# Patient Record
Sex: FEMALE | Race: WHITE | NOT HISPANIC OR LATINO | Employment: OTHER | URBAN - METROPOLITAN AREA
[De-identification: names, ages, dates, MRNs, and addresses within clinical notes are randomized per-mention and may not be internally consistent; named-entity substitution may affect disease eponyms.]

---

## 2019-05-07 ENCOUNTER — OFFICE VISIT (OUTPATIENT)
Dept: FAMILY MEDICINE CLINIC | Facility: CLINIC | Age: 44
End: 2019-05-07
Payer: COMMERCIAL

## 2019-05-07 VITALS
SYSTOLIC BLOOD PRESSURE: 116 MMHG | WEIGHT: 126 LBS | TEMPERATURE: 99.5 F | BODY MASS INDEX: 19.1 KG/M2 | HEIGHT: 68 IN | OXYGEN SATURATION: 97 % | HEART RATE: 83 BPM | DIASTOLIC BLOOD PRESSURE: 78 MMHG

## 2019-05-07 DIAGNOSIS — Z12.31 VISIT FOR SCREENING MAMMOGRAM: ICD-10-CM

## 2019-05-07 DIAGNOSIS — K44.9 HIATAL HERNIA: ICD-10-CM

## 2019-05-07 DIAGNOSIS — Z12.4 CERVICAL CANCER SCREENING: ICD-10-CM

## 2019-05-07 DIAGNOSIS — Z12.39 BREAST CANCER SCREENING: ICD-10-CM

## 2019-05-07 DIAGNOSIS — K29.70 GASTRITIS WITHOUT BLEEDING, UNSPECIFIED CHRONICITY, UNSPECIFIED GASTRITIS TYPE: Primary | ICD-10-CM

## 2019-05-07 DIAGNOSIS — B07.0 PLANTAR WART, LEFT FOOT: ICD-10-CM

## 2019-05-07 PROCEDURE — 99203 OFFICE O/P NEW LOW 30 MIN: CPT | Performed by: FAMILY MEDICINE

## 2019-06-07 ENCOUNTER — OFFICE VISIT (OUTPATIENT)
Dept: GASTROENTEROLOGY | Facility: CLINIC | Age: 44
End: 2019-06-07
Payer: COMMERCIAL

## 2019-06-07 VITALS
SYSTOLIC BLOOD PRESSURE: 100 MMHG | RESPIRATION RATE: 16 BRPM | DIASTOLIC BLOOD PRESSURE: 80 MMHG | BODY MASS INDEX: 18.79 KG/M2 | WEIGHT: 124 LBS | HEIGHT: 68 IN | HEART RATE: 70 BPM

## 2019-06-07 DIAGNOSIS — K64.9 HEMORRHOIDS, UNSPECIFIED HEMORRHOID TYPE: ICD-10-CM

## 2019-06-07 DIAGNOSIS — K44.9 HIATAL HERNIA: ICD-10-CM

## 2019-06-07 DIAGNOSIS — Z86.010 HISTORY OF COLON POLYPS: ICD-10-CM

## 2019-06-07 DIAGNOSIS — R11.0 NAUSEA: ICD-10-CM

## 2019-06-07 DIAGNOSIS — R10.13 EPIGASTRIC PAIN: Primary | ICD-10-CM

## 2019-06-07 DIAGNOSIS — K29.70 GASTRITIS WITHOUT BLEEDING, UNSPECIFIED CHRONICITY, UNSPECIFIED GASTRITIS TYPE: ICD-10-CM

## 2019-06-07 PROCEDURE — 99244 OFF/OP CNSLTJ NEW/EST MOD 40: CPT | Performed by: INTERNAL MEDICINE

## 2019-06-16 ENCOUNTER — HOSPITAL ENCOUNTER (OUTPATIENT)
Dept: ULTRASOUND IMAGING | Facility: HOSPITAL | Age: 44
Discharge: HOME | End: 2019-06-16
Attending: INTERNAL MEDICINE
Payer: COMMERCIAL

## 2019-06-16 DIAGNOSIS — R11.0 NAUSEA: ICD-10-CM

## 2019-06-16 DIAGNOSIS — R10.13 EPIGASTRIC PAIN: ICD-10-CM

## 2019-06-16 PROCEDURE — 76705 ECHO EXAM OF ABDOMEN: CPT

## 2019-06-21 ENCOUNTER — TELEPHONE (OUTPATIENT)
Dept: GASTROENTEROLOGY | Facility: CLINIC | Age: 44
End: 2019-06-21

## 2019-06-21 DIAGNOSIS — N28.9 RENAL LESION: Primary | ICD-10-CM

## 2019-06-24 ENCOUNTER — OFFICE VISIT (OUTPATIENT)
Dept: UROLOGY | Facility: AMBULATORY SURGERY CENTER | Age: 44
End: 2019-06-24
Payer: COMMERCIAL

## 2019-06-24 ENCOUNTER — TELEPHONE (OUTPATIENT)
Dept: GASTROENTEROLOGY | Facility: CLINIC | Age: 44
End: 2019-06-24

## 2019-06-24 VITALS
BODY MASS INDEX: 20 KG/M2 | DIASTOLIC BLOOD PRESSURE: 62 MMHG | HEIGHT: 67 IN | SYSTOLIC BLOOD PRESSURE: 100 MMHG | WEIGHT: 127.4 LBS | HEART RATE: 62 BPM

## 2019-06-24 DIAGNOSIS — N28.9 RENAL LESION: Primary | ICD-10-CM

## 2019-06-24 PROCEDURE — 99244 OFF/OP CNSLTJ NEW/EST MOD 40: CPT | Performed by: UROLOGY

## 2019-06-25 ENCOUNTER — APPOINTMENT (OUTPATIENT)
Dept: LAB | Facility: CLINIC | Age: 44
End: 2019-06-25
Payer: COMMERCIAL

## 2019-06-25 DIAGNOSIS — N28.9 RENAL LESION: ICD-10-CM

## 2019-06-25 LAB
ANION GAP SERPL CALCULATED.3IONS-SCNC: 5 MMOL/L (ref 4–13)
BUN SERPL-MCNC: 14 MG/DL (ref 5–25)
CALCIUM SERPL-MCNC: 8.8 MG/DL (ref 8.3–10.1)
CHLORIDE SERPL-SCNC: 105 MMOL/L (ref 100–108)
CO2 SERPL-SCNC: 26 MMOL/L (ref 21–32)
CREAT SERPL-MCNC: 0.79 MG/DL (ref 0.6–1.3)
GFR SERPL CREATININE-BSD FRML MDRD: 91 ML/MIN/1.73SQ M
GLUCOSE P FAST SERPL-MCNC: 76 MG/DL (ref 65–99)
POTASSIUM SERPL-SCNC: 4.3 MMOL/L (ref 3.5–5.3)
SODIUM SERPL-SCNC: 136 MMOL/L (ref 136–145)

## 2019-06-25 PROCEDURE — 36415 COLL VENOUS BLD VENIPUNCTURE: CPT

## 2019-06-25 PROCEDURE — 80048 BASIC METABOLIC PNL TOTAL CA: CPT

## 2019-06-28 ENCOUNTER — HOSPITAL ENCOUNTER (OUTPATIENT)
Dept: CT IMAGING | Facility: HOSPITAL | Age: 44
Discharge: HOME | End: 2019-06-28
Attending: UROLOGY
Payer: COMMERCIAL

## 2019-06-28 DIAGNOSIS — N28.9 RENAL LESION: ICD-10-CM

## 2019-06-28 PROCEDURE — 74170 CT ABD WO CNTRST FLWD CNTRST: CPT

## 2019-06-28 RX ADMIN — IOHEXOL 100 ML: 350 INJECTION, SOLUTION INTRAVENOUS at 06:54

## 2019-07-08 ENCOUNTER — TELEPHONE (OUTPATIENT)
Dept: GASTROENTEROLOGY | Facility: CLINIC | Age: 44
End: 2019-07-08

## 2019-07-10 ENCOUNTER — OFFICE VISIT (OUTPATIENT)
Dept: UROLOGY | Facility: AMBULATORY SURGERY CENTER | Age: 44
End: 2019-07-10
Payer: COMMERCIAL

## 2019-07-10 VITALS
WEIGHT: 127.2 LBS | DIASTOLIC BLOOD PRESSURE: 60 MMHG | SYSTOLIC BLOOD PRESSURE: 100 MMHG | BODY MASS INDEX: 19.97 KG/M2 | HEIGHT: 67 IN | HEART RATE: 80 BPM

## 2019-07-10 DIAGNOSIS — N28.89 RENAL MASS, RIGHT: ICD-10-CM

## 2019-07-10 DIAGNOSIS — F41.9 ANXIETY: Primary | ICD-10-CM

## 2019-07-10 PROCEDURE — 99215 OFFICE O/P EST HI 40 MIN: CPT | Performed by: UROLOGY

## 2019-07-10 RX ORDER — ALPRAZOLAM 0.25 MG/1
0.25 TABLET ORAL 2 TIMES DAILY PRN
Qty: 20 TABLET | Refills: 0 | Status: SHIPPED | OUTPATIENT
Start: 2019-07-10 | End: 2020-08-07 | Stop reason: SDUPTHER

## 2019-07-10 RX ORDER — CEFAZOLIN SODIUM 1 G/50ML
1000 SOLUTION INTRAVENOUS ONCE
Status: CANCELLED | OUTPATIENT
Start: 2019-07-10 | End: 2019-07-10

## 2019-07-10 NOTE — PROGRESS NOTES
Assessment/Plan:    Renal lesion  Had a very lengthy discussion with the patient and her   We reviewed her imaging in detail  We discussed that the lesion is concerning for malignancy and does not look like a angiomyolipoma on CT scan  We discussed there is a possible 15% chance that the lesion is benign  We discussed options including observation, biopsy, cryoablation, and surgical extirpation  The risks and benefits of all these options were discussed  The patient wishes to proceed with right robotic partial nephrectomy  We discussed all of the risks of the surgery and consented her for the surgery  All of her questions were answered  We will schedule this in the near future  The patient also asked for a refill on her xanax for help with her anxiety regarding this new diagnosis and to help her quit smoking  I did give her a small prescription and recommended that she discuss this further with her primary doctor  Diagnoses and all orders for this visit:    Anxiety  -     ALPRAZolam (XANAX) 0 25 mg tablet; Take 1 tablet (0 25 mg total) by mouth 2 (two) times a day as needed for anxiety    Renal mass, right  -     Case request operating room: [facility]; Standing  -     Case request operating room: NEPHRECTOMY PARTIAL LAPAROSCOPIC W ROBOTICS    Other orders  -     Diet NPO; Sips with meds; Standing  -     Place sequential compression device; Standing  -     ceFAZolin (ANCEF) IVPB (premix) 1,000 mg          Total visit time was 40 minutes of which over 50% was spent on counseling  Subjective:     Patient ID: Kelley Connolly is a 38 y o  female    79-year-old female presents for follow-up of recently diagnosed right renal mass  The patient underwent CT scan for further characterization and is here to discuss the results  She denies any changes in her health since her last appointment and has no new complaints          The following portions of the patient's history were reviewed and updated as appropriate: allergies, current medications, past family history, past medical history, past social history, past surgical history and problem list     Review of Systems   Constitutional: Negative  HENT: Negative  Eyes: Negative  Respiratory: Negative  Cardiovascular: Negative  Gastrointestinal: Negative  Endocrine: Negative  Genitourinary:        As noted per HPI   Musculoskeletal: Negative  Skin: Negative  Allergic/Immunologic: Negative  Neurological: Negative  Hematological: Negative  Psychiatric/Behavioral: Negative  Objective:    Physical Exam   Constitutional: She is oriented to person, place, and time  She appears well-developed and well-nourished  HENT:   Head: Normocephalic and atraumatic  Neck: Normal range of motion  Neck supple  Cardiovascular: Intact distal pulses  Pulmonary/Chest: Effort normal    Abdominal: Soft  Bowel sounds are normal  She exhibits no distension and no mass  There is no tenderness  There is no rebound and no guarding  Musculoskeletal: Normal range of motion  Neurological: She is alert and oriented to person, place, and time  Skin: Skin is warm and dry  Psychiatric: She has a normal mood and affect  Vitals reviewed          Results  No results found for: PSA  Lab Results   Component Value Date    CALCIUM 8 8 06/25/2019    K 4 3 06/25/2019    CO2 26 06/25/2019     06/25/2019    BUN 14 06/25/2019    CREATININE 0 79 06/25/2019     No results found for: WBC, HGB, HCT, MCV, PLT    No results found for this or any previous visit (from the past 1 hour(s)) ]

## 2019-07-11 ENCOUNTER — TELEPHONE (OUTPATIENT)
Dept: UROLOGY | Facility: AMBULATORY SURGERY CENTER | Age: 44
End: 2019-07-11

## 2019-07-11 NOTE — TELEPHONE ENCOUNTER
Called patient back -we discussed her having her period during  Surgery, pain after surgery which she doesn't want any pain medication  Explained that she needs to write down her questions prior to surgery if she has more questions

## 2019-07-14 ENCOUNTER — ANESTHESIA EVENT (OUTPATIENT)
Dept: GASTROENTEROLOGY | Facility: HOSPITAL | Age: 44
End: 2019-07-14

## 2019-07-14 RX ORDER — SODIUM CHLORIDE, SODIUM LACTATE, POTASSIUM CHLORIDE, CALCIUM CHLORIDE 600; 310; 30; 20 MG/100ML; MG/100ML; MG/100ML; MG/100ML
125 INJECTION, SOLUTION INTRAVENOUS CONTINUOUS
Status: CANCELLED | OUTPATIENT
Start: 2019-07-14

## 2019-07-15 ENCOUNTER — HOSPITAL ENCOUNTER (OUTPATIENT)
Dept: GASTROENTEROLOGY | Facility: HOSPITAL | Age: 44
Setting detail: OUTPATIENT SURGERY
Discharge: HOME | End: 2019-07-15
Attending: INTERNAL MEDICINE | Admitting: INTERNAL MEDICINE
Payer: COMMERCIAL

## 2019-07-15 ENCOUNTER — ANESTHESIA (OUTPATIENT)
Dept: GASTROENTEROLOGY | Facility: HOSPITAL | Age: 44
End: 2019-07-15

## 2019-07-15 ENCOUNTER — TELEPHONE (OUTPATIENT)
Dept: UROLOGY | Facility: AMBULATORY SURGERY CENTER | Age: 44
End: 2019-07-15

## 2019-07-15 VITALS
RESPIRATION RATE: 16 BRPM | OXYGEN SATURATION: 100 % | SYSTOLIC BLOOD PRESSURE: 146 MMHG | TEMPERATURE: 97.9 F | WEIGHT: 124.34 LBS | BODY MASS INDEX: 19.52 KG/M2 | HEART RATE: 72 BPM | HEIGHT: 67 IN | DIASTOLIC BLOOD PRESSURE: 77 MMHG

## 2019-07-15 DIAGNOSIS — K29.70 GASTRITIS WITHOUT BLEEDING, UNSPECIFIED CHRONICITY, UNSPECIFIED GASTRITIS TYPE: ICD-10-CM

## 2019-07-15 DIAGNOSIS — R10.13 EPIGASTRIC PAIN: ICD-10-CM

## 2019-07-15 DIAGNOSIS — K44.9 HIATAL HERNIA: ICD-10-CM

## 2019-07-15 DIAGNOSIS — R11.0 NAUSEA: ICD-10-CM

## 2019-07-15 DIAGNOSIS — Z86.010 HISTORY OF COLON POLYPS: ICD-10-CM

## 2019-07-15 DIAGNOSIS — K64.9 HEMORRHOIDS, UNSPECIFIED HEMORRHOID TYPE: ICD-10-CM

## 2019-07-15 PROBLEM — N28.89 RENAL MASS, RIGHT: Status: ACTIVE | Noted: 2019-07-15

## 2019-07-15 LAB
EXT PREGNANCY TEST URINE: NEGATIVE
EXT. CONTROL: NORMAL

## 2019-07-15 PROCEDURE — 43239 EGD BIOPSY SINGLE/MULTIPLE: CPT | Performed by: INTERNAL MEDICINE

## 2019-07-15 PROCEDURE — 88305 TISSUE EXAM BY PATHOLOGIST: CPT | Performed by: PATHOLOGY

## 2019-07-15 PROCEDURE — 81025 URINE PREGNANCY TEST: CPT | Performed by: INTERNAL MEDICINE

## 2019-07-15 PROCEDURE — 45384 COLONOSCOPY W/LESION REMOVAL: CPT | Performed by: INTERNAL MEDICINE

## 2019-07-15 RX ORDER — PROPOFOL 10 MG/ML
INJECTION, EMULSION INTRAVENOUS AS NEEDED
Status: DISCONTINUED | OUTPATIENT
Start: 2019-07-15 | End: 2019-07-15 | Stop reason: SURG

## 2019-07-15 RX ORDER — SODIUM CHLORIDE, SODIUM LACTATE, POTASSIUM CHLORIDE, CALCIUM CHLORIDE 600; 310; 30; 20 MG/100ML; MG/100ML; MG/100ML; MG/100ML
INJECTION, SOLUTION INTRAVENOUS CONTINUOUS PRN
Status: DISCONTINUED | OUTPATIENT
Start: 2019-07-15 | End: 2019-07-15 | Stop reason: SURG

## 2019-07-15 RX ORDER — LIDOCAINE HYDROCHLORIDE 10 MG/ML
INJECTION, SOLUTION INFILTRATION; PERINEURAL AS NEEDED
Status: DISCONTINUED | OUTPATIENT
Start: 2019-07-15 | End: 2019-07-15 | Stop reason: SURG

## 2019-07-15 RX ORDER — ACETAMINOPHEN 325 MG/1
650 TABLET ORAL EVERY 6 HOURS PRN
COMMUNITY
End: 2021-04-16 | Stop reason: HOSPADM

## 2019-07-15 RX ADMIN — PROPOFOL 50 MG: 10 INJECTION, EMULSION INTRAVENOUS at 07:35

## 2019-07-15 RX ADMIN — PROPOFOL 50 MG: 10 INJECTION, EMULSION INTRAVENOUS at 07:47

## 2019-07-15 RX ADMIN — LIDOCAINE HYDROCHLORIDE 50 MG: 10 INJECTION, SOLUTION INFILTRATION; PERINEURAL at 07:32

## 2019-07-15 RX ADMIN — PROPOFOL 50 MG: 10 INJECTION, EMULSION INTRAVENOUS at 07:44

## 2019-07-15 RX ADMIN — PROPOFOL 100 MG: 10 INJECTION, EMULSION INTRAVENOUS at 07:32

## 2019-07-15 RX ADMIN — PROPOFOL 50 MG: 10 INJECTION, EMULSION INTRAVENOUS at 07:38

## 2019-07-15 RX ADMIN — SODIUM CHLORIDE, SODIUM LACTATE, POTASSIUM CHLORIDE, AND CALCIUM CHLORIDE: .6; .31; .03; .02 INJECTION, SOLUTION INTRAVENOUS at 07:30

## 2019-07-15 RX ADMIN — PROPOFOL 50 MG: 10 INJECTION, EMULSION INTRAVENOUS at 07:40

## 2019-07-15 RX ADMIN — PROPOFOL 50 MG: 10 INJECTION, EMULSION INTRAVENOUS at 07:42

## 2019-07-15 NOTE — ANESTHESIA POSTPROCEDURE EVALUATION
Post-Op Assessment Note    CV Status:  Stable    Pain management: adequate     Mental Status:  Alert and awake   Hydration Status:  Euvolemic   PONV Controlled:  Controlled   Airway Patency:  Patent   Post Op Vitals Reviewed: Yes      Staff: CRNA           BP   92/52   Temp      Pulse  80   Resp 18   SpO2   94

## 2019-07-15 NOTE — TELEPHONE ENCOUNTER
Spoke to Patient to confirm surgery with Dr Torrence Primrose 8/26/19 @ [facility]  Instructions given and testing needed discussed  Paperwork to be emailed today

## 2019-07-15 NOTE — H&P
History and Physical -  [facility]  April Cathleen 44 y o  female MRN: [id number]                  HPI: April Alan Madsen is a 38y o  year old female who presents for EGD and colonoscopy for epigastric pain, nausea, hiatal hernia, history of gastritis, history of colon polyps  Last colonoscopy 5 years ago  REVIEW OF SYSTEMS: Per the HPI, and otherwise unremarkable  Historical Information   Past Medical History:   Diagnosis Date    Chronic kidney disease     tumor and cyst    Colon polyp     Gastritis      History reviewed  No pertinent surgical history  Social History   Social History     Substance and Sexual Activity   Alcohol Use Not Currently     Social History     Substance and Sexual Activity   Drug Use Never     Social History     Tobacco Use   Smoking Status Current Every Day Smoker    Packs/day: 1 00    Years: 25 00    Pack years: 25 00   Smokeless Tobacco Never Used     Family History   Problem Relation Age of Onset    Heart disease Father     Cancer Paternal Grandmother     Diabetes Paternal Grandmother     Other Maternal Aunt         Renal Cell Carcinoma       Meds/Allergies       (Not in a hospital admission)    Allergies   Allergen Reactions    Strawberry (Diagnostic) Hives    Tetracycline      breast swelling        Objective     Blood pressure 118/74, pulse 81, temperature 98 3 °F (36 8 °C), temperature source Oral, resp  rate 14, height 5' 7" (1 702 m), weight 56 4 kg (124 lb 5 4 oz), SpO2 97 %        PHYSICAL EXAM    Gen: NAD  CV: RRR  CHEST: Clear  ABD: soft, NT/ND  EXT: no edema  Neuro: AAO      ASSESSMENT/PLAN:  This is a 38y o  year old female here for epigastric pain, nausea, history of polyps    PLAN:   Procedure:  EGD and colonoscopy

## 2019-07-15 NOTE — DISCHARGE INSTRUCTIONS
Upper Endoscopy   WHAT YOU NEED TO KNOW:   An upper endoscopy is also called an upper gastrointestinal (GI) endoscopy, or an esophagogastroduodenoscopy (EGD)  You may feel bloated, gassy, or have some abdominal discomfort after your procedure  Your throat may be sore for 24 to 36 hours  You may burp or pass gas from air that is still inside your body  DISCHARGE INSTRUCTIONS:   Call 911 for any of the following:   · You have sudden chest pain or trouble breathing  Seek care immediately if:   · You feel dizzy or faint  · You have trouble swallowing  · Your bowel movements are very dark or black  · Your abdomen is hard and firm and you have severe pain  · You vomit blood  Contact your healthcare provider if:   · You feel full or bloated and cannot burp or pass gas  · You have not had a bowel movement for 3 days after your procedure  · You have neck pain  · You have a fever or chills  · You have nausea or are vomiting  · You have a rash or hives  · You have questions or concerns about your endoscopy  Relieve a sore throat:  Suck on throat lozenges or crushed ice  Gargle with a small amount of warm salt water  Mix 1 teaspoon of salt and 1 cup of warm water to make salt water  Relieve gas and discomfort from bloating:  Lie on your right side with a heating pad on your abdomen  Take short walks to help pass gas  Eat small meals until bloating is relieved  Rest after your procedure: You have been given medicine to relax you  Do not  drive or make important decisions until the day after your procedure  Return to your normal activity as directed  You can usually return to work the day after your procedure  Follow up with your healthcare provider as directed:  Write down your questions so you remember to ask them during your visits     © 2017 [address] is for End User's use only and may not be sold, redistributed or otherwise used for commercial purposes  All illustrations and images included in CareNotes® are the copyrighted property of [employer]  or Anthony Ramachandran  The above information is an  only  It is not intended as medical advice for individual conditions or treatments  Talk to your doctor, nurse or pharmacist before following any medical regimen to see if it is safe and effective for you  Colonoscopy   WHAT YOU NEED TO KNOW:   A colonoscopy is a procedure to examine the inside of your colon (intestine) with a scope  Polyps or tissue growths may have been removed during your colonoscopy  It is normal to feel bloated and to have some abdominal discomfort  You should be passing gas  If you have hemorrhoids or you had polyps removed, you may have a small amount of bleeding  DISCHARGE INSTRUCTIONS:   Seek care immediately if:   · You have a large amount of bright red blood in your bowel movements  · Your abdomen is hard and firm and you have severe pain  · You have sudden trouble breathing  Contact your healthcare provider if:   · You develop a rash or hives  · You have a fever within 24 hours of your procedure       · You have not had a bowel movement for 3 days after your procedure  · You have questions or concerns about your condition or care  Activity:   · Do not lift, strain, or run  for 3 days after your procedure  · Rest after your procedure  You have been given medicine to relax you  Do not  drive or make important decisions until the day after your procedure  Return to your normal activity as directed  · Relieve gas and discomfort from bloating  by lying on your right side with a heating pad on your abdomen  You may need to take short walks to help the gas move out  Eat small meals until bloating is relieved  If you had polyps removed: For 7 days after your procedure:  · Do not  take aspirin  · Do not  go on long car rides    Follow up with your healthcare provider as directed: Write down your questions so you remember to ask them during your visits  © 2017 [address] is for End User's use only and may not be sold, redistributed or otherwise used for commercial purposes  All illustrations and images included in CareNotes® are the copyrighted property of [employer]  or Anthony Ramachandran  The above information is an  only  It is not intended as medical advice for individual conditions or treatments  Talk to your doctor, nurse or pharmacist before following any medical regimen to see if it is safe and effective for you

## 2019-07-15 NOTE — ANESTHESIA PREPROCEDURE EVALUATION
Review of Systems/Medical History  Patient summary reviewed  Chart reviewed  No history of anesthetic complications     Cardiovascular  Exercise tolerance (METS): >4,     Pulmonary  Smoker cigarette smoker  , Tobacco cessation counseling given ,        GI/Hepatic     Hiatal hernia,             Endo/Other     GYN       Hematology   Musculoskeletal       Neurology   Psychology           Physical Exam    Airway    Mallampati score: II  TM Distance: >3 FB  Neck ROM: full     Dental   No notable dental hx     Cardiovascular      Pulmonary      Other Findings        Anesthesia Plan  ASA Score- 2     Anesthesia Type- IV sedation with anesthesia with ASA Monitors  Additional Monitors:   Airway Plan:         Plan Factors- Patient instructed to abstain from smoking on day of procedure  Patient did not smoke on day of surgery  Induction- intravenous  Postoperative Plan-     Informed Consent- Anesthetic plan and risks discussed with patient  I personally reviewed this patient with the CRNA  Discussed and agreed on the Anesthesia Plan with the CRNA  Mendy Garcia

## 2019-07-15 NOTE — TELEPHONE ENCOUNTER
Patient requesting to discuss bowel prep with Dr Kamla Rosa due to issue today @ colonoscopy  Please call [phone]  Thank you!

## 2019-08-12 ENCOUNTER — APPOINTMENT (OUTPATIENT)
Dept: LAB | Facility: HOSPITAL | Age: 44
End: 2019-08-12
Attending: UROLOGY
Payer: COMMERCIAL

## 2019-08-12 DIAGNOSIS — N28.89 RENAL MASS, RIGHT: ICD-10-CM

## 2019-08-12 LAB
ABO GROUP BLD: NORMAL
ANION GAP SERPL CALCULATED.3IONS-SCNC: 4 MMOL/L (ref 4–13)
APTT PPP: 29 SECONDS (ref 23–37)
BASOPHILS # BLD AUTO: 0.05 THOUSANDS/ΜL (ref 0–0.1)
BASOPHILS NFR BLD AUTO: 1 % (ref 0–1)
BLD GP AB SCN SERPL QL: NEGATIVE
BUN SERPL-MCNC: 9 MG/DL (ref 5–25)
CALCIUM SERPL-MCNC: 8.9 MG/DL (ref 8.3–10.1)
CHLORIDE SERPL-SCNC: 105 MMOL/L (ref 100–108)
CO2 SERPL-SCNC: 28 MMOL/L (ref 21–32)
CREAT SERPL-MCNC: 0.74 MG/DL (ref 0.6–1.3)
EOSINOPHIL # BLD AUTO: 0.09 THOUSAND/ΜL (ref 0–0.61)
EOSINOPHIL NFR BLD AUTO: 2 % (ref 0–6)
ERYTHROCYTE [DISTWIDTH] IN BLOOD BY AUTOMATED COUNT: 12.9 % (ref 11.6–15.1)
GFR SERPL CREATININE-BSD FRML MDRD: 98 ML/MIN/1.73SQ M
GLUCOSE P FAST SERPL-MCNC: 82 MG/DL (ref 65–99)
HCT VFR BLD AUTO: 44.1 % (ref 34.8–46.1)
HGB BLD-MCNC: 14.7 G/DL (ref 11.5–15.4)
IMM GRANULOCYTES # BLD AUTO: 0.02 THOUSAND/UL (ref 0–0.2)
IMM GRANULOCYTES NFR BLD AUTO: 0 % (ref 0–2)
INR PPP: 1.08 (ref 0.84–1.19)
LYMPHOCYTES # BLD AUTO: 2.13 THOUSANDS/ΜL (ref 0.6–4.47)
LYMPHOCYTES NFR BLD AUTO: 36 % (ref 14–44)
MCH RBC QN AUTO: 30.6 PG (ref 26.8–34.3)
MCHC RBC AUTO-ENTMCNC: 33.3 G/DL (ref 31.4–37.4)
MCV RBC AUTO: 92 FL (ref 82–98)
MONOCYTES # BLD AUTO: 0.4 THOUSAND/ΜL (ref 0.17–1.22)
MONOCYTES NFR BLD AUTO: 7 % (ref 4–12)
NEUTROPHILS # BLD AUTO: 3.25 THOUSANDS/ΜL (ref 1.85–7.62)
NEUTS SEG NFR BLD AUTO: 54 % (ref 43–75)
NRBC BLD AUTO-RTO: 0 /100 WBCS
PLATELET # BLD AUTO: 215 THOUSANDS/UL (ref 149–390)
PMV BLD AUTO: 9.2 FL (ref 8.9–12.7)
POTASSIUM SERPL-SCNC: 4.1 MMOL/L (ref 3.5–5.3)
PROTHROMBIN TIME: 13.6 SECONDS (ref 11.6–14.5)
RBC # BLD AUTO: 4.81 MILLION/UL (ref 3.81–5.12)
RH BLD: POSITIVE
SODIUM SERPL-SCNC: 137 MMOL/L (ref 136–145)
SPECIMEN EXPIRATION DATE: NORMAL
WBC # BLD AUTO: 5.94 THOUSAND/UL (ref 4.31–10.16)

## 2019-08-12 PROCEDURE — 86900 BLOOD TYPING SEROLOGIC ABO: CPT

## 2019-08-12 PROCEDURE — 86850 RBC ANTIBODY SCREEN: CPT

## 2019-08-12 PROCEDURE — 85610 PROTHROMBIN TIME: CPT

## 2019-08-12 PROCEDURE — 36415 COLL VENOUS BLD VENIPUNCTURE: CPT

## 2019-08-12 PROCEDURE — 85730 THROMBOPLASTIN TIME PARTIAL: CPT

## 2019-08-12 PROCEDURE — 86901 BLOOD TYPING SEROLOGIC RH(D): CPT

## 2019-08-12 PROCEDURE — 80048 BASIC METABOLIC PNL TOTAL CA: CPT

## 2019-08-12 PROCEDURE — 85025 COMPLETE CBC W/AUTO DIFF WBC: CPT

## 2019-08-20 ENCOUNTER — TELEPHONE (OUTPATIENT)
Dept: UROLOGY | Facility: AMBULATORY SURGERY CENTER | Age: 44
End: 2019-08-20

## 2019-08-20 NOTE — TELEPHONE ENCOUNTER
April is scheduled for Robotic assisted partial right nephrectomy on 8/26/19 with Dr Baylee Melgoza  Called her  Reviewed bowel prep  P/O restrictions were reviewed including no lifting greater than 10 lbs for 6 weeks after surgery and no driving until cleared at her P/O visit  She currently does not have P/O visit scheduled  Clinical team made aware  She does not have any transportation issues and her  will help her during her recovery  All of her questions about surgery were answered  She was instructed to avoid ibuprofen until after evaluated by Dr Baylee Melgoza at her P/O visit  She was advised to take the pain medication prescribed after surgery or tylenol  She was given my direct extension to call with questions

## 2019-08-23 NOTE — PRE-PROCEDURE INSTRUCTIONS
Pre-Surgery Instructions:   Medication Instructions    acetaminophen (TYLENOL) 325 mg tablet Instructed patient per Anesthesia Guidelines   ALPRAZolam (XANAX) 0 25 mg tablet Instructed patient per Anesthesia Guidelines

## 2019-08-23 NOTE — PRE-PROCEDURE INSTRUCTIONS
Pre-Surgery Instructions:   Medication Instructions    acetaminophen (TYLENOL) 325 mg tablet Instructed patient per Anesthesia Guidelines   ALPRAZolam (XANAX) 0 25 mg tablet Instructed patient per Anesthesia Guidelines  Continue to take this medication on your normal schedule  If this is an oral medication and you take it in the morning, then you may take this medicine with a sip of water  Benzodiazepine antagonist Med Class     If this medication is needed please continue to take on your normal schedule  If you take it in the morning, then you may take this medicine with a sip of water

## 2019-08-25 ENCOUNTER — ANESTHESIA EVENT (OUTPATIENT)
Dept: PERIOP | Facility: HOSPITAL | Age: 44
End: 2019-08-25
Payer: COMMERCIAL

## 2019-08-25 NOTE — ANESTHESIA PREPROCEDURE EVALUATION
Review of Systems/Medical History  Patient summary reviewed  Chart reviewed  No history of anesthetic complications     Cardiovascular  Negative cardio ROS Exercise tolerance (METS): >4,     Pulmonary  Smoker cigarette smoker  ,        GI/Hepatic    GERD ,  Hiatal hernia,        Genitourinary malignancy renal cancer,        Endo/Other  Negative endo/other ROS      GYN  Negative gynecology ROS          Hematology  Negative hematology ROS      Musculoskeletal    Arthritis     Neurology  Negative neurology ROS      Psychology   Anxiety,          CT abd 6/2019:   1  Predominantly solid enhancing mass with small cystic component arising from the lower pole of the right kidney corresponding to findings on recent ultrasound and concerning for renal cell carcinoma  There is no retroperitoneal lymphadenopathy  The   renal vein is patent  2  No clinically significant abnormality identified in the visualized lower chest     U/S abd 6/2019:  Right lower pole renal 1 6 x 1 7 cm hyperechoic slightly vascular lesion could represent angiomyolipoma although lipid-containing renal cell carcinoma cannot be excluded  Please follow-up with contrast-enhanced abdominal MRI renal protocol      Recent Results (from the past 672 hour(s))   Protime-INR    Collection Time: 08/12/19  9:39 AM   Result Value Ref Range    Protime 13 6 11 6 - 14 5 seconds    INR 1 08 0 84 - 1 19   APTT    Collection Time: 08/12/19  9:39 AM   Result Value Ref Range    PTT 29 23 - 37 seconds   CBC and differential    Collection Time: 08/12/19  9:39 AM   Result Value Ref Range    WBC 5 94 4 31 - 10 16 Thousand/uL    RBC 4 81 3 81 - 5 12 Million/uL    Hemoglobin 14 7 11 5 - 15 4 g/dL    Hematocrit 44 1 34 8 - 46 1 %    MCV 92 82 - 98 fL    MCH 30 6 26 8 - 34 3 pg    MCHC 33 3 31 4 - 37 4 g/dL    RDW 12 9 11 6 - 15 1 %    MPV 9 2 8 9 - 12 7 fL    Platelets 631 810 - 489 Thousands/uL    nRBC 0 /100 WBCs    Neutrophils Relative 54 43 - 75 %    Immat GRANS % 0 0 - 2 %    Lymphocytes Relative 36 14 - 44 %    Monocytes Relative 7 4 - 12 %    Eosinophils Relative 2 0 - 6 %    Basophils Relative 1 0 - 1 %    Neutrophils Absolute 3 25 1 85 - 7 62 Thousands/µL    Immature Grans Absolute 0 02 0 00 - 0 20 Thousand/uL    Lymphocytes Absolute 2 13 0 60 - 4 47 Thousands/µL    Monocytes Absolute 0 40 0 17 - 1 22 Thousand/µL    Eosinophils Absolute 0 09 0 00 - 0 61 Thousand/µL    Basophils Absolute 0 05 0 00 - 0 10 Thousands/µL   Basic metabolic panel    Collection Time: 08/12/19  9:39 AM   Result Value Ref Range    Sodium 137 136 - 145 mmol/L    Potassium 4 1 3 5 - 5 3 mmol/L    Chloride 105 100 - 108 mmol/L    CO2 28 21 - 32 mmol/L    ANION GAP 4 4 - 13 mmol/L    BUN 9 5 - 25 mg/dL    Creatinine 0 74 0 60 - 1 30 mg/dL    Glucose, Fasting 82 65 - 99 mg/dL    Calcium 8 9 8 3 - 10 1 mg/dL    eGFR 98 ml/min/1 73sq m   Type and screen    Collection Time: 08/12/19  9:39 AM   Result Value Ref Range    ABO Grouping B     Rh Factor Positive     Antibody Screen Negative     Specimen Expiration Date [id number]        Physical Exam    Airway    Mallampati score: II  TM Distance: >3 FB  Neck ROM: full     Dental       Cardiovascular  Comment: Negative ROS, Rhythm: regular, Rate: normal, No murmur,     Pulmonary  Breath sounds clear to auscultation,     Other Findings        Anesthesia Plan  ASA Score- 3     Anesthesia Type- general with ASA Monitors  Additional Monitors: arterial line  Airway Plan: ETT  Comment: Scop patch; multimodal analgesia;      Plan Factors-    Induction- intravenous  Postoperative Plan- Plan for postoperative opioid use  Planned trial extubation    Informed Consent- Anesthetic plan and risks discussed with patient  I personally reviewed this patient with the CRNA  Discussed and agreed on the Anesthesia Plan with the CRNA  Yohannes Plasencia

## 2019-08-26 ENCOUNTER — HOSPITAL ENCOUNTER (OUTPATIENT)
Facility: HOSPITAL | Age: 44
Setting detail: OUTPATIENT SURGERY
Discharge: HOME | End: 2019-08-28
Attending: UROLOGY | Admitting: UROLOGY
Payer: COMMERCIAL

## 2019-08-26 ENCOUNTER — ANESTHESIA (OUTPATIENT)
Dept: PERIOP | Facility: HOSPITAL | Age: 44
End: 2019-08-26
Payer: COMMERCIAL

## 2019-08-26 DIAGNOSIS — N28.89 RENAL MASS, RIGHT: ICD-10-CM

## 2019-08-26 LAB
ABO GROUP BLD: NORMAL
ANION GAP SERPL CALCULATED.3IONS-SCNC: 8 MMOL/L (ref 4–13)
BLD GP AB SCN SERPL QL: NEGATIVE
BUN SERPL-MCNC: 7 MG/DL (ref 5–25)
CALCIUM SERPL-MCNC: 7.7 MG/DL (ref 8.3–10.1)
CHLORIDE SERPL-SCNC: 108 MMOL/L (ref 100–108)
CO2 SERPL-SCNC: 23 MMOL/L (ref 21–32)
CREAT SERPL-MCNC: 0.7 MG/DL (ref 0.6–1.3)
ERYTHROCYTE [DISTWIDTH] IN BLOOD BY AUTOMATED COUNT: 12.8 % (ref 11.6–15.1)
EXT PREGNANCY TEST URINE: NEGATIVE
EXT. CONTROL: NORMAL
GFR SERPL CREATININE-BSD FRML MDRD: 105 ML/MIN/1.73SQ M
GLUCOSE P FAST SERPL-MCNC: 109 MG/DL (ref 65–99)
GLUCOSE SERPL-MCNC: 109 MG/DL (ref 65–140)
HCT VFR BLD AUTO: 39 % (ref 34.8–46.1)
HGB BLD-MCNC: 13 G/DL (ref 11.5–15.4)
MCH RBC QN AUTO: 31 PG (ref 26.8–34.3)
MCHC RBC AUTO-ENTMCNC: 33.3 G/DL (ref 31.4–37.4)
MCV RBC AUTO: 93 FL (ref 82–98)
PLATELET # BLD AUTO: 177 THOUSANDS/UL (ref 149–390)
PMV BLD AUTO: 9.3 FL (ref 8.9–12.7)
POTASSIUM SERPL-SCNC: 3.9 MMOL/L (ref 3.5–5.3)
RBC # BLD AUTO: 4.19 MILLION/UL (ref 3.81–5.12)
RH BLD: POSITIVE
SODIUM SERPL-SCNC: 139 MMOL/L (ref 136–145)
SPECIMEN EXPIRATION DATE: NORMAL
WBC # BLD AUTO: 8.89 THOUSAND/UL (ref 4.31–10.16)

## 2019-08-26 PROCEDURE — 50543 LAPARO PARTIAL NEPHRECTOMY: CPT | Performed by: UROLOGY

## 2019-08-26 PROCEDURE — 86850 RBC ANTIBODY SCREEN: CPT | Performed by: UROLOGY

## 2019-08-26 PROCEDURE — 88341 IMHCHEM/IMCYTCHM EA ADD ANTB: CPT | Performed by: PATHOLOGY

## 2019-08-26 PROCEDURE — 50543 LAPARO PARTIAL NEPHRECTOMY: CPT | Performed by: PHYSICIAN ASSISTANT

## 2019-08-26 PROCEDURE — 88342 IMHCHEM/IMCYTCHM 1ST ANTB: CPT | Performed by: PATHOLOGY

## 2019-08-26 PROCEDURE — 80048 BASIC METABOLIC PNL TOTAL CA: CPT | Performed by: UROLOGY

## 2019-08-26 PROCEDURE — 81025 URINE PREGNANCY TEST: CPT | Performed by: UROLOGY

## 2019-08-26 PROCEDURE — NC001 PR NO CHARGE: Performed by: UROLOGY

## 2019-08-26 PROCEDURE — 88307 TISSUE EXAM BY PATHOLOGIST: CPT | Performed by: PATHOLOGY

## 2019-08-26 PROCEDURE — 85027 COMPLETE CBC AUTOMATED: CPT | Performed by: UROLOGY

## 2019-08-26 PROCEDURE — 86923 COMPATIBILITY TEST ELECTRIC: CPT

## 2019-08-26 PROCEDURE — 86901 BLOOD TYPING SEROLOGIC RH(D): CPT | Performed by: UROLOGY

## 2019-08-26 PROCEDURE — 86900 BLOOD TYPING SEROLOGIC ABO: CPT | Performed by: UROLOGY

## 2019-08-26 PROCEDURE — NC001 PR NO CHARGE: Performed by: PHYSICIAN ASSISTANT

## 2019-08-26 RX ORDER — ONDANSETRON 2 MG/ML
INJECTION INTRAMUSCULAR; INTRAVENOUS AS NEEDED
Status: DISCONTINUED | OUTPATIENT
Start: 2019-08-26 | End: 2019-08-26 | Stop reason: SURG

## 2019-08-26 RX ORDER — GABAPENTIN 300 MG/1
300 CAPSULE ORAL ONCE
Status: COMPLETED | OUTPATIENT
Start: 2019-08-26 | End: 2019-08-26

## 2019-08-26 RX ORDER — SODIUM CHLORIDE, SODIUM LACTATE, POTASSIUM CHLORIDE, CALCIUM CHLORIDE 600; 310; 30; 20 MG/100ML; MG/100ML; MG/100ML; MG/100ML
75 INJECTION, SOLUTION INTRAVENOUS CONTINUOUS
Status: DISCONTINUED | OUTPATIENT
Start: 2019-08-26 | End: 2019-08-26

## 2019-08-26 RX ORDER — SCOLOPAMINE TRANSDERMAL SYSTEM 1 MG/1
1 PATCH, EXTENDED RELEASE TRANSDERMAL ONCE
Status: DISCONTINUED | OUTPATIENT
Start: 2019-08-26 | End: 2019-08-26

## 2019-08-26 RX ORDER — PROPOFOL 10 MG/ML
INJECTION, EMULSION INTRAVENOUS AS NEEDED
Status: DISCONTINUED | OUTPATIENT
Start: 2019-08-26 | End: 2019-08-26 | Stop reason: SURG

## 2019-08-26 RX ORDER — HYDROCODONE BITARTRATE AND ACETAMINOPHEN 5; 325 MG/1; MG/1
2 TABLET ORAL EVERY 4 HOURS PRN
Status: DISCONTINUED | OUTPATIENT
Start: 2019-08-26 | End: 2019-08-28 | Stop reason: HOSPADM

## 2019-08-26 RX ORDER — ONDANSETRON 2 MG/ML
4 INJECTION INTRAMUSCULAR; INTRAVENOUS ONCE AS NEEDED
Status: DISCONTINUED | OUTPATIENT
Start: 2019-08-26 | End: 2019-08-26 | Stop reason: HOSPADM

## 2019-08-26 RX ORDER — CEFAZOLIN SODIUM 1 G/50ML
1000 SOLUTION INTRAVENOUS ONCE
Status: COMPLETED | OUTPATIENT
Start: 2019-08-26 | End: 2019-08-26

## 2019-08-26 RX ORDER — PROPOFOL 10 MG/ML
INJECTION, EMULSION INTRAVENOUS CONTINUOUS PRN
Status: DISCONTINUED | OUTPATIENT
Start: 2019-08-26 | End: 2019-08-26 | Stop reason: SURG

## 2019-08-26 RX ORDER — EPHEDRINE SULFATE 50 MG/ML
INJECTION INTRAVENOUS AS NEEDED
Status: DISCONTINUED | OUTPATIENT
Start: 2019-08-26 | End: 2019-08-26 | Stop reason: SURG

## 2019-08-26 RX ORDER — ACETAMINOPHEN 325 MG/1
650 TABLET ORAL EVERY 6 HOURS PRN
Status: DISCONTINUED | OUTPATIENT
Start: 2019-08-26 | End: 2019-08-28 | Stop reason: HOSPADM

## 2019-08-26 RX ORDER — METOCLOPRAMIDE HYDROCHLORIDE 5 MG/ML
10 INJECTION INTRAMUSCULAR; INTRAVENOUS ONCE AS NEEDED
Status: DISCONTINUED | OUTPATIENT
Start: 2019-08-26 | End: 2019-08-26 | Stop reason: HOSPADM

## 2019-08-26 RX ORDER — DOCUSATE SODIUM 100 MG/1
100 CAPSULE, LIQUID FILLED ORAL 2 TIMES DAILY
Status: DISCONTINUED | OUTPATIENT
Start: 2019-08-26 | End: 2019-08-28 | Stop reason: HOSPADM

## 2019-08-26 RX ORDER — HYDROMORPHONE HCL/PF 1 MG/ML
0.2 SYRINGE (ML) INJECTION
Status: DISCONTINUED | OUTPATIENT
Start: 2019-08-26 | End: 2019-08-26 | Stop reason: HOSPADM

## 2019-08-26 RX ORDER — ACETAMINOPHEN 325 MG/1
975 TABLET ORAL ONCE
Status: COMPLETED | OUTPATIENT
Start: 2019-08-26 | End: 2019-08-26

## 2019-08-26 RX ORDER — SODIUM CHLORIDE 9 MG/ML
INJECTION, SOLUTION INTRAVENOUS CONTINUOUS PRN
Status: DISCONTINUED | OUTPATIENT
Start: 2019-08-26 | End: 2019-08-26 | Stop reason: SURG

## 2019-08-26 RX ORDER — FENTANYL CITRATE 50 UG/ML
INJECTION, SOLUTION INTRAMUSCULAR; INTRAVENOUS AS NEEDED
Status: DISCONTINUED | OUTPATIENT
Start: 2019-08-26 | End: 2019-08-26 | Stop reason: SURG

## 2019-08-26 RX ORDER — FENTANYL CITRATE/PF 50 MCG/ML
50 SYRINGE (ML) INJECTION
Status: COMPLETED | OUTPATIENT
Start: 2019-08-26 | End: 2019-08-26

## 2019-08-26 RX ORDER — MIDAZOLAM HYDROCHLORIDE 1 MG/ML
INJECTION INTRAMUSCULAR; INTRAVENOUS AS NEEDED
Status: DISCONTINUED | OUTPATIENT
Start: 2019-08-26 | End: 2019-08-26 | Stop reason: SURG

## 2019-08-26 RX ORDER — LIDOCAINE HYDROCHLORIDE 10 MG/ML
0.5 INJECTION, SOLUTION EPIDURAL; INFILTRATION; INTRACAUDAL; PERINEURAL ONCE AS NEEDED
Status: DISCONTINUED | OUTPATIENT
Start: 2019-08-26 | End: 2019-08-26 | Stop reason: HOSPADM

## 2019-08-26 RX ORDER — ROCURONIUM BROMIDE 10 MG/ML
INJECTION, SOLUTION INTRAVENOUS AS NEEDED
Status: DISCONTINUED | OUTPATIENT
Start: 2019-08-26 | End: 2019-08-26 | Stop reason: SURG

## 2019-08-26 RX ORDER — MAGNESIUM HYDROXIDE 1200 MG/15ML
LIQUID ORAL AS NEEDED
Status: DISCONTINUED | OUTPATIENT
Start: 2019-08-26 | End: 2019-08-26 | Stop reason: HOSPADM

## 2019-08-26 RX ORDER — NICOTINE 21 MG/24HR
1 PATCH, TRANSDERMAL 24 HOURS TRANSDERMAL DAILY
Status: DISCONTINUED | OUTPATIENT
Start: 2019-08-27 | End: 2019-08-28 | Stop reason: HOSPADM

## 2019-08-26 RX ORDER — HYDROMORPHONE HYDROCHLORIDE 2 MG/ML
INJECTION, SOLUTION INTRAMUSCULAR; INTRAVENOUS; SUBCUTANEOUS AS NEEDED
Status: DISCONTINUED | OUTPATIENT
Start: 2019-08-26 | End: 2019-08-26 | Stop reason: SURG

## 2019-08-26 RX ORDER — GLYCOPYRROLATE 0.2 MG/ML
INJECTION INTRAMUSCULAR; INTRAVENOUS AS NEEDED
Status: DISCONTINUED | OUTPATIENT
Start: 2019-08-26 | End: 2019-08-26 | Stop reason: SURG

## 2019-08-26 RX ORDER — CEFAZOLIN SODIUM 1 G/50ML
1000 SOLUTION INTRAVENOUS EVERY 8 HOURS
Status: COMPLETED | OUTPATIENT
Start: 2019-08-26 | End: 2019-08-27

## 2019-08-26 RX ORDER — LIDOCAINE HYDROCHLORIDE 10 MG/ML
INJECTION, SOLUTION INFILTRATION; PERINEURAL AS NEEDED
Status: DISCONTINUED | OUTPATIENT
Start: 2019-08-26 | End: 2019-08-26 | Stop reason: SURG

## 2019-08-26 RX ORDER — DEXTROSE, SODIUM CHLORIDE, SODIUM LACTATE, POTASSIUM CHLORIDE, AND CALCIUM CHLORIDE 5; .6; .31; .03; .02 G/100ML; G/100ML; G/100ML; G/100ML; G/100ML
125 INJECTION, SOLUTION INTRAVENOUS CONTINUOUS
Status: DISCONTINUED | OUTPATIENT
Start: 2019-08-26 | End: 2019-08-28 | Stop reason: HOSPADM

## 2019-08-26 RX ORDER — ONDANSETRON 2 MG/ML
4 INJECTION INTRAMUSCULAR; INTRAVENOUS EVERY 6 HOURS PRN
Status: DISCONTINUED | OUTPATIENT
Start: 2019-08-26 | End: 2019-08-28 | Stop reason: HOSPADM

## 2019-08-26 RX ORDER — DEXAMETHASONE SODIUM PHOSPHATE 10 MG/ML
INJECTION, SOLUTION INTRAMUSCULAR; INTRAVENOUS AS NEEDED
Status: DISCONTINUED | OUTPATIENT
Start: 2019-08-26 | End: 2019-08-26 | Stop reason: SURG

## 2019-08-26 RX ORDER — KETAMINE HCL IN NACL, ISO-OSM 100MG/10ML
SYRINGE (ML) INJECTION AS NEEDED
Status: DISCONTINUED | OUTPATIENT
Start: 2019-08-26 | End: 2019-08-26 | Stop reason: SURG

## 2019-08-26 RX ORDER — HYDROCODONE BITARTRATE AND ACETAMINOPHEN 5; 325 MG/1; MG/1
1 TABLET ORAL EVERY 4 HOURS PRN
Status: DISCONTINUED | OUTPATIENT
Start: 2019-08-26 | End: 2019-08-28 | Stop reason: HOSPADM

## 2019-08-26 RX ADMIN — HYDROCODONE BITARTRATE AND ACETAMINOPHEN 1 TABLET: 5; 325 TABLET ORAL at 19:56

## 2019-08-26 RX ADMIN — PROPOFOL 100 MCG/KG/MIN: 10 INJECTION, EMULSION INTRAVENOUS at 14:08

## 2019-08-26 RX ADMIN — FENTANYL CITRATE 100 MCG: 50 INJECTION, SOLUTION INTRAMUSCULAR; INTRAVENOUS at 13:13

## 2019-08-26 RX ADMIN — DEXTROSE, SODIUM CHLORIDE, SODIUM LACTATE, POTASSIUM CHLORIDE, AND CALCIUM CHLORIDE 125 ML/HR: 5; .6; .31; .03; .02 INJECTION, SOLUTION INTRAVENOUS at 18:00

## 2019-08-26 RX ADMIN — ROCURONIUM BROMIDE 50 MG: 10 INJECTION INTRAVENOUS at 13:13

## 2019-08-26 RX ADMIN — FENTANYL CITRATE 50 MCG: 50 INJECTION, SOLUTION INTRAMUSCULAR; INTRAVENOUS at 17:25

## 2019-08-26 RX ADMIN — DEXAMETHASONE SODIUM PHOSPHATE 10 MG: 10 INJECTION, SOLUTION INTRAMUSCULAR; INTRAVENOUS at 15:07

## 2019-08-26 RX ADMIN — PHENYLEPHRINE HYDROCHLORIDE 100 MCG: 10 INJECTION INTRAVENOUS at 13:19

## 2019-08-26 RX ADMIN — ROCURONIUM BROMIDE 30 MG: 10 INJECTION INTRAVENOUS at 14:35

## 2019-08-26 RX ADMIN — HYDROMORPHONE HYDROCHLORIDE 0.2 MG: 1 INJECTION, SOLUTION INTRAMUSCULAR; INTRAVENOUS; SUBCUTANEOUS at 17:55

## 2019-08-26 RX ADMIN — EPHEDRINE SULFATE 15 MG: 50 INJECTION, SOLUTION INTRAVENOUS at 14:11

## 2019-08-26 RX ADMIN — ONDANSETRON 4 MG: 2 INJECTION INTRAMUSCULAR; INTRAVENOUS at 16:10

## 2019-08-26 RX ADMIN — FENTANYL CITRATE 50 MCG: 50 INJECTION, SOLUTION INTRAMUSCULAR; INTRAVENOUS at 17:12

## 2019-08-26 RX ADMIN — PROPOFOL 150 MG: 10 INJECTION, EMULSION INTRAVENOUS at 13:13

## 2019-08-26 RX ADMIN — HYDROMORPHONE HYDROCHLORIDE 0.5 MG: 2 INJECTION, SOLUTION INTRAMUSCULAR; INTRAVENOUS; SUBCUTANEOUS at 14:22

## 2019-08-26 RX ADMIN — LIDOCAINE HYDROCHLORIDE 50 MG: 10 INJECTION, SOLUTION INFILTRATION; PERINEURAL at 13:13

## 2019-08-26 RX ADMIN — SODIUM CHLORIDE, SODIUM LACTATE, POTASSIUM CHLORIDE, AND CALCIUM CHLORIDE: .6; .31; .03; .02 INJECTION, SOLUTION INTRAVENOUS at 16:13

## 2019-08-26 RX ADMIN — Medication 30 MG: at 13:13

## 2019-08-26 RX ADMIN — ACETAMINOPHEN 975 MG: 325 TABLET ORAL at 11:09

## 2019-08-26 RX ADMIN — GABAPENTIN 300 MG: 300 CAPSULE ORAL at 11:09

## 2019-08-26 RX ADMIN — CEFAZOLIN SODIUM 1000 MG: 1 SOLUTION INTRAVENOUS at 14:02

## 2019-08-26 RX ADMIN — CEFAZOLIN SODIUM 1000 MG: 1 SOLUTION INTRAVENOUS at 21:19

## 2019-08-26 RX ADMIN — GLYCOPYRROLATE 0.4 MG: 0.2 INJECTION, SOLUTION INTRAMUSCULAR; INTRAVENOUS at 14:39

## 2019-08-26 RX ADMIN — SCOPALAMINE 1 PATCH: 1 PATCH, EXTENDED RELEASE TRANSDERMAL at 11:48

## 2019-08-26 RX ADMIN — MIDAZOLAM 2 MG: 1 INJECTION INTRAMUSCULAR; INTRAVENOUS at 13:03

## 2019-08-26 RX ADMIN — SODIUM CHLORIDE, SODIUM LACTATE, POTASSIUM CHLORIDE, AND CALCIUM CHLORIDE 75 ML/HR: .6; .31; .03; .02 INJECTION, SOLUTION INTRAVENOUS at 11:48

## 2019-08-26 RX ADMIN — HYDROMORPHONE HYDROCHLORIDE 0.2 MG: 1 INJECTION, SOLUTION INTRAMUSCULAR; INTRAVENOUS; SUBCUTANEOUS at 17:42

## 2019-08-26 RX ADMIN — SODIUM CHLORIDE: 0.9 INJECTION, SOLUTION INTRAVENOUS at 13:17

## 2019-08-26 RX ADMIN — PROPOFOL 50 MG: 10 INJECTION, EMULSION INTRAVENOUS at 14:25

## 2019-08-26 NOTE — H&P
UROLOGY H&P NOTE     Patient Identifiers: Kelley Connolly (MRN [id number])    Date of Service: 8/26/2019    History of Present Illness:     Kelley Connolly is a 38 y o  old female who presents for elective right laparoscopic robot assisted partial nephrectomy for a right-sided lower pole renal mass  The patient denies any changes in her health since being seen in the office  She denies any gross hematuria, pain, fevers, chills, or dysuria  She has no other complaints  Past Medical, Past Surgical History:     Past Medical History:   Diagnosis Date    Chronic kidney disease     tumor and cyst    Colon polyp     Gastritis    :    Past Surgical History:   Procedure Laterality Date    COLONOSCOPY      WISDOM TOOTH EXTRACTION     :    Medications, Allergies:     Current Facility-Administered Medications   Medication Dose Route Frequency    ceFAZolin (ANCEF) IVPB (premix) 1,000 mg  1,000 mg Intravenous Once    lactated ringers infusion  75 mL/hr Intravenous Continuous    lidocaine (PF) (XYLOCAINE-MPF) 1 % injection 0 5 mL  0 5 mL Infiltration Once PRN    scopolamine (TRANSDERM-SCOP) 1 5 mg/3 days TD 72 hr patch 1 patch  1 patch Transdermal Once       Allergies: Allergies   Allergen Reactions    Strawberry (Diagnostic) Hives    Tetracycline      breast swelling    :    Social and Family History:   Social History:   Social History     Tobacco Use    Smoking status: Current Every Day Smoker     Packs/day: 1 00     Years: 25 00     Pack years: 25 00    Smokeless tobacco: Never Used   Substance Use Topics    Alcohol use: Not Currently    Drug use: Never         Social History     Tobacco Use   Smoking Status Current Every Day Smoker    Packs/day: 1 00    Years: 25 00    Pack years: 25 00   Smokeless Tobacco Never Used       Family History:  Family History   Problem Relation Age of Onset    Heart disease Father     Cancer Paternal Grandmother     Diabetes Paternal Grandmother     Other Maternal Aunt Renal Cell Carcinoma   :     Review of Systems:     General: Fever, chills, or night sweats: negative  Cardiac: Negative for chest pain  Pulmonary: Negative for shortness of breath  Gastrointestinal: Abdominal pain negative  Nausea, vomiting, or diarrhea negative,  Genitourinary: See HPI above  Patient does not have hematuria  All other systems queried were negative  Physical Exam:   General: Patient is pleasant and in NAD  Awake and alert  /76   Pulse 71   Temp 98 8 °F (37 1 °C) (Tympanic)   Resp 16   Ht 5' 7" (1 702 m)   Wt 56 7 kg (125 lb)   SpO2 97%   BMI 19 58 kg/m² Temp (24hrs), Av 8 °F (37 1 °C), Min:98 8 °F (37 1 °C), Max:98 8 °F (37 1 °C)  current; Temperature: 98 8 °F (37 1 °C)  No intake/output data recorded  Cardiac: Peripheral edema: negative  Pulmonary: Non-labored breathing  Abdomen: Soft, non-tender, non-distended  No surgical scars  No masses, tenderness, hernias noted  Genitourinary: Negative CVA tenderness, negative suprapubic tenderness  Labs:     Lab Results   Component Value Date    HGB 14 7 2019    HCT 44 1 2019    WBC 5 94 2019     2019   ]    Lab Results   Component Value Date    K 4 1 2019     2019    CO2 28 2019    BUN 9 2019    CREATININE 0 74 2019    CALCIUM 8 9 2019   ]    Imaging:   I personally reviewed the images and report of the following studies, and reviewed them with the patient:    CT Abdomen/Pelvis: 2 cm anterior right lower pole renal mass      ASSESSMENT:     38 y o  old female with  right renal mass here for robotic partial nephrectomy  PLAN:     We again discussed the risks and benefits of robotic partial nephrectomy  All of her questions were answered  We will proceed with surgery as planned  She was marked  She has been previously consented          Ami Fothergill, MD

## 2019-08-26 NOTE — OP NOTE
OPERATIVE REPORT  PATIENT NAME: Kelley Connolly    :  1975  MRN: [id number]  Pt Location: [facility] OR ROOM 14    SURGERY DATE: 2019    Surgeon(s) and Role:     * Sumaya Ochoa MD - Primary     * Vilma Olivo PA-C - Assisting     * Hannah Aggarwal PA-C - Assisting     * Jewel Orosco PA-C - Rhian[place]    Preop Diagnosis:  Renal mass, right [N28 89]    Post-Op Diagnosis Codes:     * Renal mass, right [N28 89]    Procedure(s) (LRB):  NEPHRECTOMY PARTIAL LAPAROSCOPIC W ROBOTICS (Right)    Specimen(s):  ID Type Source Tests Collected by Time Destination   1 : Right Renal Mass Tissue Kidney, Right TISSUE EXAM Sumaya Ochoa MD 2019 1606        Estimated Blood Loss:   50 mL    Drains:  Closed/Suction Drain Right Abdomen Bulb 10 Fr  (Active)   Number of days: 0       Urethral Catheter Double-lumen 18 Fr  (Active)   Number of days: 0       Anesthesia Type:   General    Operative Indications:  Renal mass, right [N28 89]      Operative Findings:  Renal mass removed with no evidence grossly of positive margins  Complications:   None    Procedure and Technique:    After informed consent was obtained the patient was brought to the operating room  Preoperative antibiotics were given for infection prophylaxis and bilateral sequential compressive devices placed on lower extremities for DVT prophylaxis  A time-out was performed to identify the patient, surgery to be performed, and correct laterality  The patient was then placed under general anesthesia and a 18 [place] Martinez catheter was placed with return of clear yellow urine  The patient was then positioned in the left lateral decubitus position and all pressure points were padded  She was then prepped and draped in standard sterile fashion  A Veress needle was then inserted into the right lower quadrant and the abdomen was insufflated to 15 mm Hg  There was good high flow and low opening pressures indicating where the correct space    A 4 robotic trocars were then placed starting a finger breath below the costal margin in the midclavicular line and marching our way caudally every 8 cm along the midclavicular lied down into the lower quadrant  The 1st trocar that was placed was done so blindly and the others were placed under vision  The most cephalad trocar placement which was done under vision did cause a small laceration in the liver which was quickly cauterized with a pair of monopolar laparoscopic scissors  Good hemostasis was quickly achieved  Once all 4 robotic trocars were placed a 12 mm assistant trocar was placed medial to and between the most cephalad trocar and the 2nd trocar  The robot was then docked and the robotic portion of the surgery was started  The right colon was reflected medially along the white line of [place]  The kidney was quickly identified and the hilum was dissected  The artery was found to be just cephalad to the vein and this was dissected on both sides so that we would be able to place a bulldog clamp  We then defatted the kidney and identified the tumor  We then used intraoperative ultrasound to identify the margins of resection of the tumor  The renal artery was then clamped and the tumor was excised sharply  The base of our resection bed was then reapproximated with a running 2 0 V lock suture  A sliding clip Renorrhaphy was then performed with a running strata fix suture  The artery was then unclamped  Total clamp time was 22 minutes  Pressures in the abdomen were lowered and the resection bed, hilum, and liver were inspected to make sure we had good hemostasis  FloSeal was then applied to all 3 areas  Specimen was placed in the Endo-Catch bag that was brought out through the assistant trocar site  The robot was undocked and the assistant trocar site was made slightly larger and the Endo-Catch bag was removed    Flat LEANA drain was placed through the right lower quadrant trocar and placed in the right pericolic gutter  The fascia at the assistant trocar site where the specimen was removed was reapproximated with an 0 Vicryl suture  All the incisions were irrigated and reapproximated with 4 0  Monocryl subcuticular sutures  Skin glue was placed over top  Patient was then woken and taken to the postanesthesia care unit stable condition  Dr Quynh Linton assisted me with dissecting out the tumor and identifying the margins of our resection     I was present for the entire procedure, A qualified resident physician was not available and A physician assistant was required during the procedure for retraction tissue handling,dissection and suturing    Patient Disposition:  PACU     SIGNATURE: Betty Jacobo MD  DATE: August 26, 2019  TIME: 4:45 PM

## 2019-08-26 NOTE — ANESTHESIA POSTPROCEDURE EVALUATION
Post-Op Assessment Note    CV Status:  Stable  Pain Score: 0    Pain management: satisfactory to patient     Mental Status:  Lethargic and awake   PONV Controlled:  None   Airway Patency:  Patent   Post Op Vitals Reviewed: Yes      Staff: Anesthesiologist, CRNA           BP   117/70   Temp   98   Pulse  76   Resp   18   SpO2   100

## 2019-08-27 LAB
ANION GAP SERPL CALCULATED.3IONS-SCNC: 5 MMOL/L (ref 4–13)
BUN SERPL-MCNC: 7 MG/DL (ref 5–25)
CALCIUM SERPL-MCNC: 7.9 MG/DL (ref 8.3–10.1)
CHLORIDE SERPL-SCNC: 110 MMOL/L (ref 100–108)
CO2 SERPL-SCNC: 26 MMOL/L (ref 21–32)
CREAT SERPL-MCNC: 0.69 MG/DL (ref 0.6–1.3)
ERYTHROCYTE [DISTWIDTH] IN BLOOD BY AUTOMATED COUNT: 12.7 % (ref 11.6–15.1)
GFR SERPL CREATININE-BSD FRML MDRD: 105 ML/MIN/1.73SQ M
GLUCOSE SERPL-MCNC: 125 MG/DL (ref 65–140)
GLUCOSE SERPL-MCNC: 138 MG/DL (ref 65–140)
HCT VFR BLD AUTO: 36 % (ref 34.8–46.1)
HGB BLD-MCNC: 12.2 G/DL (ref 11.5–15.4)
MCH RBC QN AUTO: 31.3 PG (ref 26.8–34.3)
MCHC RBC AUTO-ENTMCNC: 33.9 G/DL (ref 31.4–37.4)
MCV RBC AUTO: 92 FL (ref 82–98)
PLATELET # BLD AUTO: 179 THOUSANDS/UL (ref 149–390)
PMV BLD AUTO: 9.8 FL (ref 8.9–12.7)
POTASSIUM SERPL-SCNC: 4.1 MMOL/L (ref 3.5–5.3)
RBC # BLD AUTO: 3.9 MILLION/UL (ref 3.81–5.12)
SODIUM SERPL-SCNC: 141 MMOL/L (ref 136–145)
WBC # BLD AUTO: 8.64 THOUSAND/UL (ref 4.31–10.16)

## 2019-08-27 PROCEDURE — 85027 COMPLETE CBC AUTOMATED: CPT | Performed by: UROLOGY

## 2019-08-27 PROCEDURE — 82948 REAGENT STRIP/BLOOD GLUCOSE: CPT

## 2019-08-27 PROCEDURE — 99024 POSTOP FOLLOW-UP VISIT: CPT | Performed by: PHYSICIAN ASSISTANT

## 2019-08-27 PROCEDURE — 80048 BASIC METABOLIC PNL TOTAL CA: CPT | Performed by: UROLOGY

## 2019-08-27 RX ADMIN — CEFAZOLIN SODIUM 1000 MG: 1 SOLUTION INTRAVENOUS at 06:15

## 2019-08-27 RX ADMIN — HYDROCODONE BITARTRATE AND ACETAMINOPHEN 2 TABLET: 5; 325 TABLET ORAL at 21:07

## 2019-08-27 RX ADMIN — DEXTROSE, SODIUM CHLORIDE, SODIUM LACTATE, POTASSIUM CHLORIDE, AND CALCIUM CHLORIDE 125 ML/HR: 5; .6; .31; .03; .02 INJECTION, SOLUTION INTRAVENOUS at 21:02

## 2019-08-27 RX ADMIN — ACETAMINOPHEN 650 MG: 325 TABLET ORAL at 06:15

## 2019-08-27 RX ADMIN — DEXTROSE, SODIUM CHLORIDE, SODIUM LACTATE, POTASSIUM CHLORIDE, AND CALCIUM CHLORIDE 125 ML/HR: 5; .6; .31; .03; .02 INJECTION, SOLUTION INTRAVENOUS at 04:19

## 2019-08-27 RX ADMIN — MORPHINE SULFATE 2 MG: 2 INJECTION, SOLUTION INTRAMUSCULAR; INTRAVENOUS at 11:28

## 2019-08-27 RX ADMIN — ENOXAPARIN SODIUM 40 MG: 40 INJECTION SUBCUTANEOUS at 08:43

## 2019-08-27 RX ADMIN — HYDROCODONE BITARTRATE AND ACETAMINOPHEN 2 TABLET: 5; 325 TABLET ORAL at 09:55

## 2019-08-27 RX ADMIN — DEXTROSE, SODIUM CHLORIDE, SODIUM LACTATE, POTASSIUM CHLORIDE, AND CALCIUM CHLORIDE 125 ML/HR: 5; .6; .31; .03; .02 INJECTION, SOLUTION INTRAVENOUS at 12:54

## 2019-08-27 RX ADMIN — HYDROCODONE BITARTRATE AND ACETAMINOPHEN 2 TABLET: 5; 325 TABLET ORAL at 16:34

## 2019-08-27 RX ADMIN — ONDANSETRON 4 MG: 2 INJECTION INTRAMUSCULAR; INTRAVENOUS at 09:51

## 2019-08-27 NOTE — SOCIAL WORK
Cm attempted to meet with patient x 2 today but unable to meet with patient in room due to medical team discussing care with patient  Cm to re-attempt

## 2019-08-27 NOTE — RESTORATIVE TECHNICIAN NOTE
Restorative Specialist Mobility Note       Activity: Ambulate in reina(Back to bed  )     Assistive Device: (Pushed IV pole)        Repositioned: Supine              Anti-Embolism Device On:  Bilateral, Sequential compression devices, below knee

## 2019-08-27 NOTE — RESTORATIVE TECHNICIAN NOTE
Restorative Specialist Mobility Note       Activity: Ambulate in room, Chair     Assistive Device: None        Repositioned: Up in chair, Sitting              Anti-Embolism Device On:  Bilateral, Sequential compression devices, below knee

## 2019-08-27 NOTE — PLAN OF CARE
Problem: Nutrition/Hydration-ADULT  Goal: Nutrient/Hydration intake appropriate for improving, restoring or maintaining nutritional needs  Description  Monitor and assess patient's nutrition/hydration status for malnutrition  Collaborate with interdisciplinary team and initiate plan and interventions as ordered  Monitor patient's weight and dietary intake as ordered or per policy  Utilize nutrition screening tool and intervene as necessary  Determine patient's food preferences and provide high-protein, high-caloric foods as appropriate       INTERVENTIONS:  - Monitor oral intake, urinary output, labs, and treatment plans  - Assess nutrition and hydration status and recommend course of action  - Evaluate amount of meals eaten  - Assist patient with eating if necessary   - Allow adequate time for meals  - Recommend/ encourage appropriate diets, oral nutritional supplements, and vitamin/mineral supplements  - Order, calculate, and assess calorie counts as needed  - Recommend, monitor, and adjust tube feedings and TPN/PPN based on assessed needs  - Assess need for intravenous fluids  - Provide specific nutrition/hydration education as appropriate  - Include patient/family/caregiver in decisions related to nutrition  Outcome: Progressing     Problem: PAIN - ADULT  Goal: Verbalizes/displays adequate comfort level or baseline comfort level  Description  Interventions:  - Encourage patient to monitor pain and request assistance  - Assess pain using appropriate pain scale  - Administer analgesics based on type and severity of pain and evaluate response  - Implement non-pharmacological measures as appropriate and evaluate response  - Consider cultural and social influences on pain and pain management  - Notify physician/advanced practitioner if interventions unsuccessful or patient reports new pain  Outcome: Progressing     Problem: INFECTION - ADULT  Goal: Absence or prevention of progression during hospitalization  Description  INTERVENTIONS:  - Assess and monitor for signs and symptoms of infection  - Monitor lab/diagnostic results  - Monitor all insertion sites, i e  indwelling lines, tubes, and drains  - Monitor endotracheal if appropriate and nasal secretions for changes in amount and color  - [place] appropriate cooling/warming therapies per order  - Administer medications as ordered  - Instruct and encourage patient and family to use good hand hygiene technique  - Identify and instruct in appropriate isolation precautions for identified infection/condition  Outcome: Progressing  Goal: Absence of fever/infection during neutropenic period  Description  INTERVENTIONS:  - Monitor WBC    Outcome: Progressing     Problem: SAFETY ADULT  Goal: Patient will remain free of falls  Description  INTERVENTIONS:  - Assess patient frequently for physical needs  -  Identify cognitive and physical deficits and behaviors that affect risk of falls    -  [place] fall precautions as indicated by assessment   - Educate patient/family on patient safety including physical limitations  - Instruct patient to call for assistance with activity based on assessment  - Modify environment to reduce risk of injury  - Consider OT/PT consult to assist with strengthening/mobility  Outcome: Progressing  Goal: Maintain or return to baseline ADL function  Description  INTERVENTIONS:  -  Assess patient's ability to carry out ADLs; assess patient's baseline for ADL function and identify physical deficits which impact ability to perform ADLs (bathing, care of mouth/teeth, toileting, grooming, dressing, etc )  - Assess/evaluate cause of self-care deficits   - Assess range of motion  - Assess patient's mobility; develop plan if impaired  - Assess patient's need for assistive devices and provide as appropriate  - Encourage maximum independence but intervene and supervise when necessary  - Involve family in performance of ADLs  - Assess for home care needs following discharge   - Consider OT consult to assist with ADL evaluation and planning for discharge  - Provide patient education as appropriate  Outcome: Progressing  Goal: Maintain or return mobility status to optimal level  Description  INTERVENTIONS:  - Assess patient's baseline mobility status (ambulation, transfers, stairs, etc )    - Identify cognitive and physical deficits and behaviors that affect mobility  - Identify mobility aids required to assist with transfers and/or ambulation (gait belt, sit-to-stand, lift, walker, cane, etc )  - [place] fall precautions as indicated by assessment  - Record patient progress and toleration of activity level on Mobility SBAR; progress patient to next Phase/Stage  - Instruct patient to call for assistance with activity based on assessment  - Consider rehabilitation consult to assist with strengthening/weightbearing, etc   Outcome: Progressing     Problem: DISCHARGE PLANNING  Goal: Discharge to home or other facility with appropriate resources  Description  INTERVENTIONS:  - Identify barriers to discharge w/patient and caregiver  - Arrange for needed discharge resources and transportation as appropriate  - Identify discharge learning needs (meds, wound care, etc )  - Arrange for interpretive services to assist at discharge as needed  - Refer to Case Management Department for coordinating discharge planning if the patient needs post-hospital services based on physician/advanced practitioner order or complex needs related to functional status, cognitive ability, or social support system  Outcome: Progressing     Problem: Knowledge Deficit  Goal: Patient/family/caregiver demonstrates understanding of disease process, treatment plan, medications, and discharge instructions  Description  Complete learning assessment and assess knowledge base    Interventions:  - Provide teaching at level of understanding  - Provide teaching via preferred learning methods  Outcome: Progressing     Problem: GENITOURINARY - ADULT  Goal: Maintains or returns to baseline urinary function  Description  INTERVENTIONS:  - Assess urinary function  - Encourage oral fluids to ensure adequate hydration if ordered  - Administer IV fluids as ordered to ensure adequate hydration  - Administer ordered medications as needed  - Offer frequent toileting  - Follow urinary retention protocol if ordered  Outcome: Progressing  Goal: Absence of urinary retention  Description  INTERVENTIONS:  - Assess patients ability to void and empty bladder  - Monitor I/O  - Bladder scan as needed  - Discuss with physician/AP medications to alleviate retention as needed  - Discuss catheterization for long term situations as appropriate  Outcome: Progressing  Goal: Urinary catheter remains patent  Description  INTERVENTIONS:  - Assess patency of urinary catheter  - If patient has a chronic bellamy, consider changing catheter if non-functioning  - Follow guidelines for intermittent irrigation of non-functioning urinary catheter  Outcome: Progressing     Problem: Potential for Falls  Goal: Patient will remain free of falls  Description  INTERVENTIONS:  - Assess patient frequently for physical needs  -  Identify cognitive and physical deficits and behaviors that affect risk of falls    -  [place] fall precautions as indicated by assessment   - Educate patient/family on patient safety including physical limitations  - Instruct patient to call for assistance with activity based on assessment  - Modify environment to reduce risk of injury  - Consider OT/PT consult to assist with strengthening/mobility  Outcome: Progressing

## 2019-08-27 NOTE — UTILIZATION REVIEW
Initial Clinical Review    Elective OPNCB surgical procedure    Age/Sex: 38 y o  female     Surgery Date: 8/26/19    Procedure:   Preop Diagnosis:  Renal mass, right [N28 89]     Post-Op Diagnosis Codes:     * Renal mass, right [N28 89]     Procedure(s) (LRB):  NEPHRECTOMY PARTIAL LAPAROSCOPIC W ROBOTICS (Right)    8/27 AFTERNOON UPDATE - AMBULATING, USED MORPHINE X 1 AND ZOFRAN X 1 ALONG WITH PO ANALGESIA  Anesthesia: GENERAL     Operative Findings: Renal mass removed with no evidence grossly of positive margins  Admission Orders: Date/Time/Statement: [facility] 8/26/19 @ 02 73 91 27 04    08/26/19 1637  Outpatient No Charge Bed Once     Transfer Service: Urology       Question: Admitting Physician Answer: Yenny Davis    08/26/19 1645     Vital Signs: BP 95/59   Pulse 78   Temp 98 7 °F (37 1 °C)   Resp 16   Ht 5' 7" (1 702 m)   Wt 56 7 kg (125 lb)   SpO2 97%   BMI 19 58 kg/m²      Diet: DIET CL LIQ    Mobility: AMBULATE DAILY     DVT Prophylaxis: SCDs, ENOXAPARIN SQ DAILY     Medications/Pain Control:   Current Facility-Administered Medications:  acetaminophen 650 mg Oral Q6H PRN X1 8/27   dextrose 5% lactated ringer's 125 mL/hr Intravenous Continuous Last Rate: 125 mL/hr (08/27/19 0419)   docusate sodium 100 mg Oral BID    enoxaparin 40 mg Subcutaneous Daily    HYDROcodone-acetaminophen 1 tablet Oral Q4H PRN X1 8/26   HYDROcodone-acetaminophen 2 tablet Oral Q4H PRN X1 8/27   lactated ringers 1,000 mL Intravenous Once PRN    And       lactated ringers 1,000 mL Intravenous Once PRN    morphine injection 2 mg Intravenous Q2H PRN X1 8/27   nicotine 1 patch Transdermal Daily    ondansetron 4 mg Intravenous Q6H PRN X1 8/27   sodium chloride 1,000 mL Intravenous Once PRN    And       sodium chloride 1,000 mL Intravenous Once PRN      Network Utilization Review Department  Phone: [phone]; Fax [phone]  [email or web address]  ATTENTION: Please call with any questions or concerns to [phone]  and carefully listen to the prompts so that you are directed to the right person  Send all requests for admission clinical reviews, approved or denied determinations and any other requests to fax [phone]   All voicemails are confidential

## 2019-08-27 NOTE — PLAN OF CARE
Problem: Nutrition/Hydration-ADULT  Goal: Nutrient/Hydration intake appropriate for improving, restoring or maintaining nutritional needs  Description  Monitor and assess patient's nutrition/hydration status for malnutrition  Collaborate with interdisciplinary team and initiate plan and interventions as ordered  Monitor patient's weight and dietary intake as ordered or per policy  Utilize nutrition screening tool and intervene as necessary  Determine patient's food preferences and provide high-protein, high-caloric foods as appropriate       INTERVENTIONS:  - Monitor oral intake, urinary output, labs, and treatment plans  - Assess nutrition and hydration status and recommend course of action  - Evaluate amount of meals eaten  - Assist patient with eating if necessary   - Allow adequate time for meals  - Recommend/ encourage appropriate diets, oral nutritional supplements, and vitamin/mineral supplements  - Order, calculate, and assess calorie counts as needed  - Recommend, monitor, and adjust tube feedings and TPN/PPN based on assessed needs  - Assess need for intravenous fluids  - Provide specific nutrition/hydration education as appropriate  - Include patient/family/caregiver in decisions related to nutrition  Outcome: Progressing     Problem: PAIN - ADULT  Goal: Verbalizes/displays adequate comfort level or baseline comfort level  Description  Interventions:  - Encourage patient to monitor pain and request assistance  - Assess pain using appropriate pain scale  - Administer analgesics based on type and severity of pain and evaluate response  - Implement non-pharmacological measures as appropriate and evaluate response  - Consider cultural and social influences on pain and pain management  - Notify physician/advanced practitioner if interventions unsuccessful or patient reports new pain  Outcome: Progressing     Problem: INFECTION - ADULT  Goal: Absence or prevention of progression during hospitalization  Description  INTERVENTIONS:  - Assess and monitor for signs and symptoms of infection  - Monitor lab/diagnostic results  - Monitor all insertion sites, i e  indwelling lines, tubes, and drains  - Monitor endotracheal if appropriate and nasal secretions for changes in amount and color  - [place] appropriate cooling/warming therapies per order  - Administer medications as ordered  - Instruct and encourage patient and family to use good hand hygiene technique  - Identify and instruct in appropriate isolation precautions for identified infection/condition  Outcome: Progressing  Goal: Absence of fever/infection during neutropenic period  Description  INTERVENTIONS:  - Monitor WBC    Outcome: Progressing     Problem: SAFETY ADULT  Goal: Patient will remain free of falls  Description  INTERVENTIONS:  - Assess patient frequently for physical needs  -  Identify cognitive and physical deficits and behaviors that affect risk of falls    -  [place] fall precautions as indicated by assessment   - Educate patient/family on patient safety including physical limitations  - Instruct patient to call for assistance with activity based on assessment  - Modify environment to reduce risk of injury  - Consider OT/PT consult to assist with strengthening/mobility  Outcome: Progressing  Goal: Maintain or return to baseline ADL function  Description  INTERVENTIONS:  -  Assess patient's ability to carry out ADLs; assess patient's baseline for ADL function and identify physical deficits which impact ability to perform ADLs (bathing, care of mouth/teeth, toileting, grooming, dressing, etc )  - Assess/evaluate cause of self-care deficits   - Assess range of motion  - Assess patient's mobility; develop plan if impaired  - Assess patient's need for assistive devices and provide as appropriate  - Encourage maximum independence but intervene and supervise when necessary  - Involve family in performance of ADLs  - Assess for home care needs following discharge   - Consider OT consult to assist with ADL evaluation and planning for discharge  - Provide patient education as appropriate  Outcome: Progressing  Goal: Maintain or return mobility status to optimal level  Description  INTERVENTIONS:  - Assess patient's baseline mobility status (ambulation, transfers, stairs, etc )    - Identify cognitive and physical deficits and behaviors that affect mobility  - Identify mobility aids required to assist with transfers and/or ambulation (gait belt, sit-to-stand, lift, walker, cane, etc )  - [place] fall precautions as indicated by assessment  - Record patient progress and toleration of activity level on Mobility SBAR; progress patient to next Phase/Stage  - Instruct patient to call for assistance with activity based on assessment  - Consider rehabilitation consult to assist with strengthening/weightbearing, etc   Outcome: Progressing     Problem: DISCHARGE PLANNING  Goal: Discharge to home or other facility with appropriate resources  Description  INTERVENTIONS:  - Identify barriers to discharge w/patient and caregiver  - Arrange for needed discharge resources and transportation as appropriate  - Identify discharge learning needs (meds, wound care, etc )  - Arrange for interpretive services to assist at discharge as needed  - Refer to Case Management Department for coordinating discharge planning if the patient needs post-hospital services based on physician/advanced practitioner order or complex needs related to functional status, cognitive ability, or social support system  Outcome: Progressing     Problem: Knowledge Deficit  Goal: Patient/family/caregiver demonstrates understanding of disease process, treatment plan, medications, and discharge instructions  Description  Complete learning assessment and assess knowledge base    Interventions:  - Provide teaching at level of understanding  - Provide teaching via preferred learning methods  Outcome: Progressing     Problem: GENITOURINARY - ADULT  Goal: Maintains or returns to baseline urinary function  Description  INTERVENTIONS:  - Assess urinary function  - Encourage oral fluids to ensure adequate hydration if ordered  - Administer IV fluids as ordered to ensure adequate hydration  - Administer ordered medications as needed  - Offer frequent toileting  - Follow urinary retention protocol if ordered  Outcome: Progressing  Goal: Absence of urinary retention  Description  INTERVENTIONS:  - Assess patients ability to void and empty bladder  - Monitor I/O  - Bladder scan as needed  - Discuss with physician/AP medications to alleviate retention as needed  - Discuss catheterization for long term situations as appropriate  Outcome: Progressing  Goal: Urinary catheter remains patent  Description  INTERVENTIONS:  - Assess patency of urinary catheter  - If patient has a chronic bellamy, consider changing catheter if non-functioning  - Follow guidelines for intermittent irrigation of non-functioning urinary catheter  Outcome: Progressing

## 2019-08-27 NOTE — PROGRESS NOTES
UROLOGY PROGRESS NOTE   Patient Identifiers: Kelley Connolly (MRN [id number])  Date of Service: 8/27/2019    Subjective:    Awake and alert  Complaining of some pain  Drain 40ml  Hbg 12 2    Patient has  complaints of post op pain         Objective:     VITALS:    Vitals:    08/27/19 0753   BP: 95/59   Pulse: 78   Resp: 16   Temp: 98 7 °F (37 1 °C)   SpO2: 97%           LABS:  Lab Results   Component Value Date    HGB 12 2 08/27/2019    HCT 36 0 08/27/2019    WBC 8 64 08/27/2019     08/27/2019   ]    Lab Results   Component Value Date    K 4 1 08/27/2019     (H) 08/27/2019    CO2 26 08/27/2019    BUN 7 08/27/2019    CREATININE 0 69 08/27/2019    CALCIUM 7 9 (L) 08/27/2019   ]        INPATIENT MEDS:    Current Facility-Administered Medications:     acetaminophen (TYLENOL) tablet 650 mg, 650 mg, Oral, Q6H PRN, Danay Ro MD, 650 mg at 08/27/19 0615    dextrose 5 % in lactated Ringer's infusion, 125 mL/hr, Intravenous, Continuous, Danay Ro MD, Last Rate: 125 mL/hr at 08/27/19 0419, 125 mL/hr at 08/27/19 0419    docusate sodium (COLACE) capsule 100 mg, 100 mg, Oral, BID, Danay Ro MD    enoxaparin (LOVENOX) subcutaneous injection 40 mg, 40 mg, Subcutaneous, Daily, Danay Ro MD    HYDROcodone-acetaminophen [facility]) 5-325 mg per tablet 1 tablet, 1 tablet, Oral, Q4H PRN, Danay Ro MD, 1 tablet at 08/26/19 1956    HYDROcodone-acetaminophen ([address] [address]) 5-325 mg per tablet 2 tablet, 2 tablet, Oral, Q4H PRN, Danay oR MD    lactated ringers bolus 1,000 mL, 1,000 mL, Intravenous, Once PRN **AND** lactated ringers bolus 1,000 mL, 1,000 mL, Intravenous, Once PRN, Danay Ro MD    morphine injection 2 mg, 2 mg, Intravenous, Q2H PRN, Danay Ro MD    nicotine (NICODERM CQ) 14 mg/24hr TD 24 hr patch 1 patch, 1 patch, Transdermal, Daily, Danay Ro MD    ondansetron (ZOFRAN) injection 4 mg, 4 mg, Intravenous, Q6H PRN, Danay Ro MD    sodium chloride 0 9 % bolus 1,000 mL, 1,000 mL, Intravenous, Once PRN **AND** sodium chloride 0 9 % bolus 1,000 mL, 1,000 mL, Intravenous, Once PRN, Mila Kraus MD      Physical Exam:   BP 95/59   Pulse 78   Temp 98 7 °F (37 1 °C)   Resp 16   Ht 5' 7" (1 702 m)   Wt 56 7 kg (125 lb)   SpO2 97%   BMI 19 58 kg/m²   GEN: no acute distress    RESP: breathing comfortably with no accessory muscle use    ABD: soft, appropriately tender to palpation, non-distended   INCISION: clean, dry, intact   EXT: no significant peripheral edema     RADIOLOGY:   none     Assessment:   1   POD #1  Robotic partial right nephrectomy    Plan:   -OOB and ambulate  -clear liquid diet  -pain control  -Lovenox for DVT prophylaxis

## 2019-08-28 VITALS
DIASTOLIC BLOOD PRESSURE: 79 MMHG | OXYGEN SATURATION: 93 % | HEART RATE: 83 BPM | BODY MASS INDEX: 19.62 KG/M2 | RESPIRATION RATE: 18 BRPM | SYSTOLIC BLOOD PRESSURE: 107 MMHG | HEIGHT: 67 IN | TEMPERATURE: 99 F | WEIGHT: 125 LBS

## 2019-08-28 DIAGNOSIS — C64.2 MALIGNANT NEOPLASM OF LEFT KIDNEY (HCC): ICD-10-CM

## 2019-08-28 DIAGNOSIS — C64.1 MALIGNANT NEOPLASM OF RIGHT KIDNEY (HCC): Primary | ICD-10-CM

## 2019-08-28 DIAGNOSIS — N28.89 RENAL MASS, RIGHT: ICD-10-CM

## 2019-08-28 LAB
ABO GROUP BLD BPU: NORMAL
ABO GROUP BLD BPU: NORMAL
BPU ID: NORMAL
BPU ID: NORMAL
CROSSMATCH: NORMAL
CROSSMATCH: NORMAL
UNIT DISPENSE STATUS: NORMAL
UNIT DISPENSE STATUS: NORMAL
UNIT PRODUCT CODE: NORMAL
UNIT PRODUCT CODE: NORMAL
UNIT RH: NORMAL
UNIT RH: NORMAL

## 2019-08-28 PROCEDURE — 99024 POSTOP FOLLOW-UP VISIT: CPT | Performed by: PHYSICIAN ASSISTANT

## 2019-08-28 PROCEDURE — NC001 PR NO CHARGE: Performed by: PHYSICIAN ASSISTANT

## 2019-08-28 RX ORDER — OXYCODONE HYDROCHLORIDE AND ACETAMINOPHEN 5; 325 MG/1; MG/1
1 TABLET ORAL EVERY 4 HOURS PRN
Qty: 30 TABLET | Refills: 0
Start: 2019-08-28 | End: 2019-08-28 | Stop reason: HOSPADM

## 2019-08-28 RX ORDER — OXYCODONE HYDROCHLORIDE AND ACETAMINOPHEN 5; 325 MG/1; MG/1
1 TABLET ORAL EVERY 4 HOURS PRN
Qty: 30 TABLET | Refills: 0 | Status: SHIPPED | OUTPATIENT
Start: 2019-08-28 | End: 2019-08-28 | Stop reason: SDUPTHER

## 2019-08-28 RX ORDER — OXYCODONE HYDROCHLORIDE AND ACETAMINOPHEN 5; 325 MG/1; MG/1
1 TABLET ORAL EVERY 4 HOURS PRN
Qty: 30 TABLET | Refills: 0 | Status: SHIPPED | OUTPATIENT
Start: 2019-08-28 | End: 2019-09-07

## 2019-08-28 RX ADMIN — HYDROCODONE BITARTRATE AND ACETAMINOPHEN 2 TABLET: 5; 325 TABLET ORAL at 07:47

## 2019-08-28 RX ADMIN — ONDANSETRON 4 MG: 2 INJECTION INTRAMUSCULAR; INTRAVENOUS at 07:47

## 2019-08-28 RX ADMIN — ENOXAPARIN SODIUM 40 MG: 40 INJECTION SUBCUTANEOUS at 09:43

## 2019-08-28 RX ADMIN — HYDROCODONE BITARTRATE AND ACETAMINOPHEN 2 TABLET: 5; 325 TABLET ORAL at 12:11

## 2019-08-28 RX ADMIN — DEXTROSE, SODIUM CHLORIDE, SODIUM LACTATE, POTASSIUM CHLORIDE, AND CALCIUM CHLORIDE 125 ML/HR: 5; .6; .31; .03; .02 INJECTION, SOLUTION INTRAVENOUS at 04:21

## 2019-08-28 RX ADMIN — ACETAMINOPHEN 650 MG: 325 TABLET ORAL at 04:20

## 2019-08-28 RX ADMIN — DOCUSATE SODIUM 100 MG: 100 CAPSULE, LIQUID FILLED ORAL at 09:43

## 2019-08-28 NOTE — PROGRESS NOTES
UROLOGY PROGRESS NOTE   Patient Identifiers: Kelley Connolly (MRN [id number])  Date of Service: 8/28/2019    Subjective:     Awake and alert  Complaining of some pain  Urine clear yellow  LEANA drain 65 mL  Patient has  complaints of   Some gas an incisional pain         Objective:     VITALS:    Vitals:    08/28/19 0800   BP: 105/80   Pulse: 78   Resp: 18   Temp: 99 2 °F (37 3 °C)   SpO2: 94%           LABS:  Lab Results   Component Value Date    HGB 12 2 08/27/2019    HCT 36 0 08/27/2019    WBC 8 64 08/27/2019     08/27/2019   ]    Lab Results   Component Value Date    K 4 1 08/27/2019     (H) 08/27/2019    CO2 26 08/27/2019    BUN 7 08/27/2019    CREATININE 0 69 08/27/2019    CALCIUM 7 9 (L) 08/27/2019   ]        INPATIENT MEDS:    Current Facility-Administered Medications:     acetaminophen (TYLENOL) tablet 650 mg, 650 mg, Oral, Q6H PRN, Froylan Douglass MD, 650 mg at 08/28/19 0420    dextrose 5 % in lactated Ringer's infusion, 125 mL/hr, Intravenous, Continuous, Froylan Douglass MD, Last Rate: 125 mL/hr at 08/28/19 0421, 125 mL/hr at 08/28/19 0421    docusate sodium (COLACE) capsule 100 mg, 100 mg, Oral, BID, Froylan Douglass MD, 100 mg at 08/28/19 0943    enoxaparin (LOVENOX) subcutaneous injection 40 mg, 40 mg, Subcutaneous, Daily, Froylan Douglass MD, 40 mg at 08/28/19 0943    HYDROcodone-acetaminophen (NORCO) 5-325 mg per tablet 1 tablet, 1 tablet, Oral, Q4H PRN, Froylan Douglass MD, 1 tablet at 08/26/19 1956    HYDROcodone-acetaminophen (NORCO) 5-325 mg per tablet 2 tablet, 2 tablet, Oral, Q4H PRN, Froylan Douglass MD, 2 tablet at 08/28/19 0747    morphine injection 2 mg, 2 mg, Intravenous, Q2H PRN, Froylan Douglass MD, 2 mg at 08/27/19 1128    nicotine (NICODERM CQ) 14 mg/24hr TD 24 hr patch 1 patch, 1 patch, Transdermal, Daily, Froylan Douglass MD    ondansetron (ZOFRAN) injection 4 mg, 4 mg, Intravenous, Q6H PRN, Froylan Douglass MD, 4 mg at 08/28/19 0747      Physical Exam:   /80   Pulse 78   Temp 99 2 °F (37 3 °C)   Resp 18   Ht 5' 7" (1 702 m)   Wt 56 7 kg (125 lb)   SpO2 94%   BMI 19 58 kg/m²   GEN: no acute distress    RESP: breathing comfortably with no accessory muscle use    ABD: soft, non-tender, non-distended   INCISION:   Clean dry and intact  LEANA drain removed  EXT: no significant peripheral edema     FAJARDO:  removed    RADIOLOGY:    none     Assessment:    1   POD #2  Robotic partial right nephrectomy       Plan:   - Fajardo in LEANA drain had been removed  - plan for discharge home later today  - encourage ambulation as much as possible  -

## 2019-08-28 NOTE — SOCIAL WORK
Cm met with patient to explain Cm role and discuss discharge planning  Patient lives with  and children in 1 story home with 2STE  Patient's  [place] is her emergency contact, [phone]  Patient does not have POA/living will  Patient reported being independent with ADLs PTA, has no DME, drives, and is self-employed  Patient denied SNF, VNA, mental health or ETOH treatment in the past   Pharmacy: [address] in [place] but is requesting [facility] for meds upon discharge  PCP: Dr Bryant Ask  Patient's family to transport once stable  Cm following  CM reviewed d/c planning process including the following: identifying help at home, patient preference for d/c planning needs, Discharge Lounge, [facility] Meds to Bed program, availability of treatment team to discuss questions or concerns patient and/or family may have regarding understanding medications and recognizing signs and symptoms once discharged  CM also encouraged patient to follow up with all recommended appointments after discharge  Patient advised of importance for patient and family to participate in managing patients medical well being

## 2019-08-28 NOTE — RESTORATIVE TECHNICIAN NOTE
Restorative Specialist Mobility Note       Activity: Dangle     Assistive Device: Other (Comment)(HHA x1)        Repositioned: Supine

## 2019-08-30 ENCOUNTER — TELEPHONE (OUTPATIENT)
Dept: UROLOGY | Facility: CLINIC | Age: 44
End: 2019-08-30

## 2019-08-30 NOTE — TELEPHONE ENCOUNTER
Post Op Note    April Cathleen is a 38 y o  female s/p Right NEPHRECTOMY PARTIAL LAPAROSCOPIC W ROBOTICS WITH INTRA-OP LAPAROSCOPIC ULTRASOUND  performed 8/26/19 April Cathleen is a patient of Dr Demetrius Bob and is seen at the [place] office  How would you rate your pain on a scale from 1 to 10, 10 being the worst pain ever? 7 Patient reports she has been taking Tylenol for the pain, reports it "takes the edge off"  Patient comfortable with just taking Tylenol, reports she does not want to take anything stronger  Have you had a fever? No    Have your bowel movements been regular? No  Patient reports she has not had a bowel movement yet  Reports in the hospital she was only getting liquid diet  Reports since being home she has slowly started in increase diet  Reports taking stool softeners, is passing gas  Do you have any difficulty urinating? No  If the patient has an incision- do you have any redness around the incision or any drainage from the incision? No  Patient reports she still has the dressing over previous drain site  Reports this evening with be 48 hours since drain was removed and will take dressing off at that time  Do you have any other questions or concerns that I can address at this time? No    Patient knows to call office with any questions or concerns  Otherwise, will keep follow up as scheduled  Patient has pending follow up on 9/11/19 with Dr Demetrius Bob in the [place] office

## 2019-09-09 ENCOUNTER — TELEPHONE (OUTPATIENT)
Dept: UROLOGY | Facility: MEDICAL CENTER | Age: 44
End: 2019-09-09

## 2019-09-09 NOTE — TELEPHONE ENCOUNTER
Patient of Dr Kane Lopez seen in [place] office  Patient states she had surgery on 08/26/2019 and would like to know if she can take Motrin she has her period and would like to know if this is acceptable  Please advise

## 2019-09-11 ENCOUNTER — OFFICE VISIT (OUTPATIENT)
Dept: UROLOGY | Facility: AMBULATORY SURGERY CENTER | Age: 44
End: 2019-09-11

## 2019-09-11 VITALS
HEIGHT: 67 IN | HEART RATE: 60 BPM | SYSTOLIC BLOOD PRESSURE: 102 MMHG | BODY MASS INDEX: 19.46 KG/M2 | WEIGHT: 124 LBS | DIASTOLIC BLOOD PRESSURE: 60 MMHG

## 2019-09-11 DIAGNOSIS — C64.1 RENAL CELL CARCINOMA OF RIGHT KIDNEY (HCC): Primary | ICD-10-CM

## 2019-09-11 PROCEDURE — 99024 POSTOP FOLLOW-UP VISIT: CPT | Performed by: UROLOGY

## 2019-09-11 NOTE — PROGRESS NOTES
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 with negative margins  We discussed that she does not need any further treatment for the cancer  She will need surveillance with repeat imaging and BMP in 4 months  She is to continue with light activity for another 3-4 weeks  Diagnoses and all orders for this visit:    Renal cell carcinoma of right kidney ([address] )  -     Basic metabolic panel; Future  -     CT abdomen w wo contrast; Future              Subjective:     Patient ID: April Cathleen is a 38 y o  female    50-year-old female presents for follow-up 2 weeks after right robotic partial nephrectomy  The patient has been doing well at home  Her main complaint is of fatigue  She is eating and having bowel movements  She denies any gross hematuria  She has no other complaints  Review of Systems   Constitutional: Negative  HENT: Negative  Eyes: Negative  Respiratory: Negative  Cardiovascular: Negative  Gastrointestinal: Negative  Endocrine: Negative  Genitourinary:        As noted per HPI   Musculoskeletal: Negative  Skin: Negative  Allergic/Immunologic: Negative  Neurological: Negative  Hematological: Negative  Psychiatric/Behavioral: Negative  Objective:    Physical Exam   Constitutional: She is oriented to person, place, and time  She appears well-developed and well-nourished  HENT:   Head: Normocephalic and atraumatic  Neck: Normal range of motion  Neck supple  Cardiovascular: Intact distal pulses  Pulmonary/Chest: Effort normal    Abdominal: Soft  Bowel sounds are normal  She exhibits no distension and no mass  There is no tenderness  There is no rebound and no guarding  Musculoskeletal: Normal range of motion  Neurological: She is alert and oriented to person, place, and time  Skin: Skin is warm and dry  Psychiatric: She has a normal mood and affect  Vitals reviewed          Results  No results found for: PSA  Lab Results   Component Value Date    CALCIUM 7 9 (L) 08/27/2019    K 4 1 08/27/2019    CO2 26 08/27/2019     (H) 08/27/2019    BUN 7 08/27/2019    CREATININE 0 69 08/27/2019     Lab Results   Component Value Date    WBC 8 64 08/27/2019    HGB 12 2 08/27/2019    HCT 36 0 08/27/2019    MCV 92 08/27/2019     08/27/2019       No results found for this or any previous visit (from the past 1 hour(s)) ]

## 2019-09-11 NOTE — ASSESSMENT & PLAN NOTE
I reviewed the pathology results with the patient  Final pathology demonstrated Rossana grade 1, T1a clear cell carcinoma of the kidney with negative margins  We discussed that she does not need any further treatment for the cancer  She will need surveillance with repeat imaging and BMP in 4 months  She is to continue with light activity for another 3-4 weeks

## 2019-09-20 NOTE — DISCHARGE SUMMARY
Discharge Summary   April Cathleen 1975 38 y o  female   MRN: [id number]    Unit/Bed#: [facility] 812-01 Encounter: [id number]    Admission Date: 8/26/2019   Admitting Diagnosis: Renal mass, right [[id number]]      Procedures Performed: NEPHRECTOMY PARTIAL LAPAROSCOPIC W ROBOTICS (Right)       Surgical Pathology:Final Diagnosis   A  Kidney, right, nephrectomy:             - Clear cell renal cell carcinoma, 2 2 centimeters, confined to the kidney, ISUP nucleolar grade 1, see tumor synoptic report  Order Name Source Comment Collection Info Order Time   [address], Arterial  Collected By: Cheo Adkins RN 8/26/2019  4:45 PM   CBC AND PLATELET Line, Arterial  Collected By: Cheo Adkins RN 8/26/2019  4:45 PM   BASIC METABOLIC PANEL Arm, Right  Collected By: Leila Duke 8/27/2019  4:00 AM   CBC AND PLATELET Arm, Right  Collected By: Leila Duke 8/27/2019  4:00 AM   TYPE AND SCREEN Arm, Left  Collected By: Kathie Vega 8/26/2019 10:51 AM     Has the patient been transfused in the last 3 months? Unknown          Explanatory Comment:   unknown          Has the patient been pregnant within the last 3 months? Unknown          Medical or Pre-op   PreOp          Pre-op   Complete the following question(s)          Has the surgery been scheduled? Yes          Date of Surgery   8/26/2019          Where is Surgery Scheduled? [address] [facility] ADULT    8/26/2019 10:51 AM     Transfusion Indications   Hgb < 7 5g/dL or Hct < 22% with ongoing organ ischemia, or acute CAD, or low cardiopulmonary reserve, or MI, or high oxygen consumption, or cardiogenic shock          Has consent been obtained? Yes          Medical or Pre-op   Medical          Is the patient pregnant?    No        TISSUE EXAM Kidney, Right  Collected By: Elijah Rodriguez MD 8/26/2019  4:06 PM       Hospital Course: female who presents for elective right laparoscopic robot assisted partial nephrectomy for a right-sided lower pole renal mass  The patient denies any changes in her health since being seen in the office  She denies any gross hematuria, pain, fevers, chills, or dysuria  She has no other complaints  She underwent robotic partial right nephrectomy with Dr Alvaro Looney  On August 26th  She tolerated the procedure well  She had the usual postoperative pain  There were no complications during the hospitalization and she was able to be discharged home on postoperative day 2  She will follow up in the office with the attending surgeon for her postop visit and pathology review  Patient Active Problem List   Diagnosis    Gastritis    Hiatal hernia    Plantar wart, left foot    Renal lesion    Renal mass, right    Renal cell carcinoma of right kidney [facility])       Discharge Diagnosis:   POD #2  Robotic partial right nephrectomy        Condition at Discharge: good     Discharge instructions/Information to patient and family:   See after visit summary for information provided to patient and family  Provisions for Follow-Up Care:  See after visit summary for information related to follow-up care and any pertinent home health orders  Disposition: Home    Discharge Statement   I spent 30 minutes discharging the patient  This time was spent on the day of discharge  I had direct contact with the patient on the day of discharge  Additional documentation is required if more than 30 minutes were spent on discharge  Discharge Medications:  See after visit summary for reconciled discharge medications provided to patient and family

## 2019-11-05 ENCOUNTER — OFFICE VISIT (OUTPATIENT)
Dept: FAMILY MEDICINE CLINIC | Facility: CLINIC | Age: 44
End: 2019-11-05
Payer: COMMERCIAL

## 2019-11-05 VITALS
OXYGEN SATURATION: 98 % | SYSTOLIC BLOOD PRESSURE: 118 MMHG | WEIGHT: 125.4 LBS | HEART RATE: 75 BPM | BODY MASS INDEX: 19.68 KG/M2 | DIASTOLIC BLOOD PRESSURE: 68 MMHG | HEIGHT: 67 IN

## 2019-11-05 DIAGNOSIS — K44.9 HIATAL HERNIA: ICD-10-CM

## 2019-11-05 DIAGNOSIS — F17.210 CIGARETTE NICOTINE DEPENDENCE WITHOUT COMPLICATION: ICD-10-CM

## 2019-11-05 DIAGNOSIS — K29.70 GASTRITIS WITHOUT BLEEDING, UNSPECIFIED CHRONICITY, UNSPECIFIED GASTRITIS TYPE: ICD-10-CM

## 2019-11-05 DIAGNOSIS — E83.51 HYPOCALCEMIA: ICD-10-CM

## 2019-11-05 DIAGNOSIS — Z23 ENCOUNTER FOR VACCINATION: Primary | ICD-10-CM

## 2019-11-05 DIAGNOSIS — C64.1 RENAL CELL CARCINOMA OF RIGHT KIDNEY (HCC): ICD-10-CM

## 2019-11-05 PROCEDURE — 99214 OFFICE O/P EST MOD 30 MIN: CPT | Performed by: FAMILY MEDICINE

## 2019-11-05 PROCEDURE — 90686 IIV4 VACC NO PRSV 0.5 ML IM: CPT | Performed by: FAMILY MEDICINE

## 2019-11-05 PROCEDURE — 90471 IMMUNIZATION ADMIN: CPT | Performed by: FAMILY MEDICINE

## 2019-11-05 NOTE — PROGRESS NOTES
Assessment/Plan:       Problem List Items Addressed This Visit        Digestive    Gastritis     Gastritis patient follows up with Gastroenterology she additionally had endoscopy and colonoscopy while being worked up for her renal cell carcinoma  She will continue with Pepcid AC at this time as recommended through Gastroenterology and I will follow up with her at her next visit here            Genitourinary    Renal cell carcinoma of right kidney [facility])     Renal cell carcinoma right kidney post nephrectomy stable postoperative at this point and follow up with Nephrology as indicated I reviewed laboratory work with patient today will repeat blood work at next office visit         Relevant Orders    Comprehensive metabolic panel    Lipid panel    CBC and differential    TSH, 3rd generation with Free T4 reflex       Other    Hiatal hernia     Hiatal hernia by history patient has been off her proton pump inhibitors and will use Pepcid now especially during her menstrual cycle week once per month I recommend Pepcid AC 10 mg twice daily while she requires ibuprofen for her menstrual cramps         Cigarette nicotine dependence without complication     Nicotine dependence at this time I recommend that she reduce and work on quitting which she has been going through that process over the last 6 months she went to the kidney surgery and other issues and she is down to half a pack a day she does not want to use a patch and she is going to work on this on her own  She does take alprazolam on occasion to help herself when she is ready to quit especially in the wintertime she does not like to go outside and smoke in the cold  I will discuss this with her further in the future  Hypocalcemia     Hypocalcemia on last laboratory work will repeat CMP at next office visit    I believe this may have been attributed to her previous renal cancer and I will follow up at this point         Relevant Orders    Comprehensive metabolic panel      Other Visit Diagnoses     Encounter for vaccination    -  Primary    Relevant Orders    FLUZONE: influenza vaccine, quadrivalent, 0 5 mL (Completed)            Subjective:      Patient ID: April Cathleen is a 38 y o  female  Patient presents today she is post op from nephrectomy right side from renal cell carcinoma she has been stable at this point postoperatively she had abdominal pain and postsurgical pain and has tolerated this now she is taking on occasion ibuprofen but has to watch her gastritis upper stomach and we discussed Pepcid at this point since she is not taking Dexilant Nexium or Zantac since that cause an increased risk for cancer      The following portions of the patient's history were reviewed and updated as appropriate: allergies, current medications, past family history, past medical history, past social history, past surgical history and problem list     Review of Systems   Constitutional: Negative for chills, fatigue and fever  HENT: Negative for congestion, nosebleeds, rhinorrhea, sinus pressure and sore throat  Eyes: Negative for discharge and redness  Respiratory: Negative for cough and shortness of breath  Cardiovascular: Negative for chest pain, palpitations and leg swelling  Gastrointestinal: Negative for abdominal pain, blood in stool and nausea  Endocrine: Negative for cold intolerance, heat intolerance and polyuria  Genitourinary: Negative for dysuria and frequency  Musculoskeletal: Negative for arthralgias, back pain and myalgias  Skin: Negative for rash  Neurological: Negative for dizziness, weakness and headaches  Hematological: Negative for adenopathy  Psychiatric/Behavioral: Negative for behavioral problems and sleep disturbance  The patient is not nervous/anxious            Objective:      /68 (BP Location: Left arm, Patient Position: Sitting)   Pulse 75   Ht 5' 7" (1 702 m)   Wt 56 9 kg (125 lb 6 4 oz)   SpO2 98%   BMI 19 64 kg/m²        Physical Exam   Constitutional: She is oriented to person, place, and time  She appears well-developed and well-nourished  No distress  HENT:   Head: Normocephalic and atraumatic  Right Ear: External ear normal    Left Ear: External ear normal    Nose: Nose normal    Mouth/Throat: Oropharynx is clear and moist  No oropharyngeal exudate  Eyes: Pupils are equal, round, and reactive to light  Conjunctivae and EOM are normal  Right eye exhibits no discharge  Left eye exhibits no discharge  No scleral icterus  Neck: Normal range of motion  No JVD present  No thyromegaly present  Cardiovascular: Normal rate, regular rhythm and normal heart sounds  No murmur heard  Pulmonary/Chest: Effort normal  She has no wheezes  She has no rales  She exhibits no tenderness  Abdominal: Soft  Bowel sounds are normal  She exhibits no distension and no mass  There is no tenderness  Musculoskeletal: Normal range of motion  She exhibits no edema, tenderness or deformity  Lymphadenopathy:     She has no cervical adenopathy  Neurological: She is alert and oriented to person, place, and time  She has normal reflexes  She displays normal reflexes  No cranial nerve deficit  Coordination normal    Skin: Skin is warm and dry  No rash noted  Psychiatric: She has a normal mood and affect  Her behavior is normal  Judgment and thought content normal    Nursing note and vitals reviewed  Data:    Laboratory Results: I have personally reviewed the pertinent laboratory results/reports   Radiology/Other Diagnostic Testing Results: I have personally reviewed pertinent reports         Lab Results   Component Value Date    WBC 8 64 08/27/2019    HGB 12 2 08/27/2019    HCT 36 0 08/27/2019    MCV 92 08/27/2019     08/27/2019     Lab Results   Component Value Date    K 4 1 08/27/2019     (H) 08/27/2019    CO2 26 08/27/2019    BUN 7 08/27/2019    CREATININE 0 69 08/27/2019    GLUF 109 (H) 08/26/2019 CALCIUM 7 9 (L) 08/27/2019    EGFR 105 08/27/2019     No results found for: CHOLESTEROL  No results found for: HDL  No results found for: LDLCALC  No results found for: TRIG  No results found for: CHOLHDL  No results found for: [id number], TSH  No results found for: HGBA1C  No results found for: PSA    Land O'Taylor, DO

## 2019-11-05 NOTE — ASSESSMENT & PLAN NOTE
Hypocalcemia on last laboratory work will repeat CMP at next office visit    I believe this may have been attributed to her previous renal cancer and I will follow up at this point

## 2019-11-05 NOTE — PATIENT INSTRUCTIONS
Cigarette Smoking and Your Health, Ambulatory Care   GENERAL INFORMATION:   What are the risks to my health if I smoke? Chemicals in tobacco are addictive and damage every cell in your body  All tobacco products are dangerous to you and to nonsmokers who breathe your secondhand smoke  Even if you are a light smoker or a social smoker, you have an increased risk for cancer, heart disease, and lung disease  If you are pregnant or have diabetes, smoking increases your risk for complications  What are the benefits to my health if I stop smoking? · Lower risk of cancer, heart disease, blood clots, heart attack, and stroke    · Lower risk of diabetes complications, such as kidney, artery, or eye disease, and nerve damage that can result in amputations    · Lower risk of lung infections and diseases, such as pneumonia, asthma, chronic bronchitis, and emphysema           · Increased benefits of chemotherapy if you already have cancer, and decreased risk for cancer returning after treatment    · Improved circulation, allowing more oxygen to be delivered to your body    · Improved ability to heal and to fight infections  What are the benefits to the health of others if I stop smoking? · Lower risk of lung cancer and heart disease in nonsmokers    · Lower risk of miscarriage, early delivery, low birth weight, and stillbirth    · Lower risk of SIDS, obesity, developmental delay, ear infections, colds, pneumonia, bronchitis, asthma, and ADHD in your child  Where can I find more information and support to stop smoking? It is never too late to stop smoking  Ask your healthcare provider for more information if you need help  · [employer]  Phone: 7- [phone]  Web Address: "[employer]  Follow up with your healthcare provider as directed:  Write down your questions so you remember to ask them during your visits  CARE AGREEMENT:   You have the right to help plan your care   Learn about your health condition and how it may be treated  Discuss treatment options with your caregivers to decide what care you want to receive  You always have the right to refuse treatment  The above information is an  only  It is not intended as medical advice for individual conditions or treatments  Talk to your doctor, nurse or pharmacist before following any medical regimen to see if it is safe and effective for you  © 2014 [address] is for End User's use only and may not be sold, redistributed or otherwise used for commercial purposes  All illustrations and images included in CareNotes® are the copyrighted property of [employer]  or Anthony Ramachandran

## 2019-11-05 NOTE — ASSESSMENT & PLAN NOTE
Nicotine dependence at this time I recommend that she reduce and work on quitting which she has been going through that process over the last 6 months she went to the kidney surgery and other issues and she is down to half a pack a day she does not want to use a patch and she is going to work on this on her own  She does take alprazolam on occasion to help herself when she is ready to quit especially in the wintertime she does not like to go outside and smoke in the cold  I will discuss this with her further in the future

## 2019-11-05 NOTE — ASSESSMENT & PLAN NOTE
Renal cell carcinoma right kidney post nephrectomy stable postoperative at this point and follow up with Nephrology as indicated I reviewed laboratory work with patient today will repeat blood work at next office visit

## 2019-11-05 NOTE — ASSESSMENT & PLAN NOTE
Gastritis patient follows up with Gastroenterology she additionally had endoscopy and colonoscopy while being worked up for her renal cell carcinoma    She will continue with Pepcid AC at this time as recommended through Gastroenterology and I will follow up with her at her next visit here

## 2019-11-05 NOTE — ASSESSMENT & PLAN NOTE
Hiatal hernia by history patient has been off her proton pump inhibitors and will use Pepcid now especially during her menstrual cycle week once per month I recommend Pepcid AC 10 mg twice daily while she requires ibuprofen for her menstrual cramps

## 2020-01-08 ENCOUNTER — APPOINTMENT (OUTPATIENT)
Dept: LAB | Facility: CLINIC | Age: 45
End: 2020-01-08
Payer: COMMERCIAL

## 2020-01-08 DIAGNOSIS — C64.1 RENAL CELL CARCINOMA OF RIGHT KIDNEY (HCC): ICD-10-CM

## 2020-01-08 LAB
ANION GAP SERPL CALCULATED.3IONS-SCNC: 3 MMOL/L (ref 4–13)
BUN SERPL-MCNC: 14 MG/DL (ref 5–25)
CALCIUM SERPL-MCNC: 8.7 MG/DL (ref 8.3–10.1)
CHLORIDE SERPL-SCNC: 110 MMOL/L (ref 100–108)
CO2 SERPL-SCNC: 27 MMOL/L (ref 21–32)
CREAT SERPL-MCNC: 0.85 MG/DL (ref 0.6–1.3)
GFR SERPL CREATININE-BSD FRML MDRD: 83 ML/MIN/1.73SQ M
GLUCOSE P FAST SERPL-MCNC: 84 MG/DL (ref 65–99)
POTASSIUM SERPL-SCNC: 4.3 MMOL/L (ref 3.5–5.3)
SODIUM SERPL-SCNC: 140 MMOL/L (ref 136–145)

## 2020-01-08 PROCEDURE — 80048 BASIC METABOLIC PNL TOTAL CA: CPT

## 2020-01-08 PROCEDURE — 36415 COLL VENOUS BLD VENIPUNCTURE: CPT

## 2020-01-09 ENCOUNTER — HOSPITAL ENCOUNTER (OUTPATIENT)
Dept: MAMMOGRAPHY | Facility: CLINIC | Age: 45
Discharge: HOME | End: 2020-01-09
Payer: COMMERCIAL

## 2020-01-09 VITALS — WEIGHT: 125 LBS | BODY MASS INDEX: 19.62 KG/M2 | HEIGHT: 67 IN

## 2020-01-09 DIAGNOSIS — Z12.39 BREAST CANCER SCREENING: ICD-10-CM

## 2020-01-09 PROCEDURE — 77067 SCR MAMMO BI INCL CAD: CPT

## 2020-01-09 PROCEDURE — 77063 BREAST TOMOSYNTHESIS BI: CPT

## 2020-01-13 ENCOUNTER — HOSPITAL ENCOUNTER (OUTPATIENT)
Dept: ULTRASOUND IMAGING | Facility: CLINIC | Age: 45
Discharge: HOME | End: 2020-01-13
Payer: COMMERCIAL

## 2020-01-13 ENCOUNTER — HOSPITAL ENCOUNTER (OUTPATIENT)
Dept: MAMMOGRAPHY | Facility: CLINIC | Age: 45
Discharge: HOME | End: 2020-01-13
Payer: COMMERCIAL

## 2020-01-13 ENCOUNTER — HOSPITAL ENCOUNTER (OUTPATIENT)
Dept: CT IMAGING | Facility: HOSPITAL | Age: 45
Discharge: HOME | End: 2020-01-13
Attending: UROLOGY
Payer: COMMERCIAL

## 2020-01-13 VITALS — WEIGHT: 125 LBS | HEIGHT: 67 IN | BODY MASS INDEX: 19.62 KG/M2

## 2020-01-13 DIAGNOSIS — R92.8 ABNORMAL MAMMOGRAM: ICD-10-CM

## 2020-01-13 DIAGNOSIS — C64.1 RENAL CELL CARCINOMA OF RIGHT KIDNEY (HCC): ICD-10-CM

## 2020-01-13 PROCEDURE — 77066 DX MAMMO INCL CAD BI: CPT

## 2020-01-13 PROCEDURE — G0279 TOMOSYNTHESIS, MAMMO: HCPCS

## 2020-01-13 PROCEDURE — 74170 CT ABD WO CNTRST FLWD CNTRST: CPT

## 2020-01-13 PROCEDURE — 76642 ULTRASOUND BREAST LIMITED: CPT

## 2020-01-13 RX ADMIN — IODIXANOL 100 ML: 320 INJECTION, SOLUTION INTRAVASCULAR at 07:56

## 2020-01-13 NOTE — PROGRESS NOTES
Met with patient, pt  and Dr Maki Valverde regarding recommendation for;    _____ RIGHT ___X___LEFT      __X___Ultrasound guided  ______Stereotactic breast biopsy  __X___Verbalized understanding        Blood thinners:  _____yes __X___no    Date stopped: ___N/A____    Biopsy teaching sheet given:  __X___yes ____no    Pt given contact information and adv to call with any questions/needs

## 2020-01-14 ENCOUNTER — TELEPHONE (OUTPATIENT)
Dept: UROLOGY | Facility: AMBULATORY SURGERY CENTER | Age: 45
End: 2020-01-14

## 2020-01-14 NOTE — TELEPHONE ENCOUNTER
Patient of Ilana/Radha office  History renal cell carcinoma of right kidney  Last seen in office 9/11/19  Ordered for repeat imaging and BMP in 4 months  Patient scheduled for office visit 1/21/202  Will route to provider to review

## 2020-01-14 NOTE — TELEPHONE ENCOUNTER
Radiology calling with significant findings on imaging study for CT abdomen in Epic for Remy Redmond to review

## 2020-01-15 ENCOUNTER — HOSPITAL ENCOUNTER (OUTPATIENT)
Dept: ULTRASOUND IMAGING | Facility: CLINIC | Age: 45
Discharge: HOME | End: 2020-01-15
Payer: COMMERCIAL

## 2020-01-15 ENCOUNTER — HOSPITAL ENCOUNTER (OUTPATIENT)
Dept: MAMMOGRAPHY | Facility: CLINIC | Age: 45
Discharge: HOME | End: 2020-01-15

## 2020-01-15 VITALS — DIASTOLIC BLOOD PRESSURE: 76 MMHG | SYSTOLIC BLOOD PRESSURE: 102 MMHG | HEART RATE: 92 BPM

## 2020-01-15 DIAGNOSIS — R92.8 ABNORMAL MAMMOGRAM: ICD-10-CM

## 2020-01-15 PROCEDURE — 88305 TISSUE EXAM BY PATHOLOGIST: CPT | Performed by: PATHOLOGY

## 2020-01-15 PROCEDURE — 19083 BX BREAST 1ST LESION US IMAG: CPT

## 2020-01-15 RX ORDER — LIDOCAINE HYDROCHLORIDE 10 MG/ML
5 INJECTION, SOLUTION EPIDURAL; INFILTRATION; INTRACAUDAL; PERINEURAL ONCE
Status: COMPLETED | OUTPATIENT
Start: 2020-01-15 | End: 2020-01-15

## 2020-01-15 RX ORDER — LIDOCAINE HYDROCHLORIDE 10 MG/ML
5 INJECTION, SOLUTION INFILTRATION; PERINEURAL ONCE
Status: COMPLETED | OUTPATIENT
Start: 2020-01-15 | End: 2020-01-15

## 2020-01-15 RX ADMIN — LIDOCAINE HYDROCHLORIDE 5 ML: 10 INJECTION, SOLUTION EPIDURAL; INFILTRATION; INTRACAUDAL; PERINEURAL at 09:10

## 2020-01-15 RX ADMIN — LIDOCAINE HYDROCHLORIDE 5 ML: 10 INJECTION, SOLUTION INFILTRATION; PERINEURAL at 09:06

## 2020-01-15 NOTE — PROGRESS NOTES
Procedure type:    ___x__ultrasound guided _____stereotactic    Breast:    __x___Left _____Right    Location: 4 o'clock 3 CMFN    Needle: 12 gauge lang    # of passes: 3    Clip: Heart    Performed by: Dr Magdalena Celeste held for 5 minutes by: Hernan Morley Strips:    ___x__yes _____no    Band aid:    __x___yes_____no    Tape and guaze:    _____yes __x___no    Tolerated procedure:    ___x__yes _____no

## 2020-01-16 NOTE — PROGRESS NOTES
Post procedure call completed    Bleeding: _____yes __X___no    Pain: _____yes ___X___no (Pt c/o soreness, used ice and Motrin)    Redness/Swelling: ______yes ___X___no (Pt with slight bruising)    Band aid removed: _____yes __X___no (Pt did take a shower and did not remove band aid, given instructions on changing band aid after shower d/t increased risk of infection)    Steri-Strips intact: ___X___yes _____no    Pt with no questions at this time, adv will call when results available, adv to call with any questions or concerns, has name/# for contact

## 2020-01-17 ENCOUNTER — TELEPHONE (OUTPATIENT)
Dept: MAMMOGRAPHY | Facility: CLINIC | Age: 45
End: 2020-01-17

## 2020-01-17 NOTE — TELEPHONE ENCOUNTER
Epic email sent to Dr Alex Raya,    Hi Dr Jackalyn Homans,    I just wanted to let you know that the above patient was given her negative breast biopsy results  The patient had no questions and was advised to have a bilateral diagnostic US in 6 months  The patient was scheduled for this in July 2020  If you have any questions or need further assistance please let me know      Thank you,  Jeffrey Sharpe, MSN, RN  Breast Nurse Navigator

## 2020-01-21 ENCOUNTER — OFFICE VISIT (OUTPATIENT)
Dept: UROLOGY | Facility: AMBULATORY SURGERY CENTER | Age: 45
End: 2020-01-21
Payer: COMMERCIAL

## 2020-01-21 VITALS
HEIGHT: 67 IN | WEIGHT: 128 LBS | DIASTOLIC BLOOD PRESSURE: 70 MMHG | HEART RATE: 92 BPM | SYSTOLIC BLOOD PRESSURE: 102 MMHG | BODY MASS INDEX: 20.09 KG/M2

## 2020-01-21 DIAGNOSIS — C64.1 RENAL CELL CARCINOMA OF RIGHT KIDNEY (HCC): Primary | ICD-10-CM

## 2020-01-21 PROCEDURE — 99213 OFFICE O/P EST LOW 20 MIN: CPT | Performed by: NURSE PRACTITIONER

## 2020-01-21 NOTE — PROGRESS NOTES
1/21/2020      Chief Complaint   Patient presents with    Renal cell carcinoma     Assessment and Plan    38 y o  female managed by Dr Maria Esther Abdullahi    1  Renal Cell Carcinoma (T1a clear cell renal carcinoma)  · Status post right partial laparoscopic nephrectomy 08/26/2019  · CT abdomen pelvis performed 01/13/2020 reveals postop seroma with no with definitive residual tumor appreciated  Follow-up with repeat CT scan for 6 months  · Reviewed results of CT and BMP with patient  · CT of the pelvis with and without contrast ordered for 6 months  · BMP in 6 months    History of Present Illness  April Eric Copeland is a 38 y o  female here for follow up evaluation of  T1a  cell renal cell carcinoma status post right partial laparoscopic nephrectomy 08/26/2019  Patient has no complaints and has been doing well postoperatively  She has no complaints of lower urinary tract symptoms including dysuria, hematuria, urinary frequency and urgency  Postoperative CT abdomen pelvis performed 01/13/2010 reveals a postop seroma with no definitive residual tumor appreciated  Review of Systems   Constitutional: Negative for chills and fever  Respiratory: Negative for cough and shortness of breath  Cardiovascular: Negative for chest pain  Gastrointestinal: Negative for abdominal distention, abdominal pain, blood in stool, nausea and vomiting  Genitourinary: Negative for difficulty urinating, dysuria, enuresis, flank pain, frequency, hematuria and urgency  Musculoskeletal: Negative for back pain  Skin: Negative for rash  Neurological: Negative for dizziness       Past Medical History  Past Medical History:   Diagnosis Date    Chronic kidney disease 08/2019    tumor and cyst    Colon polyp     Gastritis        Past Social History  Past Surgical History:   Procedure Laterality Date    COLONOSCOPY      MD LAP,PARTIAL NEPHRECTOMY Right 8/26/2019    Procedure: NEPHRECTOMY PARTIAL LAPAROSCOPIC W ROBOTICS WITH INTRA-OP LAPAROSCOPIC ULTRASOUND;  Surgeon: Alicja Thomas MD;  [facility];  Service: Urology    US GUIDED BREAST BIOPSY LEFT COMPLETE Left 1/15/2020    WISDOM TOOTH EXTRACTION       Social History     Tobacco Use   Smoking Status Current Every Day Smoker    Packs/day: 1 00    Years: 25 00    Pack years: 25 00   Smokeless Tobacco Never Used       Past Family History  Family History   Problem Relation Age of Onset    Heart disease Father     Cancer Paternal Grandmother     Diabetes Paternal Grandmother     Breast cancer Paternal Grandmother 79    Other Maternal Aunt         Renal Cell Carcinoma    No Known Problems Mother     No Known Problems Sister     No Known Problems Maternal Grandmother     Breast cancer Paternal Grandfather 79    No Known Problems Maternal Aunt     No Known Problems Maternal Aunt        Past Social history  Social History     Socioeconomic History    Marital status: /Civil Union     Spouse name: Not on file    Number of children: Not on file    Years of education: Not on file    Highest education level: Not on file   Occupational History    Not on file   Social Needs    Financial resource strain: Not on file    Food insecurity:     Worry: Not on file     Inability: Not on file    Transportation needs:     Medical: Not on file     Non-medical: Not on file   Tobacco Use    Smoking status: Current Every Day Smoker     Packs/day: 1 00     Years: 25 00     Pack years: 25 00    Smokeless tobacco: Never Used   Substance and Sexual Activity    Alcohol use: Not Currently    Drug use: Never    Sexual activity: Yes     Partners: Male     Birth control/protection: Condom Male   Lifestyle    Physical activity:     Days per week: Not on file     Minutes per session: Not on file    Stress: Not on file   Relationships    Social connections:     Talks on phone: Not on file     Gets together: Not on file     Attends Taoism service: Not on file     Active member of club or organization: Not on file     Attends meetings of clubs or organizations: Not on file     Relationship status: Not on file    Intimate partner violence:     Fear of current or ex partner: Not on file     Emotionally abused: Not on file     Physically abused: Not on file     Forced sexual activity: Not on file   Other Topics Concern    Not on file   Social History Narrative    Not on file       Current Medications  Current Outpatient Medications   Medication Sig Dispense Refill    acetaminophen (TYLENOL) 325 mg tablet Take 650 mg by mouth every 6 (six) hours as needed for mild pain      ALPRAZolam (XANAX) 0 25 mg tablet Take 1 tablet (0 25 mg total) by mouth 2 (two) times a day as needed for anxiety 20 tablet 0     No current facility-administered medications for this visit  Allergies  Allergies   Allergen Reactions    Strawberry (Diagnostic) Hives    Tetracycline      breast swelling          The following portions of the patient's history were reviewed and updated as appropriate: allergies, current medications, past medical history, past social history, past surgical history and problem list       Vitals  Vitals:    01/21/20 1005   BP: 102/70   BP Location: Left arm   Patient Position: Sitting   Cuff Size: Adult   Pulse: 92   Weight: 58 1 kg (128 lb)   Height: 5' 7" (1 702 m)       Physical Exam  Physical Exam   Constitutional: She is oriented to person, place, and time  She appears well-developed and well-nourished  No distress  HENT:   Head: Atraumatic  Cardiovascular: Normal rate  Pulmonary/Chest: Effort normal and breath sounds normal  No respiratory distress  Abdominal: Soft  Musculoskeletal: Normal range of motion  Neurological: She is alert and oriented to person, place, and time  Skin: Skin is warm  Psychiatric: She has a normal mood and affect  Vitals reviewed  Results  No results found for this or any previous visit (from the past 1 hour(s))  ]  No results found for: PSA  Lab Results   Component Value Date    CALCIUM 8 7 01/08/2020    K 4 3 01/08/2020    CO2 27 01/08/2020     (H) 01/08/2020    BUN 14 01/08/2020    CREATININE 0 85 01/08/2020     Lab Results   Component Value Date    WBC 8 64 08/27/2019    HGB 12 2 08/27/2019    HCT 36 0 08/27/2019    MCV 92 08/27/2019     08/27/2019     Orders  Orders Placed This Encounter   Procedures    CT chest abdomen pelvis w wo contrast     Standing Status:   Future     Standing Expiration Date:   1/21/2024     Scheduling Instructions: For procedures with both oral (PO) and IV contrast:            The patient will need to drink barium for this test  Barium needs to be picked up in the registration area at least one day prior to your study  For AM Appointments: Drink one bottle of barium before bed time the evening before your scheduled test   Drink 1/2 of the second bottle one hour prior to your test  Please bring other 1/2 bottle with you to drink at the time of your study  For PM Appointments: Drink one bottle of barium before 9:00am on the day of your test  Drink 1/2 of the second bottle one hour prior to your test  Please bring other 1/2 bottle with you to drink at the time of your study  Nothing to eat 3 hours prior to your test  In addition to the barium, clear liquids are also permitted up until the time of the scan  Clear liquids includes water, black coffee or tea, apple juice or clear broth  If possible wear clothing without any metal in the abdomen area  Sweat suit, sports bra or bra without underwire may eliminate the need to change  Please bring your insurance       cards, a form of photo ID and a list of your medications with you  Arrive 15 minutes prior to your appointment time in order to register  On the day of your test, please bring any prior CT or MRI studies of this area with you that were not performed at a [facility]              For procedures with ONLY IV contrast:            Nothing to eat 3 hours prior to your test  Clear liquids are permitted up until the time of the scan  Clear liquids includes water, black coffee or tea, apple juice or clear broth  If possible wear clothing without any metal in the abdomen area  Sweat suit, sports bra or bra without underwire may eliminate the need to change  Please bring your insurance cards, a form of photo ID and a list of your medications with you  Arrive 15 minutes prior to your appointment time in order to register  On the day of your test, please bring any prior CT or MRI studies of this area with you that were not performed at a [facility]  To schedule this appointment, please contact Central Scheduling at [phone]  Order Specific Question:   Is the patient pregnant? Answer:   No     Order Specific Question:   What is the patient's sedation requirement? Answer:   No Sedation     Order Specific Question:   Contrast information:     Answer:   IV     Order Specific Question:   Did the patient ever have a reaction to x-ray dye? If yes, please verify the type of allergy and order the contrast allergy prep  Answer:   No    Basic metabolic panel     This is a patient instruction: Patient fasting for 8 hours or longer recommended       Standing Status:   Future     Standing Expiration Date:   1/21/2021       EDNA Barrett

## 2020-05-08 ENCOUNTER — TELEMEDICINE (OUTPATIENT)
Dept: FAMILY MEDICINE CLINIC | Facility: CLINIC | Age: 45
End: 2020-05-08
Payer: COMMERCIAL

## 2020-05-08 DIAGNOSIS — C64.1 RENAL CELL CARCINOMA OF RIGHT KIDNEY (HCC): ICD-10-CM

## 2020-05-08 DIAGNOSIS — K44.9 HIATAL HERNIA: ICD-10-CM

## 2020-05-08 DIAGNOSIS — K29.70 GASTRITIS WITHOUT BLEEDING, UNSPECIFIED CHRONICITY, UNSPECIFIED GASTRITIS TYPE: ICD-10-CM

## 2020-05-08 DIAGNOSIS — E83.51 HYPOCALCEMIA: Primary | ICD-10-CM

## 2020-05-08 PROCEDURE — 99213 OFFICE O/P EST LOW 20 MIN: CPT | Performed by: FAMILY MEDICINE

## 2020-07-10 ENCOUNTER — APPOINTMENT (OUTPATIENT)
Dept: LAB | Facility: CLINIC | Age: 45
End: 2020-07-10
Payer: COMMERCIAL

## 2020-07-10 DIAGNOSIS — E83.51 HYPOCALCEMIA: ICD-10-CM

## 2020-07-10 DIAGNOSIS — K44.9 HIATAL HERNIA: ICD-10-CM

## 2020-07-10 DIAGNOSIS — K29.70 GASTRITIS WITHOUT BLEEDING, UNSPECIFIED CHRONICITY, UNSPECIFIED GASTRITIS TYPE: ICD-10-CM

## 2020-07-10 DIAGNOSIS — C64.1 RENAL CELL CARCINOMA OF RIGHT KIDNEY (HCC): ICD-10-CM

## 2020-07-10 LAB
ALBUMIN SERPL BCP-MCNC: 4.3 G/DL (ref 3.5–5)
ALP SERPL-CCNC: 68 U/L (ref 46–116)
ALT SERPL W P-5'-P-CCNC: 16 U/L (ref 12–78)
ANION GAP SERPL CALCULATED.3IONS-SCNC: 5 MMOL/L (ref 4–13)
AST SERPL W P-5'-P-CCNC: 10 U/L (ref 5–45)
BASOPHILS # BLD AUTO: 0.06 THOUSANDS/ΜL (ref 0–0.1)
BASOPHILS NFR BLD AUTO: 1 % (ref 0–1)
BILIRUB SERPL-MCNC: 0.51 MG/DL (ref 0.2–1)
BUN SERPL-MCNC: 15 MG/DL (ref 5–25)
CALCIUM SERPL-MCNC: 8.7 MG/DL (ref 8.3–10.1)
CHLORIDE SERPL-SCNC: 107 MMOL/L (ref 100–108)
CHOLEST SERPL-MCNC: 237 MG/DL (ref 50–200)
CO2 SERPL-SCNC: 25 MMOL/L (ref 21–32)
CREAT SERPL-MCNC: 0.86 MG/DL (ref 0.6–1.3)
EOSINOPHIL # BLD AUTO: 0.16 THOUSAND/ΜL (ref 0–0.61)
EOSINOPHIL NFR BLD AUTO: 2 % (ref 0–6)
ERYTHROCYTE [DISTWIDTH] IN BLOOD BY AUTOMATED COUNT: 13.5 % (ref 11.6–15.1)
GFR SERPL CREATININE-BSD FRML MDRD: 81 ML/MIN/1.73SQ M
GLUCOSE P FAST SERPL-MCNC: 87 MG/DL (ref 65–99)
HCT VFR BLD AUTO: 46.2 % (ref 34.8–46.1)
HDLC SERPL-MCNC: 65 MG/DL
HGB BLD-MCNC: 15.4 G/DL (ref 11.5–15.4)
IMM GRANULOCYTES # BLD AUTO: 0.02 THOUSAND/UL (ref 0–0.2)
IMM GRANULOCYTES NFR BLD AUTO: 0 % (ref 0–2)
LDLC SERPL CALC-MCNC: 159 MG/DL (ref 0–100)
LYMPHOCYTES # BLD AUTO: 2.97 THOUSANDS/ΜL (ref 0.6–4.47)
LYMPHOCYTES NFR BLD AUTO: 40 % (ref 14–44)
MCH RBC QN AUTO: 31.1 PG (ref 26.8–34.3)
MCHC RBC AUTO-ENTMCNC: 33.3 G/DL (ref 31.4–37.4)
MCV RBC AUTO: 93 FL (ref 82–98)
MONOCYTES # BLD AUTO: 0.46 THOUSAND/ΜL (ref 0.17–1.22)
MONOCYTES NFR BLD AUTO: 6 % (ref 4–12)
NEUTROPHILS # BLD AUTO: 3.76 THOUSANDS/ΜL (ref 1.85–7.62)
NEUTS SEG NFR BLD AUTO: 51 % (ref 43–75)
NONHDLC SERPL-MCNC: 172 MG/DL
NRBC BLD AUTO-RTO: 0 /100 WBCS
PLATELET # BLD AUTO: 216 THOUSANDS/UL (ref 149–390)
PMV BLD AUTO: 9.8 FL (ref 8.9–12.7)
POTASSIUM SERPL-SCNC: 5 MMOL/L (ref 3.5–5.3)
PROT SERPL-MCNC: 7.5 G/DL (ref 6.4–8.2)
RBC # BLD AUTO: 4.95 MILLION/UL (ref 3.81–5.12)
SODIUM SERPL-SCNC: 137 MMOL/L (ref 136–145)
TRIGL SERPL-MCNC: 65 MG/DL
TSH SERPL DL<=0.05 MIU/L-ACNC: 1.69 UIU/ML (ref 0.36–3.74)
WBC # BLD AUTO: 7.43 THOUSAND/UL (ref 4.31–10.16)

## 2020-07-10 PROCEDURE — 85025 COMPLETE CBC W/AUTO DIFF WBC: CPT

## 2020-07-10 PROCEDURE — 83516 IMMUNOASSAY NONANTIBODY: CPT

## 2020-07-10 PROCEDURE — 80053 COMPREHEN METABOLIC PANEL: CPT

## 2020-07-10 PROCEDURE — 84443 ASSAY THYROID STIM HORMONE: CPT

## 2020-07-10 PROCEDURE — 87389 HIV-1 AG W/HIV-1&-2 AB AG IA: CPT

## 2020-07-10 PROCEDURE — 80061 LIPID PANEL: CPT

## 2020-07-10 PROCEDURE — 36415 COLL VENOUS BLD VENIPUNCTURE: CPT

## 2020-07-12 LAB — HIV 1+2 AB+HIV1 P24 AG SERPL QL IA: NORMAL

## 2020-07-20 LAB — MISCELLANEOUS LAB TEST RESULT: NORMAL

## 2020-07-21 ENCOUNTER — HOSPITAL ENCOUNTER (OUTPATIENT)
Dept: ULTRASOUND IMAGING | Facility: CLINIC | Age: 45
Discharge: HOME | End: 2020-07-21
Payer: COMMERCIAL

## 2020-07-21 ENCOUNTER — TRANSCRIBE ORDERS (OUTPATIENT)
Dept: MAMMOGRAPHY | Facility: CLINIC | Age: 45
End: 2020-07-21

## 2020-07-21 ENCOUNTER — HOSPITAL ENCOUNTER (OUTPATIENT)
Dept: CT IMAGING | Facility: HOSPITAL | Age: 45
Discharge: HOME | End: 2020-07-21
Payer: COMMERCIAL

## 2020-07-21 DIAGNOSIS — R92.8 ABNORMAL MAMMOGRAM: ICD-10-CM

## 2020-07-21 DIAGNOSIS — C64.1 RENAL CELL CARCINOMA OF RIGHT KIDNEY (HCC): ICD-10-CM

## 2020-07-21 DIAGNOSIS — R92.8 ABNORMAL MAMMOGRAM: Primary | ICD-10-CM

## 2020-07-21 PROCEDURE — 76642 ULTRASOUND BREAST LIMITED: CPT

## 2020-07-21 PROCEDURE — 74178 CT ABD&PLV WO CNTR FLWD CNTR: CPT

## 2020-07-21 PROCEDURE — 71260 CT THORAX DX C+: CPT

## 2020-07-21 RX ADMIN — IOHEXOL 100 ML: 350 INJECTION, SOLUTION INTRAVENOUS at 08:37

## 2020-07-23 ENCOUNTER — TELEPHONE (OUTPATIENT)
Dept: UROLOGY | Facility: AMBULATORY SURGERY CENTER | Age: 45
End: 2020-07-23

## 2020-07-23 DIAGNOSIS — R91.1 LUNG NODULE: Primary | ICD-10-CM

## 2020-07-23 NOTE — TELEPHONE ENCOUNTER
Attempted to call patient to notify of results of CT renal protocol as well as CT of the chest   CT renal study negative for recurrence or metastatic disease  There is resolution of the postoperative seroma that was present on previous evaluation  CT of the chest reveals a 2 7 cm nodule in the right upper lobe of the lung but will need to be re-evaluated in approximately 3 months with a repeat CT of the chest   Patient will also need to be followed up by pulmonology  Referral for pulmonology placed into epic    Will re-attempt contact patient regarding results    Anamaria Watts, pt seeing you 07/28/2020

## 2020-07-24 NOTE — PROGRESS NOTES
Assessment and plan:       1  Renal Cell Carcinoma (T1a clear cell renal carcinoma)  ? Status post right partial laparoscopic nephrectomy 08/26/2019  ? Reviewed results of CT and BMP with patient  ? US and BMP will be completed in 12 months    2  Pulmonary nodule  - chest CT scan showed a pulmonary nodule  - repeat CT scan has been ordered and patient has been referred to pulmonology    Debi Joseph PA-C      Chief Complaint     Chief Complaint   Patient presents with    Renal cell carcinoma of right kidney [facility])         History of Present Illness     April Alirio Hay is a 54 y o  female patient managed by Dr Cleveland Max for T1a renal cell carcinoma status post right partial laparoscopic nephrectomy 08/26/2019  Postoperative CT scan performed 01/13/2020 revealed a postoperative seroma but no definitive residual tumor  Updated CT of abdomen and pelvis performed 07/21/2020 shows resolution of previously noted seroma  No evidence of recurrent or metastatic disease  CT scan of the chest showed a 2 7 cm ground-glass nodule in the right upper lobe and follow-up with CT scan was recommended  This has been ordered and patient will follow up with pulmonology  Kidney function remains normal with a creatinine of 0 86 on 07/10/2020  Patient reports she has been having some slight back pain  She is having no issues with urination  She is continuing to try to quit smoking  This has been made more difficult due to stressors such as an abnormal mammogram requiring biopsy and now a pulmonary nodule  She does plan on quitting smoking as soon as possible and initiating exercise  Laboratory     Lab Results   Component Value Date    CREATININE 0 86 07/10/2020       No results found for: PSA    No results found for this or any previous visit (from the past 1 hour(s))  Review of Systems     Review of Systems   Constitutional: Negative for chills and fever  HENT: Negative  Eyes: Negative  Respiratory: Negative for cough and shortness of breath  Cardiovascular: Negative for chest pain  Gastrointestinal: Negative for constipation, diarrhea, nausea and vomiting  Endocrine: Negative  Genitourinary: Negative for difficulty urinating, dyspareunia, dysuria, enuresis, flank pain, frequency, hematuria and urgency  Musculoskeletal: Negative  Skin: Negative  Allergies     Allergies   Allergen Reactions    Strawberry (Diagnostic) Hives    Tetracycline      breast swelling        Physical Exam     Physical Exam   Constitutional: She is oriented to person, place, and time  She appears well-developed and well-nourished  No distress  HENT:   Head: Normocephalic and atraumatic  Right Ear: External ear normal    Left Ear: External ear normal    Nose: Nose normal    Eyes: Right eye exhibits no discharge  Left eye exhibits no discharge  No scleral icterus  Cardiovascular: Normal rate  Pulmonary/Chest: Effort normal    Abdominal:   Incisions are well healed and nontender   Genitourinary:   Genitourinary Comments: Negative for CVA tenderness bilaterally   Musculoskeletal:   Ambulates independently   Neurological: She is alert and oriented to person, place, and time  Skin: Skin is warm and dry  She is not diaphoretic  Psychiatric: She has a normal mood and affect  Her behavior is normal  Judgment and thought content normal    Nursing note and vitals reviewed          Vital Signs     Vitals:    07/28/20 0910   BP: 100/72   Pulse: 97   Weight: 59 9 kg (132 lb)   Height: 5' 7" (1 702 m)         Current Medications       Current Outpatient Medications:     acetaminophen (TYLENOL) 325 mg tablet, Take 650 mg by mouth every 6 (six) hours as needed for mild pain, Disp: , Rfl:     ALPRAZolam (XANAX) 0 25 mg tablet, Take 1 tablet (0 25 mg total) by mouth 2 (two) times a day as needed for anxiety, Disp: 20 tablet, Rfl: 0      Active Problems     Patient Active Problem List   Diagnosis  Gastritis    Hiatal hernia    Plantar wart, left foot    Renal lesion    Renal mass, right    Renal cell carcinoma of right kidney (HCC)    Cigarette nicotine dependence without complication    Hypocalcemia         Past Medical History     Past Medical History:   Diagnosis Date    Chronic kidney disease 08/2019    tumor and cyst    Colon polyp     Gastritis          Surgical History     Past Surgical History:   Procedure Laterality Date    COLONOSCOPY      CT LAP,PARTIAL NEPHRECTOMY Right 8/26/2019    Procedure: NEPHRECTOMY PARTIAL LAPAROSCOPIC W ROBOTICS WITH INTRA-OP LAPAROSCOPIC ULTRASOUND;  Surgeon: Lilliana Wan MD;  [facility];  Service: Urology    US GUIDED BREAST BIOPSY LEFT COMPLETE Left 1/15/2020    WISDOM TOOTH EXTRACTION           Family History     Family History   Problem Relation Age of Onset    Heart disease Father     Cancer Paternal Grandmother     Diabetes Paternal Grandmother     Breast cancer Paternal Grandmother 79    Other Maternal Aunt         Renal Cell Carcinoma    No Known Problems Mother     No Known Problems Sister     No Known Problems Maternal Grandmother     Breast cancer Paternal Grandfather 79    No Known Problems Maternal Aunt     No Known Problems Maternal Aunt          Social History     Social History       Radiology     CT ABDOMEN AND PELVIS WITH AND WITHOUT IV CONTRAST     INDICATION:   C64 1: Malignant neoplasm of right kidney, except renal pelvis  Right side renal cell carcinoma, status post partial nephrectomy on 8/20 6/20/2019   Follow-up      Pathology from 8/26/2019:  A  Kidney, right, nephrectomy:             - Clear cell renal cell carcinoma, 2 2 centimeters, confined to the kidney, ISUP nucleolar grade 1, see tumor synoptic report      COMPARISON:  CT abdomen and pelvis dated January 13, 2020 and baseline CT from June 28, 2019      TECHNIQUE: Initial CT of the abdomen and pelvis was performed without intravenous contrast  Subsequent dynamic CT evaluation of the abdomen and pelvis was performed after the administration of intravenous contrast in both nephrographic and delayed   phases after the administration of intravenous contrast   Axial, sagittal, and coronal 2D reformatted images were created from the source data and submitted for interpretation       Radiation dose length product (DLP) for this visit:  8658 mGy-cm   This examination, like all CT scans performed in the [facility], was performed utilizing techniques to minimize radiation dose exposure, including the use of iterative   reconstruction and automated exposure control      IV Contrast:  100 mL of iohexol (OMNIPAQUE)  Enteric Contrast:  Enteric contrast was not administered      FINDINGS:     ABDOMEN     RIGHT KIDNEY AND URETER:  Contour deformity in the lower pole of right kidney, in keeping with prior partial nephrectomy  No mass or enhancing soft tissue is seen to suggest locally recurrent tumor  There has been interval resolution of previously noted a small postoperative   seroma      No discrete upper tract urothelial lesion  No hydronephrosis or hydroureter  No urinary tract calculi  No perinephric collection      LEFT KIDNEY AND URETER:  No solid renal mass  No detectable urothelial mass  No hydronephrosis or hydroureter  No urinary tract calculi  No perinephric collection      URINARY BLADDER:  No bladder wall mass  No calculi         LOWER CHEST:  Please refer to the separately dictated CT chest report      LIVER/BILIARY TREE:  Normal liver size and configuration  Stable 3 mm hypoattenuating left hepatic lobe lesion (series 4 image 18), too small to characterize but most likely benign  No suspicious mass  No biliary ductal dilation  The portal and   hepatic veins are patent      GALLBLADDER:  No calcified gallstones   No pericholecystic inflammatory change      SPLEEN:  Unremarkable      PANCREAS:  Unremarkable      ADRENAL GLANDS: Unremarkable      STOMACH AND BOWEL:  Unremarkable      ABDOMINOPELVIC CAVITY:  Trace free fluid in the pelvis, likely physiological   No free intraperitoneal air  No lymphadenopathy      VESSELS:  No aneurysm  Circumaortic left renal vein, normal variant      PELVIS     REPRODUCTIVE ORGANS:  Unremarkable for patient's age      APPENDIX: No findings to suggest appendicitis      ABDOMINAL WALL/INGUINAL REGIONS:  Unremarkable      OSSEOUS STRUCTURES:  No acute fracture or destructive osseous lesion      IMPRESSION:     Status post right lower pole partial nephrectomy with resolution of previously noted small postoperative seroma    No recurrent or metastatic disease in the chest, abdomen or pelvis

## 2020-07-24 NOTE — TELEPHONE ENCOUNTER
Spoke with pt regarding the results of the CT renal protocol and CT Chest  She is awre of the need for repeat CT scan of chest in 3 months and need for follow up with Pulmonolgy    Clerical Team- Please assist in securing appointment with Pulm   Thank you

## 2020-07-24 NOTE — TELEPHONE ENCOUNTER
Patient calling again  VERY CONCERNED  Would like a call back ASAP   She can be reached at [phone]

## 2020-07-28 ENCOUNTER — OFFICE VISIT (OUTPATIENT)
Dept: UROLOGY | Facility: CLINIC | Age: 45
End: 2020-07-28
Payer: COMMERCIAL

## 2020-07-28 VITALS
HEART RATE: 97 BPM | WEIGHT: 132 LBS | DIASTOLIC BLOOD PRESSURE: 72 MMHG | BODY MASS INDEX: 20.72 KG/M2 | HEIGHT: 67 IN | SYSTOLIC BLOOD PRESSURE: 100 MMHG

## 2020-07-28 DIAGNOSIS — N28.89 RENAL MASS, RIGHT: Primary | ICD-10-CM

## 2020-07-28 PROCEDURE — 4004F PT TOBACCO SCREEN RCVD TLK: CPT | Performed by: PHYSICIAN ASSISTANT

## 2020-07-28 PROCEDURE — 3008F BODY MASS INDEX DOCD: CPT | Performed by: PHYSICIAN ASSISTANT

## 2020-07-28 PROCEDURE — 3008F BODY MASS INDEX DOCD: CPT | Performed by: FAMILY MEDICINE

## 2020-07-28 PROCEDURE — 99214 OFFICE O/P EST MOD 30 MIN: CPT | Performed by: PHYSICIAN ASSISTANT

## 2020-08-07 ENCOUNTER — TELEMEDICINE (OUTPATIENT)
Dept: FAMILY MEDICINE CLINIC | Facility: CLINIC | Age: 45
End: 2020-08-07
Payer: COMMERCIAL

## 2020-08-07 DIAGNOSIS — K29.70 GASTRITIS WITHOUT BLEEDING, UNSPECIFIED CHRONICITY, UNSPECIFIED GASTRITIS TYPE: ICD-10-CM

## 2020-08-07 DIAGNOSIS — C64.1 RENAL CELL CARCINOMA OF RIGHT KIDNEY (HCC): ICD-10-CM

## 2020-08-07 DIAGNOSIS — E78.2 MIXED HYPERLIPIDEMIA: ICD-10-CM

## 2020-08-07 DIAGNOSIS — F41.9 ANXIETY: ICD-10-CM

## 2020-08-07 DIAGNOSIS — Z12.4 SCREENING FOR CERVICAL CANCER: Primary | ICD-10-CM

## 2020-08-07 PROCEDURE — 99214 OFFICE O/P EST MOD 30 MIN: CPT | Performed by: FAMILY MEDICINE

## 2020-08-07 PROCEDURE — 4004F PT TOBACCO SCREEN RCVD TLK: CPT | Performed by: FAMILY MEDICINE

## 2020-08-07 PROCEDURE — 3725F SCREEN DEPRESSION PERFORMED: CPT | Performed by: FAMILY MEDICINE

## 2020-08-07 RX ORDER — ALPRAZOLAM 0.25 MG/1
0.25 TABLET ORAL 2 TIMES DAILY PRN
Qty: 20 TABLET | Refills: 0 | Status: SHIPPED | OUTPATIENT
Start: 2020-08-07 | End: 2021-11-30 | Stop reason: ALTCHOICE

## 2020-08-07 NOTE — ASSESSMENT & PLAN NOTE
Post nephrectomy CT scan reviewed with patient showing lung mass follow-up with Pulmonary Medicine repeat CT scan in 3 months

## 2020-08-07 NOTE — ASSESSMENT & PLAN NOTE
Mixed hyperlipidemia heart healthy diet low-fat diet information provided follow-up in 3 months repeat lipid profile at that time

## 2020-08-07 NOTE — PROGRESS NOTES
Virtual Brief Visit    Assessment/Plan:    Problem List Items Addressed This Visit        Digestive    Gastritis      Continue current medication as directed avoid foods rich in acid         Relevant Orders    Comprehensive metabolic panel    Lipid panel       Genitourinary    Renal cell carcinoma of right kidney ([address] )      Post nephrectomy CT scan reviewed with patient showing lung mass follow-up with Pulmonary Medicine repeat CT scan in 3 months         Relevant Orders    Comprehensive metabolic panel    Lipid panel       Other    Mixed hyperlipidemia      Mixed hyperlipidemia heart healthy diet low-fat diet information provided follow-up in 3 months repeat lipid profile at that time         Relevant Orders    Lipid panel    Comprehensive metabolic panel    Comprehensive metabolic panel    Lipid panel    Anxiety      Anxiety and stress reaction from multiple medical problems including renal cell carcinoma and patient is attempting to quit smoking now alprazolam will be used as needed         Relevant Medications    ALPRAZolam (XANAX) 0 25 mg tablet    Other Relevant Orders    Lipid panel    Comprehensive metabolic panel    Comprehensive metabolic panel    Lipid panel      Other Visit Diagnoses     Screening for cervical cancer    -  Primary    Relevant Orders    Ambulatory referral to Obstetrics / Gynecology    Lipid panel    Comprehensive metabolic panel    Comprehensive metabolic panel    Lipid panel                Reason for visit is   Chief Complaint   Patient presents with    Virtual Brief Visit     7/10 lab review , CT 7/21 results     Virtual Brief Visit        Encounter provider Xiomara Graf DO    Provider located at [facility]  [address] [address] [phone]    Recent Visits  No visits were found meeting these conditions     Showing recent visits within past 7 days and meeting all other requirements     Today's Visits  Date Type Provider Dept   08/07/20 Telemedicine Qamar Austin DO [address] [facility] today's visits and meeting all other requirements     Future Appointments  No visits were found meeting these conditions  Showing future appointments within next 150 days and meeting all other requirements        After connecting through  Telephone virtual visit, the patient was identified by name and date of birth  Kelley Connolly was informed that this is a telemedicine visit and that the visit is being conducted through  Telephone virtual visit  My office door was closed  No one else was in the room  She acknowledged consent and understanding of privacy and security of the platform  The patient has agreed to participate and understands she can discontinue the visit at any time  It was my intent to perform this visit via video technology but the patient was not able to do a video connection so the visit was completed via audio telephone only  Patient is aware this is a billable service  Subjective    Kelley Connolly is a 54 y o  female  Telephone virtual visit to discuss CT scan reports after renal cell carcinoma and lung mass along with review of laboratory work   Review of laboratory work showing  hyperlipidemia and diet instructions  Additionally review of CT scan in general health concerns regarding lung mass on right upper lobe    Patient is trying to quit smoking and needs alprazolam as needed for anxiety and stress over all of her problems medically now       Past Medical History:   Diagnosis Date    Chronic kidney disease 08/2019    tumor and cyst    Colon polyp     Gastritis        Past Surgical History:   Procedure Laterality Date    COLONOSCOPY      ME LAP,PARTIAL NEPHRECTOMY Right 8/26/2019    Procedure: NEPHRECTOMY PARTIAL LAPAROSCOPIC W ROBOTICS WITH INTRA-OP LAPAROSCOPIC ULTRASOUND;  Surgeon: Roxi Yrabrough MD;  Location: [facility];  Service: Urology    US GUIDED BREAST BIOPSY LEFT COMPLETE Left 1/15/2020    WISDOM TOOTH EXTRACTION         Current Outpatient Medications   Medication Sig Dispense Refill    acetaminophen (TYLENOL) 325 mg tablet Take 650 mg by mouth every 6 (six) hours as needed for mild pain      ALPRAZolam (XANAX) 0 25 mg tablet Take 1 tablet (0 25 mg total) by mouth 2 (two) times a day as needed for anxiety 20 tablet 0     No current facility-administered medications for this visit  Allergies   Allergen Reactions    Strawberry (Diagnostic) Hives    Tetracycline      breast swelling        Review of Systems   Constitutional: Negative for chills, fatigue and fever  HENT: Negative for congestion, nosebleeds, rhinorrhea, sinus pressure and sore throat  Eyes: Negative for discharge and redness  Respiratory: Negative for cough and shortness of breath  Cardiovascular: Negative for chest pain, palpitations and leg swelling  Gastrointestinal: Negative for abdominal pain, blood in stool and nausea  Endocrine: Negative for cold intolerance, heat intolerance and polyuria  Genitourinary: Negative for dysuria and frequency  Musculoskeletal: Negative for arthralgias, back pain and myalgias  Skin: Negative for rash  Neurological: Negative for dizziness, weakness and headaches  Hematological: Negative for adenopathy  Psychiatric/Behavioral: Negative for behavioral problems and sleep disturbance  The patient is nervous/anxious  There were no vitals filed for this visit  I spent 25 minutes with patient today in which greater than 50% of the time was spent in counseling/coordination of care regarding  discussion regarding the CT scan report the lung mass renal cell carcinoma generalized stress over quitting smoking and all of her medical problems now and her hyperlipidemia diet plan result of gluten testing    She will follow-up with me in 3 months repeat laboratory work at that time and discussed good diet plan on heart healthy diet and low-fat diet information    VIRTUAL VISIT DISCLAIMER    April Cathleen acknowledges that she has consented to an online visit or consultation  She understands that the online visit is based solely on information provided by her, and that, in the absence of a face-to-face physical evaluation by the physician, the diagnosis she receives is both limited and provisional in terms of accuracy and completeness  This is not intended to replace a full medical face-to-face evaluation by the physician  Kelley Connolly understands and accepts these terms

## 2020-08-07 NOTE — PATIENT INSTRUCTIONS
Low Fat Diet   WHAT YOU NEED TO KNOW:   A low-fat diet is an eating plan that is low in total fat, unhealthy fat, and cholesterol  You may need to follow a low-fat diet if you have trouble digesting or absorbing fat  You may also need to follow this diet if you have high cholesterol  You can also lower your cholesterol by increasing the amount of fiber in your diet  Soluble fiber is a type of fiber that helps to decrease cholesterol levels  DISCHARGE INSTRUCTIONS:   Different types of fat in food:   · Limit unhealthy fats  A diet that is high in cholesterol, saturated fat, and trans fat may cause unhealthy cholesterol levels  Unhealthy cholesterol levels increase your risk of heart disease  ¨ Cholesterol:  Limit intake of cholesterol to less than 200 mg per day  Cholesterol is found in meat, eggs, and dairy  ¨ Saturated fat:  Limit saturated fat to less than 7% of your total daily calories  Ask your dietitian how many calories you need each day  Saturated fat is found in butter, cheese, ice cream, whole milk, and palm oil  Saturated fat is also found in meat, such as beef, pork, chicken skin, and processed meats  Processed meats include sausage, hot dogs, and bologna  ¨ Trans fat:  Avoid trans fat as much as possible  Trans fat is used in fried and baked foods  Foods that say trans fat free on the label may still have up to 0 5 grams of trans fat per serving  · Include healthy fats  Replace foods that are high in saturated and trans fat with foods high in healthy fats  This may help to decrease high cholesterol levels  ¨ Monounsaturated fats: These are found in avocados, nuts, and vegetable oils, such as olive, canola, and sunflower oil  ¨ Polyunsaturated fats: These can be found in vegetable oils, such as soybean or corn oil  Omega-3 fats can help to decrease the risk of heart disease  Omega-3 fats are found in fish, such as salmon, herring, trout, and tuna   Omega-3 fats can also be found in plant foods, such as walnuts, flaxseed, soybeans, and canola oil    Foods to limit or avoid:   · Grains:      ¨ Snacks that are made with partially hydrogenated oils, such as chips, regular crackers, and butter-flavored popcorn    ¨ High-fat baked goods, such as biscuits, croissants, doughnuts, pies, cookies, and pastries    · Dairy:      ¨ Whole milk, 2% milk, and yogurt and ice cream made with whole milk    ¨ Half and half creamer, heavy cream, and whipping cream    ¨ Cheese, cream cheese, and sour cream    · Meats and proteins:      ¨ High-fat cuts of meat (T-bone steak, regular hamburger, and ribs)    ¨ Fried meat, poultry (turkey and chicken), and fish    ¨ Poultry (chicken and turkey) with skin    ¨ Cold cuts (salami or bologna), hot dogs, garza, and sausage    ¨ Whole eggs and egg yolks    · Vegetables and fruits with added fat:      ¨ Fried vegetables or vegetables in butter or high-fat sauces, such as cream or cheese sauces    ¨ Fried fruit or fruit served with butter or cream    · Fats:      ¨ Butter, stick margarine, and shortening    ¨ Coconut, palm oil, and palm kernel oil  Foods to include:   · Grains:      ¨ Whole-grain breads, cereals, pasta, and brown rice    ¨ Low-fat crackers and pretzels    · Vegetables and fruits:      ¨ Fresh, frozen, or canned vegetables (no salt or low-sodium)    ¨ Fresh, frozen, dried, or canned fruit (canned in light syrup or fruit juice)    ¨ Avocado    · Low-fat dairy products:      ¨ Nonfat (skim) or 1% milk    ¨ Nonfat or low-fat cheese, yogurt, and cottage cheese    · Meats and proteins:      ¨ Chicken or turkey with no skin    ¨ Baked or broiled fish    ¨ Lean beef and pork (loin, round, extra lean hamburger)    ¨ Beans and peas, unsalted nuts, soy products    ¨ Egg whites and substitutes    ¨ Seeds and nuts    · Fats:      ¨ Unsaturated oil, such as canola, olive, peanut, soybean, or sunflower oil    ¨ Soft or liquid margarine and vegetable oil spread    ¨ Low-fat salad dressing  Other ways to decrease fat:   · Read food labels before you buy foods  Choose foods that have less than 30% of calories from fat  Choose low-fat or fat-free dairy products  Remember that fat free does not mean calorie free  These foods still contain calories, and too many calories can lead to weight gain  · Trim fat from meat and avoid fried food  Trim all visible fat from meat before you cook it  Remove the skin from poultry  Do not spencer meat, fish, or poultry  Bake, roast, boil, or broil these foods instead  Avoid fried foods  Eat a baked potato instead of [place] Jing fries  Steam vegetables instead of sautéing them in butter  · Add less fat to foods  Use imitation garza bits on salads and baked potatoes instead of regular garza bits  Use fat-free or low-fat salad dressings instead of regular dressings  Use low-fat or nonfat butter-flavored topping instead of regular butter or margarine on popcorn and other foods  Ways to decrease fat in recipes:  Replace high-fat ingredients with low-fat or nonfat ones  This may cause baked goods to be drier than usual  You may need to use nonfat cooking spray on pans to prevent food from sticking  You also may need to change the amount of other ingredients, such as water, in the recipe  Try the following:  · Use low-fat or light margarine instead of regular margarine or shortening  · Use lean ground turkey breast or chicken, or lean ground beef (less than 5% fat) instead of hamburger  · Add 1 teaspoon of canola oil to 8 ounces of skim milk instead of using cream or half and half  · Use grated zucchini, carrots, or apples in breads instead of coconut  · Use blenderized, low-fat cottage cheese, plain tofu, or low-fat ricotta cheese instead of cream cheese  · Use 1 egg white and 1 teaspoon of canola oil, or use ¼ cup (2 ounces) of fat-free egg substitute instead of a whole egg       · Replace half of the oil that is called for in a recipe with applesauce when you bake  Use 3 tablespoons of cocoa powder and 1 tablespoon of canola oil instead of a square of baking chocolate  How to increase fiber:  Eat enough high-fiber foods to get 20 to 30 grams of fiber every day  Slowly increase your fiber intake to avoid stomach cramps, gas, and other problems  · Eat 3 ounces of whole-grain foods each day  An ounce is about 1 slice of bread  Eat whole-grain breads, such as whole-wheat bread  Whole wheat, whole-wheat flour, or other whole grains should be listed as the first ingredient on the food label  Replace white flour with whole-grain flour or use half of each in recipes  Whole-grain flour is heavier than white flour, so you may have to add more yeast or baking powder  · Eat a high-fiber cereal for breakfast   Oatmeal is a good source of soluble fiber  Look for cereals that have bran or fiber in the name  Choose whole-grain products, such as brown rice, barley, and whole-wheat pasta  · Eat more beans, peas, and lentils  For example, add beans to soups or salads  Eat at least 5 cups of fruits and vegetables each day  Eat fruits and vegetables with the peel because the peel is high in fiber  © 2017 [address]  Information is for End User's use only and may not be sold, redistributed or otherwise used for commercial purposes  All illustrations and images included in CareNotes® are the copyrighted property of A D A M [employer]  or Anthony Ramachandran  The above information is an  only  It is not intended as medical advice for individual conditions or treatments  Talk to your doctor, nurse or pharmacist before following any medical regimen to see if it is safe and effective for you

## 2020-08-07 NOTE — ASSESSMENT & PLAN NOTE
Anxiety and stress reaction from multiple medical problems including renal cell carcinoma and patient is attempting to quit smoking now alprazolam will be used as needed

## 2020-10-20 ENCOUNTER — LAB (OUTPATIENT)
Dept: LAB | Facility: CLINIC | Age: 45
End: 2020-10-20
Payer: COMMERCIAL

## 2020-10-20 DIAGNOSIS — R91.1 LUNG NODULE: ICD-10-CM

## 2020-10-20 LAB
ANION GAP SERPL CALCULATED.3IONS-SCNC: 4 MMOL/L (ref 4–13)
BUN SERPL-MCNC: 12 MG/DL (ref 5–25)
CALCIUM SERPL-MCNC: 9.2 MG/DL (ref 8.3–10.1)
CHLORIDE SERPL-SCNC: 109 MMOL/L (ref 100–108)
CO2 SERPL-SCNC: 28 MMOL/L (ref 21–32)
CREAT SERPL-MCNC: 0.82 MG/DL (ref 0.6–1.3)
GFR SERPL CREATININE-BSD FRML MDRD: 86 ML/MIN/1.73SQ M
GLUCOSE P FAST SERPL-MCNC: 91 MG/DL (ref 65–99)
POTASSIUM SERPL-SCNC: 4.3 MMOL/L (ref 3.5–5.3)
SODIUM SERPL-SCNC: 141 MMOL/L (ref 136–145)

## 2020-10-20 PROCEDURE — 80048 BASIC METABOLIC PNL TOTAL CA: CPT

## 2020-10-20 PROCEDURE — 36415 COLL VENOUS BLD VENIPUNCTURE: CPT

## 2020-10-26 ENCOUNTER — HOSPITAL ENCOUNTER (OUTPATIENT)
Dept: CT IMAGING | Facility: HOSPITAL | Age: 45
Discharge: HOME | End: 2020-10-26
Payer: COMMERCIAL

## 2020-10-26 DIAGNOSIS — R91.1 LUNG NODULE: ICD-10-CM

## 2020-10-26 PROCEDURE — 71260 CT THORAX DX C+: CPT

## 2020-10-26 RX ADMIN — IOHEXOL 85 ML: 350 INJECTION, SOLUTION INTRAVENOUS at 08:24

## 2020-10-29 ENCOUNTER — TELEPHONE (OUTPATIENT)
Dept: UROLOGY | Facility: MEDICAL CENTER | Age: 45
End: 2020-10-29

## 2020-11-09 ENCOUNTER — LAB (OUTPATIENT)
Dept: LAB | Facility: CLINIC | Age: 45
End: 2020-11-09
Payer: COMMERCIAL

## 2020-11-09 DIAGNOSIS — K29.70 GASTRITIS WITHOUT BLEEDING, UNSPECIFIED CHRONICITY, UNSPECIFIED GASTRITIS TYPE: ICD-10-CM

## 2020-11-09 DIAGNOSIS — Z12.4 SCREENING FOR CERVICAL CANCER: ICD-10-CM

## 2020-11-09 DIAGNOSIS — E78.2 MIXED HYPERLIPIDEMIA: ICD-10-CM

## 2020-11-09 DIAGNOSIS — C64.1 RENAL CELL CARCINOMA OF RIGHT KIDNEY (HCC): ICD-10-CM

## 2020-11-09 DIAGNOSIS — F41.9 ANXIETY: ICD-10-CM

## 2020-11-09 LAB
ALBUMIN SERPL BCP-MCNC: 4.1 G/DL (ref 3.5–5)
ALP SERPL-CCNC: 80 U/L (ref 46–116)
ALT SERPL W P-5'-P-CCNC: 17 U/L (ref 12–78)
ANION GAP SERPL CALCULATED.3IONS-SCNC: 1 MMOL/L (ref 4–13)
AST SERPL W P-5'-P-CCNC: 7 U/L (ref 5–45)
BILIRUB SERPL-MCNC: 0.33 MG/DL (ref 0.2–1)
BUN SERPL-MCNC: 10 MG/DL (ref 5–25)
CALCIUM SERPL-MCNC: 9.1 MG/DL (ref 8.3–10.1)
CHLORIDE SERPL-SCNC: 110 MMOL/L (ref 100–108)
CHOLEST SERPL-MCNC: 221 MG/DL (ref 50–200)
CO2 SERPL-SCNC: 29 MMOL/L (ref 21–32)
CREAT SERPL-MCNC: 0.83 MG/DL (ref 0.6–1.3)
GFR SERPL CREATININE-BSD FRML MDRD: 85 ML/MIN/1.73SQ M
GLUCOSE P FAST SERPL-MCNC: 88 MG/DL (ref 65–99)
HDLC SERPL-MCNC: 68 MG/DL
LDLC SERPL CALC-MCNC: 135 MG/DL (ref 0–100)
NONHDLC SERPL-MCNC: 153 MG/DL
POTASSIUM SERPL-SCNC: 4.5 MMOL/L (ref 3.5–5.3)
PROT SERPL-MCNC: 7.2 G/DL (ref 6.4–8.2)
SODIUM SERPL-SCNC: 140 MMOL/L (ref 136–145)
TRIGL SERPL-MCNC: 92 MG/DL

## 2020-11-09 PROCEDURE — 80061 LIPID PANEL: CPT

## 2020-11-09 PROCEDURE — 80053 COMPREHEN METABOLIC PANEL: CPT

## 2020-11-09 PROCEDURE — 36415 COLL VENOUS BLD VENIPUNCTURE: CPT

## 2020-11-11 ENCOUNTER — OFFICE VISIT (OUTPATIENT)
Dept: PULMONOLOGY | Facility: CLINIC | Age: 45
End: 2020-11-11
Payer: COMMERCIAL

## 2020-11-11 VITALS
SYSTOLIC BLOOD PRESSURE: 126 MMHG | BODY MASS INDEX: 20.72 KG/M2 | WEIGHT: 132 LBS | HEART RATE: 96 BPM | TEMPERATURE: 98.3 F | HEIGHT: 67 IN | OXYGEN SATURATION: 96 % | DIASTOLIC BLOOD PRESSURE: 84 MMHG

## 2020-11-11 DIAGNOSIS — Z72.0 TOBACCO ABUSE: ICD-10-CM

## 2020-11-11 DIAGNOSIS — R93.89 ABNORMAL CT OF THE CHEST: Primary | ICD-10-CM

## 2020-11-11 PROCEDURE — 99244 OFF/OP CNSLTJ NEW/EST MOD 40: CPT | Performed by: INTERNAL MEDICINE

## 2020-11-16 ENCOUNTER — DOCUMENTATION (OUTPATIENT)
Dept: CARDIAC SURGERY | Facility: CLINIC | Age: 45
End: 2020-11-16

## 2020-11-16 ENCOUNTER — OFFICE VISIT (OUTPATIENT)
Dept: FAMILY MEDICINE CLINIC | Facility: CLINIC | Age: 45
End: 2020-11-16
Payer: COMMERCIAL

## 2020-11-16 VITALS
BODY MASS INDEX: 20.81 KG/M2 | WEIGHT: 132.6 LBS | HEIGHT: 67 IN | SYSTOLIC BLOOD PRESSURE: 126 MMHG | OXYGEN SATURATION: 98 % | DIASTOLIC BLOOD PRESSURE: 80 MMHG | TEMPERATURE: 97.5 F | HEART RATE: 86 BPM

## 2020-11-16 DIAGNOSIS — F41.9 ANXIETY: ICD-10-CM

## 2020-11-16 DIAGNOSIS — K29.70 GASTRITIS WITHOUT BLEEDING, UNSPECIFIED CHRONICITY, UNSPECIFIED GASTRITIS TYPE: ICD-10-CM

## 2020-11-16 DIAGNOSIS — E78.2 MIXED HYPERLIPIDEMIA: ICD-10-CM

## 2020-11-16 DIAGNOSIS — Z23 ENCOUNTER FOR IMMUNIZATION: Primary | ICD-10-CM

## 2020-11-16 DIAGNOSIS — F17.210 CIGARETTE NICOTINE DEPENDENCE WITHOUT COMPLICATION: ICD-10-CM

## 2020-11-16 DIAGNOSIS — R91.1 LUNG NODULE: Primary | ICD-10-CM

## 2020-11-16 DIAGNOSIS — C64.1 RENAL CELL CARCINOMA OF RIGHT KIDNEY (HCC): ICD-10-CM

## 2020-11-16 PROCEDURE — 3008F BODY MASS INDEX DOCD: CPT | Performed by: FAMILY MEDICINE

## 2020-11-16 PROCEDURE — 4004F PT TOBACCO SCREEN RCVD TLK: CPT | Performed by: FAMILY MEDICINE

## 2020-11-16 PROCEDURE — 90686 IIV4 VACC NO PRSV 0.5 ML IM: CPT | Performed by: FAMILY MEDICINE

## 2020-11-16 PROCEDURE — 99396 PREV VISIT EST AGE 40-64: CPT | Performed by: FAMILY MEDICINE

## 2020-11-16 PROCEDURE — 90471 IMMUNIZATION ADMIN: CPT | Performed by: FAMILY MEDICINE

## 2020-11-16 RX ORDER — BUPROPION HYDROCHLORIDE 75 MG/1
75 TABLET ORAL 2 TIMES DAILY
Qty: 60 TABLET | Refills: 5 | Status: SHIPPED | OUTPATIENT
Start: 2020-11-16 | End: 2021-02-17

## 2020-12-07 ENCOUNTER — TELEPHONE (OUTPATIENT)
Dept: PULMONOLOGY | Facility: CLINIC | Age: 45
End: 2020-12-07

## 2020-12-07 DIAGNOSIS — Z85.528 PERSONAL HISTORY OF RENAL CANCER: Primary | ICD-10-CM

## 2020-12-08 ENCOUNTER — TELEPHONE (OUTPATIENT)
Dept: SURGICAL ONCOLOGY | Facility: CLINIC | Age: 45
End: 2020-12-08

## 2020-12-11 ENCOUNTER — CLINICAL SUPPORT (OUTPATIENT)
Dept: GENETICS | Facility: CLINIC | Age: 45
End: 2020-12-11

## 2020-12-11 DIAGNOSIS — Z80.51 FAMILY HISTORY OF RENAL CANCER: ICD-10-CM

## 2020-12-11 DIAGNOSIS — Z80.3 FAMILY HISTORY OF BREAST CANCER: ICD-10-CM

## 2020-12-11 DIAGNOSIS — Z85.528 PERSONAL HISTORY OF RENAL CANCER: Primary | ICD-10-CM

## 2020-12-11 PROCEDURE — NC001 PR NO CHARGE: Performed by: GENETIC COUNSELOR, MS

## 2020-12-14 ENCOUNTER — HOSPITAL ENCOUNTER (OUTPATIENT)
Dept: PULMONOLOGY | Facility: HOSPITAL | Age: 45
Discharge: HOME | End: 2020-12-14
Attending: INTERNAL MEDICINE
Payer: COMMERCIAL

## 2020-12-14 DIAGNOSIS — R93.89 ABNORMAL CT OF THE CHEST: ICD-10-CM

## 2020-12-14 PROCEDURE — 94760 N-INVAS EAR/PLS OXIMETRY 1: CPT

## 2020-12-14 PROCEDURE — 94727 GAS DIL/WSHOT DETER LNG VOL: CPT | Performed by: INTERNAL MEDICINE

## 2020-12-14 PROCEDURE — 94010 BREATHING CAPACITY TEST: CPT | Performed by: INTERNAL MEDICINE

## 2020-12-14 PROCEDURE — 94729 DIFFUSING CAPACITY: CPT

## 2020-12-14 PROCEDURE — 94729 DIFFUSING CAPACITY: CPT | Performed by: INTERNAL MEDICINE

## 2020-12-14 PROCEDURE — 94010 BREATHING CAPACITY TEST: CPT

## 2020-12-14 PROCEDURE — 94727 GAS DIL/WSHOT DETER LNG VOL: CPT

## 2020-12-15 ENCOUNTER — TELEMEDICINE (OUTPATIENT)
Dept: CARDIAC SURGERY | Facility: CLINIC | Age: 45
End: 2020-12-15
Payer: COMMERCIAL

## 2020-12-15 DIAGNOSIS — R91.1 LUNG NODULE: Primary | ICD-10-CM

## 2020-12-15 PROCEDURE — 4004F PT TOBACCO SCREEN RCVD TLK: CPT | Performed by: THORACIC SURGERY (CARDIOTHORACIC VASCULAR SURGERY)

## 2020-12-15 PROCEDURE — 99245 OFF/OP CONSLTJ NEW/EST HI 55: CPT | Performed by: THORACIC SURGERY (CARDIOTHORACIC VASCULAR SURGERY)

## 2021-01-21 ENCOUNTER — TRANSCRIBE ORDERS (OUTPATIENT)
Dept: MAMMOGRAPHY | Facility: CLINIC | Age: 46
End: 2021-01-21

## 2021-01-21 ENCOUNTER — HOSPITAL ENCOUNTER (OUTPATIENT)
Dept: MAMMOGRAPHY | Facility: CLINIC | Age: 46
Discharge: HOME | End: 2021-01-21
Payer: COMMERCIAL

## 2021-01-21 ENCOUNTER — HOSPITAL ENCOUNTER (OUTPATIENT)
Dept: ULTRASOUND IMAGING | Facility: CLINIC | Age: 46
Discharge: HOME | End: 2021-01-21
Payer: COMMERCIAL

## 2021-01-21 VITALS — BODY MASS INDEX: 19.7 KG/M2 | WEIGHT: 130 LBS | HEIGHT: 68 IN

## 2021-01-21 DIAGNOSIS — R92.8 ABNORMAL MAMMOGRAM: ICD-10-CM

## 2021-01-21 DIAGNOSIS — R92.8 MAMMOGRAM ABNORMAL: Primary | ICD-10-CM

## 2021-01-21 PROCEDURE — 77066 DX MAMMO INCL CAD BI: CPT

## 2021-01-21 PROCEDURE — G0279 TOMOSYNTHESIS, MAMMO: HCPCS

## 2021-01-21 PROCEDURE — 76642 ULTRASOUND BREAST LIMITED: CPT

## 2021-01-22 ENCOUNTER — TELEPHONE (OUTPATIENT)
Dept: GENETICS | Facility: CLINIC | Age: 46
End: 2021-01-22

## 2021-02-11 ENCOUNTER — TELEPHONE (OUTPATIENT)
Dept: GENETICS | Facility: CLINIC | Age: 46
End: 2021-02-11

## 2021-02-11 NOTE — TELEPHONE ENCOUNTER
Post-Test Genetic Counseling Consult Note     Today I spoke with April  over the phone to review the results of her genetic test for hereditary cancer  We met previously on 12/11/2020 for pre-test counseling  SUMMARY:    Test(s): [facility] Renal/Urinary and Common Hereditary Cancers Panel (58 genes): APC, LALITO, AXIN2, BAP1, BARD1, BMPR1A, BRCA1, BRCA2, BRIP1, CDC73, CDH1, CDK4, CDKN1C, CDKN2A, CHEK2, CTNNA1, DICER1, DIS3L2, EPCAM, FH, FLCN, GPC3, GREM1, HOXB13, KIT, MEN1, MET, MLH1, MSH2, MSH3, MSH6, MUTYH, NBN, NF1, NTHL1, PALB2, PDGFRA, PMS2, POLD1, POLE, PTEN, RAD50, RAD51C, RAD51D, REST, SDHA, SDHB, SDHC, SDHD, SMAD4, SMARCA4, SMARCB1, STK11, TP53, TSC1, TSC2, VHL, WT1     Result: One Pathogenic variant identified in [facility]  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; the association of this variant with autosomal dominant hereditary leiomyomatosis and renal cell cancer is uncertain  Three variants of uncertain significance identified  Variant 1: FH CARRIER, FH c 923C>G (p [id number]); heterozygous; 1 pathogenic variant     Variant 2: MSH2 c 1657A>T (p [id number]); heterozygous; uncertain significance     Variant 3: NF1 c 5405C>G (p [id number]); heterozygous; uncertain significance     Variant 4: REST c 1825G>C (p [id number]); heterozygous; uncertain significance    Variant 1: FH CARRIER, FH c 923C>G (p [id number]); heterozygous; 1 pathogenic variant     Autosomal dominant or 1 pathogenic FH variant:   We discussed that the FH gene is associated with autosomal dominant hereditary leiomyomatosis and renal cell cancer (HLRCC)  HLRCC is a rare, adult-onset tumor-predisposition syndrome that is characterized by cutaneous leiomyomas, uterine leiomyomas, and renal tumors  Cutaneous leiomyomas appear as skin-colored or light-brown papules that can develop over the trunk, extremities, and occasionally the face   They are often more sensitive than the surrounding skin to cold or light touch, and may be painful  They present in the second to fourth decade of life and tend to increase in size and number over time  Uterine leiomyomas (fibroids) are present in almost all females with HLRCC, but they rarely become malignant  Uterine fibroids are very common in the general population, but the fibroids in [place] [address] tend to be numerous and have an earlier onset than in the general population  Approximately 15% of individuals with HLRCC develop renal tumors, including type 2 papillary, tubulo-papillary, collecting-duct carcinoma, and possibly Wilms tumor  Renal tumors in HLRCC are typically unilateral, solitary, and aggressive  Autosomal recessive or 2 pathogenic FH variants: The FH gene is also associated with a condition called fumarase deficiency (also known as fumarate hydratase deficiency and fumaric aciduria)  This is an autosomal recessive condition that results when an individual inherits 2 pathogenic FH variants (one from each parent)  Fumarase deficiency is an inborn error of metabolism that is characterized by rapidly progressive neurologic impairment, including hypotonia, seizures, and cerebral atrophy  Most survive only for a few months after birth  The specific FH variant c 923C>G (p [id number]) identified in April is associated with autosomal recessive fumarate hydratase deficiency meaning when this variant is paired with a 2nd variant it causes fumarate hydratase deficiency  However when a person carries a single FH c 923C>G (p [id number]) variant, the association with hereditary leiomyomatosis and renal cell cancer is uncertain  Based on this information we cannot determine with certainty whether or not this single pathogenic variant explains April or her maternal aunt's history of renal cancer    In addition, since the association with autosomal dominant hereditary leiomyomatosis and renal cell cancer is uncertain there are no additional screening recommendations for April at this time      Even if the FH c 923C>G (p [id number]) was associated with autosomal dominant hereditary leiomyomatosis and renal cell cancer, there are no established surveillance guidelines although the following has been proposed (PMID: 68612394, [id number]): - Baseline renal ultrasound examination and abdominal CT scan with contrast or MRI to screen for renal tumors    - Full skin examination every 1-2 years, with cutaneous leiomyomas examined by a dermatologist to evaluate for changes suggestive of leiomyosarcoma    - Baseline pelvic MRI and/or transvaginal pelvic ultrasound examination to screen for uterine fibroids is suggested, as is an annual gynecologic consultation to assess severity of uterine fibroids and to evaluate for changes suggestive of leiomyosarcoma  HLRCC-related fibroids are treated in the same manner as sporadic fibroids; however, most women with HLRCC require interventions earlier than the general population  Risks for Family Members: All first-degree relatives (parents, siblings and children) have up to a 50% chance of having the FH pathogenic variant  Other relatives such as aunts, uncles and cousins may also be at risk  Since it is not known with one-hundred percent certainty which side of the family this FH pathogenic variant came from, we recommend that April share this test results with extended family members from both sides of her family  Since this specific FH variant is not definitely linked to an increased risk of cancer, the presence and/or absence of this variant in family members will not affect their cancer risks and/or medical management  The purpose of testing family members is for reproductive risk  This FH variant is associated with autosomal recessive fumarate hydratase deficiency and if April's relative and their partner both have a single FH variant there is a 25% risk for their children to have this condition          Variant 2: MSH2 c 1657A>T (p [id number]); heterozygous; uncertain significance     Variant 3: NF1 c 5405C>G (p [id number]); heterozygous; uncertain significance     Variant 4: REST c 1825G>C (p [id number]); heterozygous; uncertain significance    A variant of uncertain significance (VUS) means that a change was identified in a specific gene but it cannot be determined whether the variant is associated with an increased risk of cancer or is a harmless genetic change  It is possible that the variant was seen in only a handful of individuals, or there may be conflicting or incomplete information in the medical literature about the variant and its association with hereditary cancer  Variant 2: The MSH2 gene is associated with a hereditary cancer syndrome called Gauthier syndrome  Gauthier syndrome is characterized by colorectal, uterine, ovarian, prostate, stomach, and small intestine cancers among others  April's personal and family history of cancer does not fit the typical presentation we would expect with this gene  Variant 3: The NF1 gene is associated with autosomal dominant neurofibromatosis type 1 (NF1)  NF1 is characterized by multiple cafe-au-lait spots, axillary and inguinal freckling, iris Lisch nodules, choroidal freckling, optic gliomas, and cutaneous neurofibromas among other features  April's personal and family history of cancer does not fit the typical presentation we would expect with this gene  Variant 4: The REST gene is associated with an autosomal dominant predisposition to Wilms tumor  April's personal and family history of cancer does not fit the typical presentation we would expect with this gene  Since the significance of the MSH2, NF1 and REST gene variants are currently not known, these test results cannot be used to help determine April cancer risks       Genetic testing for the MSH2, NF1 and REST variants is not recommended for relatives who wish to determine their cancer risks for purposes of determining medical management  The presence or absence of this variant in a relative is not clinically meaningful unless the variant is reclassified in the future  The laboratory will continue to accumulate information on the MSH2, NF1 and REST variants and will reclassify them as either a positive or negative genetic test result when they are confident that they have adequate information  As updated information is obtained, we will notify April with the updated information  It is important to note that the majority of variants of uncertain significance are reclassified as likely benign or benign as additional information about the variant becomes available  Plan:   April should continue surveillance and medical management as clinically indicated and as determined appropriate by her healthcare providers  April was strongly encouraged to contact us regarding any changes in her personal or family history of cancer as these changes could alter our recommendation regarding genetic testing and/or cancer screening  April was also encouraged to follow up with us on an annual basis as variant classifications are subject to change

## 2021-02-17 ENCOUNTER — TELEMEDICINE (OUTPATIENT)
Dept: FAMILY MEDICINE CLINIC | Facility: CLINIC | Age: 46
End: 2021-02-17
Payer: COMMERCIAL

## 2021-02-17 ENCOUNTER — TELEPHONE (OUTPATIENT)
Dept: ADMINISTRATIVE | Facility: OTHER | Age: 46
End: 2021-02-17

## 2021-02-17 DIAGNOSIS — R93.89 ABNORMAL CT OF THE CHEST: ICD-10-CM

## 2021-02-17 DIAGNOSIS — E78.2 MIXED HYPERLIPIDEMIA: ICD-10-CM

## 2021-02-17 DIAGNOSIS — C64.1 RENAL CELL CARCINOMA OF RIGHT KIDNEY (HCC): Primary | ICD-10-CM

## 2021-02-17 PROCEDURE — 3725F SCREEN DEPRESSION PERFORMED: CPT | Performed by: FAMILY MEDICINE

## 2021-02-17 PROCEDURE — 99213 OFFICE O/P EST LOW 20 MIN: CPT | Performed by: FAMILY MEDICINE

## 2021-02-17 NOTE — ASSESSMENT & PLAN NOTE
Mixed hyperlipidemia stable cholesterol has come down and will repeat this at next office visit and follow-up lipid profile patient has been eating less scrambled eggs

## 2021-02-17 NOTE — TELEPHONE ENCOUNTER
----- Message from Sharif Villanueva MA sent at 2/17/2021  9:16 AM EST -----  Regarding: PAP  02/17/21 9:16 AM    Hello, our patient April Cathleen has had Pap Smear (HPV) aka Cervical Cancer Screening completed/performed  Please assist in updating the patient chart by pulling a previous Electronic Medical Record (EMR) document  The previous EMR is Care Everywhere, [facility]  The date of service is 2016  Thank you,  GIL Richard PG

## 2021-02-17 NOTE — TELEPHONE ENCOUNTER
Upon review of the In Basket request we were able to locate, review, and update the patient chart as requested for Pap Smear (HPV) aka Cervical Cancer Screening  Any additional questions or concerns should be emailed to the Practice Liaisons via [email or web address] email, please do not reply via In Basket      Thank you  Anita Fritz

## 2021-02-17 NOTE — PROGRESS NOTES
Virtual Brief Visit    Assessment/Plan:    Problem List Items Addressed This Visit        Genitourinary    Renal cell carcinoma of right kidney ([facility] [address] ) - Primary      Currently stable follow-up with Urology in July no urinary changes or status change            Other    Mixed hyperlipidemia      Mixed hyperlipidemia stable cholesterol has come down and will repeat this at next office visit and follow-up lipid profile patient has been eating less scrambled eggs  Abnormal CT of the chest      Repeat CT next month and follow-up with cardiothoracic surgeon                     Reason for visit is   Chief Complaint   Patient presents with    Follow-up     lab review         Encounter provider Kerri Allison DO    Provider located at [place] [id number]  [address] [place] [address]  [phone]    Recent Visits  No visits were found meeting these conditions  Showing recent visits within past 7 days and meeting all other requirements     Today's Visits  Date Type Provider Dept   02/17/21 Telemedicine Kerri Allison DO [address] today's visits and meeting all other requirements     Future Appointments  No visits were found meeting these conditions  Showing future appointments within next 150 days and meeting all other requirements        After connecting through  Telephone virtual visit, the patient was identified by name and date of birth  April Cathleen was informed that this is a telemedicine visit and that the visit is being conducted through  Telephone virtual visit  My office door was closed  No one else was in the room  She acknowledged consent and understanding of privacy and security of the platform  The patient has agreed to participate and understands she can discontinue the visit at any time    It was my intent to perform this visit via video technology but the patient was not able to do a video connection so the visit was completed via audio telephone only  Patient is aware this is a billable service  Subjective    April Cathleen is a 54 y o  female  Discussion regarding recent laboratory work done over the last 3 months and recent CT scan showing a lung nodule with repeat to be done next month and follow-up with cardiothoracic surgeon  Patient presents today for a virtual visit to discuss abnormal CT scan done in November and repeat is being done next month  Recently had mammogram done and will be followed up by gynecology soon  She has appointment with cardiothoracic surgeon in March       Past Medical History:   Diagnosis Date    Chronic kidney disease 08/2019    tumor and cyst    Colon polyp     Gastritis     Nodule of upper lobe of right lung     Renal cell carcinoma ([facility] [address] ) 06/2019    Renal mass, right        Past Surgical History:   Procedure Laterality Date    BREAST BIOPSY Left 01/2020    benign    COLONOSCOPY      [place] LAP,PARTIAL NEPHRECTOMY Right 8/26/2019    Procedure: NEPHRECTOMY PARTIAL LAPAROSCOPIC W ROBOTICS WITH INTRA-OP LAPAROSCOPIC ULTRASOUND;  Surgeon: Masha Jensen MD;  Location: [facility];  Service: Urology    US GUIDED BREAST BIOPSY LEFT COMPLETE Left 1/15/2020    WISDOM TOOTH EXTRACTION         Current Outpatient Medications   Medication Sig Dispense Refill    acetaminophen (TYLENOL) 325 mg tablet Take 650 mg by mouth every 6 (six) hours as needed for mild pain      ALPRAZolam (XANAX) 0 25 mg tablet Take 1 tablet (0 25 mg total) by mouth 2 (two) times a day as needed for anxiety 20 tablet 0     No current facility-administered medications for this visit  Allergies   Allergen Reactions    Strawberry (Diagnostic) Hives    Tetracycline      breast swelling        Review of Systems   Constitutional: Negative for chills, fatigue and fever  HENT: Negative for congestion, nosebleeds, rhinorrhea, sinus pressure and sore throat  Eyes: Negative for discharge and redness  Respiratory: Negative for cough and shortness of breath  Cardiovascular: Negative for chest pain, palpitations and leg swelling  Gastrointestinal: Negative for abdominal pain, blood in stool and nausea  Endocrine: Negative for cold intolerance, heat intolerance and polyuria  Genitourinary: Negative for dysuria and frequency  Musculoskeletal: Negative for arthralgias, back pain and myalgias  Skin: Negative for rash  Neurological: Negative for dizziness, weakness and headaches  Hematological: Negative for adenopathy  Psychiatric/Behavioral: Negative for behavioral problems and sleep disturbance  The patient is not nervous/anxious  There were no vitals filed for this visit  I spent 22 minutes directly with the patient during this visit    VIRTUAL VISIT DISCLAIMER    Kelley Connolly acknowledges that she has consented to an online visit or consultation  She understands that the online visit is based solely on information provided by her, and that, in the absence of a face-to-face physical evaluation by the physician, the diagnosis she receives is both limited and provisional in terms of accuracy and completeness  This is not intended to replace a full medical face-to-face evaluation by the physician  Kelley Connolly understands and accepts these terms

## 2021-03-10 DIAGNOSIS — Z23 ENCOUNTER FOR IMMUNIZATION: ICD-10-CM

## 2021-03-11 ENCOUNTER — HOSPITAL ENCOUNTER (OUTPATIENT)
Dept: CT IMAGING | Facility: HOSPITAL | Age: 46
Discharge: HOME | End: 2021-03-11
Attending: THORACIC SURGERY (CARDIOTHORACIC VASCULAR SURGERY)
Payer: COMMERCIAL

## 2021-03-11 DIAGNOSIS — R91.1 LUNG NODULE: ICD-10-CM

## 2021-03-11 PROCEDURE — 71250 CT THORAX DX C-: CPT

## 2021-03-11 PROCEDURE — G1004 CDSM NDSC: HCPCS

## 2021-03-15 ENCOUNTER — TELEPHONE (OUTPATIENT)
Dept: GYNECOLOGIC ONCOLOGY | Facility: CLINIC | Age: 46
End: 2021-03-15

## 2021-03-15 NOTE — TELEPHONE ENCOUNTER
Radiology called to report significant findings on the Chest CT WO contrast on 3/11/21  Please advise

## 2021-03-16 ENCOUNTER — OFFICE VISIT (OUTPATIENT)
Dept: CARDIAC SURGERY | Facility: CLINIC | Age: 46
End: 2021-03-16
Payer: COMMERCIAL

## 2021-03-16 VITALS
HEART RATE: 82 BPM | HEIGHT: 68 IN | WEIGHT: 145 LBS | TEMPERATURE: 99.7 F | RESPIRATION RATE: 18 BRPM | DIASTOLIC BLOOD PRESSURE: 82 MMHG | BODY MASS INDEX: 21.98 KG/M2 | SYSTOLIC BLOOD PRESSURE: 106 MMHG

## 2021-03-16 DIAGNOSIS — R91.1 LUNG NODULE: Primary | ICD-10-CM

## 2021-03-16 PROCEDURE — 1036F TOBACCO NON-USER: CPT | Performed by: THORACIC SURGERY (CARDIOTHORACIC VASCULAR SURGERY)

## 2021-03-16 PROCEDURE — 99215 OFFICE O/P EST HI 40 MIN: CPT | Performed by: THORACIC SURGERY (CARDIOTHORACIC VASCULAR SURGERY)

## 2021-03-16 PROCEDURE — 3008F BODY MASS INDEX DOCD: CPT | Performed by: THORACIC SURGERY (CARDIOTHORACIC VASCULAR SURGERY)

## 2021-03-16 RX ORDER — ACETAMINOPHEN 325 MG/1
975 TABLET ORAL ONCE
Status: CANCELLED | OUTPATIENT
Start: 2021-03-16 | End: 2021-03-16

## 2021-03-16 RX ORDER — CELECOXIB 100 MG/1
200 CAPSULE ORAL ONCE
Status: CANCELLED | OUTPATIENT
Start: 2021-03-16 | End: 2021-03-16

## 2021-03-16 RX ORDER — GABAPENTIN 100 MG/1
600 CAPSULE ORAL ONCE
Status: CANCELLED | OUTPATIENT
Start: 2021-03-16 | End: 2021-03-16

## 2021-03-16 RX ORDER — HEPARIN SODIUM 5000 [USP'U]/ML
5000 INJECTION, SOLUTION INTRAVENOUS; SUBCUTANEOUS
Status: CANCELLED | OUTPATIENT
Start: 2021-03-16 | End: 2021-03-17

## 2021-03-16 NOTE — PROGRESS NOTES
Thoracic Follow-Up  Assessment/Plan:    Lung nodule  Ms Connolly Is a very pleasant 48-year-old female who presents to my office with a right upper lobe ground-glass opacity that has increased it is solid component over the most recent CT scan  Today in the office, we had a long conversation regarding this pulmonary nodule  I explained to her that based upon its appearance, I believe that this could be a slowly growing adenocarcinoma of her lung  With the solid component being greater than 8 mm at this time, I do not feel that we should continue to watch this, I believe that we should proceed with either a biopsy or surgical intervention  I explained to her that her options or a biopsy with Interventional Radiology to confirm a diagnosis or to proceed to the operating room with a wedge resection and possible completion lobectomy based upon the frozen pathology  We discussed the risks and benefits of surgery and she wishes to proceed with surgery  I also explained to her that I will not perform this operation on anyone who is actively smoking and she understands and states that she will not have any additional cigarettes prior to the operation  She did ask me about COVID vaccination  She was able to secure an appointment and she will complete her vaccine series on April 9th  Because of this, we will schedule her surgery for April 14th after she has completed the entire vaccination series  In addition, workup for lung cancer would include PET-CT scan  I have ordered a PET-CT scan today and we will obtain this prior to her surgery  If the results show anything concerning for metastasis, we will hold off on surgery and proceed with a preoperative biopsy  However, if the PET-CT scan demonstrates isolated disease, we will plan to proceed with a flexible bronchoscopy and right VATS wedge resection with possible completion upper lobectomy on April 14th      Jose Antonio Gauthier MD  Thoracic Surgery  (Available on [employer])  Office: [phone]         Diagnoses and all orders for this visit:    Lung nodule  -     NM PET CT skull base to mid thigh; Future  -     Case request operating room: [address] (VATS) right wedge resection possible completion upper lobectomy, flex bronch; Standing  -     PAT Covid Screening; Future  -     Type and screen; Future  -     CBC and Platelet; Future  -     Comprehensive metabolic panel; Future  -     APTT; Future  -     Protime-INR; Future  -     Case request operating room: [address] (VATS) right wedge resection possible completion upper lobectomy, flex bronch  -     EKG 12 lead; Future    Other orders  -     Diet NPO; Sips with meds; Standing  -     Nursing communication Please give pre-op Carbohydrate drink to patient 2-4 hours prior to surgery; Standing  -     Void on call to OR; Standing  -     Insert peripheral IV; Standing  -     Place sequential compression device; Standing  -     acetaminophen (TYLENOL) tablet 975 mg  -     celecoxib (CeleBREX) capsule 200 mg  -     gabapentin (NEURONTIN) capsule 600 mg  -     heparin (porcine) subcutaneous injection 5,000 Units  -     ceFAZolin (ANCEF) 1,000 mg in dextrose 5 % 100 mL IVPB          Thoracic History          Subjective:    Patient ID: April Cathleen is a 54 y o  female  HPI  Ms Connolly Is a very pleasant 79-year-old female who is known to me  I have been following her for a right upper lobe pulmonary nodule  This started out as a ground-glass opacity with a solid component of 3 mm  Her most recent CT scan, this solid component has increased to 1 2 cm  Today in the office, the patient states that she is feeling pretty good besides the anxiety that she developed after reading the CT scan report  She denies any chest pain or shortness of breath  She does report that she is having some gastritis  She denies cough or hemoptysis    She denies any fevers or chills or recent upper respiratory infection or pneumonia  She reports that she completely quit smoking in January but with the anxiety she did smoke a cigarette yesterday  She reports that she has gained 10 lb since her smoking cessation  She is stable from her kidney cancer standpoint  She is due to complete her COVID vaccination series on April 9th  The following portions of the patient's history were reviewed and updated as appropriate: allergies, current medications, past family history, past medical history, past social history, past surgical history and problem list     Review of Systems   Constitutional: Negative for chills, fatigue, fever and unexpected weight change  HENT: Negative  Negative for trouble swallowing  Eyes: Negative  Negative for visual disturbance  Respiratory: Negative for cough, shortness of breath and stridor  Cardiovascular: Negative for chest pain  Gastrointestinal: Positive for abdominal pain  Endocrine: Negative  Genitourinary: Negative  Musculoskeletal: Negative  Skin: Negative  Neurological: Negative for dizziness, light-headedness and headaches  Hematological: Negative for adenopathy  Psychiatric/Behavioral: The patient is nervous/anxious  Objective:   Physical Exam  Vitals signs and nursing note reviewed  Constitutional:       General: She is not in acute distress  Appearance: Normal appearance  She is well-developed  She is not diaphoretic  HENT:      Head: Normocephalic and atraumatic  Nose: Nose normal  No congestion or rhinorrhea  Mouth/Throat:      Mouth: Mucous membranes are moist       Pharynx: Oropharynx is clear  No oropharyngeal exudate  Eyes:      General: No scleral icterus  Pupils: Pupils are equal, round, and reactive to light  Neck:      Musculoskeletal: Normal range of motion and neck supple  No muscular tenderness  Trachea: No tracheal deviation     Cardiovascular:      Rate and Rhythm: Normal rate and regular rhythm  Pulses: Normal pulses  Heart sounds: Normal heart sounds  No murmur  Pulmonary:      Effort: Pulmonary effort is normal  No respiratory distress  Breath sounds: Normal breath sounds  No stridor  No wheezing or rales  Chest:      Chest wall: No tenderness  Abdominal:      General: Bowel sounds are normal  There is no distension  Palpations: Abdomen is soft  Tenderness: There is no abdominal tenderness  There is no rebound  Musculoskeletal: Normal range of motion  Lymphadenopathy:      Cervical: No cervical adenopathy  Skin:     General: Skin is warm and dry  Coloration: Skin is not jaundiced or pale  Findings: No erythema or rash  Neurological:      General: No focal deficit present  Mental Status: She is alert and oriented to person, place, and time  Psychiatric:         Mood and Affect: Mood normal          Behavior: Behavior normal          Thought Content: Thought content normal          Judgment: Judgment normal      /82 (BP Location: Left arm, Patient Position: Sitting, Cuff Size: Standard)   Pulse 82   Temp 99 7 °F (37 6 °C)   Resp 18   Ht 5' 7 5" (1 715 m)   Wt 65 8 kg (145 lb)   BMI 22 38 kg/m²     No Chest XR results available for this patient  Ct Chest Wo Contrast    Result Date: 3/15/2021  Narrative CT CHEST WITHOUT IV CONTRAST INDICATION:   R91 1: Solitary pulmonary nodule  Follow-up lung nodule  History of renal cell carcinoma  COMPARISON:  Chest CT from 10/26/2020 and 7/21/2020  TECHNIQUE: CT examination of the chest was performed without intravenous contrast   Axial, sagittal, and coronal 2D reformatted images were created from the source data and submitted for interpretation  Radiation dose length product (DLP) for this visit:  226 mGy-cm     This examination, like all CT scans performed in the [facility], was performed utilizing techniques to minimize radiation dose exposure, including the use of iterative reconstruction and automated exposure control  FINDINGS: LUNGS:  Part solid nodule in the anterior segment right upper lobe  The groundglass component is without significant change at 2 9 x 2 5 cm (3/49) since July 2020  (Disease It was remeasured in July 2020 )  The solid component has increased from 3 mm to 1 2 x 0 8 cm (3/47) since October 2020  Redemonstration of mild retraction of the major fissure  Mild centrilobular emphysema  No significant filling defects in the trachea and bronchi  PLEURA:  Unremarkable  HEART/GREAT VESSELS:  Unremarkable for patient's age  MEDIASTINUM AND SIMON:  Unremarkable  CHEST WALL AND LOWER NECK:   1 cm rounded lesion in the right breast (2/25) corresponds with a cyst on ultrasound from 7/21/2020  VISUALIZED STRUCTURES IN THE UPPER ABDOMEN:  Unremarkable  OSSEOUS STRUCTURES:  Mild degenerative disease in the spine  Impression Part solid right upper lobe nodule without significant change in overall size since July 2020; however, the solid component has increased from 3 mm to 1 2 cm since October 2020 and is highly suspicious for adenocarcinoma  The study was marked in EPIC for significant notification  Workstation performed: [id number]     Ct Chest W Contrast    Result Date: 10/29/2020  Narrative CT CHEST WITH IV CONTRAST INDICATION:   R91 1: Solitary pulmonary nodule  COMPARISON:  July 21, 2020 TECHNIQUE: CT examination of the chest was performed  Axial, sagittal, and coronal 2D reformatted images were created from the source data and submitted for interpretation  Radiation dose length product (DLP) for this visit:  226 mGy-cm   This examination, like all CT scans performed in the [facility], was performed utilizing techniques to minimize radiation dose exposure, including the use of iterative reconstruction and automated exposure control   IV Contrast:  85 mL of iohexol (OMNIPAQUE) FINDINGS: LUNGS:  The previously identified groundglass attenuation lesion in the right lung is still present  It is visible on images 22-26 series 2 in the periphery of the right upper lobe  The vast majority of the lesion remains groundglass attenuation although there might be a small soft tissue component on image 46 series 3 where this might just be a prominent vascular branch point within the lesion  Overall, the findings are worrisome for possible adenocarcinoma of the lung which might be more definitively assessed with biopsy if deemed clinically safe and appropriate  There are areas of platelike atelectasis posteriorly in the left lower lobe  Emphysema is stable from prior  PLEURA:  Unremarkable  HEART/GREAT VESSELS:  Unremarkable for patient's age  MEDIASTINUM AND SIMON:  Unremarkable  CHEST WALL AND LOWER NECK:   Right breast lesions are visible, previously worked up for known to be benign cystic findings  VISUALIZED STRUCTURES IN THE UPPER ABDOMEN:  Unremarkable  OSSEOUS STRUCTURES:  No acute fracture or destructive osseous lesion  Impression Persistent mostly groundglass attenuation lesion of the right upper lobe which is 2 8 cm size, not significantly changed from prior although potentially with one small soft tissue density component (versus a prominent vascular branch point)  A persisting lesion of this appearance is worrisome for adenocarcinoma of the lung and tissue sampling might be indicated for definitive diagnosis  Emphysema is noted  No significant new abnormality seen elsewhere  The study was marked in EPIC for significant notification  Workstation performed: [id number]     Ct Chest W Contrast    Result Date: 7/23/2020  Narrative CT CHEST WITH IV CONTRAST INDICATION:   C64 1: Malignant neoplasm of right kidney, except renal pelvis  Right side renal cell carcinoma, status post partial nephrectomy on 8/20 6/20/2019  Follow-up  COMPARISON:  None  TECHNIQUE: CT examination of the chest was performed   Axial, sagittal, and coronal 2D reformatted images were created from the source data and submitted for interpretation  Radiation dose length product (DLP) for this visit:  258 mGy-cm   This examination, like all CT scans performed in the [facility], was performed utilizing techniques to minimize radiation dose exposure, including the use of iterative reconstruction and automated exposure control  IV Contrast:  100 mL of iohexol (OMNIPAQUE) FINDINGS: LUNGS:  Central airways are patent  Moderate centrilobular emphysema  Bandlike densities in bilateral lower lobes, likely reflecting subsegmental atelectasis versus scarring  No focal consolidation  There is a 2 7 x 2 2 x 1 5 cm groundglass nodule (GGN) in the right upper lobe (series 7 image 51, series 601 image 59)  There are questionable small solid components measuring 2-3 mm in size  This may be related to infectious/inflammatory process or an adenocarcinoma spectrum lesion  Follow-up with  short-term chest CT is recommended  PLEURA:  No pleural effusion or pneumothorax  HEART/GREAT VESSELS:  Unremarkable for patient's age  MEDIASTINUM AND SIMON:  Unremarkable  CHEST WALL AND LOWER NECK:   Mildly heterogeneous thyroid gland with probable small nodule in the left lobe  VISUALIZED STRUCTURES IN THE UPPER ABDOMEN:  Please refer to the separately dictated CT abdomen and pelvis report  OSSEOUS STRUCTURES:  No acute fracture or destructive osseous lesion  Impression 1   2 7 cm groundglass groundglass nodule (GGN) in the right upper lobe, which be related to infectious/inflammatory process or an adenocarcinoma spectrum lesion  Follow-up evaluation with short-term chest CT in 3-6 months is recommended  2   Moderate emphysema  The study was marked in EPIC for significant notification  Workstation performed: [id number]     No CT Chest,Abdomen,Pelvis results available for this patient  No NM PET CT results available for this patient     No Barium Swallow results available for this patient  I personally reviewed all of the available and relevant imaging, including the most recent chest CT, in PACS and agree with the findings as included above

## 2021-03-16 NOTE — LETTER
March 16, 2021     Kerri Allison, [address] [id number]    Patient: Kelley Connolly   YOB: 1975   Date of Visit: 3/16/2021       Dear Dr Willow Mcnally: Thank you for referring Kelley Connolly to me for evaluation  Below are my notes for this consultation  If you have questions, please do not hesitate to call me  I look forward to following your patient along with you  Sincerely,        Devaughn Martinez MD        CC: MD Devaughn Conway MD  3/16/2021  9:38 AM  Sign when Signing Visit  Thoracic Follow-Up  Assessment/Plan:    Lung nodule  Ms Connolly Is a very pleasant 39-year-old female who presents to my office with a right upper lobe ground-glass opacity that has increased it is solid component over the most recent CT scan  Today in the office, we had a long conversation regarding this pulmonary nodule  I explained to her that based upon its appearance, I believe that this could be a slowly growing adenocarcinoma of her lung  With the solid component being greater than 8 mm at this time, I do not feel that we should continue to watch this, I believe that we should proceed with either a biopsy or surgical intervention  I explained to her that her options or a biopsy with Interventional Radiology to confirm a diagnosis or to proceed to the operating room with a wedge resection and possible completion lobectomy based upon the frozen pathology  We discussed the risks and benefits of surgery and she wishes to proceed with surgery  I also explained to her that I will not perform this operation on anyone who is actively smoking and she understands and states that she will not have any additional cigarettes prior to the operation  She did ask me about COVID vaccination  She was able to secure an appointment and she will complete her vaccine series on April 9th    Because of this, we will schedule her surgery for April 14th after she has completed the entire vaccination series  In addition, workup for lung cancer would include PET-CT scan  I have ordered a PET-CT scan today and we will obtain this prior to her surgery  If the results show anything concerning for metastasis, we will hold off on surgery and proceed with a preoperative biopsy  However, if the PET-CT scan demonstrates isolated disease, we will plan to proceed with a flexible bronchoscopy and right VATS wedge resection with possible completion upper lobectomy on April 14th  Frank Merino MD  Thoracic Surgery  (Available on [employer])  Office: [phone]         Diagnoses and all orders for this visit:    Lung nodule  -     NM PET CT skull base to mid thigh; Future  -     Case request operating room: [facility] (VATS) right wedge resection possible completion upper lobectomy, flex bronch; Standing  -     PAT Covid Screening; Future  -     Type and screen; Future  -     CBC and Platelet; Future  -     Comprehensive metabolic panel; Future  -     APTT; Future  -     Protime-INR; Future  -     [facility] ([facility]) right wedge resection possible completion upper lobectomy, flex bronch  -     EKG 12 lead; Future    Other orders  -     Diet NPO; Sips with meds; Standing  -     Nursing communication Please give pre-op Carbohydrate drink to patient 2-4 hours prior to surgery; Standing  -     Void on call to OR; Standing  -     Insert peripheral IV; Standing  -     Place sequential compression device; Standing  -     acetaminophen (TYLENOL) tablet 975 mg  -     celecoxib (CeleBREX) capsule 200 mg  -     gabapentin (NEURONTIN) capsule 600 mg  -     heparin (porcine) subcutaneous injection 5,000 Units  -     ceFAZolin (ANCEF) 1,000 mg in dextrose 5 % 100 mL IVPB          Thoracic History          Subjective:    Patient ID: Kelley Connolly is a 54 y o  female  HPI  Ms Connolly Is a very pleasant 13-year-old female who is known to me  I have been following her for a right upper lobe pulmonary nodule  This started out as a ground-glass opacity with a solid component of 3 mm  Her most recent CT scan, this solid component has increased to 1 2 cm  Today in the office, the patient states that she is feeling pretty good besides the anxiety that she developed after reading the CT scan report  She denies any chest pain or shortness of breath  She does report that she is having some gastritis  She denies cough or hemoptysis  She denies any fevers or chills or recent upper respiratory infection or pneumonia  She reports that she completely quit smoking in January but with the anxiety she did smoke a cigarette yesterday  She reports that she has gained 10 lb since her smoking cessation  She is stable from her kidney cancer standpoint  She is due to complete her COVID vaccination series on April 9th  The following portions of the patient's history were reviewed and updated as appropriate: allergies, current medications, past family history, past medical history, past social history, past surgical history and problem list     Review of Systems   Constitutional: Negative for chills, fatigue, fever and unexpected weight change  HENT: Negative  Negative for trouble swallowing  Eyes: Negative  Negative for visual disturbance  Respiratory: Negative for cough, shortness of breath and stridor  Cardiovascular: Negative for chest pain  Gastrointestinal: Positive for abdominal pain  Endocrine: Negative  Genitourinary: Negative  Musculoskeletal: Negative  Skin: Negative  Neurological: Negative for dizziness, light-headedness and headaches  Hematological: Negative for adenopathy  Psychiatric/Behavioral: The patient is nervous/anxious  Objective:   Physical Exam  Vitals signs and nursing note reviewed  Constitutional:       General: She is not in acute distress       Appearance: Normal appearance  She is well-developed  She is not diaphoretic  HENT:      Head: Normocephalic and atraumatic  Nose: Nose normal  No congestion or rhinorrhea  Mouth/Throat:      Mouth: Mucous membranes are moist       Pharynx: Oropharynx is clear  No oropharyngeal exudate  Eyes:      General: No scleral icterus  Pupils: Pupils are equal, round, and reactive to light  Neck:      Musculoskeletal: Normal range of motion and neck supple  No muscular tenderness  Trachea: No tracheal deviation  Cardiovascular:      Rate and Rhythm: Normal rate and regular rhythm  Pulses: Normal pulses  Heart sounds: Normal heart sounds  No murmur  Pulmonary:      Effort: Pulmonary effort is normal  No respiratory distress  Breath sounds: Normal breath sounds  No stridor  No wheezing or rales  Chest:      Chest wall: No tenderness  Abdominal:      General: Bowel sounds are normal  There is no distension  Palpations: Abdomen is soft  Tenderness: There is no abdominal tenderness  There is no rebound  Musculoskeletal: Normal range of motion  Lymphadenopathy:      Cervical: No cervical adenopathy  Skin:     General: Skin is warm and dry  Coloration: Skin is not jaundiced or pale  Findings: No erythema or rash  Neurological:      General: No focal deficit present  Mental Status: She is alert and oriented to person, place, and time  Psychiatric:         Mood and Affect: Mood normal          Behavior: Behavior normal          Thought Content: Thought content normal          Judgment: Judgment normal      /82 (BP Location: Left arm, Patient Position: Sitting, Cuff Size: Standard)   Pulse 82   Temp 99 7 °F (37 6 °C)   Resp 18   Ht 5' 7 5" (1 715 m)   Wt 65 8 kg (145 lb)   BMI 22 38 kg/m²     No Chest XR results available for this patient     Ct Chest Wo Contrast    Result Date: 3/15/2021  Narrative CT CHEST WITHOUT IV CONTRAST INDICATION:   R91 1: Solitary pulmonary nodule  Follow-up lung nodule  History of renal cell carcinoma  COMPARISON:  Chest CT from 10/26/2020 and 7/21/2020  TECHNIQUE: CT examination of the chest was performed without intravenous contrast   Axial, sagittal, and coronal 2D reformatted images were created from the source data and submitted for interpretation  Radiation dose length product (DLP) for this visit:  226 mGy-cm   This examination, like all CT scans performed in the [facility], was performed utilizing techniques to minimize radiation dose exposure, including the use of iterative reconstruction and automated exposure control  FINDINGS: LUNGS:  Part solid nodule in the anterior segment right upper lobe  The groundglass component is without significant change at 2 9 x 2 5 cm (3/49) since July 2020  (Disease It was remeasured in July 2020 )  The solid component has increased from 3 mm to 1 2 x 0 8 cm (3/47) since October 2020  Redemonstration of mild retraction of the major fissure  Mild centrilobular emphysema  No significant filling defects in the trachea and bronchi  PLEURA:  Unremarkable  HEART/GREAT VESSELS:  Unremarkable for patient's age  MEDIASTINUM AND SIMON:  Unremarkable  CHEST WALL AND LOWER NECK:   1 cm rounded lesion in the right breast (2/25) corresponds with a cyst on ultrasound from 7/21/2020  VISUALIZED STRUCTURES IN THE UPPER ABDOMEN:  Unremarkable  OSSEOUS STRUCTURES:  Mild degenerative disease in the spine  Impression Part solid right upper lobe nodule without significant change in overall size since July 2020; however, the solid component has increased from 3 mm to 1 2 cm since October 2020 and is highly suspicious for adenocarcinoma  The study was marked in EPIC for significant notification  Workstation performed: [id number]     Ct Chest W Contrast    Result Date: 10/29/2020  Narrative CT CHEST WITH IV CONTRAST INDICATION:   R91 1: Solitary pulmonary nodule   COMPARISON:  July 21, 2020 TECHNIQUE: CT examination of the chest was performed  Axial, sagittal, and coronal 2D reformatted images were created from the source data and submitted for interpretation  Radiation dose length product (DLP) for this visit:  226 mGy-cm   This examination, like all CT scans performed in the [facility], was performed utilizing techniques to minimize radiation dose exposure, including the use of iterative reconstruction and automated exposure control  IV Contrast:  85 mL of iohexol (OMNIPAQUE) FINDINGS: LUNGS:  The previously identified groundglass attenuation lesion in the right lung is still present  It is visible on images 22-26 series 2 in the periphery of the right upper lobe  The vast majority of the lesion remains groundglass attenuation although there might be a small soft tissue component on image 46 series 3 where this might just be a prominent vascular branch point within the lesion  Overall, the findings are worrisome for possible adenocarcinoma of the lung which might be more definitively assessed with biopsy if deemed clinically safe and appropriate  There are areas of platelike atelectasis posteriorly in the left lower lobe  Emphysema is stable from prior  PLEURA:  Unremarkable  HEART/GREAT VESSELS:  Unremarkable for patient's age  MEDIASTINUM AND SIMON:  Unremarkable  CHEST WALL AND LOWER NECK:   Right breast lesions are visible, previously worked up for known to be benign cystic findings  VISUALIZED STRUCTURES IN THE UPPER ABDOMEN:  Unremarkable  OSSEOUS STRUCTURES:  No acute fracture or destructive osseous lesion  Impression Persistent mostly groundglass attenuation lesion of the right upper lobe which is 2 8 cm size, not significantly changed from prior although potentially with one small soft tissue density component (versus a prominent vascular branch point)    A persisting lesion of this appearance is worrisome for adenocarcinoma of the lung and tissue sampling might be indicated for definitive diagnosis  Emphysema is noted  No significant new abnormality seen elsewhere  The study was marked in EPIC for significant notification  Workstation performed: [id number]     Ct Chest W Contrast    Result Date: 7/23/2020  Narrative CT CHEST WITH IV CONTRAST INDICATION:   C64 1: Malignant neoplasm of right kidney, except renal pelvis  Right side renal cell carcinoma, status post partial nephrectomy on 8/20 6/20/2019  Follow-up  COMPARISON:  None  TECHNIQUE: CT examination of the chest was performed  Axial, sagittal, and coronal 2D reformatted images were created from the source data and submitted for interpretation  Radiation dose length product (DLP) for this visit:  258 mGy-cm   This examination, like all CT scans performed in the [facility], was performed utilizing techniques to minimize radiation dose exposure, including the use of iterative reconstruction and automated exposure control  IV Contrast:  100 mL of iohexol (OMNIPAQUE) FINDINGS: LUNGS:  Central airways are patent  Moderate centrilobular emphysema  Bandlike densities in bilateral lower lobes, likely reflecting subsegmental atelectasis versus scarring  No focal consolidation  There is a 2 7 x 2 2 x 1 5 cm groundglass nodule (GGN) in the right upper lobe (series 7 image 51, series 601 image 59)  There are questionable small solid components measuring 2-3 mm in size  This may be related to infectious/inflammatory process or an adenocarcinoma spectrum lesion  Follow-up with  short-term chest CT is recommended  PLEURA:  No pleural effusion or pneumothorax  HEART/GREAT VESSELS:  Unremarkable for patient's age  MEDIASTINUM AND SIMON:  Unremarkable  CHEST WALL AND LOWER NECK:   Mildly heterogeneous thyroid gland with probable small nodule in the left lobe  VISUALIZED STRUCTURES IN THE UPPER ABDOMEN:  Please refer to the separately dictated CT abdomen and pelvis report   OSSEOUS STRUCTURES:  No acute fracture or destructive osseous lesion  Impression 1   2 7 cm groundglass groundglass nodule (GGN) in the right upper lobe, which be related to infectious/inflammatory process or an adenocarcinoma spectrum lesion  Follow-up evaluation with short-term chest CT in 3-6 months is recommended  2   Moderate emphysema  The study was marked in EPIC for significant notification  Workstation performed: [id number]     No CT Chest,Abdomen,Pelvis results available for this patient  No NM PET CT results available for this patient  No Barium Swallow results available for this patient  I personally reviewed all of the available and relevant imaging, including the most recent chest CT, in PACS and agree with the findings as included above

## 2021-03-16 NOTE — ASSESSMENT & PLAN NOTE
Ms Jamal Kauffman Is a very pleasant 69-year-old female who presents to my office with a right upper lobe ground-glass opacity that has increased it is solid component over the most recent CT scan  Today in the office, we had a long conversation regarding this pulmonary nodule  I explained to her that based upon its appearance, I believe that this could be a slowly growing adenocarcinoma of her lung  With the solid component being greater than 8 mm at this time, I do not feel that we should continue to watch this, I believe that we should proceed with either a biopsy or surgical intervention  I explained to her that her options or a biopsy with Interventional Radiology to confirm a diagnosis or to proceed to the operating room with a wedge resection and possible completion lobectomy based upon the frozen pathology  We discussed the risks and benefits of surgery and she wishes to proceed with surgery  I also explained to her that I will not perform this operation on anyone who is actively smoking and she understands and states that she will not have any additional cigarettes prior to the operation  She did ask me about COVID vaccination  She was able to secure an appointment and she will complete her vaccine series on April 9th  Because of this, we will schedule her surgery for April 14th after she has completed the entire vaccination series  In addition, workup for lung cancer would include PET-CT scan  I have ordered a PET-CT scan today and we will obtain this prior to her surgery  If the results show anything concerning for metastasis, we will hold off on surgery and proceed with a preoperative biopsy  However, if the PET-CT scan demonstrates isolated disease, we will plan to proceed with a flexible bronchoscopy and right VATS wedge resection with possible completion upper lobectomy on April 14th      Gaston Nuñez MD  Thoracic Surgery  (Available on [employer] Text)  Office: [phone]

## 2021-03-16 NOTE — H&P (VIEW-ONLY)
Thoracic Follow-Up  Assessment/Plan:    Lung nodule  Ms Connolly Is a very pleasant 76-year-old female who presents to my office with a right upper lobe ground-glass opacity that has increased it is solid component over the most recent CT scan  Today in the office, we had a long conversation regarding this pulmonary nodule  I explained to her that based upon its appearance, I believe that this could be a slowly growing adenocarcinoma of her lung  With the solid component being greater than 8 mm at this time, I do not feel that we should continue to watch this, I believe that we should proceed with either a biopsy or surgical intervention  I explained to her that her options or a biopsy with Interventional Radiology to confirm a diagnosis or to proceed to the operating room with a wedge resection and possible completion lobectomy based upon the frozen pathology  We discussed the risks and benefits of surgery and she wishes to proceed with surgery  I also explained to her that I will not perform this operation on anyone who is actively smoking and she understands and states that she will not have any additional cigarettes prior to the operation  She did ask me about COVID vaccination  She was able to secure an appointment and she will complete her vaccine series on April 9th  Because of this, we will schedule her surgery for April 14th after she has completed the entire vaccination series  In addition, workup for lung cancer would include PET-CT scan  I have ordered a PET-CT scan today and we will obtain this prior to her surgery  If the results show anything concerning for metastasis, we will hold off on surgery and proceed with a preoperative biopsy  However, if the PET-CT scan demonstrates isolated disease, we will plan to proceed with a flexible bronchoscopy and right VATS wedge resection with possible completion upper lobectomy on April 14th      Tacos Ham MD  Thoracic Surgery  (Available on [facility] [employer])  Office: [phone]         Diagnoses and all orders for this visit:    Lung nodule  -     NM PET CT skull base to mid thigh; Future  -     Case request operating room: [address] (VATS) right wedge resection possible completion upper lobectomy, flex bronch; Standing  -     PAT Covid Screening; Future  -     Type and screen; Future  -     CBC and Platelet; Future  -     Comprehensive metabolic panel; Future  -     APTT; Future  -     Protime-INR; Future  -     Case request operating room: [address] (VATS) right wedge resection possible completion upper lobectomy, flex bronch  -     EKG 12 lead; Future    Other orders  -     Diet NPO; Sips with meds; Standing  -     Nursing communication Please give pre-op Carbohydrate drink to patient 2-4 hours prior to surgery; Standing  -     Void on call to OR; Standing  -     Insert peripheral IV; Standing  -     Place sequential compression device; Standing  -     acetaminophen (TYLENOL) tablet 975 mg  -     celecoxib (CeleBREX) capsule 200 mg  -     gabapentin (NEURONTIN) capsule 600 mg  -     heparin (porcine) subcutaneous injection 5,000 Units  -     ceFAZolin (ANCEF) 1,000 mg in dextrose 5 % 100 mL IVPB          Thoracic History          Subjective:    Patient ID: April Cathleen is a 54 y o  female  HPI  Ms Connolly Is a very pleasant 70-year-old female who is known to me  I have been following her for a right upper lobe pulmonary nodule  This started out as a ground-glass opacity with a solid component of 3 mm  Her most recent CT scan, this solid component has increased to 1 2 cm  Today in the office, the patient states that she is feeling pretty good besides the anxiety that she developed after reading the CT scan report  She denies any chest pain or shortness of breath  She does report that she is having some gastritis  She denies cough or hemoptysis    She denies any fevers or chills or recent upper respiratory infection or pneumonia  She reports that she completely quit smoking in January but with the anxiety she did smoke a cigarette yesterday  She reports that she has gained 10 lb since her smoking cessation  She is stable from her kidney cancer standpoint  She is due to complete her COVID vaccination series on April 9th  The following portions of the patient's history were reviewed and updated as appropriate: allergies, current medications, past family history, past medical history, past social history, past surgical history and problem list     Review of Systems   Constitutional: Negative for chills, fatigue, fever and unexpected weight change  HENT: Negative  Negative for trouble swallowing  Eyes: Negative  Negative for visual disturbance  Respiratory: Negative for cough, shortness of breath and stridor  Cardiovascular: Negative for chest pain  Gastrointestinal: Positive for abdominal pain  Endocrine: Negative  Genitourinary: Negative  Musculoskeletal: Negative  Skin: Negative  Neurological: Negative for dizziness, light-headedness and headaches  Hematological: Negative for adenopathy  Psychiatric/Behavioral: The patient is nervous/anxious  Objective:   Physical Exam  Vitals signs and nursing note reviewed  Constitutional:       General: She is not in acute distress  Appearance: Normal appearance  She is well-developed  She is not diaphoretic  HENT:      Head: Normocephalic and atraumatic  Nose: Nose normal  No congestion or rhinorrhea  Mouth/Throat:      Mouth: Mucous membranes are moist       Pharynx: Oropharynx is clear  No oropharyngeal exudate  Eyes:      General: No scleral icterus  Pupils: Pupils are equal, round, and reactive to light  Neck:      Musculoskeletal: Normal range of motion and neck supple  No muscular tenderness  Trachea: No tracheal deviation     Cardiovascular:      Rate and Rhythm: Normal rate and regular rhythm  Pulses: Normal pulses  Heart sounds: Normal heart sounds  No murmur  Pulmonary:      Effort: Pulmonary effort is normal  No respiratory distress  Breath sounds: Normal breath sounds  No stridor  No wheezing or rales  Chest:      Chest wall: No tenderness  Abdominal:      General: Bowel sounds are normal  There is no distension  Palpations: Abdomen is soft  Tenderness: There is no abdominal tenderness  There is no rebound  Musculoskeletal: Normal range of motion  Lymphadenopathy:      Cervical: No cervical adenopathy  Skin:     General: Skin is warm and dry  Coloration: Skin is not jaundiced or pale  Findings: No erythema or rash  Neurological:      General: No focal deficit present  Mental Status: She is alert and oriented to person, place, and time  Psychiatric:         Mood and Affect: Mood normal          Behavior: Behavior normal          Thought Content: Thought content normal          Judgment: Judgment normal      /82 (BP Location: Left arm, Patient Position: Sitting, Cuff Size: Standard)   Pulse 82   Temp 99 7 °F (37 6 °C)   Resp 18   Ht 5' 7 5" (1 715 m)   Wt 65 8 kg (145 lb)   BMI 22 38 kg/m²     No Chest XR results available for this patient  Ct Chest Wo Contrast    Result Date: 3/15/2021  Narrative CT CHEST WITHOUT IV CONTRAST INDICATION:   R91 1: Solitary pulmonary nodule  Follow-up lung nodule  History of renal cell carcinoma  COMPARISON:  Chest CT from 10/26/2020 and 7/21/2020  TECHNIQUE: CT examination of the chest was performed without intravenous contrast   Axial, sagittal, and coronal 2D reformatted images were created from the source data and submitted for interpretation  Radiation dose length product (DLP) for this visit:  226 mGy-cm     This examination, like all CT scans performed in the [facility], was performed utilizing techniques to minimize radiation dose exposure, including the use of iterative reconstruction and automated exposure control  FINDINGS: LUNGS:  Part solid nodule in the anterior segment right upper lobe  The groundglass component is without significant change at 2 9 x 2 5 cm (3/49) since July 2020  (Disease It was remeasured in July 2020 )  The solid component has increased from 3 mm to 1 2 x 0 8 cm (3/47) since October 2020  Redemonstration of mild retraction of the major fissure  Mild centrilobular emphysema  No significant filling defects in the trachea and bronchi  PLEURA:  Unremarkable  HEART/GREAT VESSELS:  Unremarkable for patient's age  MEDIASTINUM AND SIMON:  Unremarkable  CHEST WALL AND LOWER NECK:   1 cm rounded lesion in the right breast (2/25) corresponds with a cyst on ultrasound from 7/21/2020  VISUALIZED STRUCTURES IN THE UPPER ABDOMEN:  Unremarkable  OSSEOUS STRUCTURES:  Mild degenerative disease in the spine  Impression Part solid right upper lobe nodule without significant change in overall size since July 2020; however, the solid component has increased from 3 mm to 1 2 cm since October 2020 and is highly suspicious for adenocarcinoma  The study was marked in EPIC for significant notification  Workstation performed: [id number]     Ct Chest W Contrast    Result Date: 10/29/2020  Narrative CT CHEST WITH IV CONTRAST INDICATION:   R91 1: Solitary pulmonary nodule  COMPARISON:  July 21, 2020 TECHNIQUE: CT examination of the chest was performed  Axial, sagittal, and coronal 2D reformatted images were created from the source data and submitted for interpretation  Radiation dose length product (DLP) for this visit:  226 mGy-cm   This examination, like all CT scans performed in the [facility], was performed utilizing techniques to minimize radiation dose exposure, including the use of iterative reconstruction and automated exposure control   IV Contrast:  85 mL of iohexol (OMNIPAQUE) FINDINGS: LUNGS:  The previously identified groundglass attenuation lesion in the right lung is still present  It is visible on images 22-26 series 2 in the periphery of the right upper lobe  The vast majority of the lesion remains groundglass attenuation although there might be a small soft tissue component on image 46 series 3 where this might just be a prominent vascular branch point within the lesion  Overall, the findings are worrisome for possible adenocarcinoma of the lung which might be more definitively assessed with biopsy if deemed clinically safe and appropriate  There are areas of platelike atelectasis posteriorly in the left lower lobe  Emphysema is stable from prior  PLEURA:  Unremarkable  HEART/GREAT VESSELS:  Unremarkable for patient's age  MEDIASTINUM AND SIMON:  Unremarkable  CHEST WALL AND LOWER NECK:   Right breast lesions are visible, previously worked up for known to be benign cystic findings  VISUALIZED STRUCTURES IN THE UPPER ABDOMEN:  Unremarkable  OSSEOUS STRUCTURES:  No acute fracture or destructive osseous lesion  Impression Persistent mostly groundglass attenuation lesion of the right upper lobe which is 2 8 cm size, not significantly changed from prior although potentially with one small soft tissue density component (versus a prominent vascular branch point)  A persisting lesion of this appearance is worrisome for adenocarcinoma of the lung and tissue sampling might be indicated for definitive diagnosis  Emphysema is noted  No significant new abnormality seen elsewhere  The study was marked in EPIC for significant notification  Workstation performed: [id number]     Ct Chest W Contrast    Result Date: 7/23/2020  Narrative CT CHEST WITH IV CONTRAST INDICATION:   C64 1: Malignant neoplasm of right kidney, except renal pelvis  Right side renal cell carcinoma, status post partial nephrectomy on 8/20 6/20/2019  Follow-up  COMPARISON:  None  TECHNIQUE: CT examination of the chest was performed   Axial, sagittal, and coronal 2D reformatted images were created from the source data and submitted for interpretation  Radiation dose length product (DLP) for this visit:  258 mGy-cm   This examination, like all CT scans performed in the [facility], was performed utilizing techniques to minimize radiation dose exposure, including the use of iterative reconstruction and automated exposure control  IV Contrast:  100 mL of iohexol (OMNIPAQUE) FINDINGS: LUNGS:  Central airways are patent  Moderate centrilobular emphysema  Bandlike densities in bilateral lower lobes, likely reflecting subsegmental atelectasis versus scarring  No focal consolidation  There is a 2 7 x 2 2 x 1 5 cm groundglass nodule (GGN) in the right upper lobe (series 7 image 51, series 601 image 59)  There are questionable small solid components measuring 2-3 mm in size  This may be related to infectious/inflammatory process or an adenocarcinoma spectrum lesion  Follow-up with  short-term chest CT is recommended  PLEURA:  No pleural effusion or pneumothorax  HEART/GREAT VESSELS:  Unremarkable for patient's age  MEDIASTINUM AND SIMON:  Unremarkable  CHEST WALL AND LOWER NECK:   Mildly heterogeneous thyroid gland with probable small nodule in the left lobe  VISUALIZED STRUCTURES IN THE UPPER ABDOMEN:  Please refer to the separately dictated CT abdomen and pelvis report  OSSEOUS STRUCTURES:  No acute fracture or destructive osseous lesion  Impression 1   2 7 cm groundglass groundglass nodule (GGN) in the right upper lobe, which be related to infectious/inflammatory process or an adenocarcinoma spectrum lesion  Follow-up evaluation with short-term chest CT in 3-6 months is recommended  2   Moderate emphysema  The study was marked in EPIC for significant notification  Workstation performed: [id number]     No CT Chest,Abdomen,Pelvis results available for this patient  No NM PET CT results available for this patient     No Barium Swallow results available for this patient  I personally reviewed all of the available and relevant imaging, including the most recent chest CT, in PACS and agree with the findings as included above

## 2021-03-23 ENCOUNTER — TELEPHONE (OUTPATIENT)
Dept: SURGICAL ONCOLOGY | Facility: CLINIC | Age: 46
End: 2021-03-23

## 2021-04-07 ENCOUNTER — APPOINTMENT (OUTPATIENT)
Dept: LAB | Facility: CLINIC | Age: 46
End: 2021-04-07
Payer: COMMERCIAL

## 2021-04-07 ENCOUNTER — LAB REQUISITION (OUTPATIENT)
Dept: LAB | Facility: HOSPITAL | Age: 46
End: 2021-04-07
Payer: COMMERCIAL

## 2021-04-07 ENCOUNTER — CLINICAL SUPPORT (OUTPATIENT)
Dept: URGENT CARE | Facility: CLINIC | Age: 46
End: 2021-04-07
Payer: COMMERCIAL

## 2021-04-07 DIAGNOSIS — R91.1 LUNG NODULE: ICD-10-CM

## 2021-04-07 DIAGNOSIS — C64.1 RENAL CELL CARCINOMA OF RIGHT KIDNEY (HCC): ICD-10-CM

## 2021-04-07 DIAGNOSIS — R91.1 SOLITARY PULMONARY NODULE: ICD-10-CM

## 2021-04-07 DIAGNOSIS — E78.2 MIXED HYPERLIPIDEMIA: ICD-10-CM

## 2021-04-07 DIAGNOSIS — R93.89 ABNORMAL CT OF THE CHEST: ICD-10-CM

## 2021-04-07 LAB
ABO GROUP BLD: NORMAL
ALBUMIN SERPL BCP-MCNC: 4 G/DL (ref 3.5–5)
ALP SERPL-CCNC: 84 U/L (ref 46–116)
ALT SERPL W P-5'-P-CCNC: 15 U/L (ref 12–78)
ANION GAP SERPL CALCULATED.3IONS-SCNC: 6 MMOL/L (ref 4–13)
APTT PPP: 29 SECONDS (ref 23–37)
AST SERPL W P-5'-P-CCNC: 8 U/L (ref 5–45)
BILIRUB SERPL-MCNC: 0.36 MG/DL (ref 0.2–1)
BLD GP AB SCN SERPL QL: NEGATIVE
BUN SERPL-MCNC: 10 MG/DL (ref 5–25)
CALCIUM SERPL-MCNC: 8.7 MG/DL (ref 8.3–10.1)
CHLORIDE SERPL-SCNC: 107 MMOL/L (ref 100–108)
CO2 SERPL-SCNC: 27 MMOL/L (ref 21–32)
CREAT SERPL-MCNC: 0.81 MG/DL (ref 0.6–1.3)
ERYTHROCYTE [DISTWIDTH] IN BLOOD BY AUTOMATED COUNT: 12.7 % (ref 11.6–15.1)
GFR SERPL CREATININE-BSD FRML MDRD: 87 ML/MIN/1.73SQ M
GLUCOSE P FAST SERPL-MCNC: 94 MG/DL (ref 65–99)
HCT VFR BLD AUTO: 42.5 % (ref 34.8–46.1)
HGB BLD-MCNC: 14.3 G/DL (ref 11.5–15.4)
INR PPP: 1.01 (ref 0.84–1.19)
MCH RBC QN AUTO: 31.2 PG (ref 26.8–34.3)
MCHC RBC AUTO-ENTMCNC: 33.6 G/DL (ref 31.4–37.4)
MCV RBC AUTO: 93 FL (ref 82–98)
PLATELET # BLD AUTO: 240 THOUSANDS/UL (ref 149–390)
PMV BLD AUTO: 9.8 FL (ref 8.9–12.7)
POTASSIUM SERPL-SCNC: 4.1 MMOL/L (ref 3.5–5.3)
PROT SERPL-MCNC: 7 G/DL (ref 6.4–8.2)
PROTHROMBIN TIME: 13.3 SECONDS (ref 11.6–14.5)
RBC # BLD AUTO: 4.59 MILLION/UL (ref 3.81–5.12)
RH BLD: POSITIVE
SODIUM SERPL-SCNC: 140 MMOL/L (ref 136–145)
SPECIMEN EXPIRATION DATE: NORMAL
WBC # BLD AUTO: 7.02 THOUSAND/UL (ref 4.31–10.16)

## 2021-04-07 PROCEDURE — 85610 PROTHROMBIN TIME: CPT

## 2021-04-07 PROCEDURE — 36415 COLL VENOUS BLD VENIPUNCTURE: CPT

## 2021-04-07 PROCEDURE — 86900 BLOOD TYPING SEROLOGIC ABO: CPT | Performed by: THORACIC SURGERY (CARDIOTHORACIC VASCULAR SURGERY)

## 2021-04-07 PROCEDURE — 85027 COMPLETE CBC AUTOMATED: CPT

## 2021-04-07 PROCEDURE — 93005 ELECTROCARDIOGRAM TRACING: CPT

## 2021-04-07 PROCEDURE — 86850 RBC ANTIBODY SCREEN: CPT | Performed by: THORACIC SURGERY (CARDIOTHORACIC VASCULAR SURGERY)

## 2021-04-07 PROCEDURE — 85730 THROMBOPLASTIN TIME PARTIAL: CPT

## 2021-04-07 PROCEDURE — 80053 COMPREHEN METABOLIC PANEL: CPT

## 2021-04-07 PROCEDURE — 86901 BLOOD TYPING SEROLOGIC RH(D): CPT | Performed by: THORACIC SURGERY (CARDIOTHORACIC VASCULAR SURGERY)

## 2021-04-07 NOTE — PROGRESS NOTES
Kelley Connolly had walk-in EKG completed on 04/07/21 at 8:19 AM by [facility]

## 2021-04-08 LAB
ATRIAL RATE: 88 BPM
P AXIS: 55 DEGREES
PR INTERVAL: 152 MS
QRS AXIS: 3 DEGREES
QRSD INTERVAL: 74 MS
QT INTERVAL: 350 MS
QTC INTERVAL: 423 MS
T WAVE AXIS: 53 DEGREES
VENTRICULAR RATE: 88 BPM

## 2021-04-08 PROCEDURE — 93010 ELECTROCARDIOGRAM REPORT: CPT | Performed by: INTERNAL MEDICINE

## 2021-04-08 NOTE — PRE-PROCEDURE INSTRUCTIONS
No outpatient medications have been marked as taking for the 4/14/21 encounter [facility] Encounter)  Have you had / have a sore throat?no  have you had / have a cough less than 1 week? no  Have you had / have a fever greater than 100 0 - 100  4? no  Are you experiencing any shortness of breath?no    Pre Procedure instructions given and verbalized understanding  all instructions reviewed on 4/8 meds, bathing and hospital instruction covid policy and visitation reviewed with understanding

## 2021-04-09 ENCOUNTER — TELEPHONE (OUTPATIENT)
Dept: SURGICAL ONCOLOGY | Facility: CLINIC | Age: 46
End: 2021-04-09

## 2021-04-09 ENCOUNTER — HOSPITAL ENCOUNTER (OUTPATIENT)
Dept: RADIOLOGY | Age: 46
Discharge: HOME | End: 2021-04-09
Payer: COMMERCIAL

## 2021-04-09 DIAGNOSIS — R91.1 LUNG NODULE: ICD-10-CM

## 2021-04-09 LAB — GLUCOSE SERPL-MCNC: 82 MG/DL (ref 65–140)

## 2021-04-09 PROCEDURE — A9552 F18 FDG: HCPCS

## 2021-04-09 PROCEDURE — 78815 PET IMAGE W/CT SKULL-THIGH: CPT

## 2021-04-09 PROCEDURE — G1004 CDSM NDSC: HCPCS

## 2021-04-09 PROCEDURE — 82948 REAGENT STRIP/BLOOD GLUCOSE: CPT

## 2021-04-13 ENCOUNTER — ANESTHESIA EVENT (OUTPATIENT)
Dept: PERIOP | Facility: HOSPITAL | Age: 46
DRG: 120 | End: 2021-04-13
Payer: COMMERCIAL

## 2021-04-13 NOTE — ANESTHESIA PREPROCEDURE EVALUATION
Procedure:  THORACOSCOPY VIDEO ASSISTED SURGERY (VATS) right wedge resection possible completion upper lobectomy, flex bronch (Right Chest)  RESECTION WEDGE LUNG (Right Chest)  LOBECTOMY LUNG (Right Chest)  BRONCHOSCOPY FLEXIBLE (N/A Bronchus)    Relevant Problems   ANESTHESIA (within normal limits)   (-) History of anesthesia complications      CARDIO   (+) Mixed hyperlipidemia      ENDO (within normal limits)      GI/HEPATIC   (+) Hiatal hernia      /RENAL   (+) Renal cell carcinoma of right kidney (HCC)      GYN   (-) Currently pregnant      HEMATOLOGY (within normal limits)      MUSCULOSKELETAL (within normal limits)      NEURO/PSYCH   (+) Anxiety      Other   (+) Lung nodule   (+) Renal mass, right (s/p partial right nephrectomy)        Physical Exam    Airway    Mallampati score: II  TM Distance: >3 FB  Neck ROM: full     Dental   No notable dental hx     Cardiovascular  Rhythm: regular, Rate: normal, Cardiovascular exam normal    Pulmonary  Pulmonary exam normal Breath sounds clear to auscultation,     Other Findings        Anesthesia Plan  ASA Score- 3     Anesthesia Type- general with ASA Monitors  Additional Monitors:   Airway Plan: ETT  Plan Factors-    Chart reviewed  EKG reviewed  Imaging results reviewed  Existing labs reviewed  Patient summary reviewed  Patient is not a current smoker  Patient did not smoke on day of surgery  Induction- intravenous  Postoperative Plan-   Planned trial extubation    Informed Consent- Anesthetic plan and risks discussed with patient and spouse  I personally reviewed this patient with the CRNA  Discussed and agreed on the Anesthesia Plan with the CRNA           NPO and allergies verified  Urine pregnancy test negative 4/14/21  Patient received PO Tylenol, gabapentin, and celecoxib preoperatively as ordered by Thoracic Surgery team     Plan:  GETA, SUJIT    Risks and benefits of general anesthesia were discussed with the patient    Discussed risks of anesthesia including, but not limited to, the risk of dental injury, n/v, sore throat, corneal abrasions, and arrhythmias  All questions were answered  Anesthesia consent was obtained from the patient

## 2021-04-14 ENCOUNTER — HOSPITAL ENCOUNTER (INPATIENT)
Facility: HOSPITAL | Age: 46
LOS: 2 days | Discharge: HOME | DRG: 120 | End: 2021-04-16
Attending: THORACIC SURGERY (CARDIOTHORACIC VASCULAR SURGERY) | Admitting: THORACIC SURGERY (CARDIOTHORACIC VASCULAR SURGERY)
Payer: COMMERCIAL

## 2021-04-14 ENCOUNTER — APPOINTMENT (INPATIENT)
Dept: RADIOLOGY | Facility: HOSPITAL | Age: 46
DRG: 120 | End: 2021-04-14
Payer: COMMERCIAL

## 2021-04-14 ENCOUNTER — ANESTHESIA (OUTPATIENT)
Dept: PERIOP | Facility: HOSPITAL | Age: 46
DRG: 120 | End: 2021-04-14
Payer: COMMERCIAL

## 2021-04-14 DIAGNOSIS — R91.1 LUNG NODULE: ICD-10-CM

## 2021-04-14 LAB
ANION GAP SERPL CALCULATED.3IONS-SCNC: 3 MMOL/L (ref 4–13)
BUN SERPL-MCNC: 12 MG/DL (ref 5–25)
CALCIUM SERPL-MCNC: 7.8 MG/DL (ref 8.3–10.1)
CHLORIDE SERPL-SCNC: 114 MMOL/L (ref 100–108)
CO2 SERPL-SCNC: 24 MMOL/L (ref 21–32)
CREAT SERPL-MCNC: 0.78 MG/DL (ref 0.6–1.3)
ERYTHROCYTE [DISTWIDTH] IN BLOOD BY AUTOMATED COUNT: 12.8 % (ref 11.6–15.1)
EXT PREGNANCY TEST URINE: NEGATIVE
EXT. CONTROL: NORMAL
GFR SERPL CREATININE-BSD FRML MDRD: 91 ML/MIN/1.73SQ M
GLUCOSE SERPL-MCNC: 112 MG/DL (ref 65–140)
HCT VFR BLD AUTO: 37.8 % (ref 34.8–46.1)
HGB BLD-MCNC: 12.5 G/DL (ref 11.5–15.4)
MAGNESIUM SERPL-MCNC: 2.2 MG/DL (ref 1.6–2.6)
MCH RBC QN AUTO: 31.1 PG (ref 26.8–34.3)
MCHC RBC AUTO-ENTMCNC: 33.1 G/DL (ref 31.4–37.4)
MCV RBC AUTO: 94 FL (ref 82–98)
PLATELET # BLD AUTO: 179 THOUSANDS/UL (ref 149–390)
PMV BLD AUTO: 9.3 FL (ref 8.9–12.7)
POTASSIUM SERPL-SCNC: 4.7 MMOL/L (ref 3.5–5.3)
RBC # BLD AUTO: 4.02 MILLION/UL (ref 3.81–5.12)
SODIUM SERPL-SCNC: 141 MMOL/L (ref 136–145)
WBC # BLD AUTO: 11.52 THOUSAND/UL (ref 4.31–10.16)

## 2021-04-14 PROCEDURE — 85027 COMPLETE CBC AUTOMATED: CPT | Performed by: PHYSICIAN ASSISTANT

## 2021-04-14 PROCEDURE — 88313 SPECIAL STAINS GROUP 2: CPT | Performed by: PATHOLOGY

## 2021-04-14 PROCEDURE — 88342 IMHCHEM/IMCYTCHM 1ST ANTB: CPT | Performed by: PATHOLOGY

## 2021-04-14 PROCEDURE — 88309 TISSUE EXAM BY PATHOLOGIST: CPT | Performed by: PATHOLOGY

## 2021-04-14 PROCEDURE — 88305 TISSUE EXAM BY PATHOLOGIST: CPT | Performed by: PATHOLOGY

## 2021-04-14 PROCEDURE — 32668 THORACOSCOPY W/W RESECT DIAG: CPT | Performed by: THORACIC SURGERY (CARDIOTHORACIC VASCULAR SURGERY)

## 2021-04-14 PROCEDURE — 99024 POSTOP FOLLOW-UP VISIT: CPT | Performed by: THORACIC SURGERY (CARDIOTHORACIC VASCULAR SURGERY)

## 2021-04-14 PROCEDURE — 0BJ08ZZ INSPECTION OF TRACHEOBRONCHIAL TREE, VIA NATURAL OR ARTIFICIAL OPENING ENDOSCOPIC: ICD-10-PCS | Performed by: THORACIC SURGERY (CARDIOTHORACIC VASCULAR SURGERY)

## 2021-04-14 PROCEDURE — 83735 ASSAY OF MAGNESIUM: CPT | Performed by: PHYSICIAN ASSISTANT

## 2021-04-14 PROCEDURE — C9290 INJ, BUPIVACAINE LIPOSOME: HCPCS | Performed by: THORACIC SURGERY (CARDIOTHORACIC VASCULAR SURGERY)

## 2021-04-14 PROCEDURE — 94760 N-INVAS EAR/PLS OXIMETRY 1: CPT

## 2021-04-14 PROCEDURE — 0BTC4ZZ RESECTION OF RIGHT UPPER LUNG LOBE, PERCUTANEOUS ENDOSCOPIC APPROACH: ICD-10-PCS | Performed by: THORACIC SURGERY (CARDIOTHORACIC VASCULAR SURGERY)

## 2021-04-14 PROCEDURE — 88307 TISSUE EXAM BY PATHOLOGIST: CPT | Performed by: PATHOLOGY

## 2021-04-14 PROCEDURE — 80048 BASIC METABOLIC PNL TOTAL CA: CPT | Performed by: PHYSICIAN ASSISTANT

## 2021-04-14 PROCEDURE — 94668 MNPJ CHEST WALL SBSQ: CPT

## 2021-04-14 PROCEDURE — 32674 THORACOSCOPY LYMPH NODE EXC: CPT | Performed by: THORACIC SURGERY (CARDIOTHORACIC VASCULAR SURGERY)

## 2021-04-14 PROCEDURE — 81025 URINE PREGNANCY TEST: CPT | Performed by: THORACIC SURGERY (CARDIOTHORACIC VASCULAR SURGERY)

## 2021-04-14 PROCEDURE — 88341 IMHCHEM/IMCYTCHM EA ADD ANTB: CPT | Performed by: PATHOLOGY

## 2021-04-14 PROCEDURE — 07B74ZZ EXCISION OF THORAX LYMPHATIC, PERCUTANEOUS ENDOSCOPIC APPROACH: ICD-10-PCS | Performed by: THORACIC SURGERY (CARDIOTHORACIC VASCULAR SURGERY)

## 2021-04-14 PROCEDURE — 88331 PATH CONSLTJ SURG 1 BLK 1SPC: CPT | Performed by: PATHOLOGY

## 2021-04-14 PROCEDURE — 71045 X-RAY EXAM CHEST 1 VIEW: CPT

## 2021-04-14 PROCEDURE — 32663 THORACOSCOPY W/LOBECTOMY: CPT | Performed by: THORACIC SURGERY (CARDIOTHORACIC VASCULAR SURGERY)

## 2021-04-14 RX ORDER — PROPOFOL 10 MG/ML
INJECTION, EMULSION INTRAVENOUS AS NEEDED
Status: DISCONTINUED | OUTPATIENT
Start: 2021-04-14 | End: 2021-04-14

## 2021-04-14 RX ORDER — SENNOSIDES 8.6 MG
1 TABLET ORAL DAILY
Status: DISCONTINUED | OUTPATIENT
Start: 2021-04-15 | End: 2021-04-16 | Stop reason: HOSPADM

## 2021-04-14 RX ORDER — HYDROMORPHONE HCL/PF 1 MG/ML
0.5 SYRINGE (ML) INJECTION EVERY 4 HOURS PRN
Status: DISCONTINUED | OUTPATIENT
Start: 2021-04-14 | End: 2021-04-16 | Stop reason: HOSPADM

## 2021-04-14 RX ORDER — LIDOCAINE HYDROCHLORIDE 10 MG/ML
INJECTION, SOLUTION EPIDURAL; INFILTRATION; INTRACAUDAL; PERINEURAL AS NEEDED
Status: DISCONTINUED | OUTPATIENT
Start: 2021-04-14 | End: 2021-04-14

## 2021-04-14 RX ORDER — ACETAMINOPHEN 325 MG/1
975 TABLET ORAL ONCE
Status: COMPLETED | OUTPATIENT
Start: 2021-04-14 | End: 2021-04-14

## 2021-04-14 RX ORDER — HEPARIN SODIUM 5000 [USP'U]/ML
5000 INJECTION, SOLUTION INTRAVENOUS; SUBCUTANEOUS
Status: COMPLETED | OUTPATIENT
Start: 2021-04-14 | End: 2021-04-14

## 2021-04-14 RX ORDER — DEXMEDETOMIDINE HYDROCHLORIDE 100 UG/ML
INJECTION, SOLUTION INTRAVENOUS AS NEEDED
Status: DISCONTINUED | OUTPATIENT
Start: 2021-04-14 | End: 2021-04-14

## 2021-04-14 RX ORDER — PROPOFOL 10 MG/ML
INJECTION, EMULSION INTRAVENOUS CONTINUOUS PRN
Status: DISCONTINUED | OUTPATIENT
Start: 2021-04-14 | End: 2021-04-14

## 2021-04-14 RX ORDER — NEOSTIGMINE METHYLSULFATE 1 MG/ML
INJECTION INTRAVENOUS AS NEEDED
Status: DISCONTINUED | OUTPATIENT
Start: 2021-04-14 | End: 2021-04-14

## 2021-04-14 RX ORDER — HYDROMORPHONE HCL/PF 1 MG/ML
SYRINGE (ML) INJECTION AS NEEDED
Status: DISCONTINUED | OUTPATIENT
Start: 2021-04-14 | End: 2021-04-14

## 2021-04-14 RX ORDER — GABAPENTIN 300 MG/1
600 CAPSULE ORAL ONCE
Status: COMPLETED | OUTPATIENT
Start: 2021-04-14 | End: 2021-04-14

## 2021-04-14 RX ORDER — PROMETHAZINE HYDROCHLORIDE 25 MG/ML
12.5 INJECTION, SOLUTION INTRAMUSCULAR; INTRAVENOUS ONCE AS NEEDED
Status: DISCONTINUED | OUTPATIENT
Start: 2021-04-14 | End: 2021-04-14 | Stop reason: HOSPADM

## 2021-04-14 RX ORDER — ONDANSETRON 2 MG/ML
INJECTION INTRAMUSCULAR; INTRAVENOUS AS NEEDED
Status: DISCONTINUED | OUTPATIENT
Start: 2021-04-14 | End: 2021-04-14

## 2021-04-14 RX ORDER — ACETAMINOPHEN 325 MG/1
975 TABLET ORAL EVERY 6 HOURS
Status: DISCONTINUED | OUTPATIENT
Start: 2021-04-14 | End: 2021-04-16 | Stop reason: HOSPADM

## 2021-04-14 RX ORDER — PANTOPRAZOLE SODIUM 40 MG/1
40 TABLET, DELAYED RELEASE ORAL
Status: DISCONTINUED | OUTPATIENT
Start: 2021-04-15 | End: 2021-04-16 | Stop reason: HOSPADM

## 2021-04-14 RX ORDER — SODIUM CHLORIDE 9 MG/ML
INJECTION INTRAVENOUS AS NEEDED
Status: DISCONTINUED | OUTPATIENT
Start: 2021-04-14 | End: 2021-04-14 | Stop reason: HOSPADM

## 2021-04-14 RX ORDER — CEFAZOLIN SODIUM 1 G/50ML
1000 SOLUTION INTRAVENOUS ONCE
Status: COMPLETED | OUTPATIENT
Start: 2021-04-14 | End: 2021-04-14

## 2021-04-14 RX ORDER — ROCURONIUM BROMIDE 10 MG/ML
INJECTION, SOLUTION INTRAVENOUS AS NEEDED
Status: DISCONTINUED | OUTPATIENT
Start: 2021-04-14 | End: 2021-04-14

## 2021-04-14 RX ORDER — ONDANSETRON 2 MG/ML
4 INJECTION INTRAMUSCULAR; INTRAVENOUS ONCE AS NEEDED
Status: DISCONTINUED | OUTPATIENT
Start: 2021-04-14 | End: 2021-04-14 | Stop reason: HOSPADM

## 2021-04-14 RX ORDER — DEXAMETHASONE SODIUM PHOSPHATE 10 MG/ML
INJECTION, SOLUTION INTRAMUSCULAR; INTRAVENOUS AS NEEDED
Status: DISCONTINUED | OUTPATIENT
Start: 2021-04-14 | End: 2021-04-14

## 2021-04-14 RX ORDER — SODIUM CHLORIDE, SODIUM LACTATE, POTASSIUM CHLORIDE, CALCIUM CHLORIDE 600; 310; 30; 20 MG/100ML; MG/100ML; MG/100ML; MG/100ML
60 INJECTION, SOLUTION INTRAVENOUS CONTINUOUS
Status: DISCONTINUED | OUTPATIENT
Start: 2021-04-14 | End: 2021-04-15

## 2021-04-14 RX ORDER — FENTANYL CITRATE/PF 50 MCG/ML
25 SYRINGE (ML) INJECTION
Status: COMPLETED | OUTPATIENT
Start: 2021-04-14 | End: 2021-04-14

## 2021-04-14 RX ORDER — ALBUTEROL SULFATE 90 UG/1
AEROSOL, METERED RESPIRATORY (INHALATION) AS NEEDED
Status: DISCONTINUED | OUTPATIENT
Start: 2021-04-14 | End: 2021-04-14

## 2021-04-14 RX ORDER — CELECOXIB 100 MG/1
100 CAPSULE ORAL 2 TIMES DAILY
Status: DISCONTINUED | OUTPATIENT
Start: 2021-04-14 | End: 2021-04-15

## 2021-04-14 RX ORDER — OXYCODONE HYDROCHLORIDE 5 MG/1
5 TABLET ORAL EVERY 4 HOURS PRN
Status: DISCONTINUED | OUTPATIENT
Start: 2021-04-14 | End: 2021-04-16 | Stop reason: HOSPADM

## 2021-04-14 RX ORDER — OXYCODONE HYDROCHLORIDE 10 MG/1
10 TABLET ORAL EVERY 4 HOURS PRN
Status: DISCONTINUED | OUTPATIENT
Start: 2021-04-14 | End: 2021-04-16 | Stop reason: HOSPADM

## 2021-04-14 RX ORDER — SODIUM CHLORIDE, SODIUM LACTATE, POTASSIUM CHLORIDE, CALCIUM CHLORIDE 600; 310; 30; 20 MG/100ML; MG/100ML; MG/100ML; MG/100ML
100 INJECTION, SOLUTION INTRAVENOUS CONTINUOUS
Status: DISCONTINUED | OUTPATIENT
Start: 2021-04-14 | End: 2021-04-15

## 2021-04-14 RX ORDER — DOCUSATE SODIUM 100 MG/1
100 CAPSULE, LIQUID FILLED ORAL 2 TIMES DAILY
Status: DISCONTINUED | OUTPATIENT
Start: 2021-04-14 | End: 2021-04-16 | Stop reason: HOSPADM

## 2021-04-14 RX ORDER — SUCCINYLCHOLINE/SOD CL,ISO/PF 100 MG/5ML
SYRINGE (ML) INTRAVENOUS AS NEEDED
Status: DISCONTINUED | OUTPATIENT
Start: 2021-04-14 | End: 2021-04-14

## 2021-04-14 RX ORDER — KETAMINE HCL IN NACL, ISO-OSM 100MG/10ML
SYRINGE (ML) INJECTION AS NEEDED
Status: DISCONTINUED | OUTPATIENT
Start: 2021-04-14 | End: 2021-04-14

## 2021-04-14 RX ORDER — MIDAZOLAM HYDROCHLORIDE 2 MG/2ML
INJECTION, SOLUTION INTRAMUSCULAR; INTRAVENOUS AS NEEDED
Status: DISCONTINUED | OUTPATIENT
Start: 2021-04-14 | End: 2021-04-14

## 2021-04-14 RX ORDER — ONDANSETRON 2 MG/ML
4 INJECTION INTRAMUSCULAR; INTRAVENOUS EVERY 6 HOURS PRN
Status: DISCONTINUED | OUTPATIENT
Start: 2021-04-14 | End: 2021-04-16 | Stop reason: HOSPADM

## 2021-04-14 RX ORDER — SCOLOPAMINE TRANSDERMAL SYSTEM 1 MG/1
1 PATCH, EXTENDED RELEASE TRANSDERMAL ONCE
Status: DISCONTINUED | OUTPATIENT
Start: 2021-04-14 | End: 2021-04-16 | Stop reason: HOSPADM

## 2021-04-14 RX ORDER — BUPIVACAINE HYDROCHLORIDE 2.5 MG/ML
INJECTION, SOLUTION EPIDURAL; INFILTRATION; INTRACAUDAL AS NEEDED
Status: DISCONTINUED | OUTPATIENT
Start: 2021-04-14 | End: 2021-04-14 | Stop reason: HOSPADM

## 2021-04-14 RX ORDER — SODIUM CHLORIDE 9 MG/ML
INJECTION, SOLUTION INTRAVENOUS CONTINUOUS PRN
Status: DISCONTINUED | OUTPATIENT
Start: 2021-04-14 | End: 2021-04-14

## 2021-04-14 RX ORDER — GLYCOPYRROLATE 0.2 MG/ML
INJECTION INTRAMUSCULAR; INTRAVENOUS AS NEEDED
Status: DISCONTINUED | OUTPATIENT
Start: 2021-04-14 | End: 2021-04-14

## 2021-04-14 RX ORDER — SODIUM CHLORIDE, SODIUM LACTATE, POTASSIUM CHLORIDE, CALCIUM CHLORIDE 600; 310; 30; 20 MG/100ML; MG/100ML; MG/100ML; MG/100ML
75 INJECTION, SOLUTION INTRAVENOUS CONTINUOUS
Status: DISCONTINUED | OUTPATIENT
Start: 2021-04-14 | End: 2021-04-15

## 2021-04-14 RX ORDER — LIDOCAINE HYDROCHLORIDE 10 MG/ML
0.5 INJECTION, SOLUTION EPIDURAL; INFILTRATION; INTRACAUDAL; PERINEURAL ONCE AS NEEDED
Status: DISCONTINUED | OUTPATIENT
Start: 2021-04-14 | End: 2021-04-14 | Stop reason: HOSPADM

## 2021-04-14 RX ORDER — FENTANYL CITRATE 50 UG/ML
INJECTION, SOLUTION INTRAMUSCULAR; INTRAVENOUS AS NEEDED
Status: DISCONTINUED | OUTPATIENT
Start: 2021-04-14 | End: 2021-04-14

## 2021-04-14 RX ORDER — CELECOXIB 200 MG/1
200 CAPSULE ORAL ONCE
Status: COMPLETED | OUTPATIENT
Start: 2021-04-14 | End: 2021-04-14

## 2021-04-14 RX ORDER — ALBUMIN, HUMAN INJ 5% 5 %
SOLUTION INTRAVENOUS CONTINUOUS PRN
Status: DISCONTINUED | OUTPATIENT
Start: 2021-04-14 | End: 2021-04-14

## 2021-04-14 RX ORDER — HYDROMORPHONE HCL/PF 1 MG/ML
0.25 SYRINGE (ML) INJECTION
Status: DISCONTINUED | OUTPATIENT
Start: 2021-04-14 | End: 2021-04-14 | Stop reason: HOSPADM

## 2021-04-14 RX ORDER — POLYETHYLENE GLYCOL 3350 17 G/17G
17 POWDER, FOR SOLUTION ORAL DAILY PRN
Status: DISCONTINUED | OUTPATIENT
Start: 2021-04-14 | End: 2021-04-16 | Stop reason: HOSPADM

## 2021-04-14 RX ORDER — GABAPENTIN 300 MG/1
300 CAPSULE ORAL 3 TIMES DAILY
Status: DISCONTINUED | OUTPATIENT
Start: 2021-04-14 | End: 2021-04-16 | Stop reason: HOSPADM

## 2021-04-14 RX ADMIN — DEXMEDETOMIDINE HCL 8 MCG: 100 INJECTION INTRAVENOUS at 09:03

## 2021-04-14 RX ADMIN — DEXAMETHASONE SODIUM PHOSPHATE 10 MG: 10 INJECTION, SOLUTION INTRAMUSCULAR; INTRAVENOUS at 08:35

## 2021-04-14 RX ADMIN — DOCUSATE SODIUM 100 MG: 100 CAPSULE, LIQUID FILLED ORAL at 20:41

## 2021-04-14 RX ADMIN — ACETAMINOPHEN 975 MG: 325 TABLET, FILM COATED ORAL at 06:49

## 2021-04-14 RX ADMIN — HYDROMORPHONE HYDROCHLORIDE 0.5 MG: 1 INJECTION, SOLUTION INTRAMUSCULAR; INTRAVENOUS; SUBCUTANEOUS at 12:06

## 2021-04-14 RX ADMIN — CELECOXIB 100 MG: 100 CAPSULE ORAL at 20:41

## 2021-04-14 RX ADMIN — GABAPENTIN 600 MG: 300 CAPSULE ORAL at 06:49

## 2021-04-14 RX ADMIN — LIDOCAINE HYDROCHLORIDE 50 MG: 10 INJECTION, SOLUTION EPIDURAL; INFILTRATION; INTRACAUDAL; PERINEURAL at 08:27

## 2021-04-14 RX ADMIN — HEPARIN SODIUM 5000 UNITS: 5000 INJECTION INTRAVENOUS; SUBCUTANEOUS at 07:49

## 2021-04-14 RX ADMIN — SODIUM CHLORIDE, SODIUM LACTATE, POTASSIUM CHLORIDE, AND CALCIUM CHLORIDE: .6; .31; .03; .02 INJECTION, SOLUTION INTRAVENOUS at 08:23

## 2021-04-14 RX ADMIN — ROCURONIUM BROMIDE 20 MG: 50 INJECTION, SOLUTION INTRAVENOUS at 10:13

## 2021-04-14 RX ADMIN — FENTANYL CITRATE 25 MCG: 50 INJECTION INTRAMUSCULAR; INTRAVENOUS at 08:27

## 2021-04-14 RX ADMIN — GLYCOPYRROLATE 0.8 MG: 0.2 INJECTION, SOLUTION INTRAMUSCULAR; INTRAVENOUS at 12:03

## 2021-04-14 RX ADMIN — ACETAMINOPHEN 975 MG: 325 TABLET ORAL at 20:41

## 2021-04-14 RX ADMIN — PROPOFOL 125 MCG/KG/MIN: 10 INJECTION, EMULSION INTRAVENOUS at 08:35

## 2021-04-14 RX ADMIN — SODIUM CHLORIDE 0.3 MCG/KG/HR: 9 INJECTION, SOLUTION INTRAVENOUS at 08:35

## 2021-04-14 RX ADMIN — ALBUMIN (HUMAN): 12.5 INJECTION, SOLUTION INTRAVENOUS at 09:58

## 2021-04-14 RX ADMIN — SCOPALAMINE 1 PATCH: 1 PATCH, EXTENDED RELEASE TRANSDERMAL at 07:49

## 2021-04-14 RX ADMIN — Medication 10 MG: at 10:12

## 2021-04-14 RX ADMIN — Medication 25 MCG: at 12:46

## 2021-04-14 RX ADMIN — ROCURONIUM BROMIDE 50 MG: 50 INJECTION, SOLUTION INTRAVENOUS at 08:35

## 2021-04-14 RX ADMIN — Medication 25 MCG: at 12:53

## 2021-04-14 RX ADMIN — SODIUM CHLORIDE, SODIUM LACTATE, POTASSIUM CHLORIDE, AND CALCIUM CHLORIDE 60 ML/HR: .6; .31; .03; .02 INJECTION, SOLUTION INTRAVENOUS at 12:51

## 2021-04-14 RX ADMIN — DEXMEDETOMIDINE HCL 8 MCG: 100 INJECTION INTRAVENOUS at 09:00

## 2021-04-14 RX ADMIN — ROCURONIUM BROMIDE 20 MG: 50 INJECTION, SOLUTION INTRAVENOUS at 11:33

## 2021-04-14 RX ADMIN — ONDANSETRON 4 MG: 2 INJECTION INTRAMUSCULAR; INTRAVENOUS at 11:40

## 2021-04-14 RX ADMIN — PHENYLEPHRINE HYDROCHLORIDE 20 MCG/MIN: 10 INJECTION INTRAVENOUS at 08:35

## 2021-04-14 RX ADMIN — ALBUTEROL SULFATE 8 PUFF: 90 AEROSOL, METERED RESPIRATORY (INHALATION) at 09:13

## 2021-04-14 RX ADMIN — PROPOFOL 150 MG: 10 INJECTION, EMULSION INTRAVENOUS at 08:27

## 2021-04-14 RX ADMIN — FENTANYL CITRATE 75 MCG: 50 INJECTION INTRAMUSCULAR; INTRAVENOUS at 08:35

## 2021-04-14 RX ADMIN — SODIUM CHLORIDE: 0.9 INJECTION, SOLUTION INTRAVENOUS at 08:33

## 2021-04-14 RX ADMIN — Medication 30 MG: at 09:35

## 2021-04-14 RX ADMIN — ROCURONIUM BROMIDE 20 MG: 50 INJECTION, SOLUTION INTRAVENOUS at 09:30

## 2021-04-14 RX ADMIN — HYDROMORPHONE HYDROCHLORIDE 0.25 MG: 1 INJECTION, SOLUTION INTRAMUSCULAR; INTRAVENOUS; SUBCUTANEOUS at 13:29

## 2021-04-14 RX ADMIN — Medication 80 MG: at 08:28

## 2021-04-14 RX ADMIN — Medication 10 MG: at 10:45

## 2021-04-14 RX ADMIN — HYDROMORPHONE HYDROCHLORIDE 0.25 MG: 1 INJECTION, SOLUTION INTRAMUSCULAR; INTRAVENOUS; SUBCUTANEOUS at 15:41

## 2021-04-14 RX ADMIN — NEOSTIGMINE METHYLSULFATE 5 MG: 1 INJECTION INTRAVENOUS at 12:03

## 2021-04-14 RX ADMIN — CEFAZOLIN SODIUM 1000 MG: 1 SOLUTION INTRAVENOUS at 08:30

## 2021-04-14 RX ADMIN — CELECOXIB 200 MG: 200 CAPSULE ORAL at 06:49

## 2021-04-14 RX ADMIN — HYDROMORPHONE HYDROCHLORIDE 0.25 MG: 1 INJECTION, SOLUTION INTRAMUSCULAR; INTRAVENOUS; SUBCUTANEOUS at 14:10

## 2021-04-14 RX ADMIN — Medication 25 MCG: at 13:00

## 2021-04-14 RX ADMIN — GABAPENTIN 300 MG: 300 CAPSULE ORAL at 20:41

## 2021-04-14 RX ADMIN — Medication 25 MCG: at 13:15

## 2021-04-14 RX ADMIN — MIDAZOLAM 2 MG: 1 INJECTION INTRAMUSCULAR; INTRAVENOUS at 08:23

## 2021-04-14 RX ADMIN — DEXMEDETOMIDINE HCL 8 MCG: 100 INJECTION INTRAVENOUS at 08:57

## 2021-04-14 NOTE — RESPIRATORY THERAPY NOTE
RT Protocol Note  April Cathleen 46 y o  female MRN: [id number]  Unit/Bed#: [facility] 410-01 Encounter: [id number]    Assessment    Principal Problem:    Lung nodule      Home Pulmonary Medications:    Home Devices/Therapy: (None per patient)    Past Medical History:   Diagnosis Date    Cancer ([place] [address] ) 19    Chronic kidney disease 2019    tumor and cyst    Colon polyp     Gastritis     Nodule of upper lobe of right lung     PONV (postoperative nausea and vomiting)     Renal cell carcinoma ([place] [address] ) 2019    Renal mass, right      Social History     Socioeconomic History    Marital status: /Civil Union     Spouse name: None    Number of children: None    Years of education: None    Highest education level: None   Occupational History    None   Social Needs    Financial resource strain: None    Food insecurity     Worry: None     Inability: None    Transportation needs     Medical: None     Non-medical: None   Tobacco Use    Smoking status: Former Smoker     Packs/day: 0 00     Years: 25 00     Pack years: 0 00     Types: Cigarettes     Quit date: 2021     Years since quittin 2    Smokeless tobacco: Never Used    Tobacco comment: quit on 20   Substance and Sexual Activity    Alcohol use: Not Currently     Alcohol/week: 0 0 standard drinks    Drug use: Never    Sexual activity: Yes     Partners: Male     Birth control/protection: Condom Male   Lifestyle    Physical activity     Days per week: None     Minutes per session: None    Stress: None   Relationships    Social connections     Talks on phone: None     Gets together: None     Attends Restorationism service: None     Active member of club or organization: None     Attends meetings of clubs or organizations: None     Relationship status: None    Intimate partner violence     Fear of current or ex partner: None     Emotionally abused: None     Physically abused: None     Forced sexual activity: None   Other Topics Concern    None   Social History Narrative    None       Subjective         Objective    Physical Exam:   Assessment Type: Assess only  General Appearance: Drowsy, Awake, Alert  Respiratory Pattern: Normal  Chest Assessment: Chest expansion symmetrical  Bilateral Breath Sounds: (P) Clear, Diminished  Cough: None    Vitals:  Blood pressure 106/71, pulse 72, temperature 98 5 °F (36 9 °C), resp  rate 18, height 5' 7" (1 702 m), weight 65 8 kg (145 lb), SpO2 95 %, not currently breastfeeding  Imaging and other studies: I have personally reviewed pertinent reports  Plan       Airway Clearance Plan: Incentive Spirometer, Flutter     Resp Comments: (P) Pt  is s/p right VATS with wedge resection  She is resting comfortably on 2L NC but c/o of some pain and discomfort from right sided chest tube  CXR showed atelectatic changes in both lungs, otherwise normal  Pt instructed on IS and flutter valve per MD request  Pt needs encouragement as she is drowsy and experiencing some pain at this time  Will continue to encourage IS/flutter valve per AW clearance protocol

## 2021-04-14 NOTE — ANESTHESIA POSTPROCEDURE EVALUATION
Post-Op Assessment Note    CV Status:  Stable  Pain Score: 0    Pain management: adequate     Mental Status:  Awake and sleepy   Hydration Status:  Euvolemic   PONV Controlled:  Controlled   Airway Patency:  Patent and adequate      Post Op Vitals Reviewed: Yes      Staff: CRNA         No complications documented      BP   104/72   Temp (!) (P) 96 6 °F (35 9 °C) (04/14/21 1222)    Pulse (P) 74 (04/14/21 1222)   Resp (P) 16 (04/14/21 1222)    SpO2 (P) 100 % (04/14/21 1222)

## 2021-04-14 NOTE — OP NOTE
OPERATIVE REPORT  PATIENT NAME: Kelley Connolly    :  1975  MRN: [id number]  Pt Location: BE OR ROOM 08    SURGERY DATE: 2021    Surgeon(s) and Role:     * Rob Baker MD - Primary     * Erica Brock PA-C - Assisting     * Dionicia Cogan, MD - Assisting    Preop Diagnosis:  Lung nodule [R91 1]    Post-Op Diagnosis Codes:     * Lung nodule [R91 1]    Procedure(s) (LRB):  THORACOSCOPY VIDEO ASSISTED SURGERY (VATS) (Right)  RESECTION WEDGE LUNG (Right)  LOBECTOMY LUNG; MEDIASTINAL LYMPH NODE DISSECTION (Right)  BRONCHOSCOPY FLEXIBLE (N/A)   Thoracopexy of lower lobe to middle lobe    Specimen(s):  ID Type Source Tests Collected by Time Destination   1 : RUL wedge resection Tissue Lung TISSUE EXAM Rob Baker MD 2021 0252    2 : level 9 Tissue Lymph Node TISSUE EXAM Rob Baker MD 2021 8500    3 : level 9 #2 Tissue Lymph Node TISSUE EXAM Rob Baker MD 2021 0933    4 : level 2 lymph node packet Tissue Lymph Node TISSUE EXAM Rob Baker MD 2021 0950    5 : level 4 Tissue Lymph Node TISSUE EXAM Rob Baker MD 2021 0950    6 : level 7 Tissue Lymph Node TISSUE EXAM Rob Baker MD 2021 0951    7 : level 10 anterior Tissue Lymph Node TISSUE EXAM Rob Baker MD 2021 1024    8 : level 11 posterior Tissue Lymph Node TISSUE EXAM Rob Baker MD 2021 1049    9 : level 10 #2 Tissue Lymph Node TISSUE EXAM Rob Baker MD 2021 1100    10 : RUL completion lobectomy Tissue Lung, Right Upper Lobe TISSUE EXAM Rob Baker MD 2021 1136        Estimated Blood Loss:   50 mL    Drains:  Chest Tube 1 Right Pleural 28 Fr  (Active)   Number of days: 0       Urethral Catheter Latex 16 Fr  (Active)   Number of days: 0       Anesthesia Type:   General    Operative Indications:  Lung nodule [R91 1]    Operative Findings:  Palpable nodule in the right upper lobe  NSCLC on frozen  Proceeded with completion lobectomy    Complications:   None    Procedure and Technique:  INDICATION:       Kelley Connolly is a 51 y o  female who presents to my office with a slowly enlarging mixed ground glass and solid nodule  Based upon its growth pattern, I explained to the patient that this was likely a malignancy  She elected to proceed with VATS wedge and possible completion lobectomy  The potential risks and benefits of the surgery were explained to the patient and she elected to proceed  All of her questions were answered  DETAILS OF PROCEDURE:  The patient was correctly identified by name and medical record number in the holding area and brought to the operative suite, where she was placed supine on the operative table  After satisfactory induction of general endotracheal anesthesia, a flexible bronchoscope was passed through the endotracheal tube visualizing the distal trachea, rebeca, right and left main stem bronchi, including all of the primary and secondary divisions  No evidence of any endobronchial tumor was noted  No suspicion or identified risk for TB or other airborne infectious disease; bronchoscopy procedure being performed for diagnostic purposes  Flexible bronchoscopy was then terminated and the scope was withdrawn  The patient was positioned in the left lateral decubitus position, prepped and draped in the usual fashion  A time-out was performed to confirm procedure and laterality  A 1 5cm incision was made in the 7th intercostal space, in line with the scapula tip  This was carried down to the pleura and the thoracic cavity was entered  The thoracoscope was introduced and the cavity explored  An additional port was placed in the 7th intercostal space in the posterior axillary line  An access port was then created in the 4th intercostal space in the anterior axillary line  An intercostal nerve block was then applied using Exparel into rib spaces 3-10        The entire lung was then mobilized using a combination of electrocautery and sharp dissection to divide adhesions to the mediastinum  The nodule was identified in the right upper lobe  This was wedge resected using serial firings of a tissue load stapler  This was sent for frozen pathology  The frozen pathology was consistent with a non-small cell lung cancer  The decision was made to proceed with completion right upper lobectomy  The inferior pulmonary ligament was taken down using electrocautery  Level 9 lymph nodes were dissected free and sent to pathology  The overlying pleura was incised up the posterior hilum  Level 7 lymph nodes were taken  The lung was then retracted inferiorly and posteriorly, exposing the superior mediastinal structures  The superior pulmonary vein was identified along with the middle lobe branch  The superior pulmonary vein was circumferentially dissected using blunt dissection  Using a vascular load stapler, the superior pulmonary vein was divided  After division of the superior pulmonary vein, the truncus anterior was identified  This was then circumferentially dissected  Level 10 lymph node around this artery was sent for pathology  The truncus anterior was then taken with a vascular load stapler  Attention was then turned to the right upper lobe bronchus  A level 11 lymph node was dissected and sent for routine pathology  The upper lobe bronchus was then identified and divided using an endoscopic stapler  After the bronchus was taken, this facilitated exposure in dissection of the additional superior pulmonary vein branch  This was circumferentially dissected and then taken with a vascular load stapler  Upon further dissection, no posterior ascending branch was identified coming off of the pulmonary artery  The lobectomy was completed with serial firings of a tissue load stapler  The specimen was placed in a Lap-Sac and removed from the chest  The specimen was sent off the table          The resection bed and all dissected areas were examined and hemostasis was deemed as adequate  Dissection of stations 2 and 4 was then performed and these lymph nodes were sent off the table to pathology  Hemostasis was obtained by placing Surgicel into the lymph node beds  The right middle lobe was seen to be on a very thin stalk  Because of this, and to prevent right middle lobe torsion, a pexy was performed using a tissue load stapler between the right middle lobe and right lower lobe  A posterior apical 28F chest tube was placed and secured to the skin with 0 Prolene sutures  The lung was re-expanded under direct visualization  The wounds were closed in layers using Vicryl suture followed by subcuticular monofilament suture  Dermabond was applied to each wound  At the end of the procedure, the instrument, sponge and needle counts were confirmed to be correct x2  The patient tolerated the procedure well and was delivered to the PACU         I was present for the entire procedure    Patient Disposition:  PACU  and extubated and stable    SIGNATURE: Alanna Black MD  DATE: April 14, 2021  TIME: 12:03 PM

## 2021-04-14 NOTE — INTERVAL H&P NOTE
H&P reviewed  After examining the patient I find no changes in the patients condition since the H&P had been written  Vitals:    04/14/21 0624   BP: 103/74   Pulse: 81   Resp: 18   Temp: (!) 96 5 °F (35 8 °C)   SpO2: 96%     Safe to proceed   Flex bronch, VATS RUL wedge resection, possible completion lobectomy    Deo Bedolla MD  Thoracic Surgery  (Available on [employer])  Office: [phone]

## 2021-04-14 NOTE — PROGRESS NOTES
Postoperative Progress Note - Thoracic Surgery   April Cathleen 46 y o  female MRN: [id number]  Unit/Bed#: OR [facility] Encounter: [id number]    Assessment:  The patient is s/p R VATS wedge resection with bronchoscopy and mediastinal lymph node dissection with thoracopexy of the lower lobe to the middle lobe  R CT to water seal (-8) on thopaz with no air leak and 155 cc serosang output    Plan:  R Chest tube to water seal (-8 cm on thopaz)  Oxygen by nasal cannula as needed  Pain control  IV fluids LR @ 60      Subjective/Objective     Chief Complaint: s/p R VATS with wedge resection    Subjective: Pain is well controlled  Objective:     Vitals: Blood pressure 108/72, pulse 76, temperature 98 5 °F (36 9 °C), resp  rate 21, height 5' 7" (1 702 m), weight 65 8 kg (145 lb), SpO2 95 %, not currently breastfeeding  ,Body mass index is 22 71 kg/m²      I/O       04/12 0701 - 04/13 0700 04/13 0701 - 04/14 0700 04/14 0701 - 04/15 0700    I V  (mL/kg)   1800 (27 4)    IV Piggyback   250    Total Intake(mL/kg)   2050 (31 2)    Urine (mL/kg/hr)   450 (0 8)    Blood   50    Chest Tube   150    Total Output   650    Net   +1400                 Physical Exam:   GENERAL APPEARANCE: in no acute distress  NEURO: stable  HEENT: normocephalic, atraumatic  CV: regular rate and rhythm, no tachycardia,   LUNGS: clear to auscultation bilaterally, on 3 L oxygen  R CT to water seal (-8) on thopaz with no air leak and 155 cc serosang output  GI: soft, nontender, nondistended  : no abnormality detected  MSK: no abnormality detected   SKIN: no abnormality detected

## 2021-04-15 ENCOUNTER — APPOINTMENT (INPATIENT)
Dept: RADIOLOGY | Facility: HOSPITAL | Age: 46
DRG: 120 | End: 2021-04-15
Payer: COMMERCIAL

## 2021-04-15 LAB
ANION GAP SERPL CALCULATED.3IONS-SCNC: 4 MMOL/L (ref 4–13)
BUN SERPL-MCNC: 9 MG/DL (ref 5–25)
CALCIUM SERPL-MCNC: 8.4 MG/DL (ref 8.3–10.1)
CHLORIDE SERPL-SCNC: 109 MMOL/L (ref 100–108)
CO2 SERPL-SCNC: 26 MMOL/L (ref 21–32)
CREAT SERPL-MCNC: 0.69 MG/DL (ref 0.6–1.3)
ERYTHROCYTE [DISTWIDTH] IN BLOOD BY AUTOMATED COUNT: 12.6 % (ref 11.6–15.1)
GFR SERPL CREATININE-BSD FRML MDRD: 104 ML/MIN/1.73SQ M
GLUCOSE SERPL-MCNC: 95 MG/DL (ref 65–140)
HCT VFR BLD AUTO: 37.2 % (ref 34.8–46.1)
HGB BLD-MCNC: 12.2 G/DL (ref 11.5–15.4)
MAGNESIUM SERPL-MCNC: 2.2 MG/DL (ref 1.6–2.6)
MCH RBC QN AUTO: 31 PG (ref 26.8–34.3)
MCHC RBC AUTO-ENTMCNC: 32.8 G/DL (ref 31.4–37.4)
MCV RBC AUTO: 94 FL (ref 82–98)
PLATELET # BLD AUTO: 196 THOUSANDS/UL (ref 149–390)
PMV BLD AUTO: 9.8 FL (ref 8.9–12.7)
POTASSIUM SERPL-SCNC: 4.2 MMOL/L (ref 3.5–5.3)
RBC # BLD AUTO: 3.94 MILLION/UL (ref 3.81–5.12)
SODIUM SERPL-SCNC: 139 MMOL/L (ref 136–145)
WBC # BLD AUTO: 11.68 THOUSAND/UL (ref 4.31–10.16)

## 2021-04-15 PROCEDURE — 85027 COMPLETE CBC AUTOMATED: CPT | Performed by: THORACIC SURGERY (CARDIOTHORACIC VASCULAR SURGERY)

## 2021-04-15 PROCEDURE — 94668 MNPJ CHEST WALL SBSQ: CPT

## 2021-04-15 PROCEDURE — 71045 X-RAY EXAM CHEST 1 VIEW: CPT

## 2021-04-15 PROCEDURE — 80048 BASIC METABOLIC PNL TOTAL CA: CPT | Performed by: THORACIC SURGERY (CARDIOTHORACIC VASCULAR SURGERY)

## 2021-04-15 PROCEDURE — 99024 POSTOP FOLLOW-UP VISIT: CPT | Performed by: THORACIC SURGERY (CARDIOTHORACIC VASCULAR SURGERY)

## 2021-04-15 PROCEDURE — 94760 N-INVAS EAR/PLS OXIMETRY 1: CPT

## 2021-04-15 PROCEDURE — 83735 ASSAY OF MAGNESIUM: CPT | Performed by: THORACIC SURGERY (CARDIOTHORACIC VASCULAR SURGERY)

## 2021-04-15 RX ORDER — LIDOCAINE 50 MG/G
1 PATCH TOPICAL DAILY
Status: DISCONTINUED | OUTPATIENT
Start: 2021-04-15 | End: 2021-04-16 | Stop reason: HOSPADM

## 2021-04-15 RX ORDER — KETOROLAC TROMETHAMINE 30 MG/ML
30 INJECTION, SOLUTION INTRAMUSCULAR; INTRAVENOUS EVERY 6 HOURS SCHEDULED
Status: DISCONTINUED | OUTPATIENT
Start: 2021-04-15 | End: 2021-04-16 | Stop reason: HOSPADM

## 2021-04-15 RX ADMIN — DOCUSATE SODIUM 100 MG: 100 CAPSULE, LIQUID FILLED ORAL at 08:15

## 2021-04-15 RX ADMIN — GABAPENTIN 300 MG: 300 CAPSULE ORAL at 08:15

## 2021-04-15 RX ADMIN — GABAPENTIN 300 MG: 300 CAPSULE ORAL at 21:12

## 2021-04-15 RX ADMIN — ACETAMINOPHEN 975 MG: 325 TABLET ORAL at 05:38

## 2021-04-15 RX ADMIN — LIDOCAINE 1 PATCH: 50 PATCH TOPICAL at 11:46

## 2021-04-15 RX ADMIN — OXYCODONE HYDROCHLORIDE 5 MG: 5 TABLET ORAL at 16:35

## 2021-04-15 RX ADMIN — SENNOSIDES 8.6 MG: 8.6 TABLET, FILM COATED ORAL at 08:15

## 2021-04-15 RX ADMIN — ACETAMINOPHEN 975 MG: 325 TABLET ORAL at 23:32

## 2021-04-15 RX ADMIN — OXYCODONE HYDROCHLORIDE 5 MG: 5 TABLET ORAL at 08:15

## 2021-04-15 RX ADMIN — ONDANSETRON 4 MG: 2 INJECTION INTRAMUSCULAR; INTRAVENOUS at 16:35

## 2021-04-15 RX ADMIN — DOCUSATE SODIUM 100 MG: 100 CAPSULE, LIQUID FILLED ORAL at 19:13

## 2021-04-15 RX ADMIN — ONDANSETRON 4 MG: 2 INJECTION INTRAMUSCULAR; INTRAVENOUS at 08:16

## 2021-04-15 RX ADMIN — KETOROLAC TROMETHAMINE 30 MG: 30 INJECTION, SOLUTION INTRAMUSCULAR at 11:09

## 2021-04-15 RX ADMIN — GABAPENTIN 300 MG: 300 CAPSULE ORAL at 16:35

## 2021-04-15 RX ADMIN — ENOXAPARIN SODIUM 40 MG: 40 INJECTION SUBCUTANEOUS at 08:15

## 2021-04-15 RX ADMIN — KETOROLAC TROMETHAMINE 30 MG: 30 INJECTION, SOLUTION INTRAMUSCULAR at 19:13

## 2021-04-15 RX ADMIN — KETOROLAC TROMETHAMINE 30 MG: 30 INJECTION, SOLUTION INTRAMUSCULAR at 23:33

## 2021-04-15 RX ADMIN — ACETAMINOPHEN 975 MG: 325 TABLET ORAL at 11:09

## 2021-04-15 RX ADMIN — PANTOPRAZOLE SODIUM 40 MG: 40 TABLET, DELAYED RELEASE ORAL at 05:38

## 2021-04-15 RX ADMIN — ACETAMINOPHEN 975 MG: 325 TABLET ORAL at 19:13

## 2021-04-15 RX ADMIN — SODIUM CHLORIDE, SODIUM LACTATE, POTASSIUM CHLORIDE, AND CALCIUM CHLORIDE 60 ML/HR: .6; .31; .03; .02 INJECTION, SOLUTION INTRAVENOUS at 03:30

## 2021-04-15 NOTE — QUICK NOTE
Post- OP Note - Thoracic Surgery   April Cathleen 46 y o  female MRN: [id number]  Unit/Bed#: [facility] 410-01 Encounter: [id number]    Assessment:  Patient is a 51 y o  female s/p R VATS wedge resection 4/14    R CT (-8,AL+): 195 cc SS    Plan:  · Diet as tolerated  · IS/ Flutter  · I/Os  · DVT ppx  · Pain/Nausea Control  · Monitor CT output    Subjective/Objective     Subjective:   Patient alert and oriented  Complains of difficulty taking deep breaths post op    Objective:    Blood pressure 100/76, pulse 99, temperature 98 8 °F (37 1 °C), temperature source Oral, resp  rate 16, height 5' 7" (1 702 m), weight 65 8 kg (145 lb), SpO2 95 %, not currently breastfeeding  ,Body mass index is 22 71 kg/m²  Physical Exam:   Gen: NAD  HEENT: MMM  CV: well perfused  Chest Wall: CT intact, incisions cdi  Lungs: Normal respiratory effort on 2L NC  Abd: soft, nontender, nondistended  Skin: warm/ dry  Neuro:  AxO x3    VTE Pharmacologic Prophylaxis: Enoxaparin (Lovenox)  VTE Mechanical Prophylaxis: sequential compression device

## 2021-04-15 NOTE — RESTORATIVE TECHNICIAN NOTE
Restorative Specialist Mobility Note       Activity: Ambulate in reina, Bathroom privileges     Assistive Device: None

## 2021-04-15 NOTE — PROGRESS NOTES
Postoperative Progress Note - Thoracic Surgery   April Cathleen 46 y o  female MRN: [id number]  Unit/Bed#: [facility] 410-01 Encounter: [id number]    Assessment:  The patient is s/p R VATS wedge resection with bronchoscopy and mediastinal lymph node dissection with thoracopexy of the lower lobe to the middle lobe  R CT to water seal (-8) on thopaz with 0-10 ml/min air leak and 415 cc serosang output  On 2 L    Plan:  R Chest tube to water seal (-8 cm on thopaz)  Oxygen by nasal cannula as needed  Pain control  IV fluids to be dced  Reg diet  IS, OT, PT, Resp Tx      Subjective/Objective     Chief Complaint: s/p R VATS with wedge resection    Subjective: Pain is not controlled        Objective:     Vitals: Blood pressure 100/76, pulse 99, temperature 98 8 °F (37 1 °C), temperature source Oral, resp  rate 16, height 5' 7" (1 702 m), weight 66 1 kg (145 lb 11 6 oz), SpO2 95 %, not currently breastfeeding  ,Body mass index is 22 82 kg/m²      I/O       04/12 0701 - 04/13 0700 04/13 0701 - 04/14 0700 04/14 0701 - 04/15 0700    I V  (mL/kg)   1800 (27 4)    IV Piggyback   250    Total Intake(mL/kg)   2050 (31 2)    Urine (mL/kg/hr)   450 (0 8)    Blood   50    Chest Tube   150    Total Output   650    Net   +1400               Physical Exam:   GENERAL APPEARANCE: in no acute distress  NEURO: stable  HEENT: normocephalic, atraumatic  CV: regular rate and rhythm, no tachycardia,   LUNGS: clear to auscultation bilaterally  RCT to -8 with air leak 0-10 ml/min, 415 cc ss output  Incisions clean, on 2 L  GI: soft, nontender, nondistended  : no abnormality detected  MSK: no abnormality detected   SKIN: no abnormality detected

## 2021-04-15 NOTE — PLAN OF CARE
Problem: Prexisting or High Potential for Compromised Skin Integrity  Goal: Skin integrity is maintained or improved  Description: INTERVENTIONS:  - Identify patients at risk for skin breakdown  - Assess and monitor skin integrity  - Assess and monitor nutrition and hydration status  - Monitor labs   - Assess for incontinence   - Turn and reposition patient  - Assist with mobility/ambulation  - Relieve pressure over bony prominences  - Avoid friction and shearing  - Provide appropriate hygiene as needed including keeping skin clean and dry  - Evaluate need for skin moisturizer/barrier cream  - Collaborate with interdisciplinary team   - Patient/family teaching  - Consider wound care consult   Outcome: Progressing     Problem: Potential for Falls  Goal: Patient will remain free of falls  Description: INTERVENTIONS:  - Assess patient frequently for physical needs  -  Identify cognitive and physical deficits and behaviors that affect risk of falls    -  [place] fall precautions as indicated by assessment   - Educate patient/family on patient safety including physical limitations  - Instruct patient to call for assistance with activity based on assessment  - Modify environment to reduce risk of injury  - Consider OT/PT consult to assist with strengthening/mobility  Outcome: Progressing     Problem: PAIN - ADULT  Goal: Verbalizes/displays adequate comfort level or baseline comfort level  Description: Interventions:  - Encourage patient to monitor pain and request assistance  - Assess pain using appropriate pain scale  - Administer analgesics based on type and severity of pain and evaluate response  - Implement non-pharmacological measures as appropriate and evaluate response  - Consider cultural and social influences on pain and pain management  - Notify physician/advanced practitioner if interventions unsuccessful or patient reports new pain  Outcome: Progressing     Problem: INFECTION - ADULT  Goal: Absence or prevention of progression during hospitalization  Description: INTERVENTIONS:  - Assess and monitor for signs and symptoms of infection  - Monitor lab/diagnostic results  - Monitor all insertion sites, i e  indwelling lines, tubes, and drains  - Monitor endotracheal if appropriate and nasal secretions for changes in amount and color  - [place] appropriate cooling/warming therapies per order  - Administer medications as ordered  - Instruct and encourage patient and family to use good hand hygiene technique  - Identify and instruct in appropriate isolation precautions for identified infection/condition  Outcome: Progressing  Goal: Absence of fever/infection during neutropenic period  Description: INTERVENTIONS:  - Monitor WBC    Outcome: Progressing     Problem: SAFETY ADULT  Goal: Patient will remain free of falls  Description: INTERVENTIONS:  - Assess patient frequently for physical needs  -  Identify cognitive and physical deficits and behaviors that affect risk of falls    -  [place] fall precautions as indicated by assessment   - Educate patient/family on patient safety including physical limitations  - Instruct patient to call for assistance with activity based on assessment  - Modify environment to reduce risk of injury  - Consider OT/PT consult to assist with strengthening/mobility  Outcome: Progressing  Goal: Maintain or return to baseline ADL function  Description: INTERVENTIONS:  -  Assess patient's ability to carry out ADLs; assess patient's baseline for ADL function and identify physical deficits which impact ability to perform ADLs (bathing, care of mouth/teeth, toileting, grooming, dressing, etc )  - Assess/evaluate cause of self-care deficits   - Assess range of motion  - Assess patient's mobility; develop plan if impaired  - Assess patient's need for assistive devices and provide as appropriate  - Encourage maximum independence but intervene and supervise when necessary  - Involve family in performance of ADLs  - Assess for home care needs following discharge   - Consider OT consult to assist with ADL evaluation and planning for discharge  - Provide patient education as appropriate  Outcome: Progressing  Goal: Maintain or return mobility status to optimal level  Description: INTERVENTIONS:  - Assess patient's baseline mobility status (ambulation, transfers, stairs, etc )    - Identify cognitive and physical deficits and behaviors that affect mobility  - Identify mobility aids required to assist with transfers and/or ambulation (gait belt, sit-to-stand, lift, walker, cane, etc )  - [place] fall precautions as indicated by assessment  - Record patient progress and toleration of activity level on Mobility SBAR; progress patient to next Phase/Stage  - Instruct patient to call for assistance with activity based on assessment  - Consider rehabilitation consult to assist with strengthening/weightbearing, etc   Outcome: Progressing     Problem: DISCHARGE PLANNING  Goal: Discharge to home or other facility with appropriate resources  Description: INTERVENTIONS:  - Identify barriers to discharge w/patient and caregiver  - Arrange for needed discharge resources and transportation as appropriate  - Identify discharge learning needs (meds, wound care, etc )  - Arrange for interpretive services to assist at discharge as needed  - Refer to Case Management Department for coordinating discharge planning if the patient needs post-hospital services based on physician/advanced practitioner order or complex needs related to functional status, cognitive ability, or social support system  Outcome: Progressing     Problem: Knowledge Deficit  Goal: Patient/family/caregiver demonstrates understanding of disease process, treatment plan, medications, and discharge instructions  Description: Complete learning assessment and assess knowledge base    Interventions:  - Provide teaching at level of understanding  - Provide teaching via preferred learning methods  Outcome: Progressing

## 2021-04-15 NOTE — CASE MANAGEMENT
LOS1   Not a bundle  Green readmission score of 6    Met with patient to discuss CM role and DCP  Pt lives w/ spouse in single home w/ 1 [address]HELLE and 4-5 steps to bedroom  Independent PTA  Presently not working  She drives but has not been doing so lately  No DME  No prior home care or inpt rehab  Denies h/o mental health and substance abuse treatment  PCP: Dr Antwon Guerrero is [facility] in [address]  No LW/POA-declined information  No d/c needs evaluted  Spouse will transport upon d/c  CM reviewed d/c planning process including the following: identifying help at home, patient preference for d/c planning needs, Discharge Lounge, [facility] Meds to Bed program, availability of treatment team to discuss questions or concerns patient and/or family may have regarding understanding medications and recognizing signs and symptoms once discharged  CM also encouraged patient to follow up with all recommended appointments after discharge  Patient advised of importance for patient and family to participate in managing patients medical well being

## 2021-04-15 NOTE — UTILIZATION REVIEW
Initial Clinical Review    Elective  surgical procedure  Age/Sex: 51 y o  female  Surgery Date: 04/14/2021  Procedure: THORACOSCOPY VIDEO ASSISTED SURGERY (VATS) (Right)  RESECTION WEDGE LUNG (Right)  LOBECTOMY LUNG; MEDIASTINAL LYMPH NODE DISSECTION (Right)  BRONCHOSCOPY FLEXIBLE (N/A)   Thoracopexy of lower lobe to middle lobe  Anesthesia: General  Operative Findings: Palpable nodule in the right upper lobe  NSCLC on frozen  Proceeded with completion lobectomy    POD#1 Progress Note (04/15/2021):  s/p VATS RULobectomy  Chest Tube to -8cm on thopaz  O2 via NC PRN  Analgesia Prn   IV flds  Pulmonary toilet - IS etc    Consult PT/OT    Admission Orders: Date/Time/Statement:   Admission Orders (From admission, onward)     Ordered        04/14/21 1219  Inpatient Admission  Once                   Orders Placed This Encounter   Procedures    Inpatient Admission     Standing Status:   Standing     Number of Occurrences:   1     Order Specific Question:   Level of Care     Answer:   Level 1 Stepdown [13]     Order Specific Question:   Estimated length of stay     Answer:   Inpatient Only Surgery     Vital Signs: /72 (BP Location: Left arm)   Pulse 73   Temp 98 1 °F (36 7 °C) (Oral)   Resp 20   Ht 5' 7" (1 702 m)   Wt 66 1 kg (145 lb 11 6 oz)   SpO2 95%   BMI 22 82 kg/m²     Pertinent Labs/Diagnostic Test Results:     Results from last 7 days   Lab Units 04/15/21  0502 04/14/21  1238   WBC Thousand/uL 11 68* 11 52*   HEMOGLOBIN g/dL 12 2 12 5   HEMATOCRIT % 37 2 37 8   PLATELETS Thousands/uL 196 179     Results from last 7 days   Lab Units 04/15/21  0502 04/14/21  1238   SODIUM mmol/L 139 141   POTASSIUM mmol/L 4 2 4 7   CHLORIDE mmol/L 109* 114*   CO2 mmol/L 26 24   ANION GAP mmol/L 4 3*   BUN mg/dL 9 12   CREATININE mg/dL 0 69 0 78   EGFR ml/min/1 73sq m 104 91   CALCIUM mg/dL 8 4 7 8*   MAGNESIUM mg/dL 2 2 2 2     Results from last 7 days   Lab Units 04/09/21  0726   POC GLUCOSE mg/dl 82     Results from last 7 days   Lab Units 04/15/21  0502 04/14/21  1238   GLUCOSE RANDOM mg/dL 95 112     Diet: Regular diet  Mobility: Ambulate TID  DVT Prophylaxis: VTE Pharmacologic Prophylaxis: Enoxaparin (Lovenox)  VTE Mechanical Prophylaxis: sequential compression device    Medications/Pain Control:   Scheduled Medications:  acetaminophen, 975 mg, Oral, Q6H  docusate sodium, 100 mg, Oral, BID  enoxaparin, 40 mg, Subcutaneous, Daily  gabapentin, 300 mg, Oral, TID  ketorolac, 30 mg, Intravenous, Q6H NEGIN  pantoprazole, 40 mg, Oral, Early Morning  scopolamine, 1 patch, Transdermal, Once  senna, 1 tablet, Oral, Daily      Continuous IV Infusions:  lactated ringers infusion   Rate: 60 mL/hr Dose: 60 mL/hr  Freq: Continuous Route: IV  Last Dose: Stopped (04/15/21 1026)  Start: 04/14/21 1230 End: 04/15/21 0744      PRN Meds:  HYDROmorphone, 0 5 mg, Intravenous, Q4H PRN  X 3 doses 4/14  ondansetron, 4 mg, Intravenous, Q6H PRN  X 1 dose 4/15  oxyCODONE, 10 mg, Oral, Q4H PRN  oxyCODONE, 5 mg, Oral, Q4H PRN  X 1 dose 4/15  polyethylene glycol, 17 g, Oral, Daily PRN        Network Utilization Review Department  ATTENTION: Please call with any questions or concerns to [phone] and carefully listen to the prompts so that you are directed to the right person  All voicemails are confidential   Kye Guerrero all requests for admission clinical reviews, approved or denied determinations and any other requests to dedicated fax number below belonging to the campus where the patient is receiving treatment   List of dedicated fax numbers for the Facilities:  [address] DENIALS (Administrative/Medical Necessity) [phone]   [address] [place] [address] (Maternity/NICU/Pediatrics) 261 [facility] [address] [place] [address] [address] [place] [address] [place] [address] 115 [facility]) [address] [address] Jamari Pereira 1481 [phone]   [address] 951 [phone]

## 2021-04-15 NOTE — UTILIZATION REVIEW
Notification of Inpatient Admission/Inpatient Authorization Request   This is a Notification of Inpatient Admission for [address]  Be advised that this patient was admitted to our facility under Inpatient Status  Contact Nicholas De Leon at [phone] for additional admission information  [facility] DE[facility]  DEDICATED -[phone]  Patient Name:   April Cathleen   YOB: 1975       [address]   [address]:   Lou Mccormick  Tax ID: [id number]  NPI: [id number] Attending Provider/NPI:  Phone:  Address: [place], [place] [[id number]]  [phone]  Same as Facility   Place of Service Code: 24 Place of Service Name:  [address] [place] [address]   Start Date: 4/14/21 1219 Discharge Date & Time: No discharge date for patient encounter  Type of Admission: Inpatient Status Discharge Disposition (if discharged): Home/Self Care   Patient Diagnoses: Lung nodule [R91 1]     Orders: Admission Orders (From admission, onward)     Ordered        04/14/21 1219  Inpatient Admission  Once                    Assigned Utilization Review Contact: Nicholas De Leon  Utilization ,   Network Utilization Review Department  Phone: [phone]; Fax [phone]   Email: [email or web address]   ATTENTION PAYERS: Please call the assigned Utilization  directly with any questions or concerns ALL voicemails in the department are confidential  Send all requests for admission clinical reviews, approved or denied determinations and any other requests to dedicated fax number belonging to the campus where the patient is receiving treatment

## 2021-04-16 ENCOUNTER — APPOINTMENT (INPATIENT)
Dept: RADIOLOGY | Facility: HOSPITAL | Age: 46
DRG: 120 | End: 2021-04-16
Payer: COMMERCIAL

## 2021-04-16 VITALS
SYSTOLIC BLOOD PRESSURE: 107 MMHG | RESPIRATION RATE: 20 BRPM | HEART RATE: 84 BPM | WEIGHT: 149.25 LBS | DIASTOLIC BLOOD PRESSURE: 72 MMHG | TEMPERATURE: 98 F | HEIGHT: 67 IN | BODY MASS INDEX: 23.43 KG/M2 | OXYGEN SATURATION: 96 %

## 2021-04-16 PROCEDURE — 99024 POSTOP FOLLOW-UP VISIT: CPT | Performed by: PHYSICIAN ASSISTANT

## 2021-04-16 PROCEDURE — 94668 MNPJ CHEST WALL SBSQ: CPT

## 2021-04-16 PROCEDURE — 97163 PT EVAL HIGH COMPLEX 45 MIN: CPT

## 2021-04-16 PROCEDURE — 71046 X-RAY EXAM CHEST 2 VIEWS: CPT

## 2021-04-16 PROCEDURE — 99024 POSTOP FOLLOW-UP VISIT: CPT | Performed by: THORACIC SURGERY (CARDIOTHORACIC VASCULAR SURGERY)

## 2021-04-16 PROCEDURE — 97167 OT EVAL HIGH COMPLEX 60 MIN: CPT

## 2021-04-16 RX ORDER — ACETAMINOPHEN 325 MG/1
650 TABLET ORAL EVERY 6 HOURS
Qty: 56 TABLET | Refills: 0 | Status: SHIPPED | OUTPATIENT
Start: 2021-04-16 | End: 2021-04-23

## 2021-04-16 RX ORDER — GABAPENTIN 300 MG/1
300 CAPSULE ORAL 3 TIMES DAILY
Qty: 63 CAPSULE | Refills: 0 | Status: SHIPPED | OUTPATIENT
Start: 2021-04-16 | End: 2021-06-02 | Stop reason: ALTCHOICE

## 2021-04-16 RX ORDER — DOCUSATE SODIUM 100 MG/1
100 CAPSULE, LIQUID FILLED ORAL 2 TIMES DAILY
Qty: 10 CAPSULE | Refills: 0 | Status: SHIPPED | OUTPATIENT
Start: 2021-04-16 | End: 2021-06-15 | Stop reason: HOSPADM

## 2021-04-16 RX ORDER — IBUPROFEN 600 MG/1
600 TABLET ORAL EVERY 8 HOURS SCHEDULED
Qty: 21 TABLET | Refills: 0 | Status: SHIPPED | OUTPATIENT
Start: 2021-04-16 | End: 2021-06-02 | Stop reason: ALTCHOICE

## 2021-04-16 RX ORDER — OXYCODONE HYDROCHLORIDE 5 MG/1
5 TABLET ORAL EVERY 4 HOURS PRN
Qty: 30 TABLET | Refills: 0 | Status: SHIPPED | OUTPATIENT
Start: 2021-04-16 | End: 2021-04-26

## 2021-04-16 RX ADMIN — KETOROLAC TROMETHAMINE 30 MG: 30 INJECTION, SOLUTION INTRAMUSCULAR at 06:55

## 2021-04-16 RX ADMIN — DOCUSATE SODIUM 100 MG: 100 CAPSULE, LIQUID FILLED ORAL at 08:52

## 2021-04-16 RX ADMIN — SENNOSIDES 8.6 MG: 8.6 TABLET, FILM COATED ORAL at 08:52

## 2021-04-16 RX ADMIN — ENOXAPARIN SODIUM 40 MG: 40 INJECTION SUBCUTANEOUS at 08:53

## 2021-04-16 RX ADMIN — LIDOCAINE 1 PATCH: 50 PATCH TOPICAL at 08:53

## 2021-04-16 RX ADMIN — PANTOPRAZOLE SODIUM 40 MG: 40 TABLET, DELAYED RELEASE ORAL at 06:54

## 2021-04-16 RX ADMIN — ACETAMINOPHEN 975 MG: 325 TABLET ORAL at 12:28

## 2021-04-16 RX ADMIN — ACETAMINOPHEN 975 MG: 325 TABLET ORAL at 06:54

## 2021-04-16 RX ADMIN — GABAPENTIN 300 MG: 300 CAPSULE ORAL at 08:52

## 2021-04-16 RX ADMIN — KETOROLAC TROMETHAMINE 30 MG: 30 INJECTION, SOLUTION INTRAMUSCULAR at 12:32

## 2021-04-16 NOTE — PLAN OF CARE
Problem: Prexisting or High Potential for Compromised Skin Integrity  Goal: Skin integrity is maintained or improved  Description: INTERVENTIONS:  - Identify patients at risk for skin breakdown  - Assess and monitor skin integrity  - Assess and monitor nutrition and hydration status  - Monitor labs   - Assess for incontinence   - Turn and reposition patient  - Assist with mobility/ambulation  - Relieve pressure over bony prominences  - Avoid friction and shearing  - Provide appropriate hygiene as needed including keeping skin clean and dry  - Evaluate need for skin moisturizer/barrier cream  - Collaborate with interdisciplinary team   - Patient/family teaching  - Consider wound care consult   Outcome: Progressing     Problem: Prexisting or High Potential for Compromised Skin Integrity  Goal: Skin integrity is maintained or improved  Description: INTERVENTIONS:  - Identify patients at risk for skin breakdown  - Assess and monitor skin integrity  - Assess and monitor nutrition and hydration status  - Monitor labs   - Assess for incontinence   - Turn and reposition patient  - Assist with mobility/ambulation  - Relieve pressure over bony prominences  - Avoid friction and shearing  - Provide appropriate hygiene as needed including keeping skin clean and dry  - Evaluate need for skin moisturizer/barrier cream  - Collaborate with interdisciplinary team   - Patient/family teaching  - Consider wound care consult   Outcome: Progressing

## 2021-04-16 NOTE — PROGRESS NOTES
Hortencia Collins visited Pt      04/16/21 1300   Clinical Encounter Type   Visited With Patient   Surgical Visit Post-op   Gnosticist Encounters   Gnosticist Needs Prayer   Sacramental Encounters   Sacrament of Sick-Anointing Anointed

## 2021-04-16 NOTE — QUICK NOTE
04/16/21    Procedure: Chest tube removal    Right chest tube removed in routine fashion without incident  The patient tolerated the procedure well  A dry, sterile dressing was placed  Will check a pa/lat chest x-ray       Srikanth Menchaca PA-C

## 2021-04-16 NOTE — PLAN OF CARE
Problem: PHYSICAL THERAPY ADULT  Goal: Performs mobility at highest level of function for planned discharge setting  See evaluation for individualized goals  Description: Treatment/Interventions: Functional transfer training, LE strengthening/ROM, Elevations, Endurance training, Therapeutic exercise, Bed mobility, Gait training, Equipment eval/education, Spoke to nursing, OT  Equipment Recommended: Other (Comment)(will likely not require an AD)       See flowsheet documentation for full assessment, interventions and recommendations  Note: Prognosis: Good  Problem List: Decreased strength, Decreased endurance, Impaired balance, Decreased mobility  Assessment: Pt is 51 y o  female admitted with Dx of lung nodule and underwent THORACOSCOPY VIDEO ASSISTED SURGERY (VATS) (Right), RESECTION WEDGE LUNG (Right), LOBECTOMY LUNG; MEDIASTINAL LYMPH NODE DISSECTION (Right), BRONCHOSCOPY FLEXIBLE and thoracopexy of lower lobe to middle lobe on 4/14/2021  Pt 's comorbidities affecting POC include: anxiety, hx of smoking and renal cell carcinoma of right kidney and personal factors of: MICHELLE and steps in the house  Pt's clinical presentation is currently  unstable/unpredictable which is evident in ongoing telem monitoring, suppl O2 needs, CT in place and need for stand by assist w/ all phases of mobility when usually mobilizing independently  Pt presents w/ postop guarding, min generalized weakness, decreased endurance and inconsistent amb balance and gait patterns (initially utilized rw but progressed to no AD during amb)  Will cont to follow pt in PT for progressive mobilization to max level of (I), endurance, and safety  Otherwise, anticipate pt will return home w/ available family support upon D/C provided she cont improving w/ mobility skills, safety, and endurance and when medically cleared; pt denies home PT needs at this time; will follow             PT Discharge Recommendation: Home with outpatient rehabilitation(pulm rehab when medically cleared)          See flowsheet documentation for full assessment

## 2021-04-16 NOTE — DISCHARGE INSTRUCTIONS
Gently wash your incisions daily with soap and water, do not soak in a tub  Do not apply any lotions, creams, or ointments to incisions  No lifting over 10 lbs or strenuous exercise  No driving until seen at your post operative visit  The blue stitch will be removed at your post operative visit  Please obtain a pa/lat chest xray at a [facility] within 3 days of your follow up visit  You will be prescribed 3 medications to take at home, that should be taken exactly as directed  These have been shown to greatly improve post-operative pain:     1  Gabapentin 300mg tablet  Take 1 tablet by mouth 3x a day for 3 weeks  2    Tylenol 325mg tablet  Take 2 tablets by mouth, every 6 hours, for 1 week  3    Ibuprofen 200mg tablet  Take 3 tablets by mouth, 3x a day with meals for 1 week  You will also be prescribed a medication that you may take on an as needed basis:  Oxycodone 5mg tablet  Take 1-2 tablets every 4 hours as needed for pain

## 2021-04-16 NOTE — DISCHARGE SUMMARY
Discharge Summary - Thoracic Surgery   April Cathleen 46 y o  female MRN: [id number]  Unit/Bed#: [facility] 410-01 Encounter: [id number]    Admission Date:   Admission Orders (From admission, onward)     Ordered        04/14/21 1219  Inpatient Admission  Once                      Discharge Date: 4/16/21    Admitting Diagnosis: Lung nodule [R91 1]    Discharge Diagnosis: as above    Resolved Problems  Date Reviewed: 4/16/2021    None          Attending: Dr Lorrie Carter Physician(s): none    Procedures Performed: 4/14/21 right thoracoscopic upper lobe wedge resection, completion lobectomy, mediastinal lymph node dissections     Pathology:   Intraoperative Consultation P  [facility]      AF  Non small cell carcinoma, tumor abutting the stapled resection margin  Final pending    Hospital Course: Ms Tawanda West is a 51year old female with a right upper lobe pulmonary nodule who underwent the above procedure 4/14/21  [place] pathology of the wedge resection demonstrated lung cancer so lobectomy was completed  She tolerated the procedure well and was transferred to [facility] with a chest tube to water seal  On POD#1, we worked on her pain control and added Toradol which helped  On POD#2, her chest tube demonstrated low output and no air leak so it was removed  Post-pull CXR demonstrated no pneumothorax  Patient's pain had improved with chest tube removal  She was voiding and ambulating independently, and deemed stable for discharge home  She will follow up with Dr Jose Ta in 2 weeks at the [place] [id number]  Condition at Discharge: good     Discharge instructions/Information to patient and family:   See after visit summary for information provided to patient and family  Provisions for Follow-Up Care:  See after visit summary for information related to follow-up care and any pertinent home health orders        Disposition: Home          Planned Readmission: No    Discharge Statement   I spent 30 minutes discharging the patient  This time was spent on the day of discharge  I had direct contact with the patient on the day of discharge  Additional documentation is required if more than 30 minutes were spent on discharge  Discharge Medications:  See after visit summary for reconciled discharge medications provided to patient and family

## 2021-04-16 NOTE — PLAN OF CARE
Problem: OCCUPATIONAL THERAPY ADULT  Goal: Performs self-care activities at highest level of function for planned discharge setting  See evaluation for individualized goals  Note: Limitation: Decreased high-level ADLs, Decreased self-care trans, Decreased endurance     Assessment: Pt is a 51 y o  female seen for OT evaluation s/p admit to [facility] on 4/14/2021 w/ Lung nodule  Pt in now s/o VATS RUlobectomy done 4/14/21  Pt now cleared for OOB and OT evaluation  She does c/o some pain incision site, is on 1LO2 NC, and has a R sided chest tube  Comorbidities affecting pt's functional performance at time of assessment include: renal mass/lesion, renal cell CA R kidney, tobacco abuse, anxiety, hiatal hernia  Personal factors affecting pt at time of IE include:steps to enter environment and difficulty performing IADLS   Prior to admission, pt was living at home w family (spouse and 14 y/o son) in a one story home, 5 steps up to bedroom  Pt reports having a walk in shower, no use of DME at baseline  She works from home  Upon evaluation: Pt requires min assist LB bathing, mostly due to lines, S dressing, S mobiltiy 2* the following deficits impacting occupational performance: weakness and decreased tolerance  Pt educated on simple ECT/self pacing skills during todays session w good understanding  Pt to benefit from continued skilled OT tx while in the hospital to address deficits as defined above and maximize level of functional independence w ADL's and functional mobility  Occupational Performance areas to address include: bathing/shower, toilet hygiene, dressing, functional mobility and clothing management  Pt scored  75 /100 on the barthel index  The patient's raw score on the AM-PAC Daily Activity inpatient short form is 22, standardized score is 47 1, greater than 39 4  Patients at this level are likely to benefit from discharge to home   Please refer to the recommendation of the Occupational Therapist for safe discharge planning  Based on findings from OT evaluation and functional performance deficits, pt has been identified as a  High complexity evaluation  From OT standpoint, recommendation at time of d/c would be home w family support  No ongoing OT needs for home  OT Discharge Recommendation: No rehabilitation needs  OT - OK to Discharge:  Yes

## 2021-04-16 NOTE — OCCUPATIONAL THERAPY NOTE
Occupational Therapy Evaluation      April Cathleen    4/16/2021    Principal Problem:    Lung nodule      Past Medical History:   Diagnosis Date    Cancer ([facility] [address] ) 6/16/19    Chronic kidney disease 08/2019    tumor and cyst    Colon polyp     Gastritis     Nodule of upper lobe of right lung     PONV (postoperative nausea and vomiting)     Renal cell carcinoma ([facility] [address] ) 06/2019    Renal mass, right        Past Surgical History:   Procedure Laterality Date    BREAST BIOPSY Left 01/2020    benign    COLONOSCOPY      LUNG SEGMENTECTOMY Right 4/14/2021    Procedure: RESECTION WEDGE LUNG;  Surgeon: Sita Wing MD;  [facility];  Service: Thoracic    IN [address] N/A 4/14/2021    Procedure: BRONCHOSCOPY FLEXIBLE;  Surgeon: Sita Wing MD;  [facility];  Service: Thoracic    IN LAP,PARTIAL NEPHRECTOMY Right 8/26/2019    Procedure: NEPHRECTOMY PARTIAL LAPAROSCOPIC W ROBOTICS WITH INTRA-OP LAPAROSCOPIC ULTRASOUND;  Surgeon: Marcial Carrillo MD;  [facility];  Service: Urology    IN THORACOSCOPY SURG LOBECTOMY Right 4/14/2021    Procedure: LOBECTOMY LUNG; MEDIASTINAL LYMPH NODE DISSECTION;  Surgeon: Sita Wing MD;  [facility];  Service: Thoracic    IN THORACOSCOPY W/THERA WEDGE RESEXN INITIAL UNILAT Right 4/14/2021    Procedure: THORACOSCOPY VIDEO ASSISTED SURGERY (VATS);   Surgeon: Sita Wing MD;  [facility];  Service: Thoracic    US GUIDED BREAST BIOPSY LEFT COMPLETE Left 1/15/2020    WISDOM TOOTH EXTRACTION          04/16/21 0944   OT Last Visit   OT Visit Date 04/16/21   Note Type   Note type Evaluation   Restrictions/Precautions   Weight Bearing Precautions Per Order No   Other Precautions Multiple lines;O2;Pain  (chest tube)   Pain Assessment   Pain Assessment Tool 0-10   Pain Score 4   Pain Location/Orientation Orientation: Right;Location: Chest;Location: Incision   Home Living   Type of [address] [place] [address] One level  (5 steps up to bedroom)   Bathroom Shower/Tub Walk-in shower   Bathroom Toilet Standard   Additional Comments denies use of DME at baseline   Prior Function   Level of [place] Independent with ADLs and functional mobility   Lives With Spouse; Son   Bam Help From Family   ADL Assistance Independent   IADLs Independent   Falls in the last 6 months 0   Vocational 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   [facility] pt reports being independent w self care, spouse does the shopping etc  has a 14 y/o son who can assist as needed   Reciprocal Relationships supportive family, mom in town to assist as well   Psychosocial   Psychosocial (WDL) WDL   Subjective   Subjective "i am feeling much better"   ADL   Eating Assistance 7  Independent   Eating Deficit Setup   Grooming Assistance 5  Supervision/Setup   Grooming Deficit Setup   UB Bathing Assistance 5  Supervision/Setup   UB Bathing Deficit Setup; Increased time to complete   LB Bathing Assistance 4  Minimal Assistance   UB Dressing Assistance 5  Supervision/Setup   LB Dressing Assistance 5  Supervision/Setup   LB Dressing Deficit Don/doff R sock; Don/doff L sock   Additional Comments pt able to complete LB dressing, sitting down for donning/doffing socks   Bed Mobility   Additional Comments OOB in chair upon arrival   Transfers   Sit to Stand 5  Supervision   Stand to Sit 5  Supervision   Stand pivot 5  Supervision   Toilet transfer 5  Supervision   Functional Mobility   Functional Mobility 5  Supervision   Additional Comments able to walk short distance without AD   Balance   Static Sitting Fair +   Dynamic Sitting Fair +   Static Standing Fair -   Dynamic Standing Fair -   Activity Tolerance   Activity Tolerance Patient tolerated treatment well  (on O2 @ 1 L NC)   Medical Staff Made Aware  [address] to see per RN   RUE Assessment   RUE Assessment WFL   LUE Assessment   LUE Assessment WFL   Hand Function   Gross Motor Coordination Functional   Fine Motor Coordination Functional   Sensation   Light Touch No apparent deficits   Cognition   Overall Cognitive Status WFL   Arousal/Participation Alert; Cooperative   Attention Within functional limits   Orientation Level Oriented X4   Memory Within functional limits   Following Commands Follows all commands and directions without difficulty   Assessment   Limitation Decreased high-level ADLs; Decreased self-care trans;Decreased endurance   Assessment Pt is a 51 y o  female seen for OT evaluation s/p admit to [facility] on 4/14/2021 w/ Lung nodule  Pt in now s/o VATS RUlobectomy done 4/14/21  Pt now cleared for OOB and OT evaluation  She does c/o some pain incision site, is on 1LO2 NC, and has a R sided chest tube  Comorbidities affecting pt's functional performance at time of assessment include: renal mass/lesion, renal cell CA R kidney, tobacco abuse, anxiety, hiatal hernia  Personal factors affecting pt at time of IE include:steps to enter environment and difficulty performing IADLS   Prior to admission, pt was living at home w family (spouse and 12 y/o son) in a one story home, 5 steps up to bedroom  Pt reports having a walk in shower, no use of DME at baseline  She works from home  Upon evaluation: Pt requires min assist LB bathing, mostly due to lines, S dressing, S mobiltiy 2* the following deficits impacting occupational performance: weakness and decreased tolerance  Pt educated on simple ECT/self pacing skills during todays session w good understanding  Pt to benefit from continued skilled OT tx while in the hospital to address deficits as defined above and maximize level of functional independence w ADL's and functional mobility  Occupational Performance areas to address include: bathing/shower, toilet hygiene, dressing, functional mobility and clothing management  Pt scored  75 /100 on the barthel index   The patient's raw score on the AM-PAC Daily Activity inpatient short form is 22, standardized score is 47 1, greater than 39 4  Patients at this level are likely to benefit from discharge to home  Please refer to the recommendation of the Occupational Therapist for safe discharge planning  Based on findings from OT evaluation and functional performance deficits, pt has been identified as a  High complexity evaluation  From OT standpoint, recommendation at time of d/c would be home w family support  No ongoing OT needs for home  Goals   Patient Goals to get rid of chest tube and go home   STG Time Frame 3-5   Short Term Goal #1 complete all self care mod I w good safety    Short Term Goal #2 demonstrate good ECT/self pacing skills with self care/mobility   Short Term Goal  tolerate standing x 10 min w fair + balance for completion of hygiene   Plan   Treatment Interventions ADL retraining;Functional transfer training; Endurance training;Patient/family training;Equipment evaluation/education; Compensatory technique education; Energy conservation; Activityengagement   Goal Expiration Date 04/16/21   OT Frequency 3-5x/wk   Recommendation   OT Discharge Recommendation No rehabilitation needs   OT - OK to Discharge Yes   AM-PAC Daily Activity Inpatient   Lower Body Dressing 3   Bathing 3   Toileting 4   Upper Body Dressing 4   Grooming 4   Eating 4   Daily Activity Raw Score 22   Daily Activity Standardized Score (Calc for Raw Score >=11) 47 1   Barthel Index   Feeding 10   Bathing 0   Grooming Score 5   Dressing Score 10   Bladder Score 10   Bowels Score 10   Toilet Use Score 10   Transfers (Bed/Chair) Score 10   Mobility (Level Surface) Score 10   Stairs Score 0   Barthel Index Score 75

## 2021-04-16 NOTE — PROGRESS NOTES
Progress Note - Thoracic Surgery   April Cathleen 46 y o  female MRN: [id number]  Unit/Bed#: [facility] 410-01 Encounter: [id number]    Assessment:  The patient is s/p R VATS with RUL lobectomy with bronchoscopy and mediastinal lymph node dissection with thoracopexy of the lower lobe to the middle lobe  R CT to water seal (-8) on thopaz with 0-10 ml/min air leak and 285 cc serosang output  On 1 L    Plan:  R Chest tube to water seal (-8 cm on thopaz)  Oxygen by nasal cannula as needed  Pain control, encourage her to take oxycodone as needed, in addition to toradol and tylenol  Reg diet  IS, OT, PT, Resp Tx      Subjective/Objective     Chief Complaint: s/p R VATS with wedge resection    Subjective: Pain is not controlled        Objective:     Vitals: Blood pressure 96/60, pulse 72, temperature 98 1 °F (36 7 °C), temperature source Oral, resp  rate 16, height 5' 7" (1 702 m), weight 67 7 kg (149 lb 4 oz), SpO2 95 %, not currently breastfeeding  ,Body mass index is 23 38 kg/m²      I/O       04/12 0701 - 04/13 0700 04/13 0701 - 04/14 0700 04/14 0701 - 04/15 0700    I V  (mL/kg)   1800 (27 4)    IV Piggyback   250    Total Intake(mL/kg)   2050 (31 2)    Urine (mL/kg/hr)   450 (0 8)    Blood   50    Chest Tube   150    Total Output   650    Net   +1400               Physical Exam:   GENERAL APPEARANCE: in no acute distress  NEURO: stable  HEENT: normocephalic, atraumatic  CV: regular rate and rhythm, no tachycardia,   LUNGS: clear to auscultation bilaterally on 1 L oxygen  R CT to water seal -8 with air leak 0-10 ml/min and 285 cc ss output  GI: soft, nontender, nondistended  : no abnormality detected  MSK: no abnormality detected   SKIN: no abnormality detected

## 2021-04-16 NOTE — PHYSICAL THERAPY NOTE
Physical Therapy Evaluation     Patient's Name: April Cathleen    Admitting Diagnosis  Lung nodule [R91 1]    Problem List  Patient Active Problem List   Diagnosis    Gastritis    Hiatal hernia    Plantar wart, left foot    Renal lesion    Renal mass, right    [facility]al cell carcinoma of right kidney ([facility] )    Cigarette nicotine dependence without complication    Hypocalcemia    Mixed hyperlipidemia    Anxiety    Abnormal CT of the chest    Lung nodule       Past Medical History  Past Medical History:   Diagnosis Date    [facility] 6/16/19    Chronic kidney disease 08/2019    tumor and cyst    Colon polyp     Gastritis     Nodule of upper lobe of right lung     PONV (postoperative nausea and vomiting)     Renal cell carcinoma ([facility] ) 06/2019    Renal mass, right        Past Surgical History  Past Surgical History:   Procedure Laterality Date    BREAST BIOPSY Left 01/2020    benign    COLONOSCOPY      LUNG SEGMENTECTOMY Right 4/14/2021    Procedure: RESECTION WEDGE LUNG;  Surgeon: Gloria Escobar MD;  [facility];  Service: Thoracic    [address] N/A 4/14/2021    Procedure: BRONCHOSCOPY FLEXIBLE;  Surgeon: Gloria Escobar MD;  [facility];  Service: Thoracic    [place] LAP,PARTIAL NEPHRECTOMY Right 8/26/2019    Procedure: NEPHRECTOMY PARTIAL LAPAROSCOPIC W ROBOTICS WITH INTRA-OP LAPAROSCOPIC ULTRASOUND;  Surgeon: Chelsea Samuels MD;  [facility];  Service: Urology    [place] THORACOSCOPY SURG LOBECTOMY Right 4/14/2021    Procedure: LOBECTOMY LUNG; MEDIASTINAL LYMPH NODE DISSECTION;  Surgeon: Gloria Escobar MD;  [facility];  Service: Thoracic    [place] THORACOSCOPY W/THERA WEDGE RESEXN INITIAL UNILAT Right 4/14/2021    Procedure: THORACOSCOPY VIDEO ASSISTED SURGERY (VATS);   Surgeon: Gloria Escobar MD;  [facility];  Service: Thoracic    US GUIDED BREAST BIOPSY LEFT COMPLETE Left 1/15/2020    WISDOM TOOTH EXTRACTION 04/16/21 0945   PT Last Visit   PT Visit Date 04/16/21   Note Type   Note type Evaluation   Pain Assessment   Pain Assessment Tool Pain Assessment not indicated - pt denies pain  (at rest)   Home Living   Type of [address] to live on main level with bedroom/bathroom; Two level  (1 MICHELLE and 4-5 steps up to the 2nd level)   Prior Function   Level of [place] Independent with ADLs and functional mobility  (amb w/o AD)   Lives With Spouse; Son   Cammie Gonzalez Help From Family  (reports her mother will stay w/ her for some time as well)   Restrictions/Precautions   Braces or Orthoses   (denies)   Other Precautions Multiple lines;Telemetry;O2  ((R) CT)   General   Additional Pertinent History cleared for assessment (spoke to nsg)   Cognition   Overall Cognitive Status [facility]   Arousal/Participation Alert   Attention Within functional limits   Orientation Level Oriented to person;Oriented to place;Oriented to situation   Memory Within functional limits   Following Commands Follows all commands and directions without difficulty   Comments Alert; in the chair; pleasant and cooperative; agreeable to amb   RUE Assessment   RUE Assessment WFL  (AROM)   LUE Assessment   LUE Assessment WFL  (AROM)   RLE Assessment   RLE Assessment WFL  (AROM)   Strength RLE   RLE Overall Strength   (fair +/ good - (grossly))   LLE Assessment   LLE Assessment WFL  (AROM)   Strength LLE   LLE Overall Strength   (fair +/ good - (grossly))   Transfers   Sit to Stand 5  Supervision   Stand to Sit 5  Supervision   Ambulation/Elevation   Gait pattern Excessively slow; Short stride; Inconsistent dinah   Gait Assistance 5  Supervision   Additional items Assist x 1;Verbal cues  (stand by (A) of 2nd for lines)   Assistive Device Rolling walker;None  (rw <--> none)   Distance 240 ft  (including 10 ft w/o AD)   Stair Management Assistance Not tested  (not appropriate at this time)   Balance   Static Sitting Fair +   Dynamic Sitting Fair   Static Standing Fair -   Dynamic Standing [address], [address] -   Activity Tolerance   Activity Tolerance Patient limited by fatigue   Nurse Made Aware spoke to [facility]   Assessment   Prognosis Good   Problem List Decreased strength;Decreased endurance; Impaired balance;Decreased mobility   Assessment Pt is 51 y o  female admitted with Dx of lung nodule and underwent THORACOSCOPY VIDEO ASSISTED SURGERY (VATS) (Right), RESECTION WEDGE LUNG (Right), LOBECTOMY LUNG; MEDIASTINAL LYMPH NODE DISSECTION (Right), BRONCHOSCOPY FLEXIBLE and thoracopexy of lower lobe to middle lobe on 4/14/2021  Pt 's comorbidities affecting POC include: anxiety, hx of smoking and renal cell carcinoma of right kidney and personal factors of: MICHELLE and steps in the house  Pt's clinical presentation is currently  unstable/unpredictable which is evident in ongoing telem monitoring, suppl O2 needs, CT in place and need for stand by assist w/ all phases of mobility when usually mobilizing independently  Pt presents w/ postop guarding, min generalized weakness, decreased endurance and inconsistent amb balance and gait patterns (initially utilized rw but progressed to no AD during amb)  Will cont to follow pt in PT for progressive mobilization to max level of (I), endurance, and safety  Otherwise, anticipate pt will return home w/ available family support upon D/C provided she cont improving w/ mobility skills, safety, and endurance and when medically cleared; pt denies home PT needs at this time; will follow  Goals   Patient Goals to go home   STG Expiration Date 04/26/21   Short Term Goal #1 7-10 days  Pt will amb 300 ft w/o assistive device, (I) in order to facilitate safe return to premorbid environment and community amb status  Pt will negotiate 5 steps w/ hand rail, mod (I) in order to navigate between levels of home environment safely   Pt will achieve (I) level w/ bed mob in order to facilitate safety with OOB and back to bed transitions in own living environment  Pt will perform transfers w/ mod (I) to assure (I) and safety w/ functional mobility/transitions w/ all aspects of mobility/locomotion  Pt will participate in LE therex and balance activities to max progression w/ mobility skills  PT Treatment Day 0   Plan   Treatment/Interventions Functional transfer training;LE strengthening/ROM; Elevations; Endurance training; Therapeutic exercise; Bed mobility;Gait training;Equipment eval/education;Spoke to nursing;OT   PT Frequency Other (Comment)  (3-6x/wk)   Recommendation   PT Discharge Recommendation Home with outpatient rehabilitation  (pulm rehab when medically cleared)   Equipment Recommended Other (Comment)  (will likely not require an AD)   AM-PAC Basic Mobility Inpatient   Turning in Bed Without Bedrails 3   Lying on Back to Sitting on Edge of Flat Bed 3   Moving Bed to Chair 3   Standing Up From Chair 3   Walk in Room 3   Climb 3-5 Stairs 3   Basic Mobility Inpatient Raw Score 18   Basic Mobility Standardized Score 41 05   Modified Commerce Scale   Modified Commerce Scale 3       Shawn Foster, PT

## 2021-04-19 ENCOUNTER — TRANSITIONAL CARE MANAGEMENT (OUTPATIENT)
Dept: FAMILY MEDICINE CLINIC | Facility: CLINIC | Age: 46
End: 2021-04-19

## 2021-04-19 NOTE — UTILIZATION REVIEW
Notification of Discharge   This is a Notification of Discharge from our facility [address]  Please be advised that this patient has been discharge from our facility  Below you will find the admission and discharge date and time including the patients disposition  UTILIZATION REVIEW CONTACT:  Juni Keita  Utilization   Network Utilization Review Department  Phone: [phone] x carefully listen to the prompts  All voicemails are confidential   Email: [email or web address]     PHYSICIAN ADVISORY SERVICES:  FOR CSUA-YZ-ICNY REVIEW - MEDICAL NECESSITY DENIAL  Phone: [phone]  Fax: [phone]  Email: [email or web address]     PRESENTATION DATE: 4/14/2021  6:02 AM  OBERVATION ADMISSION DATE:   CHANGE FROM OBSERVATION TO INPATIENT: [unfilled]   INPATIENT ADMISSION DATE: 4/14/21 1219   DISCHARGE DATE: 4/16/2021  3:22 PM  DISPOSITION: Home/Self Care Home/Self Care      IMPORTANT INFORMATION:  Send all requests for admission clinical reviews, approved or denied determinations and any other requests to dedicated fax number below belonging to the campus where the patient is receiving treatment   List of dedicated fax numbers:  [address] [place] [address] DENIALS (Administrative/Medical Necessity) [phone]   [address] (Maternity/NICU/Pediatrics) [phone]   Óscar Burr [phone]   Wilfrid Rees [phone]   Sarah Cabrera [phone]   [address] [facility] [address] [place] [address] [phone]   [facility]  [phone]   [address] [facility]  [address] [facility] [address] [facility] [phone]

## 2021-04-28 ENCOUNTER — HOSPITAL ENCOUNTER (OUTPATIENT)
Dept: RADIOLOGY | Facility: HOSPITAL | Age: 46
Discharge: HOME | End: 2021-04-28
Payer: COMMERCIAL

## 2021-04-28 DIAGNOSIS — R91.1 LUNG NODULE: ICD-10-CM

## 2021-04-28 PROCEDURE — 71046 X-RAY EXAM CHEST 2 VIEWS: CPT

## 2021-05-04 ENCOUNTER — OFFICE VISIT (OUTPATIENT)
Dept: CARDIAC SURGERY | Facility: CLINIC | Age: 46
End: 2021-05-04

## 2021-05-04 VITALS
HEIGHT: 67 IN | HEART RATE: 76 BPM | RESPIRATION RATE: 12 BRPM | TEMPERATURE: 97.9 F | DIASTOLIC BLOOD PRESSURE: 70 MMHG | SYSTOLIC BLOOD PRESSURE: 104 MMHG | BODY MASS INDEX: 23.23 KG/M2 | WEIGHT: 148 LBS

## 2021-05-04 DIAGNOSIS — C34.11 PRIMARY ADENOCARCINOMA OF UPPER LOBE OF RIGHT LUNG (HCC): ICD-10-CM

## 2021-05-04 DIAGNOSIS — R91.1 LUNG NODULE: Primary | ICD-10-CM

## 2021-05-04 PROCEDURE — 99024 POSTOP FOLLOW-UP VISIT: CPT | Performed by: THORACIC SURGERY (CARDIOTHORACIC VASCULAR SURGERY)

## 2021-05-04 NOTE — PROGRESS NOTES
Thoracic Follow-Up  Assessment/Plan:    Primary adenocarcinoma of upper lobe of right lung [facility])  Ms Connolly   Is a very pleasant 58-year-old female who is well known to me  She underwent a VATS right upper lobectomy on April 14, 2021  She presents today for her 1st postoperative visit  Today in the office, I am overall happy with her progress  She is still having some pain for which she is taking Tylenol and ibuprofen  This is well within normal limits  Additionally, she has a small pleural effusion on her chest x-ray  Again, this is very common after this surgery  I explained to her that this will resorb and go away as she continues to recover  We had a long conversation regarding her pathology  She has a stage I B  This means that she has 0 positive lymph nodes and she does not require any type of adjuvant chemotherapy or radiation  This is considered a surgical cure and this is excellent news for her  At this time, will have her come back and see me in another 4 weeks with another chest x-ray to ensure continued progress  She knows to call me if anything changes in the interim  I encouraged her to participate in any in all cardio activity that she could tolerate and to continue to use her incentive spirometer  Ty Mckeon MD  Thoracic Surgery  (Available on [employer])  Office: [phone]         Diagnoses and all orders for this visit:    Lung nodule  -     XR chest pa & lateral; Future    Primary adenocarcinoma of upper lobe of right lung [facility])          Thoracic History   Date: 4/14/21    Procedure: VATS RULobectomy,   Pathology: 2 9cm adenocarcinoma, 0/17 LNs, PL1, G2  T2aN0, Stage IB     Subjective:    Patient ID: Kelley Connolly is a 51 y o  female  HPI  Ms Connolly   Is a very pleasant 58-year-old female who is well known to me  She recently underwent a VATS right upper lobectomy on April 14, 2021  She presents today for her 1st postoperative visit    Today in the office, she states that she is still having some back and shoulder pain but that this is improving  She reports that her fatigue is improving  She states that she is still taking some Tylenol Motrin  She is not taking oxycodone  I personally reviewed her chest x-ray in PACS  This demonstrates a small pleural effusion on the right  I explained to her that this is very common after this surgery and not unexpected  She denies any fevers or chills  She states that she is still taking some shallow breaths due to pain but she is able to get her incentive spirometer up to 750  The following portions of the patient's history were reviewed and updated as appropriate: allergies, current medications, past family history, past medical history, past social history, past surgical history and problem list     Review of Systems   Constitutional: Negative for chills, fatigue, fever and unexpected weight change  HENT: Negative  Eyes: Negative  Negative for visual disturbance  Respiratory: Negative for cough, shortness of breath and stridor  Cardiovascular: Negative for chest pain  Gastrointestinal: Negative  Endocrine: Negative  Genitourinary: Negative  Musculoskeletal: Negative  Skin: Negative  Neurological: Negative for dizziness, light-headedness and headaches  Hematological: Negative for adenopathy  Psychiatric/Behavioral: Negative  Objective:   Physical Exam  Vitals signs and nursing note reviewed  Constitutional:       General: She is not in acute distress  Appearance: Normal appearance  She is well-developed  She is not diaphoretic  HENT:      Head: Normocephalic and atraumatic  Nose: Nose normal  No congestion or rhinorrhea  Mouth/Throat:      Mouth: Mucous membranes are moist       Pharynx: Oropharynx is clear  No oropharyngeal exudate  Eyes:      General: No scleral icterus  Pupils: Pupils are equal, round, and reactive to light     Neck: Musculoskeletal: Normal range of motion and neck supple  No muscular tenderness  Trachea: No tracheal deviation  Cardiovascular:      Rate and Rhythm: Normal rate and regular rhythm  Pulses: Normal pulses  Heart sounds: Normal heart sounds  No murmur  Pulmonary:      Effort: Pulmonary effort is normal  No respiratory distress  Breath sounds: Normal breath sounds  No stridor  No wheezing or rales  Comments: Well healed incisions  Chest tube stitch removed  Chest:      Chest wall: No tenderness  Abdominal:      General: Bowel sounds are normal  There is no distension  Palpations: Abdomen is soft  Tenderness: There is no abdominal tenderness  There is no rebound  Musculoskeletal: Normal range of motion  Lymphadenopathy:      Cervical: No cervical adenopathy  Skin:     General: Skin is warm and dry  Coloration: Skin is not jaundiced or pale  Findings: No erythema or rash  Neurological:      General: No focal deficit present  Mental Status: She is alert and oriented to person, place, and time  Psychiatric:         Mood and Affect: Mood normal          Behavior: Behavior normal          Thought Content: Thought content normal          Judgment: Judgment normal      /70 (BP Location: Left arm, Patient Position: Sitting)   Pulse 76   Temp 97 9 °F (36 6 °C) (Tympanic)   Resp 12   Ht 5' 7" (1 702 m)   Wt 67 1 kg (148 lb)   BMI 23 18 kg/m²     Xr Chest Pa & Lateral    Result Date: 5/2/2021  Impression Moderate right and small left pleural effusion, increased from 4/16/2021  Surgical changes from right upper lobectomy  Workstation performed: [id number]     Xr Chest Pa & Lateral    Result Date: 4/16/2021  Impression Left basilar atelectasis with trace left pleural effusion  Postoperative changes right hilum  No discernible pneumothorax   Workstation performed: [id number]      Ct Chest Wo Contrast    Result Date: 3/15/2021  Narrative CT CHEST WITHOUT IV CONTRAST INDICATION:   R91 1: Solitary pulmonary nodule  Follow-up lung nodule  History of renal cell carcinoma  COMPARISON:  Chest CT from 10/26/2020 and 7/21/2020  TECHNIQUE: CT examination of the chest was performed without intravenous contrast   Axial, sagittal, and coronal 2D reformatted images were created from the source data and submitted for interpretation  Radiation dose length product (DLP) for this visit:  226 mGy-cm   This examination, like all CT scans performed in the [facility], was performed utilizing techniques to minimize radiation dose exposure, including the use of iterative reconstruction and automated exposure control  FINDINGS: LUNGS:  Part solid nodule in the anterior segment right upper lobe  The groundglass component is without significant change at 2 9 x 2 5 cm (3/49) since July 2020  (Disease It was remeasured in July 2020 )  The solid component has increased from 3 mm to 1 2 x 0 8 cm (3/47) since October 2020  Redemonstration of mild retraction of the major fissure  Mild centrilobular emphysema  No significant filling defects in the trachea and bronchi  PLEURA:  Unremarkable  HEART/GREAT VESSELS:  Unremarkable for patient's age  MEDIASTINUM AND SIMON:  Unremarkable  CHEST WALL AND LOWER NECK:   1 cm rounded lesion in the right breast (2/25) corresponds with a cyst on ultrasound from 7/21/2020  VISUALIZED STRUCTURES IN THE UPPER ABDOMEN:  Unremarkable  OSSEOUS STRUCTURES:  Mild degenerative disease in the spine  Impression Part solid right upper lobe nodule without significant change in overall size since July 2020; however, the solid component has increased from 3 mm to 1 2 cm since October 2020 and is highly suspicious for adenocarcinoma  The study was marked in EPIC for significant notification   Workstation performed: [id number]     Ct Chest W Contrast    Result Date: 10/29/2020  Narrative CT CHEST WITH IV CONTRAST INDICATION:   R91 1: Solitary pulmonary nodule  COMPARISON:  July 21, 2020 TECHNIQUE: CT examination of the chest was performed  Axial, sagittal, and coronal 2D reformatted images were created from the source data and submitted for interpretation  Radiation dose length product (DLP) for this visit:  226 mGy-cm   This examination, like all CT scans performed in the [facility], was performed utilizing techniques to minimize radiation dose exposure, including the use of iterative reconstruction and automated exposure control  IV Contrast:  85 mL of iohexol (OMNIPAQUE) FINDINGS: LUNGS:  The previously identified groundglass attenuation lesion in the right lung is still present  It is visible on images 22-26 series 2 in the periphery of the right upper lobe  The vast majority of the lesion remains groundglass attenuation although there might be a small soft tissue component on image 46 series 3 where this might just be a prominent vascular branch point within the lesion  Overall, the findings are worrisome for possible adenocarcinoma of the lung which might be more definitively assessed with biopsy if deemed clinically safe and appropriate  There are areas of platelike atelectasis posteriorly in the left lower lobe  Emphysema is stable from prior  PLEURA:  Unremarkable  HEART/GREAT VESSELS:  Unremarkable for patient's age  MEDIASTINUM AND SIMON:  Unremarkable  CHEST WALL AND LOWER NECK:   Right breast lesions are visible, previously worked up for known to be benign cystic findings  VISUALIZED STRUCTURES IN THE UPPER ABDOMEN:  Unremarkable  OSSEOUS STRUCTURES:  No acute fracture or destructive osseous lesion  Impression Persistent mostly groundglass attenuation lesion of the right upper lobe which is 2 8 cm size, not significantly changed from prior although potentially with one small soft tissue density component (versus a prominent vascular branch point)    A persisting lesion of this appearance is worrisome for adenocarcinoma of the lung and tissue sampling might be indicated for definitive diagnosis  Emphysema is noted  No significant new abnormality seen elsewhere  The study was marked in EPIC for significant notification  Workstation performed: [id number]     Ct Chest W Contrast    Result Date: 7/23/2020  Narrative CT CHEST WITH IV CONTRAST INDICATION:   C64 1: Malignant neoplasm of right kidney, except renal pelvis  Right side renal cell carcinoma, status post partial nephrectomy on 8/20 6/20/2019  Follow-up  COMPARISON:  None  TECHNIQUE: CT examination of the chest was performed  Axial, sagittal, and coronal 2D reformatted images were created from the source data and submitted for interpretation  Radiation dose length product (DLP) for this visit:  258 mGy-cm   This examination, like all CT scans performed in the [facility], was performed utilizing techniques to minimize radiation dose exposure, including the use of iterative reconstruction and automated exposure control  IV Contrast:  100 mL of iohexol (OMNIPAQUE) FINDINGS: LUNGS:  Central airways are patent  Moderate centrilobular emphysema  Bandlike densities in bilateral lower lobes, likely reflecting subsegmental atelectasis versus scarring  No focal consolidation  There is a 2 7 x 2 2 x 1 5 cm groundglass nodule (GGN) in the right upper lobe (series 7 image 51, series 601 image 59)  There are questionable small solid components measuring 2-3 mm in size  This may be related to infectious/inflammatory process or an adenocarcinoma spectrum lesion  Follow-up with  short-term chest CT is recommended  PLEURA:  No pleural effusion or pneumothorax  HEART/GREAT VESSELS:  Unremarkable for patient's age  MEDIASTINUM AND SIMON:  Unremarkable  CHEST WALL AND LOWER NECK:   Mildly heterogeneous thyroid gland with probable small nodule in the left lobe  VISUALIZED STRUCTURES IN THE UPPER ABDOMEN:  Please refer to the separately dictated CT abdomen and pelvis report   OSSEOUS STRUCTURES:  No acute fracture or destructive osseous lesion  Impression 1   2 7 cm groundglass groundglass nodule (GGN) in the right upper lobe, which be related to infectious/inflammatory process or an adenocarcinoma spectrum lesion  Follow-up evaluation with short-term chest CT in 3-6 months is recommended  2   Moderate emphysema  The study was marked in EPIC for significant notification  Workstation performed: [id number]     No CT Chest,Abdomen,Pelvis results available for this patient  Nm Pet Ct Skull Base To Mid Thigh    Result Date: 4/9/2021  Narrative PET/CT SCAN INDICATION:  R91 1: Solitary pulmonary nodule   , history of right renal cell carcinoma, status post partial nephrectomy; Covid vaccine 1 left arm 3/19/2021 MODIFIER: PI COMPARISON: CT chest 3/11/2021 and priors CELL TYPE:  None TECHNIQUE:   8 46 mCi F-18-FDG administered IV  Multiplanar attenuation corrected and non attenuation corrected PET images were acquired 60 minutes post injection  Contiguous, low dose, axial CT sections were obtained from the skull base through the femurs   Intravenous contrast material was not utilized  This examination, like all CT scans performed in the [facility], was performed utilizing techniques to minimize radiation dose exposure, including the use of iterative reconstruction and automated exposure control  Fasting serum glucose: 82 mg/dl FINDINGS: VISUALIZED BRAIN:   No acute abnormalities are seen  HEAD/NECK:   There is a physiologic distribution of FDG  No FDG avid cervical adenopathy is seen  CT images: Unremarkable  CHEST: 2 9 x 2 4 cm right upper lung nodule image 3/88 demonstrates increased FDG uptake, SUV 4 2, most compatible with malignancy  This is likely a primary lung malignancy  There are several small subcentimeter normal size left axillary lymph nodes with mild FDG uptake, SUV measuring up to 2 8  These are likely reactive related to the recent Covid vaccination   No additional FDG avid soft tissue lesions are seen  No hypermetabolic hilar or mediastinal adenopathy  CT images: Emphysema  Bibasilar atelectasis  ABDOMEN:   No FDG avid soft tissue lesions are seen  CT images: Partial right nephrectomy  PELVIS: There is endometrial and bilateral adnexal activity, likely physiologic  2 3 x 1 7 cm left adnexal hypodense structure has an SUV of 4 1  Endometrial activity has an SUV of 3 8  Right adnexal activity has an SUV of 3 7  No additional FDG avid soft tissue lesions are seen  CT images: Stable  OSSEOUS STRUCTURES: No FDG avid lesions are seen  CT images: No significant findings  Impression 1   2 9 x 2 4 cm right upper lung nodule demonstrates hypermetabolism and is most compatible with malignancy  This is likely a primary lung malignancy  Tissue sampling recommended  2   Probable reactive left axillary nodes related to the recent Covid vaccination  These may be reassessed on follow-up exam   Otherwise no hypermetabolic metastases visualized in the thorax  3   Endometrial and bilateral adnexal activity is likely physiologic  This may be correlated with dedicated pelvic ultrasound  4   Otherwise no worrisome hypermetabolic lesions in the neck, abdomen, pelvis  The study was marked in EPIC for significant notification  Workstation performed: [id number]      No Barium Swallow results available for this patient

## 2021-05-04 NOTE — LETTER
May 4, 2021     Gina Louis, [address] [address]    Patient: Kelley Connolly   YOB: 1975   Date of Visit: 5/4/2021       Dear Dr Juan David Islas: Thank you for referring Kelley Connolly to me for evaluation  Below are my notes for this consultation  If you have questions, please do not hesitate to call me  I look forward to following your patient along with you  Sincerely,        Oswald Gonzalez MD        CC: MD Oswald Damon MD  5/4/2021  2:42 PM  Sign when Signing Visit  Thoracic Follow-Up  Assessment/Plan:    Primary adenocarcinoma of upper lobe of right lung [facility])  Ms Connolly   Is a very pleasant 41-year-old female who is well known to me  She underwent a VATS right upper lobectomy on April 14, 2021  She presents today for her 1st postoperative visit  Today in the office, I am overall happy with her progress  She is still having some pain for which she is taking Tylenol and ibuprofen  This is well within normal limits  Additionally, she has a small pleural effusion on her chest x-ray  Again, this is very common after this surgery  I explained to her that this will resorb and go away as she continues to recover  We had a long conversation regarding her pathology  She has a stage I B  This means that she has 0 positive lymph nodes and she does not require any type of adjuvant chemotherapy or radiation  This is considered a surgical cure and this is excellent news for her  At this time, will have her come back and see me in another 4 weeks with another chest x-ray to ensure continued progress  She knows to call me if anything changes in the interim  I encouraged her to participate in any in all cardio activity that she could tolerate and to continue to use her incentive spirometer      Juan C Atkinson MD  Thoracic Surgery  (Available on [employer])  Office: [phone]         Diagnoses and all orders for this visit:    Lung nodule  -     XR chest pa & lateral; Future    Primary adenocarcinoma of upper lobe of right lung [facility])          Thoracic History   Date: 4/14/21    Procedure: VATS RULobectomy,   Pathology: 2 9cm adenocarcinoma, 0/17 LNs, PL1, G2  T2aN0, Stage IB     Subjective:    Patient ID: April Cathleen is a 51 y o  female  HPI  Ms Connolly   Is a very pleasant 41-year-old female who is well known to me  She recently underwent a VATS right upper lobectomy on April 14, 2021  She presents today for her 1st postoperative visit  Today in the office, she states that she is still having some back and shoulder pain but that this is improving  She reports that her fatigue is improving  She states that she is still taking some Tylenol Motrin  She is not taking oxycodone  I personally reviewed her chest x-ray in PACS  This demonstrates a small pleural effusion on the right  I explained to her that this is very common after this surgery and not unexpected  She denies any fevers or chills  She states that she is still taking some shallow breaths due to pain but she is able to get her incentive spirometer up to 750  The following portions of the patient's history were reviewed and updated as appropriate: allergies, current medications, past family history, past medical history, past social history, past surgical history and problem list     Review of Systems   Constitutional: Negative for chills, fatigue, fever and unexpected weight change  HENT: Negative  Eyes: Negative  Negative for visual disturbance  Respiratory: Negative for cough, shortness of breath and stridor  Cardiovascular: Negative for chest pain  Gastrointestinal: Negative  Endocrine: Negative  Genitourinary: Negative  Musculoskeletal: Negative  Skin: Negative  Neurological: Negative for dizziness, light-headedness and headaches  Hematological: Negative for adenopathy  Psychiatric/Behavioral: Negative  Objective:   Physical Exam  Vitals signs and nursing note reviewed  Constitutional:       General: She is not in acute distress  Appearance: Normal appearance  She is well-developed  She is not diaphoretic  HENT:      Head: Normocephalic and atraumatic  Nose: Nose normal  No congestion or rhinorrhea  Mouth/Throat:      Mouth: Mucous membranes are moist       Pharynx: Oropharynx is clear  No oropharyngeal exudate  Eyes:      General: No scleral icterus  Pupils: Pupils are equal, round, and reactive to light  Neck:      Musculoskeletal: Normal range of motion and neck supple  No muscular tenderness  Trachea: No tracheal deviation  Cardiovascular:      Rate and Rhythm: Normal rate and regular rhythm  Pulses: Normal pulses  Heart sounds: Normal heart sounds  No murmur  Pulmonary:      Effort: Pulmonary effort is normal  No respiratory distress  Breath sounds: Normal breath sounds  No stridor  No wheezing or rales  Comments: Well healed incisions  Chest tube stitch removed  Chest:      Chest wall: No tenderness  Abdominal:      General: Bowel sounds are normal  There is no distension  Palpations: Abdomen is soft  Tenderness: There is no abdominal tenderness  There is no rebound  Musculoskeletal: Normal range of motion  Lymphadenopathy:      Cervical: No cervical adenopathy  Skin:     General: Skin is warm and dry  Coloration: Skin is not jaundiced or pale  Findings: No erythema or rash  Neurological:      General: No focal deficit present  Mental Status: She is alert and oriented to person, place, and time  Psychiatric:         Mood and Affect: Mood normal          Behavior: Behavior normal          Thought Content:  Thought content normal          Judgment: Judgment normal      /70 (BP Location: Left arm, Patient Position: Sitting)   Pulse 76   Temp 97 9 °F (36 6 °C) (Tympanic)   Resp 12   Ht 5' 7" (1 702 m)   Wt 67 1 kg (148 lb)   BMI 23 18 kg/m²     Xr Chest Pa & Lateral    Result Date: 5/2/2021  Impression Moderate right and small left pleural effusion, increased from 4/16/2021  Surgical changes from right upper lobectomy  Workstation performed: [id number]     Xr Chest Pa & Lateral    Result Date: 4/16/2021  Impression Left basilar atelectasis with trace left pleural effusion  Postoperative changes right hilum  No discernible pneumothorax  Workstation performed: [id number]      Ct Chest Wo Contrast    Result Date: 3/15/2021  Narrative CT CHEST WITHOUT IV CONTRAST INDICATION:   R91 1: Solitary pulmonary nodule  Follow-up lung nodule  History of renal cell carcinoma  COMPARISON:  Chest CT from 10/26/2020 and 7/21/2020  TECHNIQUE: CT examination of the chest was performed without intravenous contrast   Axial, sagittal, and coronal 2D reformatted images were created from the source data and submitted for interpretation  Radiation dose length product (DLP) for this visit:  226 mGy-cm   This examination, like all CT scans performed in the [facility], was performed utilizing techniques to minimize radiation dose exposure, including the use of iterative reconstruction and automated exposure control  FINDINGS: LUNGS:  Part solid nodule in the anterior segment right upper lobe  The groundglass component is without significant change at 2 9 x 2 5 cm (3/49) since July 2020  (Disease It was remeasured in July 2020 )  The solid component has increased from 3 mm to 1 2 x 0 8 cm (3/47) since October 2020  Redemonstration of mild retraction of the major fissure  Mild centrilobular emphysema  No significant filling defects in the trachea and bronchi  PLEURA:  Unremarkable  HEART/GREAT VESSELS:  Unremarkable for patient's age  MEDIASTINUM AND SIMON:  Unremarkable  CHEST WALL AND LOWER NECK:   1 cm rounded lesion in the right breast (2/25) corresponds with a cyst on ultrasound from 7/21/2020   VISUALIZED STRUCTURES IN THE UPPER ABDOMEN:  Unremarkable  OSSEOUS STRUCTURES:  Mild degenerative disease in the spine  Impression Part solid right upper lobe nodule without significant change in overall size since July 2020; however, the solid component has increased from 3 mm to 1 2 cm since October 2020 and is highly suspicious for adenocarcinoma  The study was marked in EPIC for significant notification  Workstation performed: [id number]     Ct Chest W Contrast    Result Date: 10/29/2020  Narrative CT CHEST WITH IV CONTRAST INDICATION:   R91 1: Solitary pulmonary nodule  COMPARISON:  July 21, 2020 TECHNIQUE: CT examination of the chest was performed  Axial, sagittal, and coronal 2D reformatted images were created from the source data and submitted for interpretation  Radiation dose length product (DLP) for this visit:  226 mGy-cm   This examination, like all CT scans performed in the [facility], was performed utilizing techniques to minimize radiation dose exposure, including the use of iterative reconstruction and automated exposure control  IV Contrast:  85 mL of iohexol (OMNIPAQUE) FINDINGS: LUNGS:  The previously identified groundglass attenuation lesion in the right lung is still present  It is visible on images 22-26 series 2 in the periphery of the right upper lobe  The vast majority of the lesion remains groundglass attenuation although there might be a small soft tissue component on image 46 series 3 where this might just be a prominent vascular branch point within the lesion  Overall, the findings are worrisome for possible adenocarcinoma of the lung which might be more definitively assessed with biopsy if deemed clinically safe and appropriate  There are areas of platelike atelectasis posteriorly in the left lower lobe  Emphysema is stable from prior  PLEURA:  Unremarkable  HEART/GREAT VESSELS:  Unremarkable for patient's age  MEDIASTINUM AND SIMON:  Unremarkable   CHEST WALL AND LOWER NECK:   Right breast lesions are visible, previously worked up for known to be benign cystic findings  VISUALIZED STRUCTURES IN THE UPPER ABDOMEN:  Unremarkable  OSSEOUS STRUCTURES:  No acute fracture or destructive osseous lesion  Impression Persistent mostly groundglass attenuation lesion of the right upper lobe which is 2 8 cm size, not significantly changed from prior although potentially with one small soft tissue density component (versus a prominent vascular branch point)  A persisting lesion of this appearance is worrisome for adenocarcinoma of the lung and tissue sampling might be indicated for definitive diagnosis  Emphysema is noted  No significant new abnormality seen elsewhere  The study was marked in EPIC for significant notification  Workstation performed: [id number]     Ct Chest W Contrast    Result Date: 7/23/2020  Narrative CT CHEST WITH IV CONTRAST INDICATION:   C64 1: Malignant neoplasm of right kidney, except renal pelvis  Right side renal cell carcinoma, status post partial nephrectomy on 8/20 6/20/2019  Follow-up  COMPARISON:  None  TECHNIQUE: CT examination of the chest was performed  Axial, sagittal, and coronal 2D reformatted images were created from the source data and submitted for interpretation  Radiation dose length product (DLP) for this visit:  258 mGy-cm   This examination, like all CT scans performed in the [facility], was performed utilizing techniques to minimize radiation dose exposure, including the use of iterative reconstruction and automated exposure control  IV Contrast:  100 mL of iohexol (OMNIPAQUE) FINDINGS: LUNGS:  Central airways are patent  Moderate centrilobular emphysema  Bandlike densities in bilateral lower lobes, likely reflecting subsegmental atelectasis versus scarring  No focal consolidation  There is a 2 7 x 2 2 x 1 5 cm groundglass nodule (GGN) in the right upper lobe (series 7 image 51, series 601 image 59)    There are questionable small solid components measuring 2-3 mm in size  This may be related to infectious/inflammatory process or an adenocarcinoma spectrum lesion  Follow-up with  short-term chest CT is recommended  PLEURA:  No pleural effusion or pneumothorax  HEART/GREAT VESSELS:  Unremarkable for patient's age  MEDIASTINUM AND SIMON:  Unremarkable  CHEST WALL AND LOWER NECK:   Mildly heterogeneous thyroid gland with probable small nodule in the left lobe  VISUALIZED STRUCTURES IN THE UPPER ABDOMEN:  Please refer to the separately dictated CT abdomen and pelvis report  OSSEOUS STRUCTURES:  No acute fracture or destructive osseous lesion  Impression 1   2 7 cm groundglass groundglass nodule (GGN) in the right upper lobe, which be related to infectious/inflammatory process or an adenocarcinoma spectrum lesion  Follow-up evaluation with short-term chest CT in 3-6 months is recommended  2   Moderate emphysema  The study was marked in EPIC for significant notification  Workstation performed: [id number]     No CT Chest,Abdomen,Pelvis results available for this patient  Nm Pet Ct Skull Base To Mid Thigh    Result Date: 4/9/2021  Narrative PET/CT SCAN INDICATION:  R91 1: Solitary pulmonary nodule   , history of right renal cell carcinoma, status post partial nephrectomy; Covid vaccine 1 left arm 3/19/2021 MODIFIER: PI COMPARISON: CT chest 3/11/2021 and priors CELL TYPE:  None TECHNIQUE:   8 46 mCi F-18-FDG administered IV  Multiplanar attenuation corrected and non attenuation corrected PET images were acquired 60 minutes post injection  Contiguous, low dose, axial CT sections were obtained from the skull base through the femurs   Intravenous contrast material was not utilized  This examination, like all CT scans performed in the [facility], was performed utilizing techniques to minimize radiation dose exposure, including the use of iterative reconstruction and automated exposure control   Fasting serum glucose: 82 mg/dl FINDINGS: VISUALIZED BRAIN:   No acute abnormalities are seen  HEAD/NECK:   There is a physiologic distribution of FDG  No FDG avid cervical adenopathy is seen  CT images: Unremarkable  CHEST: 2 9 x 2 4 cm right upper lung nodule image 3/88 demonstrates increased FDG uptake, SUV 4 2, most compatible with malignancy  This is likely a primary lung malignancy  There are several small subcentimeter normal size left axillary lymph nodes with mild FDG uptake, SUV measuring up to 2 8  These are likely reactive related to the recent Covid vaccination  No additional FDG avid soft tissue lesions are seen  No hypermetabolic hilar or mediastinal adenopathy  CT images: Emphysema  Bibasilar atelectasis  ABDOMEN:   No FDG avid soft tissue lesions are seen  CT images: Partial right nephrectomy  PELVIS: There is endometrial and bilateral adnexal activity, likely physiologic  2 3 x 1 7 cm left adnexal hypodense structure has an SUV of 4 1  Endometrial activity has an SUV of 3 8  Right adnexal activity has an SUV of 3 7  No additional FDG avid soft tissue lesions are seen  CT images: Stable  OSSEOUS STRUCTURES: No FDG avid lesions are seen  CT images: No significant findings  Impression 1   2 9 x 2 4 cm right upper lung nodule demonstrates hypermetabolism and is most compatible with malignancy  This is likely a primary lung malignancy  Tissue sampling recommended  2   Probable reactive left axillary nodes related to the recent Covid vaccination  These may be reassessed on follow-up exam   Otherwise no hypermetabolic metastases visualized in the thorax  3   Endometrial and bilateral adnexal activity is likely physiologic  This may be correlated with dedicated pelvic ultrasound  4   Otherwise no worrisome hypermetabolic lesions in the neck, abdomen, pelvis  The study was marked in EPIC for significant notification  Workstation performed: [id number]      No Barium Swallow results available for this patient

## 2021-05-04 NOTE — ASSESSMENT & PLAN NOTE
Ms Gomez Jimenez   Is a very pleasant 50-year-old female who is well known to me  She underwent a VATS right upper lobectomy on April 14, 2021  She presents today for her 1st postoperative visit  Today in the office, I am overall happy with her progress  She is still having some pain for which she is taking Tylenol and ibuprofen  This is well within normal limits  Additionally, she has a small pleural effusion on her chest x-ray  Again, this is very common after this surgery  I explained to her that this will resorb and go away as she continues to recover  We had a long conversation regarding her pathology  She has a stage I B  This means that she has 0 positive lymph nodes and she does not require any type of adjuvant chemotherapy or radiation  This is considered a surgical cure and this is excellent news for her  At this time, will have her come back and see me in another 4 weeks with another chest x-ray to ensure continued progress  She knows to call me if anything changes in the interim  I encouraged her to participate in any in all cardio activity that she could tolerate and to continue to use her incentive spirometer      Lyssa Jeffrey MD  Thoracic Surgery  (Available on [employer] Text)  Office: [phone]

## 2021-05-14 ENCOUNTER — TELEPHONE (OUTPATIENT)
Dept: HEMATOLOGY ONCOLOGY | Facility: CLINIC | Age: 46
End: 2021-05-14

## 2021-05-14 NOTE — TELEPHONE ENCOUNTER
Do you mind canceling Dr Yolande Watkins appt, she does not need this  I already spoke with the patient  Thank you!

## 2021-05-14 NOTE — TELEPHONE ENCOUNTER
Patient called about an appointment that was scheduled with medical oncology Amelia Jay  patient stated that Junito Villegas did not mention anything about this appointment and that patient would like to know if this appointment is needed & why  Stated to patient it could have been a mistake or this appointment could have been made before her biopsy -- stated that patient that I was unable to provide her with this answer and that I would reach out to Dr Shafer's team to see if someone can reach out to patient to further explain

## 2021-05-14 NOTE — TELEPHONE ENCOUNTER
Arabella Medley from Dr Sofía Alvarado office called to cancel the the appt with Dr Soila Douglas as noted

## 2021-05-26 ENCOUNTER — APPOINTMENT (OUTPATIENT)
Dept: LAB | Facility: CLINIC | Age: 46
End: 2021-05-26
Payer: COMMERCIAL

## 2021-05-26 ENCOUNTER — APPOINTMENT (OUTPATIENT)
Dept: RADIOLOGY | Facility: CLINIC | Age: 46
End: 2021-05-26
Payer: COMMERCIAL

## 2021-05-26 DIAGNOSIS — R91.1 LUNG NODULE: ICD-10-CM

## 2021-05-26 LAB
ALBUMIN SERPL BCP-MCNC: 3.8 G/DL (ref 3.5–5)
ALP SERPL-CCNC: 107 U/L (ref 46–116)
ALT SERPL W P-5'-P-CCNC: 17 U/L (ref 12–78)
ANION GAP SERPL CALCULATED.3IONS-SCNC: 6 MMOL/L (ref 4–13)
AST SERPL W P-5'-P-CCNC: 8 U/L (ref 5–45)
BASOPHILS # BLD AUTO: 0.05 THOUSANDS/ΜL (ref 0–0.1)
BASOPHILS NFR BLD AUTO: 1 % (ref 0–1)
BILIRUB SERPL-MCNC: 0.43 MG/DL (ref 0.2–1)
BUN SERPL-MCNC: 12 MG/DL (ref 5–25)
CALCIUM SERPL-MCNC: 8.9 MG/DL (ref 8.3–10.1)
CHLORIDE SERPL-SCNC: 107 MMOL/L (ref 100–108)
CHOLEST SERPL-MCNC: 204 MG/DL (ref 50–200)
CO2 SERPL-SCNC: 27 MMOL/L (ref 21–32)
CREAT SERPL-MCNC: 0.74 MG/DL (ref 0.6–1.3)
EOSINOPHIL # BLD AUTO: 0.1 THOUSAND/ΜL (ref 0–0.61)
EOSINOPHIL NFR BLD AUTO: 2 % (ref 0–6)
ERYTHROCYTE [DISTWIDTH] IN BLOOD BY AUTOMATED COUNT: 12.2 % (ref 11.6–15.1)
GFR SERPL CREATININE-BSD FRML MDRD: 97 ML/MIN/1.73SQ M
GLUCOSE P FAST SERPL-MCNC: 92 MG/DL (ref 65–99)
HCT VFR BLD AUTO: 39.6 % (ref 34.8–46.1)
HDLC SERPL-MCNC: 56 MG/DL
HGB BLD-MCNC: 13.2 G/DL (ref 11.5–15.4)
IMM GRANULOCYTES # BLD AUTO: 0.01 THOUSAND/UL (ref 0–0.2)
IMM GRANULOCYTES NFR BLD AUTO: 0 % (ref 0–2)
LDLC SERPL CALC-MCNC: 134 MG/DL (ref 0–100)
LYMPHOCYTES # BLD AUTO: 1.96 THOUSANDS/ΜL (ref 0.6–4.47)
LYMPHOCYTES NFR BLD AUTO: 35 % (ref 14–44)
MCH RBC QN AUTO: 30 PG (ref 26.8–34.3)
MCHC RBC AUTO-ENTMCNC: 33.3 G/DL (ref 31.4–37.4)
MCV RBC AUTO: 90 FL (ref 82–98)
MONOCYTES # BLD AUTO: 0.47 THOUSAND/ΜL (ref 0.17–1.22)
MONOCYTES NFR BLD AUTO: 9 % (ref 4–12)
NEUTROPHILS # BLD AUTO: 2.97 THOUSANDS/ΜL (ref 1.85–7.62)
NEUTS SEG NFR BLD AUTO: 53 % (ref 43–75)
NONHDLC SERPL-MCNC: 148 MG/DL
NRBC BLD AUTO-RTO: 0 /100 WBCS
PLATELET # BLD AUTO: 213 THOUSANDS/UL (ref 149–390)
PMV BLD AUTO: 9.3 FL (ref 8.9–12.7)
POTASSIUM SERPL-SCNC: 4.1 MMOL/L (ref 3.5–5.3)
PROT SERPL-MCNC: 7 G/DL (ref 6.4–8.2)
RBC # BLD AUTO: 4.4 MILLION/UL (ref 3.81–5.12)
SODIUM SERPL-SCNC: 140 MMOL/L (ref 136–145)
TRIGL SERPL-MCNC: 72 MG/DL
TSH SERPL DL<=0.05 MIU/L-ACNC: 1.01 UIU/ML (ref 0.36–3.74)
WBC # BLD AUTO: 5.56 THOUSAND/UL (ref 4.31–10.16)

## 2021-05-26 PROCEDURE — 85025 COMPLETE CBC W/AUTO DIFF WBC: CPT

## 2021-05-26 PROCEDURE — 36415 COLL VENOUS BLD VENIPUNCTURE: CPT

## 2021-05-26 PROCEDURE — 84443 ASSAY THYROID STIM HORMONE: CPT

## 2021-05-26 PROCEDURE — 80061 LIPID PANEL: CPT

## 2021-05-26 PROCEDURE — 80053 COMPREHEN METABOLIC PANEL: CPT

## 2021-05-26 PROCEDURE — 71046 X-RAY EXAM CHEST 2 VIEWS: CPT

## 2021-06-01 ENCOUNTER — OFFICE VISIT (OUTPATIENT)
Dept: CARDIAC SURGERY | Facility: CLINIC | Age: 46
End: 2021-06-01

## 2021-06-01 VITALS
BODY MASS INDEX: 22.84 KG/M2 | SYSTOLIC BLOOD PRESSURE: 92 MMHG | TEMPERATURE: 98.8 F | RESPIRATION RATE: 12 BRPM | HEIGHT: 67 IN | HEART RATE: 60 BPM | DIASTOLIC BLOOD PRESSURE: 66 MMHG | WEIGHT: 145.5 LBS

## 2021-06-01 DIAGNOSIS — C34.11 PRIMARY ADENOCARCINOMA OF UPPER LOBE OF RIGHT LUNG (HCC): Primary | ICD-10-CM

## 2021-06-01 PROCEDURE — 99024 POSTOP FOLLOW-UP VISIT: CPT | Performed by: THORACIC SURGERY (CARDIOTHORACIC VASCULAR SURGERY)

## 2021-06-01 NOTE — PROGRESS NOTES
Thoracic Follow-Up  Assessment/Plan:    No problem-specific Assessment & Plan notes found for this encounter  Diagnoses and all orders for this visit:    Primary adenocarcinoma of upper lobe of right lung [facility])  -     CT chest wo contrast; Future          Thoracic History     Date: 4/14/21    Procedure: VATS RULobectomy,   Pathology: 2 9cm adenocarcinoma, 0/17 LNs, PL1, G2  T2aN0, Stage IB     Subjective:    Patient ID: Kelley Connolly is a 51 y o  female  HPI  Ms Connolly  Is a very pleasant 50-year-old female who is well known to me  She underwent a VATS right upper lobectomy on April 14, 2021  She presents today for her 2nd postoperative visit  Today in the office, she states that she is doing pretty well  She had previously been experiencing alternating good and bad days  She said that recently, for approximately the past week, she has only had good days  She states that she still has some tenderness over her right breast but that this is improving  She does still have some muscular back pain also when she does a significant amount of activity  I personally reviewed her chest x-ray in PACS  This demonstrates that she still has a small pleural effusion but that this is improved from her last chest x-ray approximately 4 weeks ago  I explained to her that this will only continue to improve with time  The following portions of the patient's history were reviewed and updated as appropriate: allergies, current medications, past family history, past medical history, past social history, past surgical history and problem list     Review of Systems   Constitutional: Negative for chills, fatigue, fever and unexpected weight change  HENT: Negative  Eyes: Negative  Negative for visual disturbance  Respiratory: Negative for cough, shortness of breath and stridor  Cardiovascular: Negative for chest pain  Gastrointestinal: Negative  Endocrine: Negative  Genitourinary: Negative  Musculoskeletal: Negative  Skin: Negative  Neurological: Negative for dizziness, light-headedness and headaches  Hematological: Negative for adenopathy  Psychiatric/Behavioral: Negative  Objective:   Physical Exam  Vitals signs and nursing note reviewed  Constitutional:       General: She is not in acute distress  Appearance: Normal appearance  She is well-developed  She is not diaphoretic  HENT:      Head: Normocephalic and atraumatic  Nose: Nose normal  No congestion or rhinorrhea  Mouth/Throat:      Mouth: Mucous membranes are moist       Pharynx: Oropharynx is clear  No oropharyngeal exudate  Eyes:      General: No scleral icterus  Pupils: Pupils are equal, round, and reactive to light  Neck:      Musculoskeletal: Normal range of motion and neck supple  No muscular tenderness  Trachea: No tracheal deviation  Cardiovascular:      Rate and Rhythm: Normal rate and regular rhythm  Pulses: Normal pulses  Heart sounds: Normal heart sounds  No murmur  Pulmonary:      Effort: Pulmonary effort is normal  No respiratory distress  Breath sounds: Normal breath sounds  No stridor  No wheezing or rales  Comments: Well healed incisions  Chest:      Chest wall: No tenderness  Abdominal:      General: Bowel sounds are normal  There is no distension  Palpations: Abdomen is soft  Tenderness: There is no abdominal tenderness  There is no rebound  Musculoskeletal: Normal range of motion  Lymphadenopathy:      Cervical: No cervical adenopathy  Skin:     General: Skin is warm and dry  Coloration: Skin is not jaundiced or pale  Findings: No erythema or rash  Neurological:      General: No focal deficit present  Mental Status: She is alert and oriented to person, place, and time  Psychiatric:         Mood and Affect: Mood normal          Behavior: Behavior normal          Thought Content:  Thought content normal  Judgment: Judgment normal      BP 92/66 (BP Location: Left arm, Patient Position: Sitting)   Pulse 60   Temp 98 8 °F (37 1 °C) (Tympanic)   Resp 12   Ht 5' 7" (1 702 m)   Wt 66 kg (145 lb 8 oz)   BMI 22 79 kg/m²     Xr Chest Pa & Lateral    Result Date: 5/29/2021  Impression Right upper lobectomy with persistent trace right effusion  Workstation performed: [id number]     Xr Chest Pa & Lateral    Result Date: 5/2/2021  Impression Moderate right and small left pleural effusion, increased from 4/16/2021  Surgical changes from right upper lobectomy  Workstation performed: [id number]     Xr Chest Pa & Lateral    Result Date: 4/16/2021  Impression Left basilar atelectasis with trace left pleural effusion  Postoperative changes right hilum  No discernible pneumothorax  Workstation performed: [id number]      Ct Chest Wo Contrast    Result Date: 3/15/2021  Narrative CT CHEST WITHOUT IV CONTRAST INDICATION:   R91 1: Solitary pulmonary nodule  Follow-up lung nodule  History of renal cell carcinoma  COMPARISON:  Chest CT from 10/26/2020 and 7/21/2020  TECHNIQUE: CT examination of the chest was performed without intravenous contrast   Axial, sagittal, and coronal 2D reformatted images were created from the source data and submitted for interpretation  Radiation dose length product (DLP) for this visit:  226 mGy-cm   This examination, like all CT scans performed in the [facility], was performed utilizing techniques to minimize radiation dose exposure, including the use of iterative reconstruction and automated exposure control  FINDINGS: LUNGS:  Part solid nodule in the anterior segment right upper lobe  The groundglass component is without significant change at 2 9 x 2 5 cm (3/49) since July 2020  (Disease It was remeasured in July 2020 )  The solid component has increased from 3 mm to 1 2 x 0 8 cm (3/47) since October 2020  Redemonstration of mild retraction of the major fissure   Mild centrilobular emphysema  No significant filling defects in the trachea and bronchi  PLEURA:  Unremarkable  HEART/GREAT VESSELS:  Unremarkable for patient's age  MEDIASTINUM AND SIMON:  Unremarkable  CHEST WALL AND LOWER NECK:   1 cm rounded lesion in the right breast (2/25) corresponds with a cyst on ultrasound from 7/21/2020  VISUALIZED STRUCTURES IN THE UPPER ABDOMEN:  Unremarkable  OSSEOUS STRUCTURES:  Mild degenerative disease in the spine  Impression Part solid right upper lobe nodule without significant change in overall size since July 2020; however, the solid component has increased from 3 mm to 1 2 cm since October 2020 and is highly suspicious for adenocarcinoma  The study was marked in EPIC for significant notification  Workstation performed: [id number]     Ct Chest W Contrast    Result Date: 10/29/2020  Narrative CT CHEST WITH IV CONTRAST INDICATION:   R91 1: Solitary pulmonary nodule  COMPARISON:  July 21, 2020 TECHNIQUE: CT examination of the chest was performed  Axial, sagittal, and coronal 2D reformatted images were created from the source data and submitted for interpretation  Radiation dose length product (DLP) for this visit:  226 mGy-cm   This examination, like all CT scans performed in the [facility], was performed utilizing techniques to minimize radiation dose exposure, including the use of iterative reconstruction and automated exposure control  IV Contrast:  85 mL of iohexol (OMNIPAQUE) FINDINGS: LUNGS:  The previously identified groundglass attenuation lesion in the right lung is still present  It is visible on images 22-26 series 2 in the periphery of the right upper lobe  The vast majority of the lesion remains groundglass attenuation although there might be a small soft tissue component on image 46 series 3 where this might just be a prominent vascular branch point within the lesion    Overall, the findings are worrisome for possible adenocarcinoma of the lung which might be more definitively assessed with biopsy if deemed clinically safe and appropriate  There are areas of platelike atelectasis posteriorly in the left lower lobe  Emphysema is stable from prior  PLEURA:  Unremarkable  HEART/GREAT VESSELS:  Unremarkable for patient's age  MEDIASTINUM AND SIMON:  Unremarkable  CHEST WALL AND LOWER NECK:   Right breast lesions are visible, previously worked up for known to be benign cystic findings  VISUALIZED STRUCTURES IN THE UPPER ABDOMEN:  Unremarkable  OSSEOUS STRUCTURES:  No acute fracture or destructive osseous lesion  Impression Persistent mostly groundglass attenuation lesion of the right upper lobe which is 2 8 cm size, not significantly changed from prior although potentially with one small soft tissue density component (versus a prominent vascular branch point)  A persisting lesion of this appearance is worrisome for adenocarcinoma of the lung and tissue sampling might be indicated for definitive diagnosis  Emphysema is noted  No significant new abnormality seen elsewhere  The study was marked in EPIC for significant notification  Workstation performed: [id number]     Ct Chest W Contrast    Result Date: 7/23/2020  Narrative CT CHEST WITH IV CONTRAST INDICATION:   C64 1: Malignant neoplasm of right kidney, except renal pelvis  Right side renal cell carcinoma, status post partial nephrectomy on 8/20 6/20/2019  Follow-up  COMPARISON:  None  TECHNIQUE: CT examination of the chest was performed  Axial, sagittal, and coronal 2D reformatted images were created from the source data and submitted for interpretation  Radiation dose length product (DLP) for this visit:  258 mGy-cm   This examination, like all CT scans performed in the [facility], was performed utilizing techniques to minimize radiation dose exposure, including the use of iterative reconstruction and automated exposure control   IV Contrast:  100 mL of iohexol (OMNIPAQUE) FINDINGS: LUNGS:  Central airways are patent  Moderate centrilobular emphysema  Bandlike densities in bilateral lower lobes, likely reflecting subsegmental atelectasis versus scarring  No focal consolidation  There is a 2 7 x 2 2 x 1 5 cm groundglass nodule (GGN) in the right upper lobe (series 7 image 51, series 601 image 59)  There are questionable small solid components measuring 2-3 mm in size  This may be related to infectious/inflammatory process or an adenocarcinoma spectrum lesion  Follow-up with  short-term chest CT is recommended  PLEURA:  No pleural effusion or pneumothorax  HEART/GREAT VESSELS:  Unremarkable for patient's age  MEDIASTINUM AND SIMON:  Unremarkable  CHEST WALL AND LOWER NECK:   Mildly heterogeneous thyroid gland with probable small nodule in the left lobe  VISUALIZED STRUCTURES IN THE UPPER ABDOMEN:  Please refer to the separately dictated CT abdomen and pelvis report  OSSEOUS STRUCTURES:  No acute fracture or destructive osseous lesion  Impression 1   2 7 cm groundglass groundglass nodule (GGN) in the right upper lobe, which be related to infectious/inflammatory process or an adenocarcinoma spectrum lesion  Follow-up evaluation with short-term chest CT in 3-6 months is recommended  2   Moderate emphysema  The study was marked in EPIC for significant notification  Workstation performed: [id number]     No CT Chest,Abdomen,Pelvis results available for this patient  Nm Pet Ct Skull Base To Mid Thigh    Result Date: 4/9/2021  Narrative PET/CT SCAN INDICATION:  R91 1: Solitary pulmonary nodule   , history of right renal cell carcinoma, status post partial nephrectomy; Covid vaccine 1 left arm 3/19/2021 MODIFIER: PI COMPARISON: CT chest 3/11/2021 and priors CELL TYPE:  None TECHNIQUE:   8 46 mCi F-18-FDG administered IV  Multiplanar attenuation corrected and non attenuation corrected PET images were acquired 60 minutes post injection   Contiguous, low dose, axial CT sections were obtained from the skull base through the femurs   Intravenous contrast material was not utilized  This examination, like all CT scans performed in the [facility], was performed utilizing techniques to minimize radiation dose exposure, including the use of iterative reconstruction and automated exposure control  Fasting serum glucose: 82 mg/dl FINDINGS: VISUALIZED BRAIN:   No acute abnormalities are seen  HEAD/NECK:   There is a physiologic distribution of FDG  No FDG avid cervical adenopathy is seen  CT images: Unremarkable  CHEST: 2 9 x 2 4 cm right upper lung nodule image 3/88 demonstrates increased FDG uptake, SUV 4 2, most compatible with malignancy  This is likely a primary lung malignancy  There are several small subcentimeter normal size left axillary lymph nodes with mild FDG uptake, SUV measuring up to 2 8  These are likely reactive related to the recent Covid vaccination  No additional FDG avid soft tissue lesions are seen  No hypermetabolic hilar or mediastinal adenopathy  CT images: Emphysema  Bibasilar atelectasis  ABDOMEN:   No FDG avid soft tissue lesions are seen  CT images: Partial right nephrectomy  PELVIS: There is endometrial and bilateral adnexal activity, likely physiologic  2 3 x 1 7 cm left adnexal hypodense structure has an SUV of 4 1  Endometrial activity has an SUV of 3 8  Right adnexal activity has an SUV of 3 7  No additional FDG avid soft tissue lesions are seen  CT images: Stable  OSSEOUS STRUCTURES: No FDG avid lesions are seen  CT images: No significant findings  Impression 1   2 9 x 2 4 cm right upper lung nodule demonstrates hypermetabolism and is most compatible with malignancy  This is likely a primary lung malignancy  Tissue sampling recommended  2   Probable reactive left axillary nodes related to the recent Covid vaccination  These may be reassessed on follow-up exam   Otherwise no hypermetabolic metastases visualized in the thorax   3   Endometrial and bilateral adnexal activity is likely physiologic  This may be correlated with dedicated pelvic ultrasound  4   Otherwise no worrisome hypermetabolic lesions in the neck, abdomen, pelvis  The study was marked in EPIC for significant notification  Workstation performed: [id number]      No Barium Swallow results available for this patient

## 2021-06-01 NOTE — ASSESSMENT & PLAN NOTE
Ms Ross Bernheim  Is a very pleasant 69-year-old female who is well known to me  She underwent a VATS right upper lobectomy on April 14, 2021  She presents today for her 2nd postoperative visit  Today in the office, I am overall happy with her progress  She is coming along nicely  I did explain to her that her continued tenderness in her right breast is very normal and that this will continue to improve with time  I have cleared her from all restrictions at this time and I have encouraged her to use her elliptical at home  We did talk about ways to mitigate some of the tenderness that she is still experiencing  At this time, she will enter into a surveillance schedule with me  She will obtain a CT scan of her chest approximately 6 months from surgery and she will come back and see me at that time  She will be on a 6 month surveillance schedule for the 1st 3 years after surgery  She understands and all her questions were answered    She knows to call me if anything changes in the interim    Juan C Atkinson MD  Thoracic Surgery  (Available on [facility] FOR [facility] Text)  Office: [phone]

## 2021-06-02 ENCOUNTER — TELEMEDICINE (OUTPATIENT)
Dept: FAMILY MEDICINE CLINIC | Facility: CLINIC | Age: 46
End: 2021-06-02
Payer: COMMERCIAL

## 2021-06-02 DIAGNOSIS — K44.9 HIATAL HERNIA: ICD-10-CM

## 2021-06-02 DIAGNOSIS — E78.2 MIXED HYPERLIPIDEMIA: ICD-10-CM

## 2021-06-02 DIAGNOSIS — C34.11 PRIMARY ADENOCARCINOMA OF UPPER LOBE OF RIGHT LUNG (HCC): Primary | ICD-10-CM

## 2021-06-02 DIAGNOSIS — C64.1 RENAL CELL CARCINOMA OF RIGHT KIDNEY (HCC): ICD-10-CM

## 2021-06-02 DIAGNOSIS — F17.210 CIGARETTE NICOTINE DEPENDENCE WITHOUT COMPLICATION: ICD-10-CM

## 2021-06-02 PROCEDURE — 1036F TOBACCO NON-USER: CPT | Performed by: FAMILY MEDICINE

## 2021-06-02 PROCEDURE — 99214 OFFICE O/P EST MOD 30 MIN: CPT | Performed by: FAMILY MEDICINE

## 2021-06-02 NOTE — ASSESSMENT & PLAN NOTE
Patient quit smoking now she has cravings for chocolate donut she gained weight and is worried about weight gain but will start an exercise program now over the summer I told her to work on diet and exercise and follow up as needed otherwise I will see her in 6 months

## 2021-06-02 NOTE — PROGRESS NOTES
Virtual Regular Visit      Assessment/Plan:    Problem List Items Addressed This Visit        Respiratory    Primary adenocarcinoma of upper lobe of right lung ([facility] [address] ) - Primary     Discussed CT scan report thoracic surgery reports and office visit from yesterday with patient she is doing better overall has some bad days but more good days now is starting to exercise breathing has improved         Relevant Orders    CBC and differential    Comprehensive metabolic panel    Lipid panel    TSH, 3rd generation with Free T4 reflex    UA (URINE) with reflex to Scope       Genitourinary    Renal cell carcinoma of right kidney [facility])     Patient will follow-up with Nephrology next month urinalysis normal reviewed all laboratory work            Other    Hiatal hernia    Relevant Orders    CBC and differential    Comprehensive metabolic panel    Lipid panel    TSH, 3rd generation with Free T4 reflex    UA (URINE) with reflex to Scope    Cigarette nicotine dependence without complication     Patient quit smoking now she has cravings for chocolate donut she gained weight and is worried about weight gain but will start an exercise program now over the summer I told her to work on diet and exercise and follow up as needed otherwise I will see her in 6 months         Relevant Orders    CBC and differential    Comprehensive metabolic panel    Lipid panel    TSH, 3rd generation with Free T4 reflex    UA (URINE) with reflex to Scope    Mixed hyperlipidemia     Reviewed laboratory work decrease chocolate donuts follow-up laboratory work in 6 months         Relevant Orders    CBC and differential    Comprehensive metabolic panel    Lipid panel    TSH, 3rd generation with Free T4 reflex    UA (URINE) with reflex to Scope               Reason for visit is   Chief Complaint   Patient presents with    Follow-up     lab review     Virtual Regular Visit        Encounter provider José Miguel Amador DO    Provider located at [address] [place]  [facility] [address]  [phone]      Recent Visits  No visits were found meeting these conditions  Showing recent visits within past 7 days and meeting all other requirements     Today's Visits  Date Type Provider Dept   06/02/21 Telemedicine Xiomara Graf DO [address] today's visits and meeting all other requirements     Future Appointments  No visits were found meeting these conditions  Showing future appointments within next 150 days and meeting all other requirements        The patient was identified by name and date of birth  Kelley Connolly was informed that this is a telemedicine visit and that the visit is being conducted through [address] [place] [address] Now and patient was informed that this is a secure, HIPAA-compliant platform  She agrees to proceed     My office door was closed  No one else was in the room  She acknowledged consent and understanding of privacy and security of the video platform  The patient has agreed to participate and understands they can discontinue the visit at any time  Patient is aware this is a billable service  Subjective  Kelley Connolly is a 51 y o  female Discuss overall health concerns and review labs   Patient seen today by virtual video visit as she is fatigued postop from thoracic surgery for the adenocarcinoma the surgery went well I reviewed her reports with her and she had a visit with thoracic surgery yesterday she has good days and bad days more good days recently and she is starting to do light exercise concerned about weight gain after she stop smoking  Here also to review all her laboratory work and her general health concerns         Past Medical History:   Diagnosis Date    Cancer ([facility] [address] ) 6/16/19    Chronic kidney disease 08/2019    tumor and cyst    Colon polyp     Gastritis     Nodule of upper lobe of right lung     PONV (postoperative nausea and vomiting)     Renal cell carcinoma ([facility] [address] ) 06/2019    Renal mass, right        Past Surgical History:   Procedure Laterality Date    BREAST BIOPSY Left 01/2020    benign    COLONOSCOPY      LUNG SEGMENTECTOMY Right 4/14/2021    Procedure: RESECTION WEDGE LUNG;  Surgeon: Bonilla Peguero MD;  [facility];  Service: Thoracic    [address] N/A 4/14/2021    Procedure: Tolentino Mallet;  Surgeon: Bonilla Peguero MD;  [facility];  Service: Thoracic    ND LAP,PARTIAL NEPHRECTOMY Right 8/26/2019    Procedure: NEPHRECTOMY PARTIAL LAPAROSCOPIC W ROBOTICS WITH INTRA-OP LAPAROSCOPIC ULTRASOUND;  Surgeon: Lucretia Roach MD;  [facility];  Service: Urology    ND THORACOSCOPY SURG LOBECTOMY Right 4/14/2021    Procedure: LOBECTOMY LUNG; MEDIASTINAL LYMPH NODE DISSECTION;  Surgeon: Bonilla Peguero MD;  [facility];  Service: Thoracic    ND THORACOSCOPY Eather Guise WEDGE RESEXN INITIAL UNILAT Right 4/14/2021    Procedure: THORACOSCOPY VIDEO ASSISTED SURGERY (VATS); Surgeon: Bonilla Peguero MD;  [facility];  Service: Thoracic    US GUIDED BREAST BIOPSY LEFT COMPLETE Left 1/15/2020    WISDOM TOOTH EXTRACTION         Current Outpatient Medications   Medication Sig Dispense Refill    ALPRAZolam (XANAX) 0 25 mg tablet Take 1 tablet (0 25 mg total) by mouth 2 (two) times a day as needed for anxiety 20 tablet 0    docusate sodium (COLACE) 100 mg capsule Take 1 capsule (100 mg total) by mouth 2 (two) times a day (Patient taking differently: Take 100 mg by mouth as needed ) 10 capsule 0     No current facility-administered medications for this visit  Allergies   Allergen Reactions    Strawberry (Diagnostic) - Food Allergy Hives    Tetracycline      breast swelling        Review of Systems   Constitutional: Positive for fatigue  Negative for chills and fever  HENT: Negative for congestion, nosebleeds, rhinorrhea, sinus pressure and sore throat      Eyes: Negative for discharge and redness  Respiratory: Negative for cough and shortness of breath  Cardiovascular: Negative for chest pain, palpitations and leg swelling  Gastrointestinal: Negative for abdominal pain, blood in stool and nausea  Endocrine: Negative for cold intolerance, heat intolerance and polyuria  Genitourinary: Negative for dysuria and frequency  Musculoskeletal: Negative for arthralgias, back pain and myalgias  Skin: Negative for rash  Neurological: Negative for dizziness, weakness and headaches  Hematological: Negative for adenopathy  Psychiatric/Behavioral: Negative for behavioral problems and sleep disturbance  The patient is not nervous/anxious  Video Exam    There were no vitals filed for this visit  Physical Exam  Vitals signs and nursing note reviewed  Constitutional:       General: She is not in acute distress  Appearance: Normal appearance  She is well-developed and normal weight  HENT:      Head: Normocephalic and atraumatic  Right Ear: Tympanic membrane, ear canal and external ear normal       Left Ear: Tympanic membrane, ear canal and external ear normal       Nose: Nose normal       Mouth/Throat:      Mouth: Mucous membranes are moist       Pharynx: Oropharynx is clear  No oropharyngeal exudate  Eyes:      General: No scleral icterus  Right eye: No discharge  Left eye: No discharge  Conjunctiva/sclera: Conjunctivae normal       Pupils: Pupils are equal, round, and reactive to light  Neck:      Musculoskeletal: Normal range of motion  Thyroid: No thyromegaly  Vascular: No JVD  Cardiovascular:      Rate and Rhythm: Normal rate and regular rhythm  Pulses: Normal pulses  Heart sounds: Normal heart sounds  No murmur  Pulmonary:      Effort: Pulmonary effort is normal       Breath sounds: No wheezing or rales  Chest:      Chest wall: No tenderness     Abdominal:      General: Bowel sounds are normal  There is no distension  Palpations: Abdomen is soft  There is no mass  Tenderness: There is no abdominal tenderness  Musculoskeletal: Normal range of motion  General: No tenderness or deformity  Lymphadenopathy:      Cervical: No cervical adenopathy  Skin:     General: Skin is warm and dry  Findings: No rash  Neurological:      General: No focal deficit present  Mental Status: She is alert and oriented to person, place, and time  Mental status is at baseline  Cranial Nerves: No cranial nerve deficit  Coordination: Coordination normal       Deep Tendon Reflexes: Reflexes are normal and symmetric  Reflexes normal    Psychiatric:         Mood and Affect: Mood normal          Behavior: Behavior normal          Thought Content: Thought content normal          Judgment: Judgment normal           I spent 25 minutes with patient today in which greater than 50% of the time was spent in counseling/coordination of care regarding Discussed overall general health concerns and reviewed laboratory work and CT scans and specialty reports and recommend that she continue with the light exercise and increase exercise as tolerated      VIRTUAL VISIT DISCLAIMER    Kelley Connolly acknowledges that she has consented to an online visit or consultation  She understands that the online visit is based solely on information provided by her, and that, in the absence of a face-to-face physical evaluation by the physician, the diagnosis she receives is both limited and provisional in terms of accuracy and completeness  This is not intended to replace a full medical face-to-face evaluation by the physician  Kelley Connolly understands and accepts these terms

## 2021-06-02 NOTE — ASSESSMENT & PLAN NOTE
Discussed CT scan report thoracic surgery reports and office visit from yesterday with patient she is doing better overall has some bad days but more good days now is starting to exercise breathing has improved

## 2021-06-15 ENCOUNTER — APPOINTMENT (EMERGENCY)
Dept: RADIOLOGY | Facility: HOSPITAL | Age: 46
End: 2021-06-15
Payer: COMMERCIAL

## 2021-06-15 ENCOUNTER — HOSPITAL ENCOUNTER (EMERGENCY)
Facility: HOSPITAL | Age: 46
Discharge: HOME | End: 2021-06-15
Attending: EMERGENCY MEDICINE | Admitting: EMERGENCY MEDICINE
Payer: COMMERCIAL

## 2021-06-15 VITALS
DIASTOLIC BLOOD PRESSURE: 87 MMHG | OXYGEN SATURATION: 96 % | HEART RATE: 70 BPM | SYSTOLIC BLOOD PRESSURE: 146 MMHG | RESPIRATION RATE: 18 BRPM | TEMPERATURE: 98.3 F
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
## 2021-06-15 DIAGNOSIS — R11.0 NAUSEA: ICD-10-CM

## 2021-06-15 DIAGNOSIS — S42.213A HUMERAL SURGICAL NECK FRACTURE: Primary | ICD-10-CM

## 2021-06-15 PROCEDURE — 96376 TX/PRO/DX INJ SAME DRUG ADON: CPT

## 2021-06-15 PROCEDURE — 73060 X-RAY EXAM OF HUMERUS: CPT

## 2021-06-15 PROCEDURE — 96374 THER/PROPH/DIAG INJ IV PUSH: CPT

## 2021-06-15 PROCEDURE — 99284 EMERGENCY DEPT VISIT MOD MDM: CPT

## 2021-06-15 PROCEDURE — 73030 X-RAY EXAM OF SHOULDER: CPT

## 2021-06-15 PROCEDURE — 96375 TX/PRO/DX INJ NEW DRUG ADDON: CPT

## 2021-06-15 PROCEDURE — 99284 EMERGENCY DEPT VISIT MOD MDM: CPT | Performed by: PHYSICIAN ASSISTANT

## 2021-06-15 PROCEDURE — 73564 X-RAY EXAM KNEE 4 OR MORE: CPT

## 2021-06-15 RX ORDER — KETOROLAC TROMETHAMINE 30 MG/ML
15 INJECTION, SOLUTION INTRAMUSCULAR; INTRAVENOUS ONCE
Status: COMPLETED | OUTPATIENT
Start: 2021-06-15 | End: 2021-06-15

## 2021-06-15 RX ORDER — ONDANSETRON 4 MG/1
4 TABLET, ORALLY DISINTEGRATING ORAL EVERY 6 HOURS PRN
Qty: 20 TABLET | Refills: 0 | Status: SHIPPED | OUTPATIENT
Start: 2021-06-15 | End: 2021-11-30 | Stop reason: ALTCHOICE

## 2021-06-15 RX ORDER — DIPHENHYDRAMINE HYDROCHLORIDE 50 MG/ML
25 INJECTION INTRAMUSCULAR; INTRAVENOUS ONCE
Status: COMPLETED | OUTPATIENT
Start: 2021-06-15 | End: 2021-06-15

## 2021-06-15 RX ORDER — METOCLOPRAMIDE HYDROCHLORIDE 5 MG/ML
10 INJECTION INTRAMUSCULAR; INTRAVENOUS ONCE
Status: COMPLETED | OUTPATIENT
Start: 2021-06-15 | End: 2021-06-15

## 2021-06-15 RX ORDER — FENTANYL CITRATE 50 UG/ML
50 INJECTION, SOLUTION INTRAMUSCULAR; INTRAVENOUS ONCE
Status: DISCONTINUED | OUTPATIENT
Start: 2021-06-15 | End: 2021-06-15 | Stop reason: HOSPADM

## 2021-06-15 RX ORDER — OXYCODONE HYDROCHLORIDE 5 MG/1
5 TABLET ORAL EVERY 6 HOURS PRN
Qty: 12 TABLET | Refills: 0 | Status: SHIPPED | OUTPATIENT
Start: 2021-06-15 | End: 2021-06-18

## 2021-06-15 RX ADMIN — DIPHENHYDRAMINE HYDROCHLORIDE 25 MG: 50 INJECTION, SOLUTION INTRAMUSCULAR; INTRAVENOUS at 08:43

## 2021-06-15 RX ADMIN — KETOROLAC TROMETHAMINE 15 MG: 30 INJECTION, SOLUTION INTRAMUSCULAR at 07:06

## 2021-06-15 RX ADMIN — METOCLOPRAMIDE HYDROCHLORIDE 10 MG: 5 INJECTION INTRAMUSCULAR; INTRAVENOUS at 08:44

## 2021-06-15 RX ADMIN — KETOROLAC TROMETHAMINE 15 MG: 30 INJECTION, SOLUTION INTRAMUSCULAR at 08:44

## 2021-06-15 NOTE — DISCHARGE INSTRUCTIONS
Wear your sling and do not bear weight on your left arm  Take Tylenol 650mg and ibuprofen 600mg every 6 hours as needed for pain  Take oxycodone as needed for breakthrough pain  Take Zofran as needed for nausea  Please call orthopedics later today to schedule a follow-up appointment  Return to the ER with any worsening symptoms

## 2021-06-15 NOTE — ED PROVIDER NOTES
History  Chief Complaint   Patient presents with   [facility] Fall     Patient reports trip over baby gate and fall on right shoulder  Denies head injury, states she landed on right shoulder and right elbow  Denies thinners  54yo right hand dominant female with a history of lung cancer s/p lobectomy in April 2021 presenting via EMS after a fall about 1 hour ago  Patient was walking down the steps when she tripped over a baby gate and landed on her right elbow and left knee  She denies any known head strike or LOC  Patient was unable to get up after the fall so EMS was activated  Patient's main complaint at this time is right shoulder pain  She also reports minor pain in her left knee and the right side of her neck  No headache, dizziness, visual changes  Patient reports nausea but denies any vomiting  No paresthesias  Patient recently underwent a lobectomy in the right lung for lung cancer two months ago and is still recovering from this  No new chest pain or shortness of breath  No thinners         History provided by:  Patient and EMS personnel   used: No    Fall  Mechanism of injury: fall    Injury location:  Shoulder/arm  Shoulder/arm injury location:  R arm  Incident location:  Home  Time since incident:  1 hour  Arrived directly from scene: yes    Fall:     Fall occurred:  Walking    Height of fall:  Ground level    Impact surface:  Hard floor    Point of impact:  Outstretched arms    Entrapped after fall: no    Suspicion of alcohol use: no    Suspicion of drug use: no    Prior to arrival data:     Patient ambulatory at scene: no      Loss of consciousness: no      Amnesic to event: no      IV access status:  Established  Associated symptoms: neck pain    Associated symptoms: no abdominal pain, no back pain, no chest pain, no difficulty breathing, no headaches, no hearing loss, no loss of consciousness, no nausea, no seizures and no vomiting        Prior to Admission Medications   Prescriptions Last Dose Informant Patient Reported? Taking? ALPRAZolam (XANAX) 0 25 mg tablet Not Taking at Unknown time Self No No   Sig: Take 1 tablet (0 25 mg total) by mouth 2 (two) times a day as needed for anxiety   Patient not taking: Reported on 6/15/2021      Facility-Administered Medications: None       Past Medical History:   Diagnosis Date    Cancer ([facility] ) 6/16/19    Chronic kidney disease 08/2019    tumor and cyst    Colon polyp     Gastritis     Nodule of upper lobe of right lung     PONV (postoperative nausea and vomiting)     Renal cell carcinoma ([facility] ) 06/2019    Renal mass, right        Past Surgical History:   Procedure Laterality Date    BREAST BIOPSY Left 01/2020    benign    COLONOSCOPY      LUNG SEGMENTECTOMY Right 4/14/2021    Procedure: RESECTION WEDGE LUNG;  Surgeon: Manjeet Alicea MD;  [facility];  Service: Thoracic    [address] N/A 4/14/2021    Procedure: Talon Rodriguez;  Surgeon: Manjeet Alicea MD;  [facility];  Service: Thoracic    DE LAP,PARTIAL NEPHRECTOMY Right 8/26/2019    Procedure: NEPHRECTOMY PARTIAL LAPAROSCOPIC W ROBOTICS WITH INTRA-OP LAPAROSCOPIC ULTRASOUND;  Surgeon: Tino Estrada MD;  [facility];  Service: Urology    [address] [place] [address] Right 4/14/2021    Procedure: LOBECTOMY LUNG; MEDIASTINAL LYMPH NODE DISSECTION;  Surgeon: Manjeet Alicea MD;  [facility];  Service: Thoracic    DE THORACOSCOPY W/THERA WEDGE RESEXN INITIAL UNILAT Right 4/14/2021    Procedure: THORACOSCOPY VIDEO ASSISTED SURGERY (VATS);   Surgeon: Manjeet Alicea MD;  [facility];  Service: Thoracic    US GUIDED BREAST BIOPSY LEFT COMPLETE Left 1/15/2020    WISDOM TOOTH EXTRACTION         Family History   Problem Relation Age of Onset    Heart disease Father     Cancer Paternal Grandmother     Diabetes Paternal Grandmother     Breast cancer Paternal Grandmother 79    Other Maternal Aunt Renal Cell Carcinoma    No Known Problems Mother     No Known Problems Sister     No Known Problems Maternal Grandmother     Breast cancer Paternal Grandfather 79    No Known Problems Maternal Aunt     No Known Problems Maternal Aunt      I have reviewed and agree with the history as documented  E-Cigarette/Vaping    E-Cigarette Use Never User      E-Cigarette/Vaping Substances    Nicotine No     THC No     CBD No     Flavoring No     Other No     Unknown No      Social History     Tobacco Use    Smoking status: Former Smoker     Packs/day: 0 00     Years: 25 00     Pack years: 0 00     Types: Cigarettes     Quit date: 2021     Years since quittin 4    Smokeless tobacco: Never Used    Tobacco comment: quit on 20   Vaping Use    Vaping Use: Never used   Substance Use Topics    Alcohol use: Not Currently     Alcohol/week: 0 0 standard drinks    Drug use: Never       Review of Systems   Constitutional: Negative for chills and fever  HENT: Negative for hearing loss  Eyes: Negative for discharge and redness  Respiratory: Negative for shortness of breath and stridor  Cardiovascular: Negative for chest pain and palpitations  Gastrointestinal: Negative for abdominal pain, nausea and vomiting  Musculoskeletal: Positive for arthralgias and neck pain  Negative for back pain  Skin: Negative for color change and rash  Neurological: Negative for seizures, loss of consciousness, weakness, numbness and headaches  Psychiatric/Behavioral: Negative for confusion  The patient is not nervous/anxious  All other systems reviewed and are negative  Physical Exam  Physical Exam  Vitals and nursing note reviewed  Constitutional:       General: She is not in acute distress  Appearance: She is well-developed  She is not diaphoretic  HENT:      Head: Normocephalic and atraumatic        Comments: No external signs of head trauma     Right Ear: External ear normal       Left Ear: External ear normal       Nose: Nose normal    Eyes:      General: No scleral icterus  Right eye: No discharge  Left eye: No discharge  Conjunctiva/sclera: Conjunctivae normal    Neck:      Comments: No cervical spine tenderness  Cardiovascular:      Rate and Rhythm: Normal rate and regular rhythm  Heart sounds: Normal heart sounds  No murmur heard  Pulmonary:      Effort: Pulmonary effort is normal  No respiratory distress  Breath sounds: Normal breath sounds  No stridor  No wheezing or rales  Abdominal:      General: There is no distension  Tenderness: There is no abdominal tenderness  There is no guarding  Musculoskeletal:         General: No deformity  Right shoulder: Bony tenderness present  No deformity, effusion or laceration  Decreased range of motion  Cervical back: Normal range of motion and neck supple  Left knee: Bony tenderness (Mild, anterior) present  No swelling, deformity, effusion, erythema, ecchymosis or lacerations  Normal range of motion  Comments: Patient refuses to attempt ROM of right shoulder due to pain  ROM of elbow intact  No deformity  She has significant tenderness to the proximal humerus  2+ radial pulse Sensation intact in axillary nerve distribution  Lymphadenopathy:      Cervical: No cervical adenopathy  Skin:     General: Skin is warm and dry  Neurological:      General: No focal deficit present  Mental Status: She is alert  She is not disoriented  GCS: GCS eye subscore is 4  GCS verbal subscore is 5  GCS motor subscore is 6  Psychiatric:         Mood and Affect: Mood is anxious           Behavior: Behavior normal          Vital Signs  ED Triage Vitals   Temperature Pulse Respirations Blood Pressure SpO2   06/15/21 0652 06/15/21 0652 06/15/21 0652 06/15/21 0652 06/15/21 0652   98 3 °F (36 8 °C) 85 18 150/87 94 %      Temp Source Heart Rate Source Patient Position - Orthostatic VS BP Location FiO2 (%) 06/15/21 0652 06/15/21 0652 -- -- --   Oral Monitor         Pain Score       06/15/21 0740       5           Vitals:    06/15/21 0652 06/15/21 0700 06/15/21 0740   BP: 150/87 150/87 146/87   Pulse: 85 78 70         Visual Acuity      ED Medications  Medications   ketorolac (TORADOL) injection 15 mg (15 mg Intravenous Given 6/15/21 0706)   metoclopramide (REGLAN) injection 10 mg (10 mg Intravenous Given 6/15/21 0844)   diphenhydrAMINE (BENADRYL) injection 25 mg (25 mg Intravenous Given 6/15/21 0843)   ketorolac (TORADOL) injection 15 mg (15 mg Intravenous Given 6/15/21 0844)       Diagnostic Studies  Results Reviewed     None                 XR shoulder 2+ views RIGHT   ED Interpretation by Grant Em PA-C (06/15 0750)   Humeral head fracture      Final Result by Opal Steen MD (06/15 1275)      Impacted fracture of the surgical neck of the humerus with mild comminution   No dislocation            Workstation performed: [id number]         XR humerus RIGHT   Final Result by Opal Steen MD (06/15 9619)      Impacted fracture of the surgical neck of the humerus with no significant displacement and mild  comminution   No fracture seen within the shaft      Workstation performed: [id number]         XR knee 4+ views left injury   ED Interpretation by Grant Em PA-C (06/15 0750)   No acute fracture      Final Result by Opal Steen MD (06/15 8769)      No acute displaced fracture   No knee effusion            Workstation performed: [id number]                    Procedures  Procedures         ED Course                             SBIRT 20yo+      Most Recent Value   SBIRT (25 yo +)   In order to provide better care to our patients, we are screening all of our patients for alcohol and drug use  Would it be okay to ask you these screening questions?   Unable to answer at this time Filed at: 06/15/2021 6709                    MDM  Number of Diagnoses or Management Options  Fall, initial encounter: new and requires workup  Humeral surgical neck fracture: new and requires workup  Nausea: new and does not require workup  Diagnosis management comments: 52yo female presenting for evaluation after a fall less than an hour ago in which she tripped over a baby gate onto her right arm/elbow  No head strike or LOC  Patient's mainly complaint at this time is right shoulder pain  Also reports left knee pain  Patient refuses to move the right shoulder due to pain  +Bony tenderness  No deformity  RUE is neurovascularly intact  X-rays obtained which reveal a right surgical neck fracture  No dislocation seen  X-rays of left knee are normal  Patient placed in a sling  Advised PRN Tylenol and Motrin for pain  Script provided for oxycodone  Orthopedics referral given and advised close f/u  ED return precautions discussed  Patient expressed understanding and is agreeable to plan  Patient discharged in stable condition  Amount and/or Complexity of Data Reviewed  Tests in the radiology section of CPT®: ordered and reviewed  Independent visualization of images, tracings, or specimens: yes    Risk of Complications, Morbidity, and/or Mortality  Presenting problems: moderate  Diagnostic procedures: low  Management options: low    Patient Progress  Patient progress: stable      Disposition  Final diagnoses:   Humeral surgical neck fracture   Fall, initial encounter   Nausea     Time reflects when diagnosis was documented in both MDM as applicable and the Disposition within this note     Time User Action Codes Description Comment    6/15/2021  8:40  [facility] Shanita Jensen Add [S42 213A] Humeral surgical neck fracture     6/15/2021  8:40 AM Shanita Mas Add Oncristin Alexander [facility] Fall, initial encounter     6/15/2021  8:46  [facility] [address] [place] [R11 0] Nausea       ED Disposition     ED Disposition Condition Date/Time Comment    Discharge Stable Tue Cuong 15, 2021  8:39 AM April Cathleen discharge to home/self care  Follow-up Information     Follow up With Specialties Details Why Contact Info Additional [phone] [facility] Orthopedic Surgery Schedule an appointment as soon as possible for a visit   [address] [facility]  [address] [phone]  [address] [facility], [address] [address] [place], [place], [phone] [facility] Emergency Department Emergency Medicine  If symptoms worsen [address] Emergency Department, [address] [place] [address], [phone]          Discharge Medication List as of 6/15/2021  8:49 AM      START taking these medications    Details   ondansetron (ZOFRAN-ODT) 4 mg disintegrating tablet Take 1 tablet (4 mg total) by mouth every 6 (six) hours as needed for nausea or vomiting, Starting Tue 6/15/2021, Normal      oxyCODONE (ROXICODONE) 5 mg immediate release tablet Take 1 tablet (5 mg total) by mouth every 6 (six) hours as needed for severe pain for up to 3 daysMax Daily Amount: 20 mg, Starting Tue 6/15/2021, Until Fri 6/18/2021 at 2359, Normal         CONTINUE these medications which have NOT CHANGED    Details   ALPRAZolam (XANAX) 0 25 mg tablet Take 1 tablet (0 25 mg total) by mouth 2 (two) times a day as needed for anxiety, Starting Fri 8/7/2020, Normal           No discharge procedures on file      PDMP Review     None          ED Provider  Electronically Signed by           Ashley Murrell PA-C  06/15/21 0406

## 2021-06-16 ENCOUNTER — OFFICE VISIT (OUTPATIENT)
Dept: OBGYN CLINIC | Facility: CLINIC | Age: 46
End: 2021-06-16
Payer: COMMERCIAL

## 2021-06-16 VITALS
HEART RATE: 92 BPM | RESPIRATION RATE: 18 BRPM | HEIGHT: 67 IN | DIASTOLIC BLOOD PRESSURE: 72 MMHG | WEIGHT: 150 LBS | SYSTOLIC BLOOD PRESSURE: 104 MMHG | BODY MASS INDEX: 23.54 KG/M2

## 2021-06-16 DIAGNOSIS — S42.294A OTHER CLOSED NONDISPLACED FRACTURE OF PROXIMAL END OF RIGHT HUMERUS, INITIAL ENCOUNTER: Primary | ICD-10-CM

## 2021-06-16 PROCEDURE — 3008F BODY MASS INDEX DOCD: CPT | Performed by: ORTHOPAEDIC SURGERY

## 2021-06-16 PROCEDURE — 99203 OFFICE O/P NEW LOW 30 MIN: CPT | Performed by: ORTHOPAEDIC SURGERY

## 2021-06-16 PROCEDURE — 1036F TOBACCO NON-USER: CPT | Performed by: ORTHOPAEDIC SURGERY

## 2021-06-16 NOTE — PROGRESS NOTES
Patient Name:  Kelley Connolly  MRN:  [id number]    [address] [place], [place] [address]     1  Other closed nondisplaced fracture of proximal end of right humerus, initial encounter        79-year-old right-hand-dominant female status post mechanical fall at home resulting in right proximal humerus fracture  X-rays reviewed in office with patient and   In depth discussion had with patient regarding conservative vs surgical management if indicated  Conservative treatment consisting of physical therapy for gentle ROM, oral OTC medications, sling use  Also in-depth discussion had with patient has been regarding surgical intervention indications  Risks and benefits of both surgical versus conservative management discussed with patient and at this time, patient like to proceed forward with conservative treatment  Due to overall alignment of fracture currently on x-ray, non operative treatment seems likely  Advised patient would be beneficial to sleep upright in a recliner or with multiple pillows propped behind in bed with sling intact  Remove sling for hygenic purposes and about 3 times daily for elbow ROM  No overhead motion of shoulder at this time  Patient was scheduled for CT of right shoulder for further evaluation of proximal humerus fracture displacement and alignment  Patient will follow up in office after CT has resulted  For further treatment planning  Chief Complaint     Right shoulder pain     History of the Present Illness     Kelley Connolly is a right-hand-dominant 51 y o  female with right shoulder pain secondary to mechanical fall last night over a dog gate  Patient reports she fell directly onto right side and experienced immediate pain  Patient was taken to the emergency department where x-rays were performed demonstrating a proximal humerus fracture  Today, patient presents in a sling which has been applied since last night  Patient denies numbness and tingling right upper extremity    Patient is approximately 8 weeks status post lobectomy and mediastinal lymph node dissection performed in [place] by Dr Jim Cardenas  She has a more distant history of renal cancer which was cured surgically, as well as adenocarcinoma of the lung with no no metastasis  She denies any shoulder pain prior to her trip and fall  Review of Systems     Review of Systems   Constitutional: Negative for chills and fever  HENT: Negative for congestion  Respiratory: Negative for cough and chest tightness  Cardiovascular: Negative for palpitations  Gastrointestinal: Negative for abdominal pain  Endocrine: Negative for cold intolerance and heat intolerance  Musculoskeletal: Negative for arthralgias, back pain and gait problem  Neurological: Negative for syncope  Psychiatric/Behavioral: Negative for confusion  Physical Exam     /72   Pulse 92   Resp 18   Ht 5' 7" (1 702 m)   Wt 68 kg (150 lb)   BMI 23 49 kg/m²     Right Shoulder: Active range of motion of shoulder deferred secondary to acute fracture  Passive range of elbow from about -1 degrees extension to 120 degrees flexion with associated shoulder pain secondary to fracture  There is moderate tenderness present over the fracture site and proximal humerus  All special testing deferred today secondary to acute fracture  Motor intact to AIN, PI and, ulnar nerve distributions  The patient is neurovascularly intact distally in the extremity  Eyes:  Anicteric sclerae  Neck:  Supple  Lungs:  Normal respiratory effort  Cardiovascular:  Capillary refill is less than 2 seconds  Skin:  Intact without erythema  Neurologic:  Sensation grossly intact to light touch  Psychiatric:  Mood and affect are appropriate  Data Review     I have personally reviewed pertinent films in PACS, and my interpretation follows:    X-rays taken yesterday 04/15/2021 of right shoulder demonstrates  Comminuted, minimally displaced proximal humerus fracture    No obvious sign of blastic or lytic lesion appreciable  Past Medical History:   Diagnosis Date    Cancer ([facility] ) 6/16/19    Chronic kidney disease 08/2019    tumor and cyst    Colon polyp     Gastritis     Nodule of upper lobe of right lung     PONV (postoperative nausea and vomiting)     Renal cell carcinoma ([facility] ) 06/2019    Renal mass, right        Past Surgical History:   Procedure Laterality Date    BREAST BIOPSY Left 01/2020    benign    COLONOSCOPY      LUNG SEGMENTECTOMY Right 4/14/2021    Procedure: RESECTION WEDGE LUNG;  Surgeon: Rob Baker MD;  [facility];  Service: Thoracic    RI [address] N/A 4/14/2021    Procedure: BRONCHOSCOPY FLEXIBLE;  Surgeon: Rob Baker MD;  [facility];  Service: Thoracic    RI LAP,PARTIAL NEPHRECTOMY Right 8/26/2019    Procedure: NEPHRECTOMY PARTIAL LAPAROSCOPIC W ROBOTICS WITH INTRA-OP LAPAROSCOPIC ULTRASOUND;  Surgeon: Payam Parker MD;  [facility];  Service: Urology    RI THORACOSCOPY SURG LOBECTOMY Right 4/14/2021    Procedure: LOBECTOMY LUNG; MEDIASTINAL LYMPH NODE DISSECTION;  Surgeon: Rob Baker MD;  [facility];  Service: Thoracic    RI THORACOSCOPY W/THERA WEDGE RESEXN INITIAL UNILAT Right 4/14/2021    Procedure: THORACOSCOPY VIDEO ASSISTED SURGERY (VATS);   Surgeon: Rob Baker MD;  [facility];  Service: Thoracic    US GUIDED BREAST BIOPSY LEFT COMPLETE Left 1/15/2020    WISDOM TOOTH EXTRACTION         Allergies   Allergen Reactions    Strawberry (Diagnostic) - Food Allergy Hives    Tetracycline      breast swelling        Current Outpatient Medications on File Prior to Visit   Medication Sig Dispense Refill    ALPRAZolam (XANAX) 0 25 mg tablet Take 1 tablet (0 25 mg total) by mouth 2 (two) times a day as needed for anxiety 20 tablet 0    ondansetron (ZOFRAN-ODT) 4 mg disintegrating tablet Take 1 tablet (4 mg total) by mouth every 6 (six) hours as needed for nausea or vomiting 20 tablet 0    oxyCODONE (ROXICODONE) 5 mg immediate release tablet Take 1 tablet (5 mg total) by mouth every 6 (six) hours as needed for severe pain for up to 3 daysMax Daily Amount: 20 mg (Patient not taking: Reported on 2021) 12 tablet 0     No current facility-administered medications on file prior to visit  Social History     Tobacco Use    Smoking status: Former Smoker     Packs/day: 0 00     Years: 25 00     Pack years: 0 00     Types: Cigarettes     Quit date: 2021     Years since quittin 4    Smokeless tobacco: Never Used    Tobacco comment: quit on 21   Vaping Use    Vaping Use: Never used   Substance Use Topics    Alcohol use: Not Currently     Alcohol/week: 0 0 standard drinks    Drug use: Never       Family History   Problem Relation Age of Onset    Heart disease Father     Cancer Paternal Grandmother     Diabetes Paternal Grandmother     Breast cancer Paternal Grandmother 79    Other Maternal Aunt         Renal Cell Carcinoma    No Known Problems Mother     No Known Problems Sister     No Known Problems Maternal Grandmother     Breast cancer Paternal Grandfather 79    No Known Problems Maternal Aunt     No Known Problems Maternal Aunt              Procedures Performed     Fracture / Dislocation Treatment    Date/Time: 2021 7:58 AM  Performed by: Chaparrita Pollock DO  Authorized by: Chaparrita Pollock DO     Patient Location:  Bedside  Consent given by:  Patient  Patient states understanding of procedure being performed: Yes    Radiology Images displayed and confirmed   If images not available, report reviewed: Yes    Injury location:  Shoulder  Location details:  Right shoulder  Injury type:  Fracture  Neurovascular status: Neurovascularly intact    Manipulation performed?: No    Immobilization:  Sling  Neurovascular status: Neurovascularly intact    Patient tolerance:  Patient tolerated the procedure well with no immediate complications      None    Obdulia Oconnell PA-C

## 2021-07-03 ENCOUNTER — HOSPITAL ENCOUNTER (OUTPATIENT)
Dept: CT IMAGING | Facility: HOSPITAL | Age: 46
Discharge: HOME | End: 2021-07-03
Payer: COMMERCIAL

## 2021-07-03 DIAGNOSIS — S42.294A OTHER CLOSED NONDISPLACED FRACTURE OF PROXIMAL END OF RIGHT HUMERUS, INITIAL ENCOUNTER: ICD-10-CM

## 2021-07-03 PROCEDURE — 73200 CT UPPER EXTREMITY W/O DYE: CPT

## 2021-07-07 ENCOUNTER — OFFICE VISIT (OUTPATIENT)
Dept: OBGYN CLINIC | Facility: CLINIC | Age: 46
End: 2021-07-07

## 2021-07-07 VITALS
WEIGHT: 150 LBS | HEIGHT: 67 IN | BODY MASS INDEX: 23.54 KG/M2 | HEART RATE: 84 BPM | SYSTOLIC BLOOD PRESSURE: 103 MMHG | DIASTOLIC BLOOD PRESSURE: 71 MMHG

## 2021-07-07 DIAGNOSIS — S42.201D CLOSED FRACTURE OF PROXIMAL END OF RIGHT HUMERUS WITH ROUTINE HEALING, UNSPECIFIED FRACTURE MORPHOLOGY, SUBSEQUENT ENCOUNTER: Primary | ICD-10-CM

## 2021-07-07 PROCEDURE — 3008F BODY MASS INDEX DOCD: CPT | Performed by: ORTHOPAEDIC SURGERY

## 2021-07-07 PROCEDURE — 99024 POSTOP FOLLOW-UP VISIT: CPT | Performed by: ORTHOPAEDIC SURGERY

## 2021-07-07 NOTE — PROGRESS NOTES
Patient Name:  Kelley Connolly  MRN:  [id number]    [address] [place] [address]     1  Closed fracture of proximal end of right humerus with routine healing, unspecified fracture morphology, subsequent encounter          CT scan of the right humerus reviewed in the patient displays comminuted nondisplaced proximal humerus fracture with no increase in displacement from prior x-rays  Multiple treatment options were discussed with the patient including operative and non operative treatment  Due to the alignment of the fracture I have recommended non operative intervention at this time  Patient was given a prescription to begin formal physical therapy to work on gentle passive range of motion to maximum of 90 of forward flexion, 90 of abduction, and 60 of external rotation  I will see the patient back in 3 weeks for repeat evaluation and new x-rays of her right shoulder  History of the Present Illness   Kelley Madsen is a 51 y o  female   Here for follow-up with CT scan of right proximal humerus fracture  She reports she has stopped using her sling 1 week ago  Pain has significantly improved  She denies any other new complaints  Review of Systems     Review of Systems   Constitutional: Negative for appetite change and unexpected weight change  HENT: Negative for congestion and trouble swallowing  Eyes: Negative for visual disturbance  Respiratory: Negative for cough and shortness of breath  Cardiovascular: Negative for chest pain and palpitations  Gastrointestinal: Negative for nausea and vomiting  Endocrine: Negative for cold intolerance and heat intolerance  Musculoskeletal: Negative for gait problem and myalgias  Skin: Negative for rash  Neurological: Negative for numbness  Physical Exam     /71   Pulse 84   Ht 5' 7" (1 702 m)   Wt 68 kg (150 lb)   BMI 23 49 kg/m²       Right shoulder: Resolving ecchymosis over upper arm  Minimal tenderness over proximal humerus present  Passive forward flexion of 45 and abduction to 45 without pain  Full active range of motion of elbow, wrist, and digits  Neurovascular intact distally  Data Review     I have personally reviewed pertinent films in PACS, and my interpretation follows  CT scan of the right humerus displaced comminuted nondisplaced proximal humerus fracture with maintenance of overall fracture alignment when compared to prior radiographs      Social History     Tobacco Use    Smoking status: Former Smoker     Packs/day: 0 00     Years: 25 00     Pack years: 0 00     Types: Cigarettes     Quit date: 2021     Years since quittin 5    Smokeless tobacco: Never Used    Tobacco comment: quit on 21   Vaping Use    Vaping Use: Never used   Substance Use Topics    Alcohol use: Not Currently     Alcohol/week: 0 0 standard drinks    Drug use: Never           Procedures    Carlye Velasco, DO

## 2021-07-19 ENCOUNTER — EVALUATION (OUTPATIENT)
Dept: PHYSICAL THERAPY | Facility: CLINIC | Age: 46
End: 2021-07-19
Payer: COMMERCIAL

## 2021-07-19 ENCOUNTER — APPOINTMENT (OUTPATIENT)
Dept: LAB | Facility: CLINIC | Age: 46
End: 2021-07-19
Payer: COMMERCIAL

## 2021-07-19 DIAGNOSIS — M25.511 RIGHT SHOULDER PAIN, UNSPECIFIED CHRONICITY: Primary | ICD-10-CM

## 2021-07-19 DIAGNOSIS — K44.9 HIATAL HERNIA: ICD-10-CM

## 2021-07-19 DIAGNOSIS — C34.11 PRIMARY ADENOCARCINOMA OF UPPER LOBE OF RIGHT LUNG (HCC): ICD-10-CM

## 2021-07-19 DIAGNOSIS — N28.89 RENAL MASS, RIGHT: ICD-10-CM

## 2021-07-19 DIAGNOSIS — F17.210 CIGARETTE NICOTINE DEPENDENCE WITHOUT COMPLICATION: ICD-10-CM

## 2021-07-19 DIAGNOSIS — E78.2 MIXED HYPERLIPIDEMIA: ICD-10-CM

## 2021-07-19 LAB
ANION GAP SERPL CALCULATED.3IONS-SCNC: 6 MMOL/L (ref 4–13)
BUN SERPL-MCNC: 12 MG/DL (ref 5–25)
CALCIUM SERPL-MCNC: 8.8 MG/DL (ref 8.3–10.1)
CHLORIDE SERPL-SCNC: 106 MMOL/L (ref 100–108)
CO2 SERPL-SCNC: 27 MMOL/L (ref 21–32)
CREAT SERPL-MCNC: 0.71 MG/DL (ref 0.6–1.3)
GFR SERPL CREATININE-BSD FRML MDRD: 102 ML/MIN/1.73SQ M
GLUCOSE SERPL-MCNC: 96 MG/DL (ref 65–140)
POTASSIUM SERPL-SCNC: 4.3 MMOL/L (ref 3.5–5.3)
SODIUM SERPL-SCNC: 139 MMOL/L (ref 136–145)

## 2021-07-19 PROCEDURE — 97162 PT EVAL MOD COMPLEX 30 MIN: CPT | Performed by: PHYSICAL THERAPIST

## 2021-07-19 PROCEDURE — 80048 BASIC METABOLIC PNL TOTAL CA: CPT

## 2021-07-19 PROCEDURE — 36415 COLL VENOUS BLD VENIPUNCTURE: CPT

## 2021-07-19 PROCEDURE — 97110 THERAPEUTIC EXERCISES: CPT | Performed by: PHYSICAL THERAPIST

## 2021-07-19 NOTE — PROGRESS NOTES
PT Evaluation     Today's date: 2021  Patient name: Kelley Connolly  : 1975  MRN: [id number]  Referring provider: Lillian Burt DO  Dx:   Encounter Diagnosis     ICD-10-CM    1  Right shoulder pain, unspecified chronicity  M25 511                   Assessment  Assessment details: Kelley Connolly is a pleasant 51 y o  presenting to physical therapy with MD referral for Right shoulder pain, unspecified chronicity  (primary encounter diagnosis) status post shoulder fracture  Problem list: Poor posture, presumed decreased upper extremity strength, limited shoulder PROM (AROM not assessed), probable hematoma along lateral aspect of upper arm    Treatment to include: Manual therapy techniques, shoulder A/AA/PROM within MD restrictions, RTC/periscapular strengthening when allowed, instruction in a comprehensive HEP, postural re-education exercises, and modalities as needed  This pt would benefit from skilled PT services to address their impairments and functional limitations to maximize functional outcome  Barriers to therapy: Recent lung lobectomy  Understanding of Dx/Px/POC: good   Prognosis: good    Goals  ST  Pt will improve shoulder flexion to 90 degrees in 4 weeks  2  Pt will improve shoulder abduction to 90 degrees in 4 weeks  LT  Pt will be able to fasten bra behind back with minimal to no pain in 8 weeks  2  Pt will be independent in a comprehensive HEP in 8 weeks  3  Pt will be able to reach into overhead cabinets with minimal to no pain in 8 weeks  Plan  Patient would benefit from: skilled physical therapy  Frequency: 2x week  Duration in weeks: 8  Treatment plan discussed with: patient        Subjective Evaluation    History of Present Illness  Mechanism of injury: Patient tripped over a baby gait and landed on right elbow 6-15-21  Pt went to ER who took x-rays which revealed impacted fracture of surgical neck of humerus with mild comminution   Pt had appointment with ortho, Dr Beryl Moreno, the following day who proceeded with nonsurgical management of fracture  CT ordered of shoulder which revealed "Comminuted, minimally displaced fracture of the humeral head and neck extending to the greater tuberosity"  Pt reports she also underwent a lobectomy on 21 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  Pt denies any numbness/tingling      Premorbid status:  - ADLs: Indepenent with mild difficulty  - Work: Full time, Full duty- work from home  - Recreation: elliptical    Current status:  - ADLs/Functional activities:     - Reaching into shoulder height cabinets with Pain Levels: unable   - Reaching into overhead cabinets with Pain Levels: unable   - Washing lower back/tucking in shirt with Pain Levels: unable   - Washing hair with Pain Levels: unable   - Driving with left arm only   - Lifting unable with RUE   - Sleeping with 0 nightly sleep disturbances due to pain laying flat in bed  - Work: Full time, Full duty- work from home  - Recreation: none  Pain  Current pain ratin  At best pain ratin  At worst pain rating: 10  Location: Superior and lateral shoulder  Quality: sharp and dull ache  Relieving factors: rest  Progression: improved      Diagnostic Tests  X-ray: abnormal  Treatments  Previous treatment: immobilization  Current treatment: physical therapy  Patient Goals  Patient goals for therapy: decreased pain, increased motion, increased strength and independence with ADLs/IADLs          Objective     Palpation     Additional Palpation Details  Hard hematoma noted at deltoid insertion with no pain  TTP over ACJ and posterior RTC tendons    Active Range of Motion   Left Shoulder   Flexion: 145 degrees   Abduction: 170 degrees   External rotation BTH: T3   Internal rotation BTB: T5     Right Elbow   Extension: 25 degrees     Additional Active Range of Motion Details  No AROM on RUE  Lacking full elbow extension    Passive Range of Motion     Right Shoulder   Flexion: 48 degrees with pain  Abduction: 30 degrees with pain  External rotation 0°: 37 degrees with pain    Strength/Myotome Testing     Left Shoulder     Planes of Motion   Flexion: 4   Abduction: 4   External rotation at 0°: 4+     Left Elbow   Flexion: 4+  Extension: 4+    Additional Strength Details  No MMT on RUE due to MD restrictions             Precautions: "gentle passive range of motion to maximum of 90 of forward flexion, 90 of abduction, and 60 of external rotation"      Manuals 7-19 (IE)            gentle passive range of motion to maximum of 90 of forward flexion, 90 of abduction, and 60 of external rotation NV                                                   Neuro Re-Ed             Cervical retraction 10 x 10" NV            Scapular retraction 10 x 10"NV                                                                             Ther Ex             Pendulums CW/CCW, AP, ML 20 x ea NV                         PROM with OP into shoulder flexion 15 x 10" NV            PROM with OP into shoulder scaption 15 x 10" NV            PROM into ER to 60 deg with arm at side 15 x 10" NV                         Elbow flexion 2 x 10 NV            Seated:             - wrist flexion/extension 2 x 10 NV            -  strength (all fingers) 10 x 10" Red (3#) NV                                      Ther Activity                                       Gait Training                                       Modalities             Cryo                            * On initial evaluation, educated pt on anatomy, pathology, and exercise rationale  Provided pt with basic HEP and ensured proper exercise performance  Educated pt to call with any questions or concerns

## 2021-07-21 ENCOUNTER — HOSPITAL ENCOUNTER (OUTPATIENT)
Dept: ULTRASOUND IMAGING | Facility: CLINIC | Age: 46
Discharge: HOME | End: 2021-07-21
Payer: COMMERCIAL

## 2021-07-21 DIAGNOSIS — R92.8 MAMMOGRAM ABNORMAL: ICD-10-CM

## 2021-07-21 PROCEDURE — 76642 ULTRASOUND BREAST LIMITED: CPT

## 2021-07-22 ENCOUNTER — OFFICE VISIT (OUTPATIENT)
Dept: PHYSICAL THERAPY | Facility: CLINIC | Age: 46
End: 2021-07-22
Payer: COMMERCIAL

## 2021-07-22 DIAGNOSIS — M25.511 RIGHT SHOULDER PAIN, UNSPECIFIED CHRONICITY: Primary | ICD-10-CM

## 2021-07-22 PROCEDURE — 97110 THERAPEUTIC EXERCISES: CPT | Performed by: PHYSICAL THERAPIST

## 2021-07-22 PROCEDURE — 97140 MANUAL THERAPY 1/> REGIONS: CPT | Performed by: PHYSICAL THERAPIST

## 2021-07-22 NOTE — PROGRESS NOTES
Daily Note     Today's date: 2021  Patient name: April Cathleen  : 1975  MRN: [id number]  Referring provider: Bailey Rubin DO  Dx:   Encounter Diagnosis     ICD-10-CM    1  Right shoulder pain, unspecified chronicity  M25 511                   Subjective: Patient reports she feels as though she has been overdoing it at home  Pt states she tried to put on different clothes and a bra and has noticed increased pain since  Pt reports she is also experiencing some pain in left rhomboid region after she has been laying flat without 2-3 pillows  Objective: See treatment diary below      Assessment: Pt presents with spasm of right upper trapezius muscle as well as levator scapulae this date  Reviewed with pt that at this time she is PROM only and she should not be actively using her right shoulder  Tolerated treatment well  Patient demonstrated fatigue post treatment, exhibited good technique with therapeutic exercises and would benefit from continued PT      Plan: Progress treatment as tolerated         Precautions: "gentle passive range of motion to maximum of 90 of forward flexion, 90 of abduction, and 60 of external rotation"      Manuals 7-19 (IE) 7-22           gentle passive range of motion to maximum of 90 of forward flexion, 90 of abduction, and 60 of external rotation NV JG                                                  Neuro Re-Ed             Cervical retraction 10 x 10" NV            Scapular retraction 10 x 10"NV                                                                             Ther Ex             Pendulums CW/CCW, AP, ML 20 x ea NV 20 x ea                        PROM with OP into shoulder flexion 15 x 10" NV 15 x 10"            PROM with OP into shoulder scaption 15 x 10" NV 15 x 10"            PROM into ER to 60 deg with arm at side 15 x 10" NV 15 x 10" supine with half foam under arm                        Elbow flexion 2 x 10 NV 2 x 10           Seated:             - wrist flexion/extension 2 x 10 NV 2 x 10           -  strength (all fingers) 10 x 10" Red (3#) NV 10 x 10" Red                                     Ther Activity                                       Gait Training                                       Modalities             Cryo

## 2021-07-23 ENCOUNTER — HOSPITAL ENCOUNTER (OUTPATIENT)
Dept: ULTRASOUND IMAGING | Facility: HOSPITAL | Age: 46
Discharge: HOME | End: 2021-07-23
Payer: COMMERCIAL

## 2021-07-23 DIAGNOSIS — N28.89 RENAL MASS, RIGHT: ICD-10-CM

## 2021-07-23 PROCEDURE — 76770 US EXAM ABDO BACK WALL COMP: CPT

## 2021-07-27 ENCOUNTER — TELEPHONE (OUTPATIENT)
Dept: UROLOGY | Facility: CLINIC | Age: 46
End: 2021-07-27

## 2021-07-27 ENCOUNTER — OFFICE VISIT (OUTPATIENT)
Dept: PHYSICAL THERAPY | Facility: CLINIC | Age: 46
End: 2021-07-27
Payer: COMMERCIAL

## 2021-07-27 ENCOUNTER — OFFICE VISIT (OUTPATIENT)
Dept: OBGYN CLINIC | Facility: CLINIC | Age: 46
End: 2021-07-27
Payer: COMMERCIAL

## 2021-07-27 ENCOUNTER — APPOINTMENT (OUTPATIENT)
Dept: RADIOLOGY | Facility: CLINIC | Age: 46
End: 2021-07-27
Payer: COMMERCIAL

## 2021-07-27 VITALS — BODY MASS INDEX: 23.54 KG/M2 | HEIGHT: 67 IN | RESPIRATION RATE: 18 BRPM | WEIGHT: 150 LBS

## 2021-07-27 DIAGNOSIS — S42.201D CLOSED FRACTURE OF PROXIMAL END OF RIGHT HUMERUS WITH ROUTINE HEALING, UNSPECIFIED FRACTURE MORPHOLOGY, SUBSEQUENT ENCOUNTER: Primary | ICD-10-CM

## 2021-07-27 DIAGNOSIS — M25.511 RIGHT SHOULDER PAIN, UNSPECIFIED CHRONICITY: Primary | ICD-10-CM

## 2021-07-27 DIAGNOSIS — S42.201D CLOSED FRACTURE OF PROXIMAL END OF RIGHT HUMERUS WITH ROUTINE HEALING, UNSPECIFIED FRACTURE MORPHOLOGY, SUBSEQUENT ENCOUNTER: ICD-10-CM

## 2021-07-27 PROCEDURE — 99213 OFFICE O/P EST LOW 20 MIN: CPT | Performed by: ORTHOPAEDIC SURGERY

## 2021-07-27 PROCEDURE — 97110 THERAPEUTIC EXERCISES: CPT

## 2021-07-27 PROCEDURE — 73030 X-RAY EXAM OF SHOULDER: CPT

## 2021-07-27 NOTE — PROGRESS NOTES
Daily Note     Today's date: 2021  Patient name: Kelley Connolly  : 1975  MRN: [id number]  Referring provider: Debbie Holman DO  Dx:   Encounter Diagnosis     ICD-10-CM    1  Right shoulder pain, unspecified chronicity  M25 511        Start Time: 930  Stop Time: 1015  Total time in clinic (min): 45 minutes    Subjective: Patient notes, "my shoulder has been throbbing  No complaints of pain"  Patient to see ortho following this visit  Objective: See treatment diary below    Assessment: Patient still very limited within the current PROM protocol  No increase in pain with PROM, however, she does have mod muscular guarding  Tolerated treatment well  Patient demonstrated fatigue post treatment, exhibited good technique with therapeutic exercises and would benefit from continued PT    Plan: Progress treatment as tolerated         Precautions: "gentle passive range of motion to maximum of 90 of forward flexion, 90 of abduction, and 60 of external rotation"    Manuals 7-19 (IE) 7-22 7-27          gentle passive range of motion to maximum of 90 of forward flexion, 90 of abduction, and 60 of external rotation NV JG JM                                                 Neuro Re-Ed             Cervical retraction 10 x 10" NV            Scapular retraction 10 x 10"NV                                                                             Ther Ex             Pendulums CW/CCW, AP, ML 20 x ea NV 20 x ea 20x ea                       PROM with OP into shoulder flexion 15 x 10" NV 15 x 10"  15 x 10"          PROM with OP into shoulder scaption 15 x 10" NV 15 x 10"  15 x 10"          PROM into ER to 60 deg with arm at side 15 x 10" NV 15 x 10" supine with half foam under arm 15 x 10" supine with half foam under arm                       Elbow flexion 2 x 10 NV 2 x 10 2x10          Seated:             - wrist flexion/extension 2 x 10 NV 2 x 10   2x10          -  strength (all fingers) 10 x 10" Red (3#) NV 10 x 10" Red   10"x10    Red                                    Ther Activity                                       Gait Training                                       Modalities             Cryo

## 2021-07-27 NOTE — TELEPHONE ENCOUNTER
Pt needs [facility] referral  For [facility]  Previous referral   Has [facility] Family insurance that requires referral   Pt of Dr Isa Kennedy 21 - 1 year exam with Anthony Gaitan PA-C   F/U Renal mass    No need to fax I can pull it off [address]  Thank you!   Vanessa Valladares

## 2021-07-27 NOTE — PROGRESS NOTES
Patient Name:  Kelley Connolly  MRN:  [id number]    [address] [place] [address]     1  Closed fracture of proximal end of right humerus with routine healing, unspecified fracture morphology, subsequent encounter  -     XR shoulder 2+ vw right; Future; Expected date: 07/27/2021          X-rays reviewed and discussed in the office with the patient today  At this time she is given a new prescription to continue physical therapy working on more aggressive passive range of motion and incorporating gentle active assist and active range of motion  I will see the patient back in 4 weeks for repeat evaluation and new x-rays of her right shoulder  History of the Present Illness   Kelley Peñaloza is a 51 y o  female  Here for follow-up of right proximal humerus fracture  She reports continued pain which is minimally improved since her last visit  She has started working with physical therapy  Range of motion is slowly improving  She denies any other new complaints  Review of Systems     Review of Systems   Constitutional: Negative for appetite change and unexpected weight change  HENT: Negative for congestion and trouble swallowing  Eyes: Negative for visual disturbance  Respiratory: Negative for cough and shortness of breath  Cardiovascular: Negative for chest pain and palpitations  Gastrointestinal: Negative for nausea and vomiting  Endocrine: Negative for cold intolerance and heat intolerance  Musculoskeletal: Negative for gait problem and myalgias  Skin: Negative for rash  Neurological: Negative for numbness  Physical Exam     Resp 18   Ht 5' 7" (1 702 m)   Wt 68 kg (150 lb)   BMI 23 49 kg/m²       Right shoulder:  Swelling and ecchymosis are improving  Mild tenderness present over deltoid musculature  Passive forward flexion to 90°, abduction to 90° with mild discomfort at terminal range of motion  Active forward flexion of 45° without pain    Patient is neurovascular intact distally  Data Review     I have personally reviewed pertinent films in PACS, and my interpretation follows  X-rays of the right shoulder reviewed in the office today display maintenance of alignment of proximal humerus fracture with early signs of interval healing present      Social History     Tobacco Use    Smoking status: Former Smoker     Packs/day: 0 00     Years: 25 00     Pack years: 0 00     Types: Cigarettes     Quit date: 2021     Years since quittin 5    Smokeless tobacco: Never Used    Tobacco comment: quit on 21   Vaping Use    Vaping Use: Never used   Substance Use Topics    Alcohol use: Not Currently     Alcohol/week: 0 0 standard drinks    Drug use: Never           Procedures    Jam Kaba,

## 2021-07-29 ENCOUNTER — OFFICE VISIT (OUTPATIENT)
Dept: PHYSICAL THERAPY | Facility: CLINIC | Age: 46
End: 2021-07-29
Payer: COMMERCIAL

## 2021-07-29 ENCOUNTER — OFFICE VISIT (OUTPATIENT)
Dept: UROLOGY | Facility: CLINIC | Age: 46
End: 2021-07-29
Payer: COMMERCIAL

## 2021-07-29 VITALS
SYSTOLIC BLOOD PRESSURE: 114 MMHG | HEART RATE: 86 BPM | HEIGHT: 67 IN | BODY MASS INDEX: 24.96 KG/M2 | WEIGHT: 159 LBS | DIASTOLIC BLOOD PRESSURE: 66 MMHG

## 2021-07-29 DIAGNOSIS — M25.511 RIGHT SHOULDER PAIN, UNSPECIFIED CHRONICITY: Primary | ICD-10-CM

## 2021-07-29 DIAGNOSIS — C64.1 RENAL CELL CARCINOMA OF RIGHT KIDNEY (HCC): Primary | ICD-10-CM

## 2021-07-29 PROCEDURE — 1036F TOBACCO NON-USER: CPT | Performed by: PHYSICIAN ASSISTANT

## 2021-07-29 PROCEDURE — 97110 THERAPEUTIC EXERCISES: CPT | Performed by: PHYSICAL THERAPIST

## 2021-07-29 PROCEDURE — 3008F BODY MASS INDEX DOCD: CPT | Performed by: PHYSICIAN ASSISTANT

## 2021-07-29 PROCEDURE — 99213 OFFICE O/P EST LOW 20 MIN: CPT | Performed by: PHYSICIAN ASSISTANT

## 2021-07-29 NOTE — PROGRESS NOTES
Daily Note     Today's date: 2021  Patient name: Kelley Connolly  : 1975  MRN: [id number]  Referring provider: Julia De La Paz DO  Dx:   Encounter Diagnosis     ICD-10-CM    1  Right shoulder pain, unspecified chronicity  M25 511                   Subjective: Pt reports she saw MD who has allowed her to now perform AROM of shoulder  Pt reports she was very sore following MD visit  Objective: See treatment diary below      Assessment: Progressed exercises to include AAROM exercises per updated MD script  Tolerated treatment well  Patient demonstrated fatigue post treatment and would benefit from continued PT  Plan: Continue per plan of care  Progress treatment as tolerated  Precautions: "Increase passive range of motion  as tolerated   Begin to incorporate active assist and active range of motion as tolerated   Modalities as needed   No resistance exercises at this time " <-- Per MD updated script     Manuals  (IE)          gentle passive range of motion to maximum of 90 of forward flexion, 90 of abduction, and 60 of external rotation NV JG JM          PROM with OP into flex/scap and IR/ER at 45 degrees abd    JG                                   Neuro Re-Ed             Cervical retraction 10 x 10" NV            Scapular retraction 10 x 10"NV                                                                             Ther Ex             Pendulums CW/CCW, AP, ML 20 x ea NV 20 x ea 20x ea NV                      PROM with OP into shoulder flexion 15 x 10" NV 15 x 10"  15 x 10" DC         PROM with OP into shoulder scaption 15 x 10" NV 15 x 10"  15 x 10" DC         PROM into ER to 60 deg with arm at side 15 x 10" NV 15 x 10" supine with half foam under arm 15 x 10" supine with half foam under arm DC         Standing:             - wall ladder flex    10 x 5" NV         - wall ladder scap    10 x 5" NV                                   Seated:             - pulleys flexion    2 mins x 6" hold         - pulley scaption    2 mins x 6" hold         - table slide flexion    15 x 10"         - table slide scaption    15 x 10"         - ER stretch     4 x 30"                      Elbow flexion 2 x 10 NV 2 x 10 2x10 3 x 10         Seated:             - wrist flexion/extension 2 x 10 NV 2 x 10   2x10          -  strength (all fingers) 10 x 10" Red (3#) NV 10 x 10" Red   10"x10    Red 10 x 10" Green                                   Ther Activity                                       Gait Training                                       Modalities             Cryo [facility]

## 2021-07-29 NOTE — PROGRESS NOTES
7/29/2021      Chief Complaint   Patient presents with    Renal cell carcinoma of right kidney ([address] )         Assessment and Plan  1  Renal Cell Carcinoma (T1a clear cell renal carcinoma)  - s/p right partial laparoscopic nephrectomy (8/26/19)  - Recent US and BMP showing no signs of recurrence  - Follow up in 1 year with US and BMP prior for continued surveillance    2  Pulmonary nodule s/p lobectomy  - Following with pulmonology      History of Present Illness  April Demetra Pérez is a 51 y o  female patient with history of T1a renal cell carcinoma s/p right partial laparoscopic nephrectomy (8/26/19) here for follow up  Postoperative CT scan performed 01/13/2020 revealed a postoperative seroma but no definitive residual tumor  Recent US shows no evidence of recurrent or metastatic disease  Creatinine 0 71  Patient states she is doing well overall without changes since last year  Denies difficulties with urination  Denies frequency, urgency, dysuria, gross hematuria, flank pain, suprapubic pain, fevers, chills  Review of Systems   Constitutional: Negative for activity change, appetite change, chills and fever  HENT: Negative for congestion and trouble swallowing  Respiratory: Negative for cough and shortness of breath  Cardiovascular: Negative for chest pain, palpitations and leg swelling  Gastrointestinal: Negative for abdominal pain, constipation, diarrhea, nausea and vomiting  Genitourinary: Negative for difficulty urinating, dysuria, flank pain, frequency, hematuria and urgency  Musculoskeletal: Negative for back pain and gait problem  Skin: Negative for wound  Allergic/Immunologic: Negative for immunocompromised state  Neurological: Negative for dizziness and syncope  Hematological: Does not bruise/bleed easily  Psychiatric/Behavioral: Negative for confusion  All other systems reviewed and are negative        Vitals  Vitals:    07/29/21 0909   BP: 114/66   Pulse: 86   Weight: 72 1 kg (159 lb)   Height: 5' 7" (1 702 m)       Physical Exam  Constitutional:       Appearance: Normal appearance  HENT:      Head: Normocephalic  Pulmonary:      Effort: Pulmonary effort is normal    Musculoskeletal:      Cervical back: Normal range of motion  Skin:     General: Skin is warm and dry  Neurological:      General: No focal deficit present  Mental Status: She is alert and oriented to person, place, and time  Psychiatric:         Mood and Affect: Mood normal          Behavior: Behavior normal          Thought Content:  Thought content normal          Judgment: Judgment normal            Past History  Past Medical History:   Diagnosis Date    Cancer ([place] [address] ) 19    Chronic kidney disease 2019    tumor and cyst    Colon polyp     Gastritis     Nodule of upper lobe of right lung     PONV (postoperative nausea and vomiting)     Renal cell carcinoma ([place] [address] ) 2019    Renal mass, right      Social History     Socioeconomic History    Marital status: /Civil Union     Spouse name: None    Number of children: None    Years of education: None    Highest education level: None   Occupational History    None   Tobacco Use    Smoking status: Former Smoker     Packs/day: 0 00     Years: 25 00     Pack years: 0 00     Types: Cigarettes     Quit date: 2021     Years since quittin 5    Smokeless tobacco: Never Used    Tobacco comment: quit on 21   Vaping Use    Vaping Use: Never used   Substance and Sexual Activity    Alcohol use: Not Currently     Alcohol/week: 0 0 standard drinks    Drug use: Never    Sexual activity: Yes     Partners: Male     Birth control/protection: Condom Male   Other Topics Concern    None   Social History Narrative    None     Social Determinants of Health     Financial Resource Strain:     Difficulty of Paying Living Expenses:    Food Insecurity:     Worried About Running Out of Food in the Last Year:     Ran Out of Food in the Last Year:    Transportation Needs:     Lack of Transportation (Medical):  Lack of Transportation (Non-Medical):    Physical Activity:     Days of Exercise per Week:     Minutes of Exercise per Session:    Stress:     Feeling of Stress :    Social Connections:     Frequency of Communication with Friends and Family:     Frequency of Social Gatherings with Friends and Family:     Attends Evangelical Services:     Active Member of Clubs or Organizations:     Attends Club or Organization Meetings:     Marital Status:    Intimate Partner Violence:     Fear of Current or Ex-Partner:     Emotionally Abused:     Physically Abused:     Sexually Abused:      Social History     Tobacco Use   Smoking Status Former Smoker    Packs/day: 0 00    Years: 25 00    Pack years: 0 00    Types: Cigarettes    Quit date: 2021    Years since quittin 5   Smokeless Tobacco Never Used   Tobacco Comment    quit on 21     Family History   Problem Relation Age of Onset    Heart disease Father     Cancer Paternal Grandmother     Diabetes Paternal Grandmother     Breast cancer Paternal Grandmother 79    Other Maternal Aunt         Renal Cell Carcinoma    No Known Problems Mother     No Known Problems Sister     No Known Problems Maternal Grandmother     Breast cancer Paternal Grandfather 79    No Known Problems Maternal Aunt     No Known Problems Maternal Aunt        The following portions of the patient's history were reviewed and updated as appropriate: allergies, current medications, past medical history, past social history, past surgical history and problem list     Results  No results found for this or any previous visit (from the past 1 hour(s))  ]  No results found for: PSA  Lab Results   Component Value Date    CALCIUM 8 8 2021    K 4 3 2021    CO2 27 2021     2021    BUN 12 2021    CREATININE 0 71 2021     Lab Results   Component Value Date    WBC 5 56 05/26/2021    HGB 13 2 05/26/2021    HCT 39 6 05/26/2021    MCV 90 05/26/2021     05/26/2021       Eufemia Rose PA-C

## 2021-08-03 ENCOUNTER — OFFICE VISIT (OUTPATIENT)
Dept: PHYSICAL THERAPY | Facility: CLINIC | Age: 46
End: 2021-08-03
Payer: COMMERCIAL

## 2021-08-03 DIAGNOSIS — M25.511 RIGHT SHOULDER PAIN, UNSPECIFIED CHRONICITY: Primary | ICD-10-CM

## 2021-08-03 PROCEDURE — 97140 MANUAL THERAPY 1/> REGIONS: CPT

## 2021-08-03 PROCEDURE — 97110 THERAPEUTIC EXERCISES: CPT

## 2021-08-03 NOTE — PROGRESS NOTES
Daily Note     Today's date: 8/3/2021  Patient name: April Cathleen  : 1975  MRN: [id number]  Referring provider: Shaniqua Faustin DO  Dx:   Encounter Diagnosis     ICD-10-CM    1  Right shoulder pain, unspecified chronicity  M25 511                   Subjective: Pt noted she is sore today but she was trying to give her dog a hair cut yesterday and using her R  shoulder yesterday and reported feeling some increase in soreness  Pt noted that she is not doing her exercises at home  Objective: See treatment diary below      Assessment:  Continued with treatment session, Tolerated treatment fair  Patient demonstrated fatigue post treatment, exhibited good technique with therapeutic exercises and would benefit from continued PT  Highly encouraged patient to perform her stretches at home  s/p treatment session, noted soreness  Plan: Continue per plan of care  Precautions: "Increase passive range of motion  as tolerated   Begin to incorporate active assist and active range of motion as tolerated   Modalities as needed   No resistance exercises at this time " <-- Per MD updated script     Manuals  (IE) 7-22 7-27 7/29 8/3        gentle passive range of motion to maximum of 90 of forward flexion, 90 of abduction, and 60 of external rotation NV JG JM          PROM with OP into flex/scap and IR/ER at 45 degrees abd    JG SC                                  Neuro Re-Ed             Cervical retraction 10 x 10" NV            Scapular retraction 10 x 10"NV                                                                             Ther Ex             Pendulums CW/CCW, AP, ML 20 x ea NV 20 x ea 20x ea NV                      PROM with OP into shoulder flexion 15 x 10" NV 15 x 10"  15 x 10" DC         PROM with OP into shoulder scaption 15 x 10" NV 15 x 10"  15 x 10" DC         PROM into ER to 60 deg with arm at side 15 x 10" NV 15 x 10" supine with half foam under arm 15 x 10" supine with half foam under arm DC         Standing:             - wall ladder flex    10 x 5" NV 10x 5"         - wall ladder scap    10 x 5" NV                                   Seated:             - pulleys flexion    2 mins x 6" hold 2 min 6" hold         - pulley scaption    2 mins x 6" hold 2 min 6" hold         - table slide flexion    15 x 10" 15x 10"         - table slide scaption    15 x 10" 15x 10"         - ER stretch     4 x 30"                      Elbow flexion 2 x 10 NV 2 x 10 2x10 3 x 10 3x 10         Seated:             - wrist flexion/extension 2 x 10 NV 2 x 10   2x10          -  strength (all fingers) 10 x 10" Red (3#) NV 10 x 10" Red   10"x10    Red 10 x 10" Green                                   Ther Activity                                       Gait Training                                       Modalities             Cryo

## 2021-08-05 ENCOUNTER — OFFICE VISIT (OUTPATIENT)
Dept: PHYSICAL THERAPY | Facility: CLINIC | Age: 46
End: 2021-08-05
Payer: COMMERCIAL

## 2021-08-05 DIAGNOSIS — M25.511 RIGHT SHOULDER PAIN, UNSPECIFIED CHRONICITY: Primary | ICD-10-CM

## 2021-08-05 PROCEDURE — 97110 THERAPEUTIC EXERCISES: CPT

## 2021-08-05 PROCEDURE — 97140 MANUAL THERAPY 1/> REGIONS: CPT

## 2021-08-05 NOTE — PROGRESS NOTES
Daily Note     Today's date: 2021  Patient name: Kelley Connolly  : 1975  MRN: [id number]  Referring provider: Salena Llamas DO  Dx:   Encounter Diagnosis     ICD-10-CM    1  Right shoulder pain, unspecified chronicity  M25 511                   Subjective: Pt reports that she occasionally gets some pain down into distal UE while seated, not moving shoulder  She states it only lasts a few minutes and then goes away  She is still not consistently performing HEP as she says she is very sore following treatments and avoids exercises for this reason  Objective: See treatment diary below      Assessment:  Tolerated treatment fair  Pt is hesitant and moves slowly through exercises  Notes some pain with completion, but fatigue following treatment  Encouraged pt to do exercises and stretches at home to help with pain levels and motion  Patient demonstrated fatigue post treatment, exhibited good technique with therapeutic exercises and would benefit from continued PT  Plan: Continue per plan of care  Precautions: "Increase passive range of motion  as tolerated   Begin to incorporate active assist and active range of motion as tolerated   Modalities as needed   No resistance exercises at this time " <-- Per MD updated script     Manuals  (IE) 7-22 7-27 7/29 8/3 8/5       gentle passive range of motion to maximum of 90 of forward flexion, 90 of abduction, and 60 of external rotation NV JG JM          PROM with OP into flex/scap and IR/ER at 45 degrees abd    JG SC SC                                 Neuro Re-Ed             Cervical retraction 10 x 10" NV            Scapular retraction 10 x 10"NV                                                                             Ther Ex             Pendulums CW/CCW, AP, ML 20 x ea NV 20 x ea 20x ea NV                      PROM with OP into shoulder flexion 15 x 10" NV 15 x 10"  15 x 10" DC         PROM with OP into shoulder scaption 15 x 10" NV 15 x 10"  15 x 10" DC         PROM into ER to 60 deg with arm at side 15 x 10" NV 15 x 10" supine with half foam under arm 15 x 10" supine with half foam under arm DC         Standing:             - wall ladder flex    10 x 5" NV 10x 5"  10x5"       - wall ladder scap    10 x 5" NV                                   Seated:             - pulleys flexion    2 mins x 6" hold 2 min 6" hold  2 min 6" hold       - pulley scaption    2 mins x 6" hold 2 min 6" hold  2 min 6" hold       - table slide flexion    15 x 10" 15x 10"  15x10"       - table slide scaption    15 x 10" 15x 10"  15x10"       - ER stretch     4 x 30"                      Elbow flexion 2 x 10 NV 2 x 10 2x10 3 x 10 3x 10  3x10       Seated:             - wrist flexion/extension 2 x 10 NV 2 x 10   2x10          -  strength (all fingers) 10 x 10" Red (3#) NV 10 x 10" Red   10"x10    Red 10 x 10" Green                                   Ther Activity                                       Gait Training                                       Modalities             Cryo

## 2021-08-10 ENCOUNTER — OFFICE VISIT (OUTPATIENT)
Dept: PHYSICAL THERAPY | Facility: CLINIC | Age: 46
End: 2021-08-10
Payer: COMMERCIAL

## 2021-08-10 DIAGNOSIS — M25.511 RIGHT SHOULDER PAIN, UNSPECIFIED CHRONICITY: Primary | ICD-10-CM

## 2021-08-10 PROCEDURE — 97110 THERAPEUTIC EXERCISES: CPT | Performed by: PHYSICAL THERAPIST

## 2021-08-10 PROCEDURE — 97140 MANUAL THERAPY 1/> REGIONS: CPT | Performed by: PHYSICAL THERAPIST

## 2021-08-10 NOTE — PROGRESS NOTES
Daily Note     Today's date: 8/10/2021  Patient name: Kelley Connolly  : 1975  MRN: [id number]  Referring provider: Mert Mccoy DO  Dx:   Encounter Diagnosis     ICD-10-CM    1  Right shoulder pain, unspecified chronicity  M25 511                   Subjective: Patient reports she is performing the stretch "twice a day if I'm tej"      Objective: See treatment diary below  PROM:  Abd: 85 deg  Flex: 101 deg  ER at 45 de deg  IR at 45 de deg      Assessment: Reinforced importance of performing HEP at least 3-5 times per day to make changes in ROM  Educated pt she should stop the stretch prior to onset of pain, rather, push only to some discomfort  Pt demonstrates improvement inshoulder ROM since initial evaluation per objective section; however, continues to be very limited with most likely onset of adhesive capsulitis  Pt's fracture is not fully healed at this time, therefore, joint mobilizations have not been initiated  Tolerated treatment fair  Patient demonstrated fatigue post treatment and would benefit from continued PT      Plan: Progress treatment as tolerated  Precautions: "Increase passive range of motion  as tolerated   Begin to incorporate active assist and active range of motion as tolerated   Modalities as needed   No resistance exercises at this time " <-- Per MD updated script     Manuals 7- (IE) 7-22 7-27 7/29 8/3 8/5 8-10      gentle passive range of motion to maximum of 90 of forward flexion, 90 of abduction, and 60 of external rotation NV JG JM          PROM with OP into flex/scap and IR/ER at 45 degrees abd    JG SC SC JG with oscillations                                Neuro Re-Ed             Cervical retraction 10 x 10" NV            Scapular retraction 10 x 10"NV                                                                             Ther Ex             Pendulums CW/CCW, AP, ML 20 x ea NV 20 x ea 20x ea NV                      PROM with OP into shoulder flexion 15 x 10" NV 15 x 10"  15 x 10" DC         PROM with OP into shoulder scaption 15 x 10" NV 15 x 10"  15 x 10" DC         PROM into ER to 60 deg with arm at side 15 x 10" NV 15 x 10" supine with half foam under arm 15 x 10" supine with half foam under arm DC         Standing:             - wall ladder flex    10 x 5" NV 10x 5"  10x5" NV      - wall ladder scap    10 x 5" NV   NV                                Seated:             - pulleys flexion    2 mins x 6" hold 2 min 6" hold  2 min 6" hold 2 min 6" hold      - pulley scaption    2 mins x 6" hold 2 min 6" hold  2 min 6" hold 2 min 6" hold      - table slide flexion    15 x 10" 15x 10"  15x10" HEP      - table slide scaption    15 x 10" 15x 10"  15x10" HEP      - ER stretch     4 x 30"                      Elbow flexion 2 x 10 NV 2 x 10 2x10 3 x 10 3x 10  3x10       Seated:             - wrist flexion/extension 2 x 10 NV 2 x 10   2x10          -  strength (all fingers) 10 x 10" Red (3#) NV 10 x 10" Red   10"x10    Red 10 x 10" Green                      Supine:             - AAROM flexion with cane       10 x 6" hold      - AAROM scaption with cane       10 x 6" hold                   - AAROM ER at 45 deg (arm supported on half foam)       10 x 6" hold                   Standing:             - cane AAROM IR       10 x 10"      - cross body stretch       4 x 30" NV      Ther Activity                                       Gait Training                                       Modalities             Cryo                             * 33 minutes 1:1 time this date

## 2021-08-12 ENCOUNTER — OFFICE VISIT (OUTPATIENT)
Dept: PHYSICAL THERAPY | Facility: CLINIC | Age: 46
End: 2021-08-12
Payer: COMMERCIAL

## 2021-08-12 DIAGNOSIS — M25.511 RIGHT SHOULDER PAIN, UNSPECIFIED CHRONICITY: Primary | ICD-10-CM

## 2021-08-12 PROCEDURE — 97140 MANUAL THERAPY 1/> REGIONS: CPT | Performed by: PHYSICAL THERAPIST

## 2021-08-12 PROCEDURE — 97110 THERAPEUTIC EXERCISES: CPT | Performed by: PHYSICAL THERAPIST

## 2021-08-12 NOTE — PROGRESS NOTES
Daily Note     Today's date: 2021  Patient name: Kelley Connolly  : 1975  MRN: [id number]  Referring provider: Julia De La Paz DO  Dx:   Encounter Diagnosis     ICD-10-CM    1  Right shoulder pain, unspecified chronicity  M25 511                   Subjective: Pt reports that her shoulder hurts and is frustrated with her slow progress  Objective: See treatment diary below      Assessment: Pt demonstrated empty end feels in all directions with PROM, firm end feels felt ER/IR consistent with adhesive capsulitis  She demonstrated difficulty with all stretching activities  She did require cueing for appropriate completion for exercises this visit  Plan: Continue per plan of care  Precautions: "Increase passive range of motion  as tolerated   Begin to incorporate active assist and active range of motion as tolerated   Modalities as needed   No resistance exercises at this time " <-- Per MD updated script     Manuals  (IE) 7-22 7-27 7/29 8/3 8 8-10 8/12     gentle passive range of motion to maximum of 90 of forward flexion, 90 of abduction, and 60 of external rotation NV JG JM          PROM with OP into flex/scap and IR/ER at 45 degrees abd    JG SC SC JG with oscillations KB                               Neuro Re-Ed             Cervical retraction 10 x 10" NV            Scapular retraction 10 x 10"NV                                                                             Ther Ex             Pendulums CW/CCW, AP, ML 20 x ea NV 20 x ea 20x ea NV                      PROM with OP into shoulder flexion 15 x 10" NV 15 x 10"  15 x 10" DC         PROM with OP into shoulder scaption 15 x 10" NV 15 x 10"  15 x 10" DC         PROM into ER to 60 deg with arm at side 15 x 10" NV 15 x 10" supine with half foam under arm 15 x 10" supine with half foam under arm DC         Standing:             - wall ladder flex    10 x 5" NV 10x 5"  10x5" NV 10x5"     - wall ladder scap    10 x 5" NV   NV 10x5"                               Seated:             - pulleys flexion    2 mins x 6" hold 2 min 6" hold  2 min 6" hold 2 min 6" hold 2 min 6" hold     - pulley scaption    2 mins x 6" hold 2 min 6" hold  2 min 6" hold 2 min 6" hold 2 min 6" hold     - table slide flexion    15 x 10" 15x 10"  15x10" HEP      - table slide scaption    15 x 10" 15x 10"  15x10" HEP      - ER stretch     4 x 30"                      Elbow flexion 2 x 10 NV 2 x 10 2x10 3 x 10 3x 10  3x10       Seated:             - wrist flexion/extension 2 x 10 NV 2 x 10   2x10          -  strength (all fingers) 10 x 10" Red (3#) NV 10 x 10" Red   10"x10    Red 10 x 10" Green                      Supine:             - AAROM flexion with cane       10 x 6" hold 10 x 6" hold     - AAROM scaption with cane       10 x 6" hold 10 x 6" hold                  - AAROM ER at 45 deg (arm supported on half foam)       10 x 6" hold 10 x 6" hold                  Standing:             - cane AAROM IR       10 x 10" 10 x 10"     - cross body stretch       4 x 30" NV 4 x 30"      Ther Activity                                       Gait Training                                       Modalities             Cryo

## 2021-08-17 ENCOUNTER — OFFICE VISIT (OUTPATIENT)
Dept: PHYSICAL THERAPY | Facility: CLINIC | Age: 46
End: 2021-08-17
Payer: COMMERCIAL

## 2021-08-17 DIAGNOSIS — M25.511 RIGHT SHOULDER PAIN, UNSPECIFIED CHRONICITY: Primary | ICD-10-CM

## 2021-08-17 PROCEDURE — 97140 MANUAL THERAPY 1/> REGIONS: CPT | Performed by: PHYSICAL THERAPIST

## 2021-08-17 PROCEDURE — 97110 THERAPEUTIC EXERCISES: CPT | Performed by: PHYSICAL THERAPIST

## 2021-08-17 NOTE — PROGRESS NOTES
PT Evaluation     Today's date: 2021  Patient name: Kelley Connolly  : 1975  MRN: [id number]  Referring provider: Salena Llamas DO  Dx:   Encounter Diagnosis     ICD-10-CM    1  Right shoulder pain, unspecified chronicity  M25 511                   Assessment  Assessment details: Kelley Connolly is a pleasant 51 y o  presenting to physical therapy with MD referral for Right shoulder pain, unspecified chronicity  (primary encounter diagnosis) status post shoulder fracture  Since time of initial evaluation, pt has made some improvements in shoulder A/PROM; however, continues to be very limited  Pt admits to not being compliant with HEP stretches; however, has recently began to stretch more consistently at home  Pt has been educated numerous times on importance of stretching outside of therapy in attaining a successful outcome  Problem list: Poor posture, presumed decreased upper extremity strength, limited shoulder A/PROM , probable hematoma along lateral aspect of upper arm    Treatment to include: Manual therapy techniques, shoulder A/AA/PROM within MD restrictions, RTC/periscapular strengthening when allowed, instruction in a comprehensive HEP, postural re-education exercises, and modalities as needed  This pt would benefit from skilled PT services to address their impairments and functional limitations to maximize functional outcome  Barriers to therapy: Recent lung lobectomy  Understanding of Dx/Px/POC: good   Prognosis: good    Goals  ST  Pt will improve shoulder flexion to 90 degrees in 3 weeks  PARTIALLY MET  2  Pt will improve shoulder abduction to 90 degrees in 3 weeks  PARTIALLY MET    LT  Pt will be able to fasten bra behind back with minimal to no pain in 6 weeks  NOT MET  2  Pt will be independent in a comprehensive HEP in 6 weeks  PARTIALLY MET  3  Pt will be able to reach into overhead cabinets with minimal to no pain in 6 weeks   PARTIALLY MET    Plan  Patient would benefit from: skilled physical therapy  Frequency: 2x week  Duration in weeks: 6  Treatment plan discussed with: patient        Subjective Evaluation    History of Present Illness  Mechanism of injury: Patient tripped over a baby gait and landed on right elbow 6-15-21  Pt went to ER who took x-rays which revealed impacted fracture of surgical neck of humerus with mild comminution  Pt had appointment with ortho, Dr Phyllis Mathew, the following day who proceeded with nonsurgical management of fracture  CT ordered of shoulder which revealed "Comminuted, minimally displaced fracture of the humeral head and neck extending to the greater tuberosity"  Pt reports she also underwent a lobectomy on 21 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  Pt denies any numbness/tingling  Premorbid status:  - ADLs: Indepenent with mild difficulty  - Work: Full time, Full duty- work from home  - Recreation: elliptical    Current status:  - ADLs/Functional activities:     - Reaching into shoulder height cabinets with no pain (improved)   - Reaching into overhead cabinets with Pain Levels: unable (no change)   - Washing lower back/tucking in shirt with a little discomfort (improved)   - Washing hair with discomfort only with BUEs (improved)   - Driving with both arms with discomfort only (improved)   - Lifting unable with RUE   - Sleeping waking every 2 hours due to shoulder pain/general pain (worse)  - Work: Full time, Full duty- work from home  - Recreation: none    Since time of initial evaluation, functionally, pt feels as though she is back to 40% of her premorbid status  As noted above, pt has made good improvements functionally; however, continues to be limited in ability to perform ADLs    Pain  Current pain rating: 3  At best pain ratin  At worst pain ratin  Location: Superior and lateral shoulder  Quality: sharp and dull ache  Relieving factors: rest  Progression: improved      Diagnostic Tests  X-ray: abnormal  Treatments  Previous treatment: immobilization  Current treatment: physical therapy  Patient Goals  Patient goals for therapy: decreased pain, increased motion, increased strength and independence with ADLs/IADLs          Objective     Palpation     Additional Palpation Details  Hard hematoma noted at deltoid insertion with no pain  TTP over ACJ and posterior RTC tendons    Active Range of Motion   Left Shoulder   Flexion: 145 degrees   Abduction: 170 degrees   External rotation BTH: T3   Internal rotation BTB: T5     Right Shoulder   Flexion: 85 degrees   Abduction: 80 degrees   External rotation BTH: T1   Internal rotation BTB: sacrum     Right Elbow   Extension: 25 degrees     Additional Active Range of Motion Details  No AROM on RUE  Lacking full elbow extension    Passive Range of Motion     Right Shoulder   Flexion: 107 degrees with pain  Abduction: 85 degrees with pain  External rotation 0°: 65 degrees with pain  External rotation 45°: 54 degrees with pain  Internal rotation 45°: 30 (stop when coracoid tilts) degrees     Strength/Myotome Testing     Left Shoulder     Planes of Motion   Flexion: 4   Abduction: 4   External rotation at 0°: 4+     Left Elbow   Flexion: 4+  Extension: 4+    Additional Strength Details  No MMT on RUE due to MD restrictions             Precautions: "Increase passive range of motion  as tolerated   Begin to incorporate active assist and active range of motion as tolerated   Modalities as needed   No resistance exercises at this time " <-- Per MD updated script 7-27     Manuals 7-19 (IE) 7-22 7-27 7/29 8/3 8/5 8-10 8/12  8-17 (RE)     gentle passive range of motion to maximum of 90 of forward flexion, 90 of abduction, and 60 of external rotation NV JG JM                 PROM with OP into flex/scap and IR/ER at 45 degrees abd       JG SC SC JG with oscillations KB  JG with oscillations      SL scapular mobility                 JG                           Neuro Re-Ed                       Cervical retraction 10 x 10" NV                     Scapular retraction 10 x 10"NV                                                                                                                                             Ther Ex                       Pendulums CW/CCW, AP, ML 20 x ea NV 20 x ea 20x ea NV                                       PROM with OP into shoulder flexion 15 x 10" NV 15 x 10"  15 x 10" DC               PROM with OP into shoulder scaption 15 x 10" NV 15 x 10"  15 x 10" DC               PROM into ER to 60 deg with arm at side 15 x 10" NV 15 x 10" supine with half foam under arm 15 x 10" supine with half foam under arm DC               Standing:                       - wall ladder flex       10 x 5" NV 10x 5"  10x5" NV 10x5"  10 x 5"     - wall ladder scap       10 x 5" NV     NV 10x5"  10 x 5"                                                     Seated:                       - pulleys flexion       2 mins x 6" hold 2 min 6" hold  2 min 6" hold 2 min 6" hold 2 min 6" hold  2 min 6" hold     - pulley scaption       2 mins x 6" hold 2 min 6" hold  2 min 6" hold 2 min 6" hold 2 min 6" hold  2 min 6" hold     - table slide flexion       15 x 10" 15x 10"  15x10" HEP         - table slide scaption       15 x 10" 15x 10"  15x10" HEP         - ER stretch        4 x 30"                                       Elbow flexion 2 x 10 NV 2 x 10 2x10 3 x 10 3x 10  3x10           Seated:                       - wrist flexion/extension 2 x 10 NV 2 x 10   2x10                 -  strength (all fingers) 10 x 10" Red (3#) NV 10 x 10" Red   10"x10    Red 10 x 10" Green                                       Supine:                       - AAROM flexion with cane             10 x 6" hold 10 x 6" hold  NV     - AAROM scaption with cane             10 x 6" hold 10 x 6" hold  NV                             - AAROM ER at 45 deg (arm supported on half foam)             10 x 6" hold 10 x 6" hold  NV                             Standing:                       - cane AAROM IR             10 x 10" 10 x 10"  NV     - cross body stretch             4 x 30" NV 4 x 30"   NV     - towel IR stretch         10 x 10" NV    Ther Activity                                                                       Gait Training                                                                       Modalities                       Cryo

## 2021-08-19 ENCOUNTER — OFFICE VISIT (OUTPATIENT)
Dept: PHYSICAL THERAPY | Facility: CLINIC | Age: 46
End: 2021-08-19
Payer: COMMERCIAL

## 2021-08-19 DIAGNOSIS — M25.511 RIGHT SHOULDER PAIN, UNSPECIFIED CHRONICITY: Primary | ICD-10-CM

## 2021-08-19 PROCEDURE — 97140 MANUAL THERAPY 1/> REGIONS: CPT | Performed by: PHYSICAL THERAPIST

## 2021-08-19 PROCEDURE — 97110 THERAPEUTIC EXERCISES: CPT | Performed by: PHYSICAL THERAPIST

## 2021-08-19 NOTE — PROGRESS NOTES
Daily Note     Today's date: 2021  Patient name: Kelley Connolly  : 1975  MRN: [id number]  Referring provider: Lillian Burt DO  Dx:   Encounter Diagnosis     ICD-10-CM    1  Right shoulder pain, unspecified chronicity  M25 511                   Subjective: Pt reports she felt better following previous session  Objective: See treatment diary below      Assessment: Pt continues to demonstrate a capsular end feel with all shoulder motions  Joint mobilizations not initiated secondary to healing fracture  Tolerated treatment well  Patient demonstrated fatigue post treatment, exhibited good technique with therapeutic exercises and would benefit from continued PT      Plan: Progress treatment as tolerated  Precautions: "Increase passive range of motion  as tolerated   Begin to incorporate active assist and active range of motion as tolerated   Modalities as needed   No resistance exercises at this time " <-- Per MD updated script      Manuals  (IE) 7-22 7-27 7/29 8/3 8/5 8-10 8/12  8 (RE)     gentle passive range of motion to maximum of 90 of forward flexion, 90 of abduction, and 60 of external rotation NV JG JM                 PROM with OP into flex/scap and IR/ER at 45 degrees abd       JG SC SC JG with oscillations KB  JG with oscillations  JG with oscillations    SL scapular mobility                 JG  JG                           Neuro Re-Ed                       Cervical retraction 10 x 10" NV                     Scapular retraction 10 x 10"NV                                                                                                                                             Ther Ex                       Pendulums CW/CCW, AP, ML 20 x ea NV 20 x ea 20x ea NV                                       PROM with OP into shoulder flexion 15 x 10" NV 15 x 10"  15 x 10" DC               PROM with OP into shoulder scaption 15 x 10" NV 15 x 10"  15 x 10" DC               PROM into ER to 60 deg with arm at side 15 x 10" NV 15 x 10" supine with half foam under arm 15 x 10" supine with half foam under arm DC               Standing:                       - wall ladder flex       10 x 5" NV 10x 5"  10x5" NV 10x5"  10 x 5"  10 x 5"   - wall ladder scap       10 x 5" NV     NV 10x5"  10 x 5"  10 x 5"                                                   Seated:                       - pulleys flexion       2 mins x 6" hold 2 min 6" hold  2 min 6" hold 2 min 6" hold 2 min 6" hold  2 min 6" hold  2 min 6" hold   - pulley scaption       2 mins x 6" hold 2 min 6" hold  2 min 6" hold 2 min 6" hold 2 min 6" hold  2 min 6" hold  2 min 6" hold   - table slide flexion       15 x 10" 15x 10"  15x10" HEP         - table slide scaption       15 x 10" 15x 10"  15x10" HEP         - ER stretch        4 x 30"                                       Elbow flexion 2 x 10 NV 2 x 10 2x10 3 x 10 3x 10  3x10           Seated:                       - wrist flexion/extension 2 x 10 NV 2 x 10   2x10                 -  strength (all fingers) 10 x 10" Red (3#) NV 10 x 10" Red   10"x10    Red 10 x 10" Green                                       Supine:                       - AAROM flexion with cane             10 x 6" hold 10 x 6" hold  NV  10 x 6" hold   - AAROM scaption with cane             10 x 6" hold 10 x 6" hold  NV  10 x 6" hold                           - AAROM ER at 45 deg (arm supported on half foam)             10 x 6" hold 10 x 6" hold  NV  10 x 6" hold                           Standing:                       - cane AAROM IR             10 x 10" 10 x 10"  NV     - cross body stretch             4 x 30" NV 4 x 30"   NV     - towel IR stretch         10 x 10" NV 10 x 10"   Ther Activity                                                                       Gait Training                                                                       Modalities                       Cryo

## 2021-08-24 ENCOUNTER — OFFICE VISIT (OUTPATIENT)
Dept: PHYSICAL THERAPY | Facility: CLINIC | Age: 46
End: 2021-08-24
Payer: COMMERCIAL

## 2021-08-24 DIAGNOSIS — M25.511 RIGHT SHOULDER PAIN, UNSPECIFIED CHRONICITY: Primary | ICD-10-CM

## 2021-08-24 PROCEDURE — 97110 THERAPEUTIC EXERCISES: CPT

## 2021-08-24 PROCEDURE — 97140 MANUAL THERAPY 1/> REGIONS: CPT

## 2021-08-24 NOTE — PROGRESS NOTES
Daily Note     Today's date: 2021  Patient name: April Cathleen  : 1975  MRN: [id number]  Referring provider: Jase Thomas DO  Dx:   Encounter Diagnosis     ICD-10-CM    1  Right shoulder pain, unspecified chronicity  M25 511                   Subjective: pt noted that she had a really bad weekend with her R shoulder p!  Pt noted she was unsure if it was because of the weather or what  Objective: See treatment diary below      Assessment:  Continued with treatment session, overall patient able to perform all exercises with no increase in pain  Pt noted having less stiffness after Manuals  Tolerated treatment fair  Patient exhibited good technique with therapeutic exercises and would benefit from continued PT  S/p treatment patent noted no changes  Plan: Continue per plan of care  Precautions: "Increase passive range of motion  as tolerated   Begin to incorporate active assist and active range of motion as tolerated   Modalities as needed   No resistance exercises at this time " <-- Per MD updated script      Manuals 7-27 7/29 8/3 8/5 8-10 8/12  8- (RE)     gentle passive range of motion to maximum of 90 of forward flexion, 90 of abduction, and 60 of external rotation JM                  PROM with OP into flex/scap and IR/ER at 45 degrees abd   JG SC SC JG with oscillations KB  JG with oscillations  JG with oscillations SC    SL scapular mobility             JG  JG                         Neuro Re-Ed                    Cervical retraction                    Scapular retraction                                                                                                                             Ther Ex                    Pendulums CW/CCW, AP, ML 20x ea NV                                     PROM with OP into shoulder flexion 15 x 10" DC                PROM with OP into shoulder scaption 15 x 10" DC                PROM into ER to 60 deg with arm at side 15 x 10" supine with half foam under arm DC                Standing:                    - wall ladder flex   10 x 5" NV 10x 5"  10x5" NV 10x5"  10 x 5"  10 x 5" 10x 5"    - wall ladder scap   10 x 5" NV     NV 10x5"  10 x 5"  10 x 5" 10x 5"                                              Seated:                    - pulleys flexion   2 mins x 6" hold 2 min 6" hold  2 min 6" hold 2 min 6" hold 2 min 6" hold  2 min 6" hold  2 min 6" hold 3 min 6" hold    - pulley scaption   2 mins x 6" hold 2 min 6" hold  2 min 6" hold 2 min 6" hold 2 min 6" hold  2 min 6" hold  2 min 6" hold 3 min 6" hold    - table slide flexion   15 x 10" 15x 10"  15x10" HEP          - table slide scaption   15 x 10" 15x 10"  15x10" HEP          - ER stretch    4 x 30"                                     Elbow flexion 2x10 3 x 10 3x 10  3x10            Seated:                    - wrist flexion/extension   2x10                  -  strength (all fingers)   10"x10    Red 10 x 10" Green                                     Supine:                    - AAROM flexion with cane         10 x 6" hold 10 x 6" hold  NV  10 x 6" hold 10" x 6    - AAROM scaption with cane         10 x 6" hold 10 x 6" hold  NV  10 x 6" hold 10x 6"                         - AAROM ER at 45 deg (arm supported on half foam)         10 x 6" hold 10 x 6" hold  NV  10 x 6" hold 10x 6"                         Standing:                    - cane AAROM IR         10 x 10" 10 x 10"  NV   10" x 10    - cross body stretch         4 x 30" NV 4 x 30"   NV   4x 30"    - towel IR stretch       10 x 10" NV 10 x 10"    Ther Activity                                                              Gait Training                                                              Modalities                    Cryo

## 2021-08-27 ENCOUNTER — OFFICE VISIT (OUTPATIENT)
Dept: PHYSICAL THERAPY | Facility: CLINIC | Age: 46
End: 2021-08-27
Payer: COMMERCIAL

## 2021-08-27 DIAGNOSIS — M25.511 RIGHT SHOULDER PAIN, UNSPECIFIED CHRONICITY: Primary | ICD-10-CM

## 2021-08-27 PROCEDURE — 97140 MANUAL THERAPY 1/> REGIONS: CPT | Performed by: PHYSICAL THERAPIST

## 2021-08-27 PROCEDURE — 97110 THERAPEUTIC EXERCISES: CPT | Performed by: PHYSICAL THERAPIST

## 2021-08-27 NOTE — PROGRESS NOTES
Daily Note     Today's date: 2021  Patient name: Kelley Connolly  : 1975  MRN: [id number]  Referring provider: Sumit Reddy DO  Dx:   Encounter Diagnosis     ICD-10-CM    1  Right shoulder pain, unspecified chronicity  M25 511                   Subjective: Pt reports she had a really good day yesterday; however, today she woke up with increased pain and stiffness  Objective: See treatment diary below      Assessment: No progressions this date  Continued to encourage pt to perform HEP consistently to make gains in shoulder  Educated pt when MD clears for strengthening, we can begin joint mobilizations to address joint capsule limitations  Improvement in motion noted following scapulothoracic mobilizations/stretches  Tolerated treatment well  Patient demonstrated fatigue post treatment, exhibited good technique with therapeutic exercises and would benefit from continued PT      Plan: Progress treatment as tolerated  Precautions: "Increase passive range of motion  as tolerated   Begin to incorporate active assist and active range of motion as tolerated   Modalities as needed   No resistance exercises at this time " <-- Per MD updated script      Manuals 8-27  8/3 8/5 8-10 8/12  8-17 (RE)  -   PROM with OP into flex/scap and IR/ER at 45 degrees abd JG with oscillations  SC SC JG with oscillations KB  JG with oscillations  JG with oscillations SC    SL scapular mobility JG          JG  JG                       Neuro Re-Ed                  Cervical retraction                  Scapular retraction                                                                                                                 Ther Ex                  Standing:                  - wall ladder flex 10x 5"   10x 5"  10x5" NV 10x5"  10 x 5"  10 x 5" 10x 5"    - wall ladder scap 10x 5"       NV 10x5"  10 x 5"  10 x 5" 10x 5"                                          Seated:                  - pulleys flexion 3 min 6" hold   2 min 6" hold  2 min 6" hold 2 min 6" hold 2 min 6" hold  2 min 6" hold  2 min 6" hold 3 min 6" hold    - pulley scaption 3 min 6" hold   2 min 6" hold  2 min 6" hold 2 min 6" hold 2 min 6" hold  2 min 6" hold  2 min 6" hold 3 min 6" hold    - table slide flexion   15x 10"  15x10" HEP          - table slide scaption   15x 10"  15x10" HEP          - ER stretch                                      Elbow flexion   3x 10  3x10            Seated:                  - wrist flexion/extension                  -  strength (all fingers)                                     Supine:                  - AAROM flexion with cane 10 x 6" hold      10 x 6" hold 10 x 6" hold  NV  10 x 6" hold 10" x 6    - AAROM scaption with cane 10 x 6" hold      10 x 6" hold 10 x 6" hold  NV  10 x 6" hold 10x 6"                       - AAROM ER at 45 deg (arm supported on half foam) 10 x 6" hold      10 x 6" hold 10 x 6" hold  NV  10 x 6" hold 10x 6"                       Standing:                  - cane AAROM IR 10 x 10"      10 x 10" 10 x 10"  NV   10" x 10    - cross body stretch NV      4 x 30" NV 4 x 30"   NV   4x 30"    - towel IR stretch NV      10 x 10" NV 10 x 10"    Ther Activity                                                        Gait Training                                                        Modalities                  Cryo                                           * 27 minutes 1:1 time this date

## 2021-08-31 ENCOUNTER — OFFICE VISIT (OUTPATIENT)
Dept: PHYSICAL THERAPY | Facility: CLINIC | Age: 46
End: 2021-08-31
Payer: COMMERCIAL

## 2021-08-31 ENCOUNTER — OFFICE VISIT (OUTPATIENT)
Dept: OBGYN CLINIC | Facility: CLINIC | Age: 46
End: 2021-08-31
Payer: COMMERCIAL

## 2021-08-31 ENCOUNTER — APPOINTMENT (OUTPATIENT)
Dept: RADIOLOGY | Facility: CLINIC | Age: 46
End: 2021-08-31
Payer: COMMERCIAL

## 2021-08-31 VITALS
RESPIRATION RATE: 18 BRPM | DIASTOLIC BLOOD PRESSURE: 76 MMHG | HEIGHT: 68 IN | BODY MASS INDEX: 23.49 KG/M2 | WEIGHT: 155 LBS | SYSTOLIC BLOOD PRESSURE: 107 MMHG | HEART RATE: 80 BPM

## 2021-08-31 DIAGNOSIS — S42.201D CLOSED FRACTURE OF PROXIMAL END OF RIGHT HUMERUS WITH ROUTINE HEALING, UNSPECIFIED FRACTURE MORPHOLOGY, SUBSEQUENT ENCOUNTER: ICD-10-CM

## 2021-08-31 DIAGNOSIS — S42.201D CLOSED FRACTURE OF PROXIMAL END OF RIGHT HUMERUS WITH ROUTINE HEALING, UNSPECIFIED FRACTURE MORPHOLOGY, SUBSEQUENT ENCOUNTER: Primary | ICD-10-CM

## 2021-08-31 DIAGNOSIS — M25.511 RIGHT SHOULDER PAIN, UNSPECIFIED CHRONICITY: Primary | ICD-10-CM

## 2021-08-31 PROCEDURE — 1036F TOBACCO NON-USER: CPT | Performed by: ORTHOPAEDIC SURGERY

## 2021-08-31 PROCEDURE — 99212 OFFICE O/P EST SF 10 MIN: CPT | Performed by: ORTHOPAEDIC SURGERY

## 2021-08-31 PROCEDURE — 97140 MANUAL THERAPY 1/> REGIONS: CPT

## 2021-08-31 PROCEDURE — 73030 X-RAY EXAM OF SHOULDER: CPT

## 2021-08-31 PROCEDURE — 97110 THERAPEUTIC EXERCISES: CPT

## 2021-08-31 PROCEDURE — 3008F BODY MASS INDEX DOCD: CPT | Performed by: ORTHOPAEDIC SURGERY

## 2021-08-31 NOTE — PROGRESS NOTES
Daily Note     Today's date: 2021  Patient name: Kelley Connolly  : 1975  MRN: [id number]  Referring provider: Brenda Wilson DO  Dx:   Encounter Diagnosis     ICD-10-CM    1  Right shoulder pain, unspecified chronicity  M25 511                   Subjective: Patient had a F/U today with Dr Breanne Carter, he is happy with her progress  Imaging was done and she is heeling well  She still needs to work on ROM, no strengthening yet  Objective: See treatment diary below      Assessment: Tolerated treatment well  Patient continues to make slow and steady progress with with ROM  Heavy UT compensation noted with all tasks today, frequent cuing to correct with fair carryover  Limitations noted in all planes during PROM, more so with abd and flex  Continued PT would be beneficial to improve function           Plan: Continue per plan of care  Precautions: "Increase passive range of motion  as tolerated   Begin to incorporate active assist and active range of motion as tolerated   Modalities as needed   No resistance exercises at this time " <-- Per MD updated script      Manuals  8-10 8/12  8 (RE)     PROM with OP into flex/scap and IR/ER at 45 degrees abd JG with oscillations MB  SC JG with oscillations KB  JG with oscillations  JG with oscillations SC    SL scapular mobility JG MB        JG  JG                      Neuro Re-Ed                 Cervical retraction                 Scapular retraction                                                                                                           Ther Ex                 Standing:                 - wall ladder flex 10x 5"  10x 5"   10x5" NV 10x5"  10 x 5"  10 x 5" 10x 5"    - wall ladder scap 10x 5"  10x 5"     NV 10x5"  10 x 5"  10 x 5" 10x 5"                                        Seated:                 - pulleys flexion 3 min 6" hold  3 min 6" hold   2 min 6" hold 2 min 6" hold 2 min 6" hold  2 min 6" hold  2 min 6" hold 3 min 6" hold    - pulley scaption 3 min 6" hold  3 min 6" hold   2 min 6" hold 2 min 6" hold 2 min 6" hold  2 min 6" hold  2 min 6" hold 3 min 6" hold    - table slide flexion    15x10" HEP          - table slide scaption    15x10" HEP          - ER stretch                                    Elbow flexion    3x10            Seated:                 - wrist flexion/extension                 -  strength (all fingers)                                   Supine:                 - AAROM flexion with cane 10 x 6" hold 10 x 6" hold    10 x 6" hold 10 x 6" hold  NV  10 x 6" hold 10" x 6    - AAROM scaption with cane 10 x 6" hold 10 x 6" hold    10 x 6" hold 10 x 6" hold  NV  10 x 6" hold 10x 6"                      - AAROM ER at 45 deg (arm supported on half foam) 10 x 6" hold 10x6"     10 x 6" hold 10 x 6" hold  NV  10 x 6" hold 10x 6"                      Standing:                 - cane AAROM IR 10 x 10" 10x10"    10 x 10" 10 x 10"  NV   10" x 10    - cross body stretch NV 4x30"    4 x 30" NV 4 x 30"   NV   4x 30"    - towel IR stretch NV 10x10"     10 x 10" NV 10 x 10"    Ther Activity                                                     Gait Training                                                     Modalities                 Cryo                                         * 27 minutes 1:1 time this date

## 2021-08-31 NOTE — PROGRESS NOTES
Patient Name:  Kelley Connolly  MRN:  [id number]    [address] [place] [address] [place]     1  Closed fracture of proximal end of right humerus with routine healing, unspecified fracture morphology, subsequent encounter  -     XR shoulder 2+ vw right; Future; Expected date: 08/31/2021        51year old female 11 weeks s/p mechanical fall resulting in Right proximal humerus fracture  New x-rays reviewed in office today with patient  In light of patient's moderate stiffness, advised to patient to continue with PT with continued aggressive passive range of motion, active assisted, active range of motion with adherence to home exercise program  Can incorporate strengthening after ROM has been obtained; no heavy lifting at this time until ROM has been obtained  Patient will follow up in office in approximately 4-6 weeks for reevaluation of range of motion  History of the Present Illness   Kelley Carter is a 51 y o  female 11 weeks s/p mechanical fall resulting in Right proximal humerus fracture  At last appointment, patient was given script for physical therapy to continue to work on aggressive passive range of motion with active assisted and active ROM  Today, patient reports she feels "stiff" and swollen, more from her recent lung surgery  She continues to work with physical therapy and does admit she has obtained more range of motion since attending sessions and adherence to home exercise program          Review of Systems     Review of Systems   Constitutional: Negative for chills and fever  HENT: Negative for congestion  Respiratory: Negative for cough, chest tightness and shortness of breath  Cardiovascular: Negative for chest pain and palpitations  Gastrointestinal: Negative for abdominal pain  Endocrine: Negative for cold intolerance and heat intolerance  Musculoskeletal: Negative for arthralgias, back pain and gait problem  Neurological: Negative for syncope  Psychiatric/Behavioral: Negative for confusion  Physical Exam     /76   Pulse 80   Resp 18   Ht 5' 8" (1 727 m)   Wt 70 3 kg (155 lb)   BMI 23 57 kg/m²     Right Shoulder: Active range of motion to 70 degrees forward flexion, 80 degrees abduction, lack approximately 15 degrees external rotation as compared to contralateral side, and internal rotation to SI joint  Passive range of motion to 80 forward flexion  There is no tenderness present over the fracture site at this time  The patient is neurovascularly intact distally in the extremity  Data Review     I have personally reviewed pertinent films in PACS, and my interpretation follows  X-rays taken today 2021 of Right shoulder demonstrates maintenance of fracture alignment with increased callus formation as compared to previous exam  No acute fracture or dislocation noted        Social History     Tobacco Use    Smoking status: Former Smoker     Packs/day: 0 00     Years: 25 00     Pack years: 0 00     Types: Cigarettes     Quit date: 2021     Years since quittin 6    Smokeless tobacco: Never Used    Tobacco comment: quit on 21   Vaping Use    Vaping Use: Never used   Substance Use Topics    Alcohol use: Not Currently     Alcohol/week: 0 0 standard drinks    Drug use: Never           Procedures   None    Chandler Diaz PA-C

## 2021-09-02 ENCOUNTER — OFFICE VISIT (OUTPATIENT)
Dept: PHYSICAL THERAPY | Facility: CLINIC | Age: 46
End: 2021-09-02
Payer: COMMERCIAL

## 2021-09-02 DIAGNOSIS — M25.511 RIGHT SHOULDER PAIN, UNSPECIFIED CHRONICITY: Primary | ICD-10-CM

## 2021-09-02 PROCEDURE — 97140 MANUAL THERAPY 1/> REGIONS: CPT

## 2021-09-02 PROCEDURE — 97110 THERAPEUTIC EXERCISES: CPT

## 2021-09-02 NOTE — PROGRESS NOTES
Daily Note     Today's date: 2021  Patient name: Kelley Connolly  : 1975  MRN: [id number]  Referring provider: Kenna Cabrera DO  Dx:   Encounter Diagnosis     ICD-10-CM    1  Right shoulder pain, unspecified chronicity  M25 511        Start Time: 153  Stop Time: 1615  Total time in clinic (min): 40 minutes    Subjective: Pt noted still having some pain and soreness in her R shoulder  Objective: See treatment diary below      Assessment:  Continued with treatment session, Tolerated treatment fair  Patient exhibited good technique with therapeutic exercises and would benefit from continued PT  S/p treatment session patient noted feeling pretty good  Pt educated with HEP and DOMS  Plan: Continue per plan of care  Precautions: "Increase passive range of motion  as tolerated   Begin to incorporate active assist and active range of motion as tolerated   Modalities as needed   No resistance exercises at this time " <-- Per MD updated script      Manuals   8-10 8/12  8- (RE)  -   PROM with OP into flex/scap and IR/ER at 39 degrees abd JG with oscillations MB SC  JG with oscillations KB  JG with oscillations  JG with oscillations SC    SL scapular mobility JG MB SC      JG  JG                     Neuro Re-Ed                Cervical retraction                Scapular retraction                                                                                                     Ther Ex                Standing:                - wall ladder flex 10x 5"  10x 5"  10x 5"   NV 10x5"  10 x 5"  10 x 5" 10x 5"    - wall ladder scap 10x 5"  10x 5"  10x 5"   NV 10x5"  10 x 5"  10 x 5" 10x 5"                                      Seated:                - pulleys flexion 3 min 6" hold  3 min 6" hold  2 min 6" hold   2 min 6" hold 2 min 6" hold  2 min 6" hold  2 min 6" hold 3 min 6" hold    - pulley scaption 3 min 6" hold  3 min 6" hold  2 min 6"   2 min 6" hold 2 min 6" hold  2 min 6" hold  2 min 6" hold 3 min 6" hold    - table slide flexion     HEP          - table slide scaption     HEP          - ER stretch                                  Elbow flexion                Seated:                - wrist flexion/extension                -  strength (all fingers)                                 Supine:                - AAROM flexion with cane 10 x 6" hold 10 x 6" hold 10x 6"   10 x 6" hold 10 x 6" hold  NV  10 x 6" hold 10" x 6    - AAROM scaption with cane 10 x 6" hold 10 x 6" hold 10x 6"   10 x 6" hold 10 x 6" hold  NV  10 x 6" hold 10x 6"                     - AAROM ER at 45 deg (arm supported on half foam) 10 x 6" hold 10x6"  10x 6"   10 x 6" hold 10 x 6" hold  NV  10 x 6" hold 10x 6"                     Standing:                - cane AAROM IR 10 x 10" 10x10" 10" x 10   10 x 10" 10 x 10"  NV   10" x 10    - cross body stretch NV 4x30" 4x 30"   4 x 30" NV 4 x 30"   NV   4x 30"    - towel IR stretch NV 10x10"     10 x 10" NV 10 x 10"    Ther Activity                                                  Gait Training                                                  Modalities                Cryo

## 2021-09-07 ENCOUNTER — OFFICE VISIT (OUTPATIENT)
Dept: PHYSICAL THERAPY | Facility: CLINIC | Age: 46
End: 2021-09-07
Payer: COMMERCIAL

## 2021-09-07 DIAGNOSIS — M25.511 RIGHT SHOULDER PAIN, UNSPECIFIED CHRONICITY: Primary | ICD-10-CM

## 2021-09-07 PROCEDURE — 97110 THERAPEUTIC EXERCISES: CPT

## 2021-09-07 NOTE — PROGRESS NOTES
Daily Note     Today's date: 2021  Patient name: Kelley Connolly  : 1975  MRN: [id number]  Referring provider: Marilynn Quezada DO  Dx:   Encounter Diagnosis     ICD-10-CM    1  Right shoulder pain, unspecified chronicity  M25 511        Start Time: 1610  Stop Time: 1700  Total time in clinic (min): 50 minutes    Subjective: Patient states, "I slept with a pillow under my arm last night and I'm really tight today because of it  Normally I use a towel"  Still with mod pain  Objective: See treatment diary below    Assessment:  Continued with treatment session, Tolerated treatment fair  Patient exhibited good technique with therapeutic exercises and would benefit from continued PT  PROM limitations continue to be apparent  Limited by pain and muscular restrictions  Significant sub scap and serratus tone  Plan: Continue per plan of care  Precautions: "Increase passive range of motion  as tolerated   Begin to incorporate active assist and active range of motion as tolerated   Modalities as needed   No resistance exercises at this time " <-- Per MD updated script      Manuals     8 (RE)     PROM with OP into flex/scap and IR/ER at 45 degrees abd JG with oscillations MB SC JM    JG with oscillations  JG with oscillations SC    SL scapular mobility JG MB SC JM   JG  JG                   Neuro Re-Ed              Cervical retraction              Scapular retraction                                                                                         Ther Ex              Standing:              - wall ladder flex 10x 5"  10x 5"  10x 5"  10x 5"     10 x 5"  10 x 5" 10x 5"    - wall ladder scap 10x 5"  10x 5"  10x 5"  10x 5"     10 x 5"  10 x 5" 10x 5"                                  Seated:              - pulleys flexion 3 min 6" hold  3 min 6" hold  2 min 6" hold  2 min 6" hold     2 min 6" hold  2 min 6" hold 3 min 6" hold    - pulley scaption 3 min 6" hold  3 min 6" hold  2 min 6"  2 min 6" hold     2 min 6" hold  2 min 6" hold 3 min 6" hold    - table slide flexion              - table slide scaption              - ER stretch                              Elbow flexion              Seated:              - wrist flexion/extension              -  strength (all fingers)                             Supine:              - AAROM flexion with cane 10 x 6" hold 10 x 6" hold 10x 6"  10x 6"    NV  10 x 6" hold 10" x 6    - AAROM scaption with cane 10 x 6" hold 10 x 6" hold 10x 6"  10x 6"    NV  10 x 6" hold 10x 6"                   - AAROM ER at 45 deg (arm supported on half foam) 10 x 6" hold 10x6"  10x 6"  10x 6"    NV  10 x 6" hold 10x 6"                   Standing:              - cane AAROM IR 10 x 10" 10x10" 10" x 10  10" x 10     NV   10" x 10    - cross body stretch NV 4x30" 4x 30"  4x 30"     NV   4x 30"    - towel IR stretch NV 10x10"     10 x 10" NV 10 x 10"    Ther Activity                                            Gait Training                                            Modalities              Cryo

## 2021-09-09 ENCOUNTER — OFFICE VISIT (OUTPATIENT)
Dept: PHYSICAL THERAPY | Facility: CLINIC | Age: 46
End: 2021-09-09
Payer: COMMERCIAL

## 2021-09-09 DIAGNOSIS — M25.511 RIGHT SHOULDER PAIN, UNSPECIFIED CHRONICITY: Primary | ICD-10-CM

## 2021-09-09 PROCEDURE — 97140 MANUAL THERAPY 1/> REGIONS: CPT | Performed by: PHYSICAL THERAPIST

## 2021-09-09 PROCEDURE — 97110 THERAPEUTIC EXERCISES: CPT | Performed by: PHYSICAL THERAPIST

## 2021-09-09 NOTE — PROGRESS NOTES
Daily Note     Today's date: 2021  Patient name: Kelley Connolly  : 1975  MRN: [id number]  Referring provider: Santa Anderson DO  Dx:   Encounter Diagnosis     ICD-10-CM    1  Right shoulder pain, unspecified chronicity  M25 511                   Subjective: Pt reports she noticed some improvement in symptoms following previous session with gains in ROM  Objective: See treatment diary below      Assessment: Manual therapy performed this date which resulted in improvement in ROM  Pt continues to demonstrate significant increased tension in latissimus musculature  Tolerated treatment well  Patient demonstrated fatigue post treatment, exhibited good technique with therapeutic exercises and would benefit from continued PT      Plan: Progress treatment as tolerated  Precautions: "Increase passive range of motion  as tolerated   Begin to incorporate active assist and active range of motion as tolerated   Modalities as needed   No resistance exercises at this time " <-- Per MD updated script      Manuals  (RE)     PROM with OP into flex/scap and IR/ER at 45 degrees abd JG with oscillations MB SC JM JG   JG with oscillations  JG with oscillations SC    SL scapular mobility JG MB SC JM JG  JG  JG     STM to Lats     JG         Neuro Re-Ed              Cervical retraction              Scapular retraction                                                                                         Ther Ex              Standing:              - wall ladder flex 10x 5"  10x 5"  10x 5"  10x 5"  10 x 5"   10 x 5"  10 x 5" 10x 5"    - wall ladder scap 10x 5"  10x 5"  10x 5"  10x 5"  10 x 5"   10 x 5"  10 x 5" 10x 5"                                  Seated:              - pulleys flexion 3 min 6" hold  3 min 6" hold  2 min 6" hold  2 min 6" hold  2 min 6" hold    2 min 6" hold  2 min 6" hold 3 min 6" hold    - pulley scaption 3 min 6" hold  3 min 6" hold  2 min 6"  2 min 6" hold  2 min 6" hold    2 min 6" hold  2 min 6" hold 3 min 6" hold                Supine:              - AAROM flexion with cane 10 x 6" hold 10 x 6" hold 10x 6"  10x 6"    NV  10 x 6" hold 10" x 6    - AAROM scaption with cane 10 x 6" hold 10 x 6" hold 10x 6"  10x 6"    NV  10 x 6" hold 10x 6"                   - AAROM ER at 45 deg (arm supported on half foam) 10 x 6" hold 10x6"  10x 6"  10x 6"    NV  10 x 6" hold 10x 6"                   Standing:              - cane AAROM IR 10 x 10" 10x10" 10" x 10  10" x 10  10 x 10"   NV   10" x 10    - cross body stretch NV 4x30" 4x 30"  4x 30"  4 x 30"   NV   4x 30"    - towel IR stretch NV 10x10"     10 x 10" NV 10 x 10"    Ther Activity                                            Gait Training                                            Modalities              Cryo

## 2021-09-14 ENCOUNTER — APPOINTMENT (OUTPATIENT)
Dept: PHYSICAL THERAPY | Facility: CLINIC | Age: 46
End: 2021-09-14
Payer: COMMERCIAL

## 2021-09-16 ENCOUNTER — OFFICE VISIT (OUTPATIENT)
Dept: PHYSICAL THERAPY | Facility: CLINIC | Age: 46
End: 2021-09-16
Payer: COMMERCIAL

## 2021-09-16 DIAGNOSIS — M25.511 RIGHT SHOULDER PAIN, UNSPECIFIED CHRONICITY: Primary | ICD-10-CM

## 2021-09-16 PROCEDURE — 97110 THERAPEUTIC EXERCISES: CPT

## 2021-09-16 PROCEDURE — 97140 MANUAL THERAPY 1/> REGIONS: CPT

## 2021-09-16 NOTE — PROGRESS NOTES
Daily Note     Today's date: 2021  Patient name: Kelley Connolly  : 1975  MRN: [id number]  Referring provider: Darby Chavez DO  Dx:   Encounter Diagnosis     ICD-10-CM    1  Right shoulder pain, unspecified chronicity  M25 511                   Subjective: Patient reports that her shoulder continues to feel stiff and "frozen"  Reports that she continues to have a pain/pressure on her anterior shoulder as if she still has a hematoma  Objective: See treatment diary below      Assessment: Tolerated treatment well  Patient requires verbal cues to avoid right shoulder hiking and wrist flexion when performing shoulder IR AAROM with cane and towel IR stretch  Demonstrates good tolerance to PROM with OP, remains most limited in shoulder flexion and scaption  Patient demonstrated fatigue post treatment and would benefit from continued PT  Plan: Continue per plan of care  Precautions: "Increase passive range of motion  as tolerated   Begin to incorporate active assist and active range of motion as tolerated   Modalities as needed   No resistance exercises at this time " <-- Per MD updated script      Manuals - (RE)     PROM with OP into flex/scap and IR/ER at 39 degrees abd JG with oscillations MB SC JM JG BE  JG with oscillations  JG with oscillations SC    SL scapular mobility JG MB SC JM JG BE JG  JG     STM to Lats     JG BE        Neuro Re-Ed              Cervical retraction              Scapular retraction                                                                                         Ther Ex              Standing:              - wall ladder flex 10x 5"  10x 5"  10x 5"  10x 5"  10 x 5" 10x5"  10 x 5"  10 x 5" 10x 5"    - wall ladder scap 10x 5"  10x 5"  10x 5"  10x 5"  10 x 5" 10x5"  10 x 5"  10 x 5" 10x 5"                                  Seated:              - pulleys flexion 3 min 6" hold  3 min 6" hold  2 min 6" hold  2 min 6" hold 2 min 6" hold  2 min   6"hold   2 min 6" hold  2 min 6" hold 3 min 6" hold    - pulley scaption 3 min 6" hold  3 min 6" hold  2 min 6"  2 min 6" hold  2 min 6" hold  2 min  6"hold   2 min 6" hold  2 min 6" hold 3 min 6" hold                Supine:              - AAROM flexion with cane 10 x 6" hold 10 x 6" hold 10x 6"  10x 6"  10x6"  NV  10 x 6" hold 10" x 6    - AAROM scaption with cane 10 x 6" hold 10 x 6" hold 10x 6"  10x 6"  10x6"  NV  10 x 6" hold 10x 6"                   - AAROM ER at 45 deg (arm supported on half foam) 10 x 6" hold 10x6"  10x 6"  10x 6"  10x6"  NV  10 x 6" hold 10x 6"                   Standing:              - cane AAROM IR 10 x 10" 10x10" 10" x 10  10" x 10  10 x 10" 10x10"  NV   10" x 10    - cross body stretch NV 4x30" 4x 30"  4x 30"  4 x 30" 4x30"  NV   4x 30"    - towel IR stretch NV 10x10"    4x30" 10 x 10" NV 10 x 10"    Ther Activity                                            Gait Training                                            Modalities              Cryo

## 2021-09-21 ENCOUNTER — EVALUATION (OUTPATIENT)
Dept: PHYSICAL THERAPY | Facility: CLINIC | Age: 46
End: 2021-09-21
Payer: COMMERCIAL

## 2021-09-21 DIAGNOSIS — M25.511 RIGHT SHOULDER PAIN, UNSPECIFIED CHRONICITY: Primary | ICD-10-CM

## 2021-09-21 PROCEDURE — 97110 THERAPEUTIC EXERCISES: CPT

## 2021-09-21 PROCEDURE — 97140 MANUAL THERAPY 1/> REGIONS: CPT

## 2021-09-21 NOTE — PROGRESS NOTES
PT Re-Evaluation     Today's date: 2021  Patient name: Kelley Connolly  : 1975  MRN: [id number]  Referring provider: Rossy Wolff DO  Dx:   Encounter Diagnosis     ICD-10-CM    1  Right shoulder pain, unspecified chronicity  M25 511                   Assessment  Assessment details: Kelley Connolly is a 51 year old female presenting to physical therapy for a reevaluation with a MD referral of right shoulder pain, status post shoulder fracture  Since her initial evaluation, she reports 50% improvement in her shoulder mobility and ability to perform ADLs  The patient has demonstrated improved shoulder AROM and PROM to date  However, she continues with deficits in functional mobility and strength of the shoulder  leading to limitations in their ability to perform overhead activities such as reaching items in her kitchen cabinet, styling her hair, and putting on a bra  This patient would benefit from continued PT services to address their impairments and functional limitations to maximize functional outcome  Impairments: abnormal or restricted ROM, activity intolerance, impaired physical strength and pain with function  Barriers to therapy: Recent lung lobectomy  Understanding of Dx/Px/POC: good   Prognosis: good    Goals  ST  Pt will improve shoulder flexion to 90 degrees in 3 weeks  MET  2  Pt will improve shoulder abduction to 90 degrees in 3 weeks  MET    LT  Pt will be able to fasten bra behind back with minimal to no pain in 6 weeks  NOT MET  2  Pt will be independent in a comprehensive HEP in 6 weeks  PARTIALLY MET  3  Pt will be able to reach into overhead cabinets with minimal to no pain in 6 weeks   PARTIALLY MET    Plan  Patient would benefit from: skilled physical therapy  Planned modality interventions: TENS, thermotherapy: hydrocollator packs and cryotherapy  Planned therapy interventions: joint mobilization, manual therapy, neuromuscular re-education, therapeutic activities, therapeutic exercise, flexibility, functional ROM exercises, home exercise program, strengthening, stretching, patient education and ADL retraining  Frequency: 2x week  Duration in weeks: 6  Plan of Care beginning date: 2021  Plan of Care expiration date: 2021  Treatment plan discussed with: patient        Subjective Evaluation    History of Present Illness  Mechanism of injury: The patient reports 50% improvement since beginning PT services  She notes improvement in right shoulder mobility since initial evaluation and overall decreased pain  She notes that at evaluation she had constant pain, however that has subsided  She reports that she has continued difficulty reaching her arm overhead due to mobility and weakness, especially to reach kitchen cabinets and to dry her hair        Premorbid status:  - ADLs: Indepenent with mild difficulty  - Work: Full time, Full duty- work from home  - Recreation: elliptical    Current status:  - ADLs/Functional activities:     - Reaching into shoulder height cabinets with no pain (improved, no pain but muscle weakness)   - Reaching into overhead cabinets with Pain Levels: unable (no change, difficulty due to mobility)   - Washing lower back/tucking in shirt with a little discomfort (improved, but difficult due to mobility)   - Washing hair with discomfort only with BUEs (improved)   - Driving with both arms with discomfort only (improved)   - Lifting unable with RUE   - Sleeping: tossing/turning at night due to unable to get comfortable   - Work: Full time, Full duty- work from home  - Recreation: none      Pain  Current pain rating: 3  At best pain ratin  At worst pain ratin  Location: Superior and lateral shoulder  Quality: sharp and dull ache  Relieving factors: rest  Aggravating factors: overhead activity and lifting  Progression: improved      Diagnostic Tests  X-ray: abnormal  Treatments  Previous treatment: immobilization  Current treatment: physical therapy  Patient Goals  Patient goals for therapy: decreased pain, increased motion, increased strength and independence with ADLs/IADLs  Patient goal: improved shoulder mobility        Objective     Palpation     Additional Palpation Details  Hard hematoma noted at deltoid insertion with no pain  TTP over ACJ and posterior RTC tendons    Active Range of Motion   Left Shoulder   Flexion: 145 degrees   Abduction: 170 degrees   External rotation BTH: T3   Internal rotation BTB: T5     Right Shoulder   Flexion: 100 degrees with pain  Abduction: 93 degrees with pain  External rotation BTH: T3 with pain  Internal rotation BTB: L5     Passive Range of Motion     Right Shoulder   Flexion: 110 degrees with pain  Abduction: 95 degrees with pain  External rotation 45°: 57 degrees with pain  Internal rotation 45°: 40 (stop when coracoid tilts) degrees     Strength/Myotome Testing     Left Shoulder     Planes of Motion   Flexion: 4   Abduction: 4   External rotation at 0°: 4+     Left Elbow   Flexion: 4+  Extension: 4+    Additional Strength Details  No MMT on RUE due to MD restrictions             Precautions: "Increase passive range of motion  as tolerated   Begin to incorporate active assist and active range of motion as tolerated   Modalities as needed   No resistance exercises at this time " <-- Per MD updated script 7-27     Manuals 8-27 8/31 9/2 9/7 9-9 9/16 9/21 8-19 8/24   PROM with OP into flex/scap and IR/ER at 45 degrees abd JG with oscillations MOHAN MCINTYRE JG BE BE  JG with oscillations SC    SL scapular mobility JG MOHAN MCINTYRE JG BE   JG     STM to Lats     JG BE       Neuro Re-Ed             Cervical retraction             Scapular retraction                                                                                   Ther Ex             Standing:             - wall ladder flex 10x 5"  10x 5"  10x 5"  10x 5"  10 x 5" 10x5" 10x5"  10 x 5" 10x 5"    - wall ladder scap 10x 5"  10x 5"  10x 5"  10x 5"  10 x 5" 10x5" 10x5"  10 x 5" 10x 5"                                Seated:             - pulleys flexion 3 min 6" hold  3 min 6" hold  2 min 6" hold  2 min 6" hold  2 min 6" hold  2 min   6"hold  3 min  6"hold   2 min 6" hold 3 min 6" hold    - pulley scaption 3 min 6" hold  3 min 6" hold  2 min 6"  2 min 6" hold  2 min 6" hold  2 min  6"hold  3 min  6"hold  2 min 6" hold 3 min 6" hold                Supine:             - AAROM flexion with cane 10 x 6" hold 10 x 6" hold 10x 6"  10x 6"  10x6" 10x6"  10 x 6" hold 10" x 6    - AAROM scaption with cane 10 x 6" hold 10 x 6" hold 10x 6"  10x 6"  10x6" 10x6"  10 x 6" hold 10x 6"                  - AAROM ER at 45 deg (arm supported on half foam) 10 x 6" hold 10x6"  10x 6"  10x 6"  10x6" 10x6"  10 x 6" hold 10x 6"                  Standing:             - cane AAROM IR 10 x 10" 10x10" 10" x 10  10" x 10  10 x 10" 10x10" 10"x10   10" x 10    - cross body stretch NV 4x30" 4x 30"  4x 30"  4 x 30" 4x30" 4x30"   4x 30"    - towel IR stretch NV 10x10"    4x30" 4x30" 10 x 10"    Ther Activity                                         Gait Training                                            Modalities              Cryo                               9/21: 1 on 1 for 45 minutes

## 2021-09-23 ENCOUNTER — OFFICE VISIT (OUTPATIENT)
Dept: PHYSICAL THERAPY | Facility: CLINIC | Age: 46
End: 2021-09-23
Payer: COMMERCIAL

## 2021-09-23 DIAGNOSIS — M25.511 RIGHT SHOULDER PAIN, UNSPECIFIED CHRONICITY: Primary | ICD-10-CM

## 2021-09-23 PROCEDURE — 97110 THERAPEUTIC EXERCISES: CPT

## 2021-09-23 PROCEDURE — 97140 MANUAL THERAPY 1/> REGIONS: CPT

## 2021-09-23 NOTE — PROGRESS NOTES
Daily Note     Today's date: 2021  Patient name: Kelley Connolly  : 1975  MRN: [id number]  Referring provider: Cheryle Holcomb DO  Dx:   Encounter Diagnosis     ICD-10-CM    1  Right shoulder pain, unspecified chronicity  M25 511                   Subjective: Pt noted that she is still really stiff today  Pt noted the weather could be making it increase as well  Objective: See treatment diary below      Assessment:  Continued with treatment session, Progressed to some AROM with no wt in standing shoulder flexion and abd to 90 degree  Pt required frequent cues to decrease R UT compensation  Tolerated treatment fair  Patient demonstrated fatigue post treatment, exhibited good technique with therapeutic exercises and would benefit from continued PT  S/p treatment session patient noted some soreness  Pt educated on DOMS  Plan: Continue per plan of care  Precautions: "Increase passive range of motion  as tolerated   Begin to incorporate active assist and active range of motion as tolerated   Modalities as needed   No resistance exercises at this time " <-- Per MD updated script      Manuals     PROM with OP into flex/scap and IR/ER at 45 degrees abd JG with oscillations MB SC JM JG BE BE SC     SL scapular mobility JG MB SC JM JG BE       STM to Lats     JG BE      Neuro Re-Ed            Cervical retraction            Scapular retraction                                                                             Ther Ex            Standing:            - wall ladder flex 10x 5"  10x 5"  10x 5"  10x 5"  10 x 5" 10x5" 10x5" 10x 5"     - wall ladder scap 10x 5"  10x 5"  10x 5"  10x 5"  10 x 5" 10x5" 10x5" 10x 5"                              Seated:            - pulleys flexion 3 min 6" hold  3 min 6" hold  2 min 6" hold  2 min 6" hold  2 min 6" hold  2 min   6"hold  3 min  6"hold  3 min 6" hold     - pulley scaption 3 min 6" hold  3 min 6" hold  2 min 6" 2 min 6" hold  2 min 6" hold  2 min  6"hold  3 min  6"hold 3 min 6" hold                 Supine:            - AAROM flexion with cane 10 x 6" hold 10 x 6" hold 10x 6"  10x 6"  10x6" 10x6" 10x 6"     - AAROM scaption with cane 10 x 6" hold 10 x 6" hold 10x 6"  10x 6"  10x6" 10x6" 10x 6"                  - AAROM ER at 45 deg (arm supported on half foam) 10 x 6" hold 10x6"  10x 6"  10x 6"  10x6" 10x6" 10x 6"                  Standing:            - cane AAROM IR 10 x 10" 10x10" 10" x 10  10" x 10  10 x 10" 10x10" 10"x10     - cross body stretch NV 4x30" 4x 30"  4x 30"  4 x 30" 4x30" 4x30" 4x 30"     - towel IR stretch NV 10x10"    4x30" 4x30" 4x 30"     IR stretch on wall         4x 30"      Standing AROM 3 way to 90 degrees (NO WEIGHT)        10x flexion and abd                                           Ther Activity                                      Gait Training                                         Modalities             Cryo                             1 on 1 time for 41 minutes on 9/23/21

## 2021-09-28 ENCOUNTER — OFFICE VISIT (OUTPATIENT)
Dept: PHYSICAL THERAPY | Facility: CLINIC | Age: 46
End: 2021-09-28
Payer: COMMERCIAL

## 2021-09-28 DIAGNOSIS — M25.511 RIGHT SHOULDER PAIN, UNSPECIFIED CHRONICITY: Primary | ICD-10-CM

## 2021-09-28 PROCEDURE — 97110 THERAPEUTIC EXERCISES: CPT

## 2021-09-28 PROCEDURE — 97140 MANUAL THERAPY 1/> REGIONS: CPT

## 2021-09-28 NOTE — PROGRESS NOTES
Daily Note     Today's date: 2021  Patient name: Kelley Connolly  : 1975  MRN: [id number]  Referring provider: Denis Alfaro DO  Dx:   Encounter Diagnosis     ICD-10-CM    1  Right shoulder pain, unspecified chronicity  M25 511                   Subjective: Pt reports feeling really stiff today noting she woke up that way  Objective: See treatment diary below      Assessment: Tolerated treatment well  Patient with decreased R shoulder PROM all planes noted with manual therapy  Pt does well with there ex  Pt would benefit from continued therapy for strengthening for functional mobility  Plan: Continue per plan of care  Precautions: "Increase passive range of motion  as tolerated   Begin to incorporate active assist and active range of motion as tolerated   Modalities as needed   No resistance exercises at this time " <-- Per MD updated script      Manuals    PROM with OP into flex/scap and IR/ER at 45 degrees abd JG with oscillations MOHAN FRANKS JM JG BE BE SC TK    SL scapular mobility JG MB SC JM JG BE       STM to Lats     JG BE      Neuro Re-Ed            Cervical retraction            Scapular retraction                                                                             Ther Ex            Standing:            - wall ladder flex 10x 5"  10x 5"  10x 5"  10x 5"  10 x 5" 10x5" 10x5" 10x 5"  10x5"   - wall ladder scap 10x 5"  10x 5"  10x 5"  10x 5"  10 x 5" 10x5" 10x5" 10x 5" 10x5"                             Seated:            - pulleys flexion 3 min 6" hold  3 min 6" hold  2 min 6" hold  2 min 6" hold  2 min 6" hold  2 min   6"hold  3 min  6"hold  3 min 6" hold  3 min 6" hold    - pulley scaption 3 min 6" hold  3 min 6" hold  2 min 6"  2 min 6" hold  2 min 6" hold  2 min  6"hold  3 min  6"hold 3 min 6" hold  3 min 6" hold                Supine:            - AAROM flexion with cane 10 x 6" hold 10 x 6" hold 10x 6"  10x 6"  10x6" 10x6" 10x 6"  10x6"   - AAROM scaption with cane 10 x 6" hold 10 x 6" hold 10x 6"  10x 6"  10x6" 10x6" 10x 6"  10x6"                - AAROM ER at 45 deg (arm supported on half foam) 10 x 6" hold 10x6"  10x 6"  10x 6"  10x6" 10x6" 10x 6"  10x6"                Standing:            - cane AAROM IR 10 x 10" 10x10" 10" x 10  10" x 10  10 x 10" 10x10" 10"x10     - cross body stretch NV 4x30" 4x 30"  4x 30"  4 x 30" 4x30" 4x30" 4x 30"  4x30"   - towel IR stretch NV 10x10"    4x30" 4x30" 4x 30"  4 x 30"   IR stretch on wall         4x 30"      Standing AROM 3 way to 90 degrees (NO WEIGHT)        10x flexion and abd   10x flexion and abd                                       Ther Activity                                      Gait Training                                         Modalities             Cryo

## 2021-09-30 ENCOUNTER — OFFICE VISIT (OUTPATIENT)
Dept: PHYSICAL THERAPY | Facility: CLINIC | Age: 46
End: 2021-09-30
Payer: COMMERCIAL

## 2021-09-30 DIAGNOSIS — M25.511 RIGHT SHOULDER PAIN, UNSPECIFIED CHRONICITY: Primary | ICD-10-CM

## 2021-09-30 PROCEDURE — 97110 THERAPEUTIC EXERCISES: CPT

## 2021-09-30 PROCEDURE — 97140 MANUAL THERAPY 1/> REGIONS: CPT

## 2021-09-30 NOTE — PROGRESS NOTES
Daily Note     Today's date: 2021  Patient name: April Cathleen  : 1975  MRN: [id number]  Referring provider: Kenna Cabrera DO  Dx:   Encounter Diagnosis     ICD-10-CM    1  Right shoulder pain, unspecified chronicity  M25 511                   Subjective: Patient reports "it just randomly aches "      Objective: See treatment diary below      Assessment: Tolerated treatment fair  Shoulder hiking observed with standing exercises, patient was able ti decrease UT compensation with using mirror for visual cuing  Able to achieve greater ROM with stretches  Patient demonstrated fatigue post treatment and would benefit from continued PT      Plan: Continue per plan of care  Precautions: "Increase passive range of motion  as tolerated   Begin to incorporate active assist and active range of motion as tolerated   Modalities as needed   No resistance exercises at this time " <-- Per MD updated script      Manuals  9-   PROM with OP into flex/scap and IR/ER at 45 degrees abd MOHAN FRANKS JM JG BE BE SC TK BOGDAN    SL scapular mobility MOHAN MCINTYRE JG BE        STM to Lats    JG BE       Neuro Re-Ed            Cervical retraction            Scapular retraction                                                                             Ther Ex            Standing:            - wall ladder flex 10x 5"  10x 5"  10x 5"  10 x 5" 10x5" 10x5" 10x 5"  10x5" 10x 5"   - wall ladder scap 10x 5"  10x 5"  10x 5"  10 x 5" 10x5" 10x5" 10x 5" 10x5" 10x5"                              Seated:            - pulleys flexion 3 min 6" hold  2 min 6" hold  2 min 6" hold  2 min 6" hold  2 min   6"hold  3 min  6"hold  3 min 6" hold  3 min 6" hold  3 min 6" hold   - pulley scaption 3 min 6" hold  2 min 6"  2 min 6" hold  2 min 6" hold  2 min  6"hold  3 min  6"hold 3 min 6" hold  3 min 6" hold  3 min 6" hold               Supine:            - AAROM flexion with cane 10 x 6" hold 10x 6"  10x 6"  10x6" 10x6" 10x 6"  10x6" 10x 6"   - AAROM scaption with cane 10 x 6" hold 10x 6"  10x 6"  10x6" 10x6" 10x 6"  10x6" 10x 6"                - AAROM ER at 45 deg (arm supported on half foam) 10x6"  10x 6"  10x 6"  10x6" 10x6" 10x 6"  10x6" 10x 6"                Standing:            - cane AAROM IR 10x10" 10" x 10  10" x 10  10 x 10" 10x10" 10"x10      - cross body stretch 4x30" 4x 30"  4x 30"  4 x 30" 4x30" 4x30" 4x 30"  4x30" 4x30"   - towel IR stretch 10x10"    4x30" 4x30" 4x 30"  4 x 30" 4x30"   IR stretch on wall        4x 30"       Standing AROM 3 way to 90 degrees (NO WEIGHT)       10x flexion and abd   10x flexion and abd 10x flex, scaption, abd                                          Ther Activity                                      Gait Training                                         Modalities             Cryo

## 2021-10-05 ENCOUNTER — APPOINTMENT (OUTPATIENT)
Dept: RADIOLOGY | Facility: CLINIC | Age: 46
End: 2021-10-05
Payer: COMMERCIAL

## 2021-10-05 ENCOUNTER — OFFICE VISIT (OUTPATIENT)
Dept: OBGYN CLINIC | Facility: CLINIC | Age: 46
End: 2021-10-05
Payer: COMMERCIAL

## 2021-10-05 VITALS
DIASTOLIC BLOOD PRESSURE: 75 MMHG | HEART RATE: 79 BPM | BODY MASS INDEX: 23.49 KG/M2 | HEIGHT: 68 IN | SYSTOLIC BLOOD PRESSURE: 107 MMHG | WEIGHT: 155 LBS

## 2021-10-05 DIAGNOSIS — S42.201D CLOSED FRACTURE OF PROXIMAL END OF RIGHT HUMERUS WITH ROUTINE HEALING, UNSPECIFIED FRACTURE MORPHOLOGY, SUBSEQUENT ENCOUNTER: Primary | ICD-10-CM

## 2021-10-05 DIAGNOSIS — S42.201D CLOSED FRACTURE OF PROXIMAL END OF RIGHT HUMERUS WITH ROUTINE HEALING, UNSPECIFIED FRACTURE MORPHOLOGY, SUBSEQUENT ENCOUNTER: ICD-10-CM

## 2021-10-05 PROCEDURE — 73030 X-RAY EXAM OF SHOULDER: CPT

## 2021-10-05 PROCEDURE — 99213 OFFICE O/P EST LOW 20 MIN: CPT | Performed by: ORTHOPAEDIC SURGERY

## 2021-10-07 ENCOUNTER — OFFICE VISIT (OUTPATIENT)
Dept: PHYSICAL THERAPY | Facility: CLINIC | Age: 46
End: 2021-10-07
Payer: COMMERCIAL

## 2021-10-07 DIAGNOSIS — M25.511 RIGHT SHOULDER PAIN, UNSPECIFIED CHRONICITY: Primary | ICD-10-CM

## 2021-10-07 DIAGNOSIS — S42.201D CLOSED FRACTURE OF PROXIMAL END OF RIGHT HUMERUS WITH ROUTINE HEALING, UNSPECIFIED FRACTURE MORPHOLOGY, SUBSEQUENT ENCOUNTER: ICD-10-CM

## 2021-10-07 PROCEDURE — 97140 MANUAL THERAPY 1/> REGIONS: CPT

## 2021-10-07 PROCEDURE — 97110 THERAPEUTIC EXERCISES: CPT

## 2021-10-12 ENCOUNTER — OFFICE VISIT (OUTPATIENT)
Dept: PHYSICAL THERAPY | Facility: CLINIC | Age: 46
End: 2021-10-12
Payer: COMMERCIAL

## 2021-10-12 DIAGNOSIS — M25.511 RIGHT SHOULDER PAIN, UNSPECIFIED CHRONICITY: Primary | ICD-10-CM

## 2021-10-12 DIAGNOSIS — S42.201D CLOSED FRACTURE OF PROXIMAL END OF RIGHT HUMERUS WITH ROUTINE HEALING, UNSPECIFIED FRACTURE MORPHOLOGY, SUBSEQUENT ENCOUNTER: ICD-10-CM

## 2021-10-12 PROCEDURE — 97110 THERAPEUTIC EXERCISES: CPT | Performed by: PHYSICAL THERAPIST

## 2021-10-12 PROCEDURE — 97140 MANUAL THERAPY 1/> REGIONS: CPT | Performed by: PHYSICAL THERAPIST

## 2021-10-14 ENCOUNTER — APPOINTMENT (OUTPATIENT)
Dept: PHYSICAL THERAPY | Facility: CLINIC | Age: 46
End: 2021-10-14
Payer: COMMERCIAL

## 2021-10-17 ENCOUNTER — HOSPITAL ENCOUNTER (OUTPATIENT)
Dept: CT IMAGING | Facility: HOSPITAL | Age: 46
Discharge: HOME | End: 2021-10-17
Attending: THORACIC SURGERY (CARDIOTHORACIC VASCULAR SURGERY)
Payer: COMMERCIAL

## 2021-10-17 DIAGNOSIS — C34.11 PRIMARY ADENOCARCINOMA OF UPPER LOBE OF RIGHT LUNG (HCC): ICD-10-CM

## 2021-10-17 PROCEDURE — G1004 CDSM NDSC: HCPCS

## 2021-10-17 PROCEDURE — 71250 CT THORAX DX C-: CPT

## 2021-10-19 ENCOUNTER — APPOINTMENT (OUTPATIENT)
Dept: PHYSICAL THERAPY | Facility: CLINIC | Age: 46
End: 2021-10-19
Payer: COMMERCIAL

## 2021-10-19 ENCOUNTER — OFFICE VISIT (OUTPATIENT)
Dept: CARDIAC SURGERY | Facility: CLINIC | Age: 46
End: 2021-10-19
Payer: COMMERCIAL

## 2021-10-19 VITALS
OXYGEN SATURATION: 98 % | SYSTOLIC BLOOD PRESSURE: 110 MMHG | TEMPERATURE: 97.6 F | WEIGHT: 169 LBS | HEART RATE: 90 BPM | RESPIRATION RATE: 12 BRPM | DIASTOLIC BLOOD PRESSURE: 72 MMHG | HEIGHT: 68 IN | BODY MASS INDEX: 25.61 KG/M2

## 2021-10-19 DIAGNOSIS — C34.11 PRIMARY ADENOCARCINOMA OF UPPER LOBE OF RIGHT LUNG (HCC): Primary | ICD-10-CM

## 2021-10-19 PROCEDURE — 1036F TOBACCO NON-USER: CPT | Performed by: THORACIC SURGERY (CARDIOTHORACIC VASCULAR SURGERY)

## 2021-10-19 PROCEDURE — 3008F BODY MASS INDEX DOCD: CPT | Performed by: THORACIC SURGERY (CARDIOTHORACIC VASCULAR SURGERY)

## 2021-10-19 PROCEDURE — 99214 OFFICE O/P EST MOD 30 MIN: CPT | Performed by: THORACIC SURGERY (CARDIOTHORACIC VASCULAR SURGERY)

## 2021-10-21 ENCOUNTER — OFFICE VISIT (OUTPATIENT)
Dept: PHYSICAL THERAPY | Facility: CLINIC | Age: 46
End: 2021-10-21
Payer: COMMERCIAL

## 2021-10-21 DIAGNOSIS — S42.201D CLOSED FRACTURE OF PROXIMAL END OF RIGHT HUMERUS WITH ROUTINE HEALING, UNSPECIFIED FRACTURE MORPHOLOGY, SUBSEQUENT ENCOUNTER: ICD-10-CM

## 2021-10-21 DIAGNOSIS — M25.511 RIGHT SHOULDER PAIN, UNSPECIFIED CHRONICITY: Primary | ICD-10-CM

## 2021-10-21 PROCEDURE — 97110 THERAPEUTIC EXERCISES: CPT

## 2021-10-21 PROCEDURE — 97140 MANUAL THERAPY 1/> REGIONS: CPT

## 2021-10-26 ENCOUNTER — EVALUATION (OUTPATIENT)
Dept: PHYSICAL THERAPY | Facility: CLINIC | Age: 46
End: 2021-10-26
Payer: COMMERCIAL

## 2021-10-26 DIAGNOSIS — M25.511 RIGHT SHOULDER PAIN, UNSPECIFIED CHRONICITY: ICD-10-CM

## 2021-10-26 DIAGNOSIS — S42.201D CLOSED FRACTURE OF PROXIMAL END OF RIGHT HUMERUS WITH ROUTINE HEALING, UNSPECIFIED FRACTURE MORPHOLOGY, SUBSEQUENT ENCOUNTER: Primary | ICD-10-CM

## 2021-10-26 PROCEDURE — 97140 MANUAL THERAPY 1/> REGIONS: CPT

## 2021-10-26 PROCEDURE — 97110 THERAPEUTIC EXERCISES: CPT

## 2021-10-28 ENCOUNTER — OFFICE VISIT (OUTPATIENT)
Dept: PHYSICAL THERAPY | Facility: CLINIC | Age: 46
End: 2021-10-28
Payer: COMMERCIAL

## 2021-10-28 DIAGNOSIS — S42.201D CLOSED FRACTURE OF PROXIMAL END OF RIGHT HUMERUS WITH ROUTINE HEALING, UNSPECIFIED FRACTURE MORPHOLOGY, SUBSEQUENT ENCOUNTER: Primary | ICD-10-CM

## 2021-10-28 DIAGNOSIS — M25.511 RIGHT SHOULDER PAIN, UNSPECIFIED CHRONICITY: ICD-10-CM

## 2021-10-28 PROCEDURE — 97110 THERAPEUTIC EXERCISES: CPT | Performed by: PHYSICAL THERAPIST

## 2021-10-28 PROCEDURE — 97140 MANUAL THERAPY 1/> REGIONS: CPT | Performed by: PHYSICAL THERAPIST

## 2021-11-02 ENCOUNTER — OFFICE VISIT (OUTPATIENT)
Dept: PHYSICAL THERAPY | Facility: CLINIC | Age: 46
End: 2021-11-02
Payer: COMMERCIAL

## 2021-11-02 DIAGNOSIS — M25.511 RIGHT SHOULDER PAIN, UNSPECIFIED CHRONICITY: ICD-10-CM

## 2021-11-02 DIAGNOSIS — S42.201D CLOSED FRACTURE OF PROXIMAL END OF RIGHT HUMERUS WITH ROUTINE HEALING, UNSPECIFIED FRACTURE MORPHOLOGY, SUBSEQUENT ENCOUNTER: Primary | ICD-10-CM

## 2021-11-02 PROCEDURE — 97140 MANUAL THERAPY 1/> REGIONS: CPT

## 2021-11-02 PROCEDURE — 97110 THERAPEUTIC EXERCISES: CPT

## 2021-11-04 ENCOUNTER — OFFICE VISIT (OUTPATIENT)
Dept: PHYSICAL THERAPY | Facility: CLINIC | Age: 46
End: 2021-11-04
Payer: COMMERCIAL

## 2021-11-04 DIAGNOSIS — M25.511 RIGHT SHOULDER PAIN, UNSPECIFIED CHRONICITY: ICD-10-CM

## 2021-11-04 DIAGNOSIS — S42.201D CLOSED FRACTURE OF PROXIMAL END OF RIGHT HUMERUS WITH ROUTINE HEALING, UNSPECIFIED FRACTURE MORPHOLOGY, SUBSEQUENT ENCOUNTER: Primary | ICD-10-CM

## 2021-11-04 PROCEDURE — 97140 MANUAL THERAPY 1/> REGIONS: CPT

## 2021-11-04 PROCEDURE — 97110 THERAPEUTIC EXERCISES: CPT

## 2021-11-09 ENCOUNTER — OFFICE VISIT (OUTPATIENT)
Dept: PHYSICAL THERAPY | Facility: CLINIC | Age: 46
End: 2021-11-09
Payer: COMMERCIAL

## 2021-11-09 DIAGNOSIS — S42.201D CLOSED FRACTURE OF PROXIMAL END OF RIGHT HUMERUS WITH ROUTINE HEALING, UNSPECIFIED FRACTURE MORPHOLOGY, SUBSEQUENT ENCOUNTER: Primary | ICD-10-CM

## 2021-11-09 DIAGNOSIS — M25.511 RIGHT SHOULDER PAIN, UNSPECIFIED CHRONICITY: ICD-10-CM

## 2021-11-09 PROCEDURE — 97140 MANUAL THERAPY 1/> REGIONS: CPT

## 2021-11-09 PROCEDURE — 97110 THERAPEUTIC EXERCISES: CPT

## 2021-11-11 ENCOUNTER — OFFICE VISIT (OUTPATIENT)
Dept: PHYSICAL THERAPY | Facility: CLINIC | Age: 46
End: 2021-11-11
Payer: COMMERCIAL

## 2021-11-11 DIAGNOSIS — M25.511 RIGHT SHOULDER PAIN, UNSPECIFIED CHRONICITY: ICD-10-CM

## 2021-11-11 DIAGNOSIS — S42.201D CLOSED FRACTURE OF PROXIMAL END OF RIGHT HUMERUS WITH ROUTINE HEALING, UNSPECIFIED FRACTURE MORPHOLOGY, SUBSEQUENT ENCOUNTER: Primary | ICD-10-CM

## 2021-11-11 PROCEDURE — 97140 MANUAL THERAPY 1/> REGIONS: CPT

## 2021-11-11 PROCEDURE — 97110 THERAPEUTIC EXERCISES: CPT

## 2021-11-16 ENCOUNTER — OFFICE VISIT (OUTPATIENT)
Dept: PHYSICAL THERAPY | Facility: CLINIC | Age: 46
End: 2021-11-16
Payer: COMMERCIAL

## 2021-11-16 DIAGNOSIS — M25.511 RIGHT SHOULDER PAIN, UNSPECIFIED CHRONICITY: ICD-10-CM

## 2021-11-16 DIAGNOSIS — S42.201D CLOSED FRACTURE OF PROXIMAL END OF RIGHT HUMERUS WITH ROUTINE HEALING, UNSPECIFIED FRACTURE MORPHOLOGY, SUBSEQUENT ENCOUNTER: Primary | ICD-10-CM

## 2021-11-16 PROCEDURE — 97140 MANUAL THERAPY 1/> REGIONS: CPT

## 2021-11-16 PROCEDURE — 97110 THERAPEUTIC EXERCISES: CPT

## 2021-11-18 ENCOUNTER — OFFICE VISIT (OUTPATIENT)
Dept: PHYSICAL THERAPY | Facility: CLINIC | Age: 46
End: 2021-11-18
Payer: COMMERCIAL

## 2021-11-18 DIAGNOSIS — M25.511 RIGHT SHOULDER PAIN, UNSPECIFIED CHRONICITY: ICD-10-CM

## 2021-11-18 DIAGNOSIS — S42.201D CLOSED FRACTURE OF PROXIMAL END OF RIGHT HUMERUS WITH ROUTINE HEALING, UNSPECIFIED FRACTURE MORPHOLOGY, SUBSEQUENT ENCOUNTER: Primary | ICD-10-CM

## 2021-11-18 PROCEDURE — 97140 MANUAL THERAPY 1/> REGIONS: CPT

## 2021-11-18 PROCEDURE — 97110 THERAPEUTIC EXERCISES: CPT

## 2021-11-22 ENCOUNTER — OFFICE VISIT (OUTPATIENT)
Dept: PHYSICAL THERAPY | Facility: CLINIC | Age: 46
End: 2021-11-22
Payer: COMMERCIAL

## 2021-11-22 DIAGNOSIS — S42.201D CLOSED FRACTURE OF PROXIMAL END OF RIGHT HUMERUS WITH ROUTINE HEALING, UNSPECIFIED FRACTURE MORPHOLOGY, SUBSEQUENT ENCOUNTER: Primary | ICD-10-CM

## 2021-11-22 DIAGNOSIS — M25.511 RIGHT SHOULDER PAIN, UNSPECIFIED CHRONICITY: ICD-10-CM

## 2021-11-22 PROCEDURE — 97140 MANUAL THERAPY 1/> REGIONS: CPT | Performed by: PHYSICAL THERAPIST

## 2021-11-22 PROCEDURE — 97110 THERAPEUTIC EXERCISES: CPT | Performed by: PHYSICAL THERAPIST

## 2021-11-24 ENCOUNTER — EVALUATION (OUTPATIENT)
Dept: PHYSICAL THERAPY | Facility: CLINIC | Age: 46
End: 2021-11-24
Payer: COMMERCIAL

## 2021-11-24 DIAGNOSIS — M25.511 RIGHT SHOULDER PAIN, UNSPECIFIED CHRONICITY: ICD-10-CM

## 2021-11-24 DIAGNOSIS — S42.201D CLOSED FRACTURE OF PROXIMAL END OF RIGHT HUMERUS WITH ROUTINE HEALING, UNSPECIFIED FRACTURE MORPHOLOGY, SUBSEQUENT ENCOUNTER: Primary | ICD-10-CM

## 2021-11-24 PROCEDURE — 97140 MANUAL THERAPY 1/> REGIONS: CPT

## 2021-11-24 PROCEDURE — 97110 THERAPEUTIC EXERCISES: CPT

## 2021-11-30 ENCOUNTER — OFFICE VISIT (OUTPATIENT)
Dept: OBGYN CLINIC | Facility: CLINIC | Age: 46
End: 2021-11-30
Payer: COMMERCIAL

## 2021-11-30 ENCOUNTER — APPOINTMENT (OUTPATIENT)
Dept: LAB | Facility: CLINIC | Age: 46
End: 2021-11-30
Payer: COMMERCIAL

## 2021-11-30 ENCOUNTER — APPOINTMENT (OUTPATIENT)
Dept: RADIOLOGY | Facility: CLINIC | Age: 46
End: 2021-11-30
Payer: COMMERCIAL

## 2021-11-30 VITALS
HEIGHT: 68 IN | SYSTOLIC BLOOD PRESSURE: 108 MMHG | HEART RATE: 75 BPM | DIASTOLIC BLOOD PRESSURE: 73 MMHG | WEIGHT: 168 LBS | BODY MASS INDEX: 25.46 KG/M2 | RESPIRATION RATE: 18 BRPM

## 2021-11-30 DIAGNOSIS — S42.201D CLOSED FRACTURE OF PROXIMAL END OF RIGHT HUMERUS WITH ROUTINE HEALING, UNSPECIFIED FRACTURE MORPHOLOGY, SUBSEQUENT ENCOUNTER: ICD-10-CM

## 2021-11-30 DIAGNOSIS — S42.201D CLOSED FRACTURE OF PROXIMAL END OF RIGHT HUMERUS WITH ROUTINE HEALING, UNSPECIFIED FRACTURE MORPHOLOGY, SUBSEQUENT ENCOUNTER: Primary | ICD-10-CM

## 2021-11-30 DIAGNOSIS — C64.1 RENAL CELL CARCINOMA OF RIGHT KIDNEY (HCC): ICD-10-CM

## 2021-11-30 LAB
ALBUMIN SERPL BCP-MCNC: 3.7 G/DL (ref 3.5–5)
ALP SERPL-CCNC: 112 U/L (ref 46–116)
ALT SERPL W P-5'-P-CCNC: 16 U/L (ref 12–78)
AMORPH URATE CRY URNS QL MICRO: ABNORMAL /HPF
ANION GAP SERPL CALCULATED.3IONS-SCNC: 4 MMOL/L (ref 4–13)
AST SERPL W P-5'-P-CCNC: 9 U/L (ref 5–45)
BACTERIA UR QL AUTO: ABNORMAL /HPF
BASOPHILS # BLD AUTO: 0.05 THOUSANDS/ΜL (ref 0–0.1)
BASOPHILS NFR BLD AUTO: 1 % (ref 0–1)
BILIRUB SERPL-MCNC: 0.35 MG/DL (ref 0.2–1)
BILIRUB UR QL STRIP: NEGATIVE
BUN SERPL-MCNC: 15 MG/DL (ref 5–25)
CALCIUM SERPL-MCNC: 9 MG/DL (ref 8.3–10.1)
CHLORIDE SERPL-SCNC: 109 MMOL/L (ref 100–108)
CHOLEST SERPL-MCNC: 209 MG/DL
CLARITY UR: ABNORMAL
CO2 SERPL-SCNC: 26 MMOL/L (ref 21–32)
COLOR UR: YELLOW
CREAT SERPL-MCNC: 0.84 MG/DL (ref 0.6–1.3)
EOSINOPHIL # BLD AUTO: 0.06 THOUSAND/ΜL (ref 0–0.61)
EOSINOPHIL NFR BLD AUTO: 1 % (ref 0–6)
ERYTHROCYTE [DISTWIDTH] IN BLOOD BY AUTOMATED COUNT: 12.5 % (ref 11.6–15.1)
GFR SERPL CREATININE-BSD FRML MDRD: 83 ML/MIN/1.73SQ M
GLUCOSE P FAST SERPL-MCNC: 95 MG/DL (ref 65–99)
GLUCOSE UR STRIP-MCNC: NEGATIVE MG/DL
HCT VFR BLD AUTO: 43.1 % (ref 34.8–46.1)
HDLC SERPL-MCNC: 55 MG/DL
HGB BLD-MCNC: 14 G/DL (ref 11.5–15.4)
HGB UR QL STRIP.AUTO: ABNORMAL
IMM GRANULOCYTES # BLD AUTO: 0.02 THOUSAND/UL (ref 0–0.2)
IMM GRANULOCYTES NFR BLD AUTO: 0 % (ref 0–2)
KETONES UR STRIP-MCNC: NEGATIVE MG/DL
LDLC SERPL CALC-MCNC: 138 MG/DL (ref 0–100)
LEUKOCYTE ESTERASE UR QL STRIP: ABNORMAL
LYMPHOCYTES # BLD AUTO: 1.87 THOUSANDS/ΜL (ref 0.6–4.47)
LYMPHOCYTES NFR BLD AUTO: 34 % (ref 14–44)
MCH RBC QN AUTO: 29.5 PG (ref 26.8–34.3)
MCHC RBC AUTO-ENTMCNC: 32.5 G/DL (ref 31.4–37.4)
MCV RBC AUTO: 91 FL (ref 82–98)
MONOCYTES # BLD AUTO: 0.41 THOUSAND/ΜL (ref 0.17–1.22)
MONOCYTES NFR BLD AUTO: 7 % (ref 4–12)
NEUTROPHILS # BLD AUTO: 3.1 THOUSANDS/ΜL (ref 1.85–7.62)
NEUTS SEG NFR BLD AUTO: 57 % (ref 43–75)
NITRITE UR QL STRIP: NEGATIVE
NON-SQ EPI CELLS URNS QL MICRO: ABNORMAL /HPF
NONHDLC SERPL-MCNC: 154 MG/DL
NRBC BLD AUTO-RTO: 0 /100 WBCS
PH UR STRIP.AUTO: 5.5 [PH]
PLATELET # BLD AUTO: 216 THOUSANDS/UL (ref 149–390)
PMV BLD AUTO: 10.3 FL (ref 8.9–12.7)
POTASSIUM SERPL-SCNC: 4.4 MMOL/L (ref 3.5–5.3)
PROT SERPL-MCNC: 7 G/DL (ref 6.4–8.2)
PROT UR STRIP-MCNC: ABNORMAL MG/DL
RBC # BLD AUTO: 4.75 MILLION/UL (ref 3.81–5.12)
RBC #/AREA URNS AUTO: ABNORMAL /HPF
SODIUM SERPL-SCNC: 139 MMOL/L (ref 136–145)
SP GR UR STRIP.AUTO: 1.03 (ref 1–1.03)
TRIGL SERPL-MCNC: 82 MG/DL
TSH SERPL DL<=0.05 MIU/L-ACNC: 0.65 UIU/ML (ref 0.36–3.74)
UROBILINOGEN UR QL STRIP.AUTO: 0.2 E.U./DL
WBC # BLD AUTO: 5.51 THOUSAND/UL (ref 4.31–10.16)
WBC #/AREA URNS AUTO: ABNORMAL /HPF

## 2021-11-30 PROCEDURE — 99213 OFFICE O/P EST LOW 20 MIN: CPT | Performed by: ORTHOPAEDIC SURGERY

## 2021-11-30 PROCEDURE — 81001 URINALYSIS AUTO W/SCOPE: CPT | Performed by: FAMILY MEDICINE

## 2021-11-30 PROCEDURE — 1036F TOBACCO NON-USER: CPT | Performed by: ORTHOPAEDIC SURGERY

## 2021-11-30 PROCEDURE — 3008F BODY MASS INDEX DOCD: CPT | Performed by: ORTHOPAEDIC SURGERY

## 2021-11-30 PROCEDURE — 84443 ASSAY THYROID STIM HORMONE: CPT

## 2021-11-30 PROCEDURE — 73030 X-RAY EXAM OF SHOULDER: CPT

## 2021-11-30 PROCEDURE — 36415 COLL VENOUS BLD VENIPUNCTURE: CPT

## 2021-11-30 PROCEDURE — 80061 LIPID PANEL: CPT

## 2021-11-30 PROCEDURE — 80053 COMPREHEN METABOLIC PANEL: CPT

## 2021-11-30 PROCEDURE — 85025 COMPLETE CBC W/AUTO DIFF WBC: CPT

## 2021-12-02 ENCOUNTER — OFFICE VISIT (OUTPATIENT)
Dept: FAMILY MEDICINE CLINIC | Facility: CLINIC | Age: 46
End: 2021-12-02
Payer: COMMERCIAL

## 2021-12-02 VITALS
HEART RATE: 78 BPM | BODY MASS INDEX: 25.55 KG/M2 | DIASTOLIC BLOOD PRESSURE: 70 MMHG | OXYGEN SATURATION: 97 % | TEMPERATURE: 98.3 F | SYSTOLIC BLOOD PRESSURE: 118 MMHG | WEIGHT: 168.6 LBS | HEIGHT: 68 IN | RESPIRATION RATE: 18 BRPM

## 2021-12-02 DIAGNOSIS — K44.9 HIATAL HERNIA: ICD-10-CM

## 2021-12-02 DIAGNOSIS — Z23 ENCOUNTER FOR IMMUNIZATION: Primary | ICD-10-CM

## 2021-12-02 DIAGNOSIS — Z12.4 PAP SMEAR FOR CERVICAL CANCER SCREENING: Primary | ICD-10-CM

## 2021-12-02 DIAGNOSIS — E78.2 MIXED HYPERLIPIDEMIA: ICD-10-CM

## 2021-12-02 DIAGNOSIS — C64.1 RENAL CELL CARCINOMA OF RIGHT KIDNEY (HCC): ICD-10-CM

## 2021-12-02 DIAGNOSIS — C34.11 PRIMARY ADENOCARCINOMA OF UPPER LOBE OF RIGHT LUNG (HCC): ICD-10-CM

## 2021-12-02 PROCEDURE — 3725F SCREEN DEPRESSION PERFORMED: CPT | Performed by: FAMILY MEDICINE

## 2021-12-02 PROCEDURE — 90682 RIV4 VACC RECOMBINANT DNA IM: CPT | Performed by: FAMILY MEDICINE

## 2021-12-02 PROCEDURE — 1036F TOBACCO NON-USER: CPT | Performed by: FAMILY MEDICINE

## 2021-12-02 PROCEDURE — 3008F BODY MASS INDEX DOCD: CPT | Performed by: FAMILY MEDICINE

## 2021-12-02 PROCEDURE — 90471 IMMUNIZATION ADMIN: CPT | Performed by: FAMILY MEDICINE

## 2021-12-02 PROCEDURE — 99396 PREV VISIT EST AGE 40-64: CPT | Performed by: FAMILY MEDICINE

## 2021-12-16 ENCOUNTER — APPOINTMENT (OUTPATIENT)
Dept: LAB | Facility: CLINIC | Age: 46
End: 2021-12-16
Payer: COMMERCIAL

## 2021-12-16 LAB
BACTERIA UR QL AUTO: ABNORMAL /HPF
BILIRUB UR QL STRIP: NEGATIVE
CLARITY UR: CLEAR
COLOR UR: YELLOW
GLUCOSE UR STRIP-MCNC: NEGATIVE MG/DL
HGB UR QL STRIP.AUTO: ABNORMAL
HYALINE CASTS #/AREA URNS LPF: ABNORMAL /LPF
KETONES UR STRIP-MCNC: NEGATIVE MG/DL
LEUKOCYTE ESTERASE UR QL STRIP: ABNORMAL
NITRITE UR QL STRIP: NEGATIVE
NON-SQ EPI CELLS URNS QL MICRO: ABNORMAL /HPF
PH UR STRIP.AUTO: 6 [PH]
PROT UR STRIP-MCNC: NEGATIVE MG/DL
RBC #/AREA URNS AUTO: ABNORMAL /HPF
SP GR UR STRIP.AUTO: 1 (ref 1–1.03)
UROBILINOGEN UR QL STRIP.AUTO: 0.2 E.U./DL
WBC #/AREA URNS AUTO: ABNORMAL /HPF

## 2021-12-16 PROCEDURE — 81001 URINALYSIS AUTO W/SCOPE: CPT | Performed by: FAMILY MEDICINE

## 2021-12-23 ENCOUNTER — TELEPHONE (OUTPATIENT)
Dept: UROLOGY | Facility: MEDICAL CENTER | Age: 46
End: 2021-12-23

## 2022-01-07 ENCOUNTER — APPOINTMENT (OUTPATIENT)
Dept: LAB | Facility: CLINIC | Age: 47
End: 2022-01-07
Payer: COMMERCIAL

## 2022-01-07 DIAGNOSIS — C64.1 RENAL CELL CARCINOMA OF RIGHT KIDNEY (HCC): ICD-10-CM

## 2022-01-07 LAB
BACTERIA UR QL AUTO: ABNORMAL /HPF
BILIRUB UR QL STRIP: NEGATIVE
CLARITY UR: CLEAR
COLOR UR: YELLOW
GLUCOSE UR STRIP-MCNC: NEGATIVE MG/DL
HGB UR QL STRIP.AUTO: ABNORMAL
KETONES UR STRIP-MCNC: NEGATIVE MG/DL
LEUKOCYTE ESTERASE UR QL STRIP: ABNORMAL
NITRITE UR QL STRIP: NEGATIVE
NON-SQ EPI CELLS URNS QL MICRO: ABNORMAL /HPF
PH UR STRIP.AUTO: 6 [PH]
PROT UR STRIP-MCNC: NEGATIVE MG/DL
RBC #/AREA URNS AUTO: ABNORMAL /HPF
SP GR UR STRIP.AUTO: >=1.03 (ref 1–1.03)
UROBILINOGEN UR QL STRIP.AUTO: 0.2 E.U./DL
WBC #/AREA URNS AUTO: ABNORMAL /HPF

## 2022-01-07 PROCEDURE — 87086 URINE CULTURE/COLONY COUNT: CPT

## 2022-01-07 PROCEDURE — 81001 URINALYSIS AUTO W/SCOPE: CPT

## 2022-01-08 LAB — BACTERIA UR CULT: NORMAL

## 2022-01-10 ENCOUNTER — APPOINTMENT (OUTPATIENT)
Dept: LAB | Facility: CLINIC | Age: 47
End: 2022-01-10
Payer: COMMERCIAL

## 2022-01-10 PROCEDURE — 88112 CYTOPATH CELL ENHANCE TECH: CPT | Performed by: PATHOLOGY

## 2022-01-13 NOTE — PROGRESS NOTES
1/14/2022      Chief Complaint   Patient presents with    Microhematuria     Assessment and Plan    1  Microhematuria  - Recent UA micro from 1/7/22 showing 4-10 RBCs and 2-4 RBCs on 12/16/21 in absence of UTI  - Urine cytology from 1/10/21 - negative  - Will obtain CT renal protocol/BMP  - Return for cystoscopy     2  Hx of T1a clear cell RCC  - S/p right partial laparoscopic nephrectomy on 8/26/19  - Renal US from 7/23/21 with no evidence of disease recurrence  CMP from 11/30/21 - kidney function WNL    History of Present Illness  April Cathleen is a 51 y o  female here for follow up evaluation of  new issue of microscopic hematuria  Recent urinalysis with microscopy has been positive for RBCs showing 2-4 RBCs on 12/16/2021 and 4-10 RBCs on 01/07/2022  This has been in the absence of a urinary tract infection  Patient has significant history renal cell carcinoma status post right partial laparoscopic nephrectomy in August 2019  Her postoperative imaging has been negative for any evidence of disease recurrence  Her kidney function is within normal range  She has family history of renal cell carcinoma and her aunt  Denies any other family history of  malignancy  She is a former smoker and quit 9 months ago  Review of Systems   Constitutional: Negative for chills and fever  Respiratory: Negative for shortness of breath  Cardiovascular: Negative for chest pain  Gastrointestinal: Negative for abdominal pain  Genitourinary: Positive for frequency  Negative for difficulty urinating, dysuria, flank pain, hematuria and urgency  Musculoskeletal: Positive for back pain  Neurological: Negative for dizziness         Past Medical History  Past Medical History:   Diagnosis Date    Cancer ([facility] [address] ) 6/16/19    Chronic kidney disease 08/2019    tumor and cyst    Colon polyp     Gastritis     Nodule of upper lobe of right lung     PONV (postoperative nausea and vomiting)     Renal cell carcinoma (HCC) 2019    Renal mass, right        Past Social History  Past Surgical History:   Procedure Laterality Date    BREAST BIOPSY Left 2020    benign    COLONOSCOPY      LUNG SEGMENTECTOMY Right 2021    Procedure: RESECTION WEDGE LUNG;  Surgeon: Norma Garcia MD;  [facility];  Service: Thoracic    HI [address] N/A 2021    Procedure: Tianna Matthewst;  Surgeon: Norma Garcia MD;  [facility];  Service: Thoracic    HI LAP,PARTIAL NEPHRECTOMY Right 2019    Procedure: NEPHRECTOMY PARTIAL LAPAROSCOPIC W ROBOTICS WITH INTRA-OP LAPAROSCOPIC ULTRASOUND;  Surgeon: Lynn Soriano MD;  [facility];  Service: Urology    HI THORACOSCOPY SURG LOBECTOMY Right 2021    Procedure: LOBECTOMY LUNG; MEDIASTINAL LYMPH NODE DISSECTION;  Surgeon: Norma Garcia MD;  [facility];  Service: Thoracic    HI THORACOSCOPY [place]  WEDGE RESEXN INITIAL UNILAT Right 2021    Procedure: THORACOSCOPY VIDEO ASSISTED SURGERY (VATS);   Surgeon: Norma Garcia MD;  [facility];  Service: Thoracic    US GUIDED BREAST BIOPSY LEFT COMPLETE Left 1/15/2020    WISDOM TOOTH EXTRACTION       Social History     Tobacco Use   Smoking Status Former Smoker    Packs/day: 0 00    Years: 25 00    Pack years: 0 00    Types: Cigarettes    Quit date: 2021    Years since quittin 0   Smokeless Tobacco Never Used   Tobacco Comment    quit on 21       Past Family History  Family History   Problem Relation Age of Onset    Heart disease Father     Cancer Paternal Grandmother     Diabetes Paternal Grandmother     Breast cancer Paternal Grandmother 79    Other Maternal Aunt         Renal Cell Carcinoma    No Known Problems Mother     No Known Problems Sister     No Known Problems Maternal Grandmother     Breast cancer Paternal Grandfather 79    No Known Problems Maternal Aunt     No Known Problems Maternal Aunt        Past Social history  Social History     Socioeconomic History    Marital status: /Civil Union     Spouse name: Not on file    Number of children: Not on file    Years of education: Not on file    Highest education level: Not on file   Occupational History    Not on file   Tobacco Use    Smoking status: Former Smoker     Packs/day: 0 00     Years: 25 00     Pack years: 0 00     Types: Cigarettes     Quit date: 2021     Years since quittin 0    Smokeless tobacco: Never Used    Tobacco comment: quit on 21   Vaping Use    Vaping Use: Former   Substance and Sexual Activity    Alcohol use: Not Currently     Alcohol/week: 0 0 standard drinks    Drug use: Never    Sexual activity: Yes     Partners: Male     Birth control/protection: Condom Male   Other Topics Concern    Not on file   Social History Narrative    Not on file     Social Determinants of Health     Financial Resource Strain: Not on file   Food Insecurity: Not on file   Transportation Needs: Not on file   Physical Activity: Not on file   Stress: Not on file   Social Connections: Not on file   Intimate Partner Violence: Not on file   Housing Stability: Not on file       Current Medications  No current outpatient medications on file  No current facility-administered medications for this visit  Allergies  Allergies   Allergen Reactions    Strawberry (Diagnostic) - Food Allergy Hives    Tetracycline      breast swelling          The following portions of the patient's history were reviewed and updated as appropriate: allergies, current medications, past medical history, past social history, past surgical history and problem list       Vitals  Vitals:    22 1052   BP: 110/64   Pulse: 93   Resp: 16   Temp: 98 °F (36 7 °C)   TempSrc: Temporal   SpO2: 97%   Weight: 74 8 kg (165 lb)   Height: 5' 8" (1 727 m)           Physical Exam  Physical Exam  Constitutional:       Appearance: Normal appearance     HENT:      Head: Normocephalic and atraumatic  Right Ear: External ear normal       Left Ear: External ear normal    Eyes:      General: No scleral icterus  Conjunctiva/sclera: Conjunctivae normal    Cardiovascular:      Pulses: Normal pulses  Pulmonary:      Effort: Pulmonary effort is normal    Musculoskeletal:         General: Normal range of motion  Cervical back: Normal range of motion  Neurological:      General: No focal deficit present  Mental Status: She is alert and oriented to person, place, and time  Psychiatric:         Mood and Affect: Mood normal          Behavior: Behavior normal          Thought Content: Thought content normal          Judgment: Judgment normal            Results  No results found for this or any previous visit (from the past 1 hour(s))  ]  No results found for: PSA  Lab Results   Component Value Date    CALCIUM 9 0 11/30/2021    K 4 4 11/30/2021    CO2 26 11/30/2021     (H) 11/30/2021    BUN 15 11/30/2021    CREATININE 0 84 11/30/2021     Lab Results   Component Value Date    WBC 5 51 11/30/2021    HGB 14 0 11/30/2021    HCT 43 1 11/30/2021    MCV 91 11/30/2021     11/30/2021           Orders  No orders of the defined types were placed in this encounter        Leslie Fortune PA-C

## 2022-01-14 ENCOUNTER — OFFICE VISIT (OUTPATIENT)
Dept: UROLOGY | Facility: CLINIC | Age: 47
End: 2022-01-14
Payer: COMMERCIAL

## 2022-01-14 VITALS
RESPIRATION RATE: 16 BRPM | DIASTOLIC BLOOD PRESSURE: 64 MMHG | OXYGEN SATURATION: 97 % | WEIGHT: 165 LBS | HEIGHT: 68 IN | HEART RATE: 93 BPM | SYSTOLIC BLOOD PRESSURE: 110 MMHG | TEMPERATURE: 98 F | BODY MASS INDEX: 25.01 KG/M2

## 2022-01-14 DIAGNOSIS — R31.29 MICROHEMATURIA: Primary | ICD-10-CM

## 2022-01-14 PROCEDURE — 99214 OFFICE O/P EST MOD 30 MIN: CPT | Performed by: PHYSICIAN ASSISTANT

## 2022-01-14 PROCEDURE — 1036F TOBACCO NON-USER: CPT | Performed by: PHYSICIAN ASSISTANT

## 2022-01-14 PROCEDURE — 3008F BODY MASS INDEX DOCD: CPT | Performed by: PHYSICIAN ASSISTANT

## 2022-01-18 ENCOUNTER — TELEPHONE (OUTPATIENT)
Dept: UROLOGY | Facility: CLINIC | Age: 47
End: 2022-01-18

## 2022-01-18 NOTE — TELEPHONE ENCOUNTER
Patient called to say she seen got email that her procedure was canceled  She lives in [place] but will travel to get cysto if needed  She will see any provider that can do it in February

## 2022-01-18 NOTE — TELEPHONE ENCOUNTER
Please assist in r/s of cysto for microheme in 2/16/22  This can be another day at the [place] office due to [address] availability

## 2022-01-24 ENCOUNTER — HOSPITAL ENCOUNTER (OUTPATIENT)
Dept: MAMMOGRAPHY | Facility: CLINIC | Age: 47
Discharge: HOME | End: 2022-01-24
Payer: COMMERCIAL

## 2022-01-24 ENCOUNTER — HOSPITAL ENCOUNTER (OUTPATIENT)
Dept: ULTRASOUND IMAGING | Facility: CLINIC | Age: 47
Discharge: HOME | End: 2022-01-24
Payer: COMMERCIAL

## 2022-01-24 VITALS — WEIGHT: 165 LBS | BODY MASS INDEX: 25.01 KG/M2 | HEIGHT: 68 IN

## 2022-01-24 DIAGNOSIS — R92.8 ABNORMAL MAMMOGRAM: ICD-10-CM

## 2022-01-24 PROCEDURE — 76642 ULTRASOUND BREAST LIMITED: CPT

## 2022-01-24 PROCEDURE — G0279 TOMOSYNTHESIS, MAMMO: HCPCS

## 2022-01-24 PROCEDURE — 77066 DX MAMMO INCL CAD BI: CPT

## 2022-01-25 ENCOUNTER — HOSPITAL ENCOUNTER (OUTPATIENT)
Dept: RADIOLOGY | Facility: MEDICAL CENTER | Age: 47
Discharge: HOME | End: 2022-01-25
Payer: COMMERCIAL

## 2022-01-25 DIAGNOSIS — R31.29 MICROHEMATURIA: ICD-10-CM

## 2022-01-25 PROCEDURE — G1004 CDSM NDSC: HCPCS

## 2022-01-25 PROCEDURE — 74178 CT ABD&PLV WO CNTR FLWD CNTR: CPT

## 2022-01-25 RX ADMIN — IOHEXOL 100 ML: 350 INJECTION, SOLUTION INTRAVENOUS at 10:42

## 2022-02-07 ENCOUNTER — PROCEDURE VISIT (OUTPATIENT)
Dept: UROLOGY | Facility: CLINIC | Age: 47
End: 2022-02-07
Payer: COMMERCIAL

## 2022-02-07 VITALS
DIASTOLIC BLOOD PRESSURE: 76 MMHG | SYSTOLIC BLOOD PRESSURE: 130 MMHG | HEIGHT: 68 IN | HEART RATE: 97 BPM | RESPIRATION RATE: 16 BRPM | WEIGHT: 167.8 LBS | BODY MASS INDEX: 25.43 KG/M2 | OXYGEN SATURATION: 99 %

## 2022-02-07 DIAGNOSIS — C64.1 RENAL CELL CARCINOMA OF RIGHT KIDNEY (HCC): ICD-10-CM

## 2022-02-07 DIAGNOSIS — R31.29 MICROSCOPIC HEMATURIA: Primary | ICD-10-CM

## 2022-02-07 PROBLEM — N28.89 RENAL MASS, RIGHT: Status: RESOLVED | Noted: 2019-07-15 | Resolved: 2022-02-07

## 2022-02-07 PROBLEM — N28.9 RENAL LESION: Status: RESOLVED | Noted: 2019-06-24 | Resolved: 2022-02-07

## 2022-02-07 PROCEDURE — 3008F BODY MASS INDEX DOCD: CPT | Performed by: PHYSICIAN ASSISTANT

## 2022-02-07 PROCEDURE — 52000 CYSTOURETHROSCOPY: CPT | Performed by: UROLOGY

## 2022-02-07 NOTE — ASSESSMENT & PLAN NOTE
Pt with hx of partial nephrectomy in August 2019  Plan for CT C/A/P in August 2022  At this point she will be 3 years out and would just need annual chest imaging which she will likely be getting from hx of lung cancer regardless

## 2022-02-07 NOTE — PROGRESS NOTES
Assessment/Plan:    Microscopic hematuria  The patient has had a negative hematuria workup  Recommend annual urinalysis with PCP and consideration for repeat workup in 3-5 years if micro hematuria persists  If the patient has gross hematuria then they should see us immediately  Renal cell carcinoma of right kidney (HCC)  Pt with hx of partial nephrectomy in August 2019  Plan for CT C/A/P in August 2022  At this point she will be 3 years out and would just need annual chest imaging which she will likely be getting from hx of lung cancer regardless  Subjective:      Patient ID: Kelley Connolly is a 51 y o  female  HPI  Kelley Connolly is a 51 y o  female with hx of T1a RCC with new issue of microscopic hematuria  Recent urinalysis with microscopy has been positive for RBCs showing 2-4 RBCs on 12/16/2021 and 4-10 RBCs on 01/07/2022  This has been in the absence of a urinary tract infection  Urine cytology Jan 10th negative  CT IVP Jan 2022 unremarkable  Cystoscopy today was unremarkable  Patient has significant history renal cell carcinoma status post right partial laparoscopic nephrectomy in August 2019 (t1a g1 clear cell)  Her postoperative imaging has been negative for any evidence of disease recurrence  Her kidney function is within normal range  She has family history of renal cell carcinoma and her aunt  Denies any other family history of  malignancy  She was diagnosed with lung cancer and had segmental resection in April 2021  She is a former smoker and quit in 2021        Past Surgical History:   Procedure Laterality Date    BREAST BIOPSY Left 01/2020    benign    COLONOSCOPY      LUNG SEGMENTECTOMY Right 4/14/2021    Procedure: RESECTION WEDGE LUNG;  Surgeon: Mario Mason MD;  [facility];  Service: Thoracic    [place] [address] N/A 4/14/2021    Procedure: BRONCHOSCOPY FLEXIBLE;  Surgeon: Mario Mason MD;  [facility]; Service: Thoracic    [place] LAP,PARTIAL NEPHRECTOMY Right 8/26/2019    Procedure: NEPHRECTOMY PARTIAL LAPAROSCOPIC W ROBOTICS WITH INTRA-OP LAPAROSCOPIC ULTRASOUND;  Surgeon: Brayden Ball MD;  [facility];  Service: Urology    [place] THORACOSCOPY SURG LOBECTOMY Right 4/14/2021    Procedure: LOBECTOMY LUNG; MEDIASTINAL LYMPH NODE DISSECTION;  Surgeon: Alley Hess MD;  [facility];  Service: Thoracic    [place] THORACOSCOPY W/THERA WEDGE RESEXN INITIAL UNILAT Right 4/14/2021    Procedure: THORACOSCOPY VIDEO ASSISTED SURGERY (VATS); Surgeon: Alley Hess MD;  [facility];  Service: Thoracic    US GUIDED BREAST BIOPSY LEFT COMPLETE Left 1/15/2020    WISDOM TOOTH EXTRACTION          Past Medical History:   Diagnosis Date    BRCA1 negative     BRCA2 negative     Cancer ([facility] [address] ) 6/16/19    Chronic kidney disease 08/2019    tumor and cyst    Colon polyp     Gastritis     Nodule of upper lobe of right lung 04/2021    PONV (postoperative nausea and vomiting)     Renal cell carcinoma ([facility] [address] ) 06/2019    Renal mass, right              Review of Systems   Constitutional: Negative for chills and fever  HENT: Negative for ear pain and sore throat  Eyes: Negative for pain and visual disturbance  Respiratory: Negative for cough and shortness of breath  Cardiovascular: Negative for chest pain and palpitations  Gastrointestinal: Negative for abdominal pain and vomiting  Genitourinary: Negative for dysuria and hematuria  Musculoskeletal: Negative for arthralgias and back pain  Skin: Negative for color change and rash  Neurological: Negative for seizures and syncope  All other systems reviewed and are negative          Objective:      /76 (BP Location: Left arm, Patient Position: Sitting, Cuff Size: Adult)   Pulse 97   Resp 16   Ht 5' 8" (1 727 m)   Wt 76 1 kg (167 lb 12 8 oz)   LMP 01/19/2022   SpO2 99%   BMI 25 51 kg/m²     No results found for: PSA Physical Exam  Vitals reviewed  Constitutional:       General: She is not in acute distress  Appearance: Normal appearance  She is not ill-appearing, toxic-appearing or diaphoretic  HENT:      Head: Normocephalic and atraumatic  Eyes:      Extraocular Movements: Extraocular movements intact  Pupils: Pupils are equal, round, and reactive to light  Pulmonary:      Effort: Pulmonary effort is normal    Abdominal:      General: Abdomen is flat  There is no distension  Palpations: Abdomen is soft  There is no mass  Tenderness: There is no abdominal tenderness  There is no guarding or rebound  Hernia: No hernia is present  Skin:     General: Skin is warm  Neurological:      General: No focal deficit present  Mental Status: She is alert and oriented to person, place, and time  Mental status is at baseline  Psychiatric:         Mood and Affect: Mood normal          Behavior: Behavior normal          Thought Content: Thought content normal             Cystoscopy     Date/Time 2/7/2022 8:30 AM     Performed by  Lc Morales MD     Authorized by Lc Morales MD      Universal Protocol:  Consent: Written consent obtained  Risks and benefits: risks, benefits and alternatives were discussed  Consent given by: patient  Time out: Immediately prior to procedure a "time out" was called to verify the correct patient, procedure, equipment, support staff and site/side marked as required    Patient understanding: patient states understanding of the procedure being performed  Patient consent: the patient's understanding of the procedure matches consent given  Procedure consent: procedure consent matches procedure scheduled  Patient identity confirmed: verbally with patient        Procedure Details:  Procedure type: cystoscopy    Patient tolerance: Patient tolerated the procedure well with no immediate complications    Additional Procedure Details: A female MA was in the room and served as a chaperone and assistant    The patient's external genitals were sterilely prepped and draped  Viscous lidocaine was introduced into the urethra  A flexible cystoscope was introduced into the urethra    Urethra: Normal  Bladder: No lesions  Ureteral orifices in orthotopic position  No diverticula or trabeculations  Mild squamous metaplasia of trigone          RIGHT KIDNEY AND URETER:  No solid renal mass  No detectable urothelial mass  No hydronephrosis or hydroureter  Stable partial nephrectomy change  No urinary tract calculi  No perinephric collection      LEFT KIDNEY AND URETER:  No solid renal mass  No detectable urothelial mass  No hydronephrosis or hydroureter  No urinary tract calculi  No perinephric collection      URINARY BLADDER:  No bladder wall mass  No calculi         LOWER CHEST:  Small hiatal hernia noted  No other clinically significant abnormality identified in the visualized lower chest      LIVER/BILIARY TREE:  One or more subcentimeter sharply circumscribed low-density hepatic lesion(s) are noted, too small to accurately characterize, but statistically most likely to represent subcentimeter hepatic cysts  No suspicious solid hepatic   lesion is identified  Hepatic contours are normal   No biliary dilatation      GALLBLADDER:  No calcified gallstones  No pericholecystic inflammatory change      SPLEEN:  Unremarkable      PANCREAS:  Unremarkable      ADRENAL GLANDS:  Unremarkable      STOMACH AND BOWEL:  Unremarkable      ABDOMINOPELVIC CAVITY:  No ascites  No free intraperitoneal air  No lymphadenopathy      VESSELS:  Unremarkable for patient's age      PELVIS     REPRODUCTIVE ORGANS:  Unremarkable for patient's age      APPENDIX: No findings to suggest appendicitis      ABDOMINAL WALL/INGUINAL REGIONS:  Unremarkable      OSSEOUS STRUCTURES:  No acute fracture or destructive osseous lesion      IMPRESSION:     Stable partial nephrotomy changes of the right kidney  No kidney stone  No suspicious renal mass  Orders  Orders Placed This Encounter   Procedures    Cystoscopy     This order was created via procedure documentation    CT chest abdomen pelvis w contrast     Standing Status:   Future     Standing Expiration Date:   2/7/2026     Scheduling Instructions: For procedures with both oral (PO) and IV contrast:            The patient will need to drink barium for this test  Barium needs to be picked up in the registration area at least one day prior to your study  For out of network (non-[facility]) orders please bring your prescription when picking up oral contrast  For AM Appointments: Drink one bottle of barium before bed time the evening before your scheduled test   Drink 1/2 of the second bottle one hour prior to your test  Please bring other 1/2 bottle with you to drink at the time of your study  For PM Appointments: Drink one bottle of barium before 9:00am on the day of your test  Drink 1/2 of the second bottle one hour prior to your test  Please bring other 1/2 bottle with you to drink at the time of your study  Nothing to eat 3 hours prior to your test  In addition to the barium, clear liquids are also permitted up until the time of the scan  Clear liquids includes water, black coffee or tea, apple juice or clear broth  If possible wear clothing without any metal in the abdomen area  Sweat suit,       sports bra or bra without underwire may eliminate the need to change  Please bring your insurance cards, a form of photo ID and a list of your medications with you  Arrive 15 minutes prior to your appointment time in order to register  On the day of your test, please bring any prior CT or MRI studies of this area with you that were not performed at a [facility] facility  For procedures with ONLY IV contrast:            Nothing to eat 3 hours prior to your test  Clear liquids are permitted up until the time of the scan   Clear liquids includes water, black coffee or tea, apple juice or clear broth  If possible wear clothing without any metal in the abdomen area  Sweat suit, sports bra or bra without underwire may eliminate the need to change  Please bring your insurance cards, a form of photo ID and a list of your medications with you  Arrive 15 minutes prior to your appointment time in order to register  On the day of your test, please bring any prior CT or MRI studies of this area with you that were not performed at a [facility]  To schedule this appointment, please contact Central Scheduling at [phone]  Order Specific Question:   Is the patient pregnant? Answer:   No     Order Specific Question:   What is the patient's sedation requirement? Answer:   No Sedation     Order Specific Question:   Contrast information:     Answer:   IV     Order Specific Question:   Did the patient ever have a reaction to x-ray dye? If yes, please verify the type of allergy and order the contrast allergy prep       Answer:   No     Order Specific Question:   Release to patient through [employer]     Answer:   Immediate     Order Specific Question:   Reason for Exam (FREE TEXT)     Answer:   pt with hx of kidney cancer s/p partial nx and also lung cancer

## 2022-03-01 ENCOUNTER — OFFICE VISIT (OUTPATIENT)
Dept: OBGYN CLINIC | Facility: CLINIC | Age: 47
End: 2022-03-01
Payer: COMMERCIAL

## 2022-03-01 VITALS
HEIGHT: 68 IN | SYSTOLIC BLOOD PRESSURE: 101 MMHG | DIASTOLIC BLOOD PRESSURE: 70 MMHG | WEIGHT: 167.8 LBS | HEART RATE: 76 BPM | BODY MASS INDEX: 25.43 KG/M2

## 2022-03-01 DIAGNOSIS — M75.01 ADHESIVE CAPSULITIS OF RIGHT SHOULDER: ICD-10-CM

## 2022-03-01 DIAGNOSIS — S42.201D CLOSED FRACTURE OF PROXIMAL END OF RIGHT HUMERUS WITH ROUTINE HEALING, UNSPECIFIED FRACTURE MORPHOLOGY, SUBSEQUENT ENCOUNTER: Primary | ICD-10-CM

## 2022-03-01 PROCEDURE — 3008F BODY MASS INDEX DOCD: CPT | Performed by: ORTHOPAEDIC SURGERY

## 2022-03-01 PROCEDURE — 99213 OFFICE O/P EST LOW 20 MIN: CPT | Performed by: ORTHOPAEDIC SURGERY

## 2022-03-01 PROCEDURE — 1036F TOBACCO NON-USER: CPT | Performed by: ORTHOPAEDIC SURGERY

## 2022-03-01 NOTE — PROGRESS NOTES
Patient Name:  Kelley Connolly  MRN:  [id number]    [address] [place] [address] [place]     1  Closed fracture of proximal end of right humerus with routine healing, unspecified fracture morphology, subsequent encounter    2  Adhesive capsulitis of right shoulder        51 y o  female s/p mechanical fall on 06/15/2021 resulting in Right proximal humerus fracture  Overall, patient with moderate improvements of range of motion and pain since last appointment  She continues to stretch and has started light strengthening  Patient is motivated to continue home exercises to increase her range of motion  Patient will follow up in office in 3 months for reevaluation of Right shoulder ROM  If at that time patient notes plateau of range and function, can consider BINDU vs arthroscopic capsular release  Patient would like to hold off on any surgical intervention and will continue home exercises  History of the Present Illness   Kelley Wu is a 51 y o  female s/p mechanical fall on 06/15/2021 resulting in Right proximal humerus fracture  At last appointment, educated patient on continuation of home exercises as prescribed by PT and stretching exercises  Today, patient reports improvement of range of motion since last appointment; she has been stretching on a bar overhead with guidance from her , continuous stretching and initiation of stretching  She states she is now able to perform all functional activities without much difficulty  Review of Systems     Review of Systems   Constitutional: Negative for chills and fever  HENT: Negative for ear pain and sore throat  Eyes: Negative for pain and visual disturbance  Respiratory: Negative for cough and shortness of breath  Cardiovascular: Negative for chest pain and palpitations  Gastrointestinal: Negative for abdominal pain and vomiting  Genitourinary: Negative for dysuria and hematuria  Musculoskeletal: Negative for arthralgias and back pain     Skin: Negative for color change and rash  Neurological: Negative for seizures and syncope  All other systems reviewed and are negative  Physical Exam     /70   Pulse 76   Ht 5' 8" (1 727 m)   Wt 76 1 kg (167 lb 12 8 oz)   BMI 25 51 kg/m²     Right Shoulder: Active range of motion   110-115 degrees forward flexion   degrees abduction  50 degrees external rotation   L4 internal rotation    Passive range of motion   120-125 degrees of forward flexion   Empty can testing with subtle weakness without associated pain  The patient is neurovascularly intact distally in the extremity  Data Review     I have personally reviewed pertinent films in PACS, and my interpretation follows      No new images     Social History     Tobacco Use    Smoking status: Former Smoker     Packs/day: 0 00     Years: 25 00     Pack years: 0 00     Types: Cigarettes     Quit date: 2021     Years since quittin 1    Smokeless tobacco: Never Used    Tobacco comment: quit on 21   Vaping Use    Vaping Use: Former   Substance Use Topics    Alcohol use: Not Currently     Alcohol/week: 0 0 standard drinks    Drug use: Never           Procedures  None    Geeta Cordova,

## 2022-03-03 ENCOUNTER — TELEPHONE (OUTPATIENT)
Dept: GYNECOLOGIC ONCOLOGY | Facility: CLINIC | Age: 47
End: 2022-03-03

## 2022-03-08 ENCOUNTER — TELEPHONE (OUTPATIENT)
Dept: UROLOGY | Facility: MEDICAL CENTER | Age: 47
End: 2022-03-08

## 2022-03-08 NOTE — TELEPHONE ENCOUNTER
Pt called and LVM asking if her f/u appt from her cysto done w/ Elvira De La Torree 2/7/2022 at the [facility] office could be moved to the [facility] office  Pt only came down to [facility] because it was the soonest available appt, but [facility] is closer for her       It's a fu for her CT - was not sure who to schedule her with in [facility]

## 2022-03-09 NOTE — TELEPHONE ENCOUNTER
Spoke with patient at great length  She has no problem going for the CT scan in Aug 2022  She is asking if she can have her f/u appt after that CT scan at the [facility] office instead of coming to [facility] to see Dr Syed Farmer  Patient states she always saw AP in [facility] previously, and was only down to see Dr Syed Farmer at [facility] to have cystoscopy done  She would like to reschedule her appt in August to be at the [facility] office if this is OK with provider  If OK to see AP in August, please send to clerical to reschedule   Thanks

## 2022-03-09 NOTE — TELEPHONE ENCOUNTER
Called pt to r/s her appt to [facility]  She had a few questions regarding her follow up - she knew that she was to go and have the cysto - Dr Reece Ramires said she was fine  She said he had mentioned a possible f/u CT in a few years as a precaution   But then she received a CT order and f/u in August in the mail  ( it does state that at the end of her visit)    But she was under the impression that if her CT scan was normal and the cysto was normal she would not have to f/u that sson - she is worried about too many CT scans in one year ( because of insurance ) please advise

## 2022-04-13 ENCOUNTER — HOSPITAL ENCOUNTER (OUTPATIENT)
Dept: CT IMAGING | Facility: HOSPITAL | Age: 47
Discharge: HOME | End: 2022-04-13
Attending: THORACIC SURGERY (CARDIOTHORACIC VASCULAR SURGERY)
Payer: COMMERCIAL

## 2022-04-13 DIAGNOSIS — C34.11 PRIMARY ADENOCARCINOMA OF UPPER LOBE OF RIGHT LUNG (HCC): ICD-10-CM

## 2022-04-13 PROCEDURE — 71250 CT THORAX DX C-: CPT

## 2022-04-13 PROCEDURE — G1004 CDSM NDSC: HCPCS

## 2022-04-14 NOTE — PROGRESS NOTES
Assessment/Plan:    Encounter for annual routine gynecological examination  Pap/HPV collected  Mammogram ordered  Colonoscopy current    Encourage healthy diet, exercise, Calcium 1200mg per day and at least 800 iu Vitamin D daily  Diagnoses and all orders for this visit:    Encounter for annual routine gynecological examination  -     Liquid-based pap, screening    Screening mammogram, encounter for  -     Mammo screening bilateral w 3d & cad; Future    Encounter for screening colonoscopy    Pap smear for cervical cancer screening  -     Ambulatory referral to Obstetrics / Gynecology    Cyst of left ovary  -     Cancel: US pelvis complete w transvaginal; Future          Subjective:      Patient ID: April Cathleen is a 49 y o  female  Patient presents for a routine annual visit  Menarche- 14 Y/O  Last Pap Smear- 10/18/16 Neg-HPV  LMP-4/10  Birth control-condoms  Mammogram-22 WNL (scheduled 23)  Colonoscopy-19 Repeat in 5 years due to a personal history of colon polyps    Former smoker  Social drinker  Currently sexually active  Family history of breast cancer  Patient would like to discuss PET scan      Ovarian cyst noted and active endometirum  Offered TVUS declined, has CT abd/pelvis pending  Menses regular  Lasting 4-5 days  Initially heavy but then tapers down  +dysmenorrhea responds to Aleve  H/o gastritis which began after she stopped OC 17 years ago  Unclear cause  Over the last year - dx Lung and kIdney cancer  S/p surgery  No need for chemo or radiation due to low stage  No change in menses  Sister has gone through menopause age 46  Gynecologic Exam  The patient's pertinent negatives include no genital itching, genital lesions, genital odor, genital rash, pelvic pain, vaginal bleeding or vaginal discharge  The patient is experiencing no pain  Pertinent negatives include no chills, constipation, diarrhea, fever, nausea, sore throat or vomiting   She is sexually active  Her menstrual history has been regular  The following portions of the patient's history were reviewed and updated as appropriate:   She  has a past medical history of BRCA1 negative, BRCA2 negative, Cancer ([address] ) (6/16/19), Chronic kidney disease (08/2019), Colon polyp, Gastritis, Nodule of upper lobe of right lung (04/2021), PONV (postoperative nausea and vomiting), Renal cell carcinoma ([address] ) (06/2019), and Renal mass, right  She   Patient Active Problem List    Diagnosis Date Noted    Encounter for annual routine gynecological examination 04/18/2022    Microscopic hematuria 02/07/2022    Primary adenocarcinoma of upper lobe of right lung ([facility] [address] ) 05/04/2021    Lung nodule 12/15/2020    Abnormal CT of the chest     Mixed hyperlipidemia 08/07/2020    Anxiety 08/07/2020    Hypocalcemia 11/05/2019    Renal cell carcinoma of right kidney ([facility] [address] ) 09/11/2019    Gastritis 05/07/2019    Hiatal hernia 05/07/2019    Plantar wart, left foot 05/07/2019     She  has a past surgical history that includes Colonoscopy; [place] tooth extraction; pr lap,partial nephrectomy (Right, 8/26/2019); US guided breast biopsy left complete (Left, 1/15/2020); Breast biopsy (Left, 01/2020); pr thoracoscopy w/thera wedge resexn initial unilat (Right, 4/14/2021); pr thoracoscopy surg lobectomy (Right, 4/14/2021); pr bronchoscopy,diagnostic (N/A, 4/14/2021); and Lung segmentectomy (Right, 4/14/2021)  Her family history includes Breast cancer (age of onset: 79) in her paternal grandfather and paternal grandmother; Cancer in her paternal grandmother; Diabetes in her paternal grandmother; Heart disease in her father; No Known Problems in her maternal aunt, maternal aunt, maternal grandmother, mother, and sister; Other in her maternal aunt  She  reports that she quit smoking about 15 months ago  Her smoking use included cigarettes  She smoked 0 00 packs per day for 25 00 years   She has never used smokeless tobacco  She reports previous alcohol use  She reports that she does not use drugs  No current outpatient medications on file  No current facility-administered medications for this visit  No current outpatient medications on file prior to visit  No current facility-administered medications on file prior to visit  She is allergic to strawberry (diagnostic) - food allergy and tetracycline       Review of Systems   Constitutional: Negative for activity change, appetite change, chills, fatigue and fever  HENT: Negative for rhinorrhea, sneezing and sore throat  Eyes: Negative for visual disturbance  Respiratory: Negative for cough, shortness of breath and wheezing  Cardiovascular: Negative for chest pain, palpitations and leg swelling  Gastrointestinal: Negative for abdominal distention, constipation, diarrhea, nausea and vomiting  Genitourinary: Negative for difficulty urinating, pelvic pain and vaginal discharge  Neurological: Negative for syncope and light-headedness  Objective:      /70   Ht 5' 7" (1 702 m)   Wt 77 1 kg (170 lb)   LMP 04/10/2022   BMI 26 63 kg/m²          Physical Exam  Chest:   Breasts: Breasts are symmetrical       Right: No inverted nipple, mass, nipple discharge, skin change or tenderness  Left: No inverted nipple, mass, nipple discharge, skin change or tenderness  Genitourinary:     Labia:         Right: No rash, tenderness, lesion or injury  Left: No rash, tenderness, lesion or injury  Vagina: Normal  No vaginal discharge, tenderness or bleeding  Cervix: No cervical motion tenderness, discharge or friability  Uterus: Not deviated, not enlarged, not fixed and not tender  Adnexa:         Right: No mass, tenderness or fullness  Left: No mass, tenderness or fullness  Neurological:      Mental Status: She is alert and oriented to person, place, and time

## 2022-04-18 ENCOUNTER — OFFICE VISIT (OUTPATIENT)
Dept: OBGYN CLINIC | Facility: CLINIC | Age: 47
End: 2022-04-18
Payer: COMMERCIAL

## 2022-04-18 VITALS
BODY MASS INDEX: 26.68 KG/M2 | SYSTOLIC BLOOD PRESSURE: 110 MMHG | HEIGHT: 67 IN | WEIGHT: 170 LBS | DIASTOLIC BLOOD PRESSURE: 70 MMHG

## 2022-04-18 DIAGNOSIS — Z01.419 ENCOUNTER FOR ANNUAL ROUTINE GYNECOLOGICAL EXAMINATION: Primary | ICD-10-CM

## 2022-04-18 DIAGNOSIS — Z12.31 SCREENING MAMMOGRAM, ENCOUNTER FOR: ICD-10-CM

## 2022-04-18 DIAGNOSIS — Z12.4 PAP SMEAR FOR CERVICAL CANCER SCREENING: ICD-10-CM

## 2022-04-18 DIAGNOSIS — Z12.11 ENCOUNTER FOR SCREENING COLONOSCOPY: ICD-10-CM

## 2022-04-18 DIAGNOSIS — N83.202 CYST OF LEFT OVARY: ICD-10-CM

## 2022-04-18 PROCEDURE — G0145 SCR C/V CYTO,THINLAYER,RESCR: HCPCS | Performed by: OBSTETRICS & GYNECOLOGY

## 2022-04-18 PROCEDURE — 1036F TOBACCO NON-USER: CPT | Performed by: OBSTETRICS & GYNECOLOGY

## 2022-04-18 PROCEDURE — G0476 HPV COMBO ASSAY CA SCREEN: HCPCS | Performed by: OBSTETRICS & GYNECOLOGY

## 2022-04-18 PROCEDURE — 99386 PREV VISIT NEW AGE 40-64: CPT | Performed by: OBSTETRICS & GYNECOLOGY

## 2022-04-18 NOTE — PATIENT INSTRUCTIONS
Wellness Visit for Adults   WHAT YOU NEED TO KNOW:   What is a wellness visit? A wellness visit is when you see your healthcare provider to get screened for health problems  Your healthcare provider will also give you advice on how to stay healthy  Write down your questions so you remember to ask them  Ask your healthcare provider how often you should have a wellness visit  What happens at a wellness visit? Your healthcare provider will ask about your health, and your family history of health problems  This includes high blood pressure, heart disease, and cancer  He or she will ask if you have symptoms that concern you, if you smoke, and about your mood  You may also be asked about your intake of medicines, supplements, food, and alcohol  Any of the following may be done:  · Your weight  will be checked  Your height may also be checked so your body mass index (BMI) can be calculated  Your BMI shows if you are at a healthy weight  · Your blood pressure  and heart rate will be checked  Your temperature may also be checked  · Blood and urine tests  may be done  Blood tests may be done to check your cholesterol levels  Abnormal cholesterol levels increase your risk for heart disease and stroke  You may also need a blood or urine test to check for diabetes if you are at increased risk  Urine tests may be done to look for signs of an infection or kidney disease  · A physical exam  includes checking your heartbeat and lungs with a stethoscope  Your healthcare provider may also check your skin to look for sun damage  · Screening tests  may be recommended  A screening test is done to check for diseases that may not cause symptoms  The screening tests you may need depend on your age, gender, family history, and lifestyle habits  For example, colorectal screening may be recommended if you are 48years old or older  What screening tests do I need if I am a woman?    · A Pap smear  is used to screen for cervical cancer  Pap smears are usually done every 3 to 5 years depending on your age  You may need them more often if you have had abnormal Pap smear test results in the past  Ask your healthcare provider how often you should have a Pap smear  · A mammogram  is an x-ray of your breasts to screen for breast cancer  Experts recommend mammograms every 2 years starting at age 48 years  You may need a mammogram at age 52 years or younger if you have an increased risk for breast cancer  Talk to your healthcare provider about when you should start having mammograms and how often you need them  What vaccines might I need? · Get an influenza vaccine  every year  The influenza vaccine protects you from the flu  Several types of viruses cause the flu  The viruses change over time, so new vaccines are made each year  · Get a tetanus-diphtheria (Td) booster vaccine  every 10 years  This vaccine protects you against tetanus and diphtheria  Tetanus is a severe infection that may cause painful muscle spasms and lockjaw  Diphtheria is a severe bacterial infection that causes a thick covering in the back of your mouth and throat  · Get a human papillomavirus (HPV) vaccine  if you are female and aged 23 to 32 or male 23 to 24 and never received it  This vaccine protects you from HPV infection  HPV is the most common infection spread by sexual contact  HPV may also cause vaginal, penile, and anal cancers  · Get a pneumococcal vaccine  if you are aged 72 years or older  The pneumococcal vaccine is an injection given to protect you from pneumococcal disease  Pneumococcal disease is an infection caused by pneumococcal bacteria  The infection may cause pneumonia, meningitis, or an ear infection  · Get a shingles vaccine  if you are 60 or older, even if you have had shingles before  The shingles vaccine is an injection to protect you from the varicella-zoster virus  This is the same virus that causes chickenpox   Shingles is a painful rash that develops in people who had chickenpox or have been exposed to the virus  How can I eat healthy? My Plate is a model for planning healthy meals  It shows the types and amounts of foods that should go on your plate  Fruits and vegetables make up about half of your plate, and grains and protein make up the other half  A serving of dairy is included on the side of your plate  The amount of calories and serving sizes you need depends on your age, gender, weight, and height  Examples of healthy foods are listed below:  · Eat a variety of vegetables  such as dark green, red, and orange vegetables  You can also include canned vegetables low in sodium (salt) and frozen vegetables without added butter or sauces  · Eat a variety of fresh fruits , canned fruit in 100% juice, frozen fruit, and dried fruit  · Include whole grains  At least half of the grains you eat should be whole grains  Examples include whole-wheat bread, wheat pasta, brown rice, and whole-grain cereals such as oatmeal     · Eat a variety of protein foods such as seafood (fish and shellfish), lean meat, and poultry without skin (turkey and chicken)  Examples of lean meats include pork leg, shoulder, or tenderloin, and beef round, sirloin, tenderloin, and extra lean ground beef  Other protein foods include eggs and egg substitutes, beans, peas, soy products, nuts, and seeds  · Choose low-fat dairy products such as skim or 1% milk or low-fat yogurt, cheese, and cottage cheese  · Limit unhealthy fats  such as butter, hard margarine, and shortening  How much exercise do I need? Exercise at least 30 minutes per day on most days of the week  Some examples of exercise include walking, biking, dancing, and swimming  You can also fit in more physical activity by taking the stairs instead of the elevator or parking farther away from stores  Include muscle strengthening activities 2 days each week   Regular exercise provides many health benefits  It helps you manage your weight, and decreases your risk for type 2 diabetes, heart disease, stroke, and high blood pressure  Exercise can also help improve your mood  Ask your healthcare provider about the best exercise plan for you  What are some general health and safety guidelines I should follow? · Do not smoke  Nicotine and other chemicals in cigarettes and cigars can cause lung damage  Ask your healthcare provider for information if you currently smoke and need help to quit  E-cigarettes or smokeless tobacco still contain nicotine  Talk to your healthcare provider before you use these products  · Limit alcohol  A drink of alcohol is 12 ounces of beer, 5 ounces of wine, or 1½ ounces of liquor  · Lose weight, if needed  Being overweight increases your risk of certain health conditions  These include heart disease, high blood pressure, type 2 diabetes, and certain types of cancer  · Protect your skin  Do not sunbathe or use tanning beds  Use sunscreen with a SPF 15 or higher  Apply sunscreen at least 15 minutes before you go outside  Reapply sunscreen every 2 hours  Wear protective clothing, hats, and sunglasses when you are outside  · Drive safely  Always wear your seatbelt  Make sure everyone in your car wears a seatbelt  A seatbelt can save your life if you are in an accident  Do not use your cell phone when you are driving  This could distract you and cause an accident  Pull over if you need to make a call or send a text message  · Practice safe sex  Use latex condoms if are sexually active and have more than one partner  Your healthcare provider may recommend screening tests for sexually transmitted infections (STIs)  · Wear helmets, lifejackets, and protective gear  Always wear a helmet when you ride a bike or motorcycle, go skiing, or play sports that could cause a head injury  Wear protective equipment when you play sports   Wear a lifejacket when you are on a boat or doing water sports  CARE AGREEMENT:   You have the right to help plan your care  Learn about your health condition and how it may be treated  Discuss treatment options with your healthcare providers to decide what care you want to receive  You always have the right to refuse treatment  The above information is an  only  It is not intended as medical advice for individual conditions or treatments  Talk to your doctor, nurse or pharmacist before following any medical regimen to see if it is safe and effective for you  © Copyright "[employer] 2022 Information is for End User's use only and may not be sold, redistributed or otherwise used for commercial purposes   All illustrations and images included in CareNotes® are the copyrighted property of A D A M , Inc  or [address] [place] [address]

## 2022-04-22 ENCOUNTER — TELEPHONE (OUTPATIENT)
Dept: OBGYN CLINIC | Facility: CLINIC | Age: 47
End: 2022-04-22

## 2022-04-22 LAB
HPV HR 12 DNA CVX QL NAA+PROBE: NEGATIVE
HPV16 DNA CVX QL NAA+PROBE: NEGATIVE
HPV18 DNA CVX QL NAA+PROBE: POSITIVE
LAB AP GYN PRIMARY INTERPRETATION: NORMAL
Lab: NORMAL

## 2022-04-22 NOTE — TELEPHONE ENCOUNTER
Spoke to pt she is aware of what a colpo entails   Advised her I will send over to Jhoan Walker to help get her in with [facility] for colpo

## 2022-04-22 NOTE — TELEPHONE ENCOUNTER
----- Message from Loir Cifuentes MD sent at 4/22/2022  3:23 PM EDT -----  Please call patient  Due to abnormal pap test (HPV positive) need to complete a colposcopy and collect biopsies from the cervix

## 2022-04-27 NOTE — PROGRESS NOTES
Assessment/Plan:    High risk HPV infection  HPV 18+    Colposcopy completed  2,4 8, 10  ECC collected  Will call with results  Diagnoses and all orders for this visit:    High risk HPV infection  -     Tissue Exam    Negative pregnancy test  -     POCT urine HCG    Other orders  -     Colposcopy          Subjective:      Patient ID: Kelley Connolly is a 49 y o  female  Patient presents for a colposcopy  Last PAP-4/18/22 HPV 18    Colposcopy    Date/Time: 4/29/2022 4:38 PM  Performed by: Shasha Mathis MD  Authorized by: Shasha Mathis MD     Consent:     Consent obtained:  Verbal    Consent given by:  Patient    Procedural risks discussed:  Bleeding, infection and repeat procedure    Patient questions answered: yes      Patient agrees, verbalizes understanding, and wants to proceed: yes    Pre-procedure:     Prepped with: acetic acid    Indication:     Indications: Pap wnl  HPV 18+  Procedure:     Procedure: Colposcopy w/ cervical biopsy and ECC      [place] speculum was placed in the vagina: yes      Under colposcopic examination the transition zone was seen in entirety: yes      Endocervix was curetted using a Kevorkian curette: yes      Cervical biopsy performed with a cervical biopsy punch: yes      Monsel's solution was applied: yes      Biopsy(s): yes      Location:  2 4 8 10    Specimen to pathology: yes    Post-procedure:     Findings: Friable cervix, Mosaicism and Punctation      Findings comment:  Cervix inflamed circumfirentially    Impression: Low grade cervical dysplasia      Patient tolerance of procedure:   Tolerated well, no immediate complications  Comments:      Punctation and friability noted at both 2 and 10   2 oclock site, with biopsy mucous noted suspect nabothian cyst    4-8 swollen appearing cervix, punctations, mosaicism            The following portions of the patient's history were reviewed and updated as appropriate: She  has a past medical history of BRCA1 negative, BRCA2 negative, Cancer ([facility] ) (6/16/19), Chronic kidney disease (08/2019), Colon polyp, Gastritis, Nodule of upper lobe of right lung (04/2021), PONV (postoperative nausea and vomiting), Renal cell carcinoma ([facility] ) (06/2019), and Renal mass, right  She   Patient Active Problem List    Diagnosis Date Noted    High risk HPV infection 04/29/2022    Encounter for annual routine gynecological examination 04/18/2022    Microscopic hematuria 02/07/2022    Primary adenocarcinoma of upper lobe of right lung ([facility] ) 05/04/2021    Lung nodule 12/15/2020    Abnormal CT of the chest     Mixed hyperlipidemia 08/07/2020    Anxiety 08/07/2020    Hypocalcemia 11/05/2019    Renal cell carcinoma of right kidney ([facility] ) 09/11/2019    Gastritis 05/07/2019    Hiatal hernia 05/07/2019    Plantar wart, left foot 05/07/2019     She  has a past surgical history that includes Colonoscopy; [place] tooth extraction; pr lap,partial nephrectomy (Right, 8/26/2019); US guided breast biopsy left complete (Left, 1/15/2020); Breast biopsy (Left, 01/2020); pr thoracoscopy w/thera wedge resexn initial unilat (Right, 4/14/2021); pr thoracoscopy surg lobectomy (Right, 4/14/2021); pr bronchoscopy,diagnostic (N/A, 4/14/2021); and Lung segmentectomy (Right, 4/14/2021)  Her family history includes Breast cancer (age of onset: 79) in her paternal grandfather and paternal grandmother; Cancer in her paternal grandmother; Diabetes in her paternal grandmother; Heart disease in her father; No Known Problems in her maternal aunt, maternal aunt, maternal grandmother, mother, and sister; Other in her maternal aunt  She  reports that she quit smoking about 15 months ago  Her smoking use included cigarettes  She smoked 0 00 packs per day for 25 00 years  She has never used smokeless tobacco  She reports previous alcohol use  She reports that she does not use drugs  No current outpatient medications on file       No current facility-administered medications for this visit  No current outpatient medications on file prior to visit  No current facility-administered medications on file prior to visit  She is allergic to strawberry (diagnostic) - food allergy and tetracycline       Review of Systems      Objective:      /70   Ht 5' 7" (1 702 m)   Wt 77 7 kg (171 lb 3 2 oz)   LMP 04/10/2022   BMI 26 81 kg/m²          Physical Exam

## 2022-04-29 ENCOUNTER — PROCEDURE VISIT (OUTPATIENT)
Dept: OBGYN CLINIC | Facility: CLINIC | Age: 47
End: 2022-04-29
Payer: COMMERCIAL

## 2022-04-29 VITALS
WEIGHT: 171.2 LBS | DIASTOLIC BLOOD PRESSURE: 70 MMHG | SYSTOLIC BLOOD PRESSURE: 112 MMHG | HEIGHT: 67 IN | BODY MASS INDEX: 26.87 KG/M2

## 2022-04-29 DIAGNOSIS — Z32.02 NEGATIVE PREGNANCY TEST: ICD-10-CM

## 2022-04-29 DIAGNOSIS — B97.7 HIGH RISK HPV INFECTION: Primary | ICD-10-CM

## 2022-04-29 LAB — SL AMB POCT URINE HCG: NORMAL

## 2022-04-29 PROCEDURE — 3008F BODY MASS INDEX DOCD: CPT | Performed by: OBSTETRICS & GYNECOLOGY

## 2022-04-29 PROCEDURE — 88305 TISSUE EXAM BY PATHOLOGIST: CPT | Performed by: PATHOLOGY

## 2022-04-29 PROCEDURE — 57454 BX/CURETT OF CERVIX W/SCOPE: CPT | Performed by: OBSTETRICS & GYNECOLOGY

## 2022-04-29 PROCEDURE — 88344 IMHCHEM/IMCYTCHM EA MLT ANTB: CPT | Performed by: PATHOLOGY

## 2022-04-29 PROCEDURE — 81025 URINE PREGNANCY TEST: CPT | Performed by: OBSTETRICS & GYNECOLOGY

## 2022-05-03 ENCOUNTER — OFFICE VISIT (OUTPATIENT)
Dept: CARDIAC SURGERY | Facility: CLINIC | Age: 47
End: 2022-05-03
Payer: COMMERCIAL

## 2022-05-03 VITALS
HEIGHT: 67 IN | SYSTOLIC BLOOD PRESSURE: 124 MMHG | DIASTOLIC BLOOD PRESSURE: 76 MMHG | RESPIRATION RATE: 12 BRPM | HEART RATE: 82 BPM | OXYGEN SATURATION: 98 % | WEIGHT: 170.6 LBS | BODY MASS INDEX: 26.78 KG/M2

## 2022-05-03 DIAGNOSIS — C34.11 PRIMARY ADENOCARCINOMA OF UPPER LOBE OF RIGHT LUNG (HCC): Primary | ICD-10-CM

## 2022-05-03 PROCEDURE — 99214 OFFICE O/P EST MOD 30 MIN: CPT | Performed by: THORACIC SURGERY (CARDIOTHORACIC VASCULAR SURGERY)

## 2022-05-03 PROCEDURE — 3008F BODY MASS INDEX DOCD: CPT | Performed by: THORACIC SURGERY (CARDIOTHORACIC VASCULAR SURGERY)

## 2022-05-03 PROCEDURE — 1036F TOBACCO NON-USER: CPT | Performed by: THORACIC SURGERY (CARDIOTHORACIC VASCULAR SURGERY)

## 2022-05-03 NOTE — ASSESSMENT & PLAN NOTE
Ms Ted Phillips is a very pleasant 24-year-old female who is well known to me  She presents today for routine surveillance  She had undergone a VATS right upper lobectomy on April 14, 2021  She is now 1 year out from surgery  At this time, she will remain on a 6 month surveillance schedule  I have ordered a CT scan today and this will be performed in 6 months and she will follow me at that time  She knows to call if anything changes in the interim      Oleg Gary MD  Thoracic Surgery  (Available on [employer])  Office: [phone]

## 2022-05-03 NOTE — PROGRESS NOTES
Thoracic Follow-Up  Assessment/Plan:    Primary adenocarcinoma of upper lobe of right lung [facility])  Ms Gisselle Cason is a very pleasant 79-year-old female who is well known to me  She presents today for routine surveillance  She had undergone a VATS right upper lobectomy on April 14, 2021  She is now 1 year out from surgery  At this time, she will remain on a 6 month surveillance schedule  I have ordered a CT scan today and this will be performed in 6 months and she will follow me at that time  She knows to call if anything changes in the interim  Hector Vega MD  Thoracic Surgery  (Available on [employer] Text)  Office: [phone]           Diagnoses and all orders for this visit:    Primary adenocarcinoma of upper lobe of right lung [facility])  -     CT chest wo contrast; Future          Thoracic History        Date: 4/14/21    Procedure: VATS RULobectomy,   Pathology: 2 9cm adenocarcinoma, 0/17 LNs, PL1, G2  T2aN0, Stage IB     Subjective:    Patient ID: April Cathleen is a 49 y o  female  HPI  Ms Gisselle Cason is a very pleasant 79-year-old female who is well known to me  She presents today for routine surveillance  Today in the office, she is doing well  She states that her shoulder is feeling significantly better and she is able to work out again  She does report some weight gain but she is now back in the gym  She denies any other significant symptoms  No shortness of breath  No chest pain  She did have COVID in April but has she has since recovered from this without difficulty  I have personally reviewed all of her imaging  This demonstrates stability no signs of any recurrence of her cancer  She has some mild atelectasis and stable scar tissue  I have also reviewed all the most recent notes in the chart, specifically from her urologist and PCP      The following portions of the patient's history were reviewed and updated as appropriate: allergies, current medications, past family history, past medical history, past social history, past surgical history and problem list     Review of Systems   Constitutional: Negative for chills, fatigue, fever and unexpected weight change  HENT: Negative  Negative for trouble swallowing  Eyes: Negative  Negative for visual disturbance  Respiratory: Negative for cough, shortness of breath and stridor  Cardiovascular: Negative for chest pain  Gastrointestinal: Negative  Endocrine: Negative  Genitourinary: Negative  Musculoskeletal: Negative  Skin: Negative  Neurological: Negative for dizziness, light-headedness and headaches  Hematological: Negative for adenopathy  Psychiatric/Behavioral: Negative  Objective:   Physical Exam  Vitals and nursing note reviewed  Constitutional:       General: She is not in acute distress  Appearance: Normal appearance  She is well-developed and normal weight  She is not diaphoretic  HENT:      Head: Normocephalic and atraumatic  Nose: Nose normal  No congestion or rhinorrhea  Mouth/Throat:      Mouth: Mucous membranes are moist       Pharynx: Oropharynx is clear  No oropharyngeal exudate  Eyes:      General: No scleral icterus  Pupils: Pupils are equal, round, and reactive to light  Neck:      Trachea: No tracheal deviation  Cardiovascular:      Rate and Rhythm: Normal rate and regular rhythm  Pulses: Normal pulses  Heart sounds: Normal heart sounds  No murmur heard  Pulmonary:      Effort: Pulmonary effort is normal  No respiratory distress  Breath sounds: Normal breath sounds  No stridor  No wheezing or rales  Chest:      Chest wall: No tenderness  Abdominal:      General: Bowel sounds are normal  There is no distension  Palpations: Abdomen is soft  Tenderness: There is no abdominal tenderness  There is no rebound  Musculoskeletal:         General: Normal range of motion  Cervical back: Normal range of motion and neck supple   No muscular tenderness  Lymphadenopathy:      Cervical: No cervical adenopathy  Skin:     General: Skin is warm and dry  Coloration: Skin is not jaundiced or pale  Findings: No erythema or rash  Neurological:      General: No focal deficit present  Mental Status: She is alert and oriented to person, place, and time  Psychiatric:         Mood and Affect: Mood normal          Behavior: Behavior normal          Thought Content: Thought content normal          Judgment: Judgment normal      /76 (BP Location: Left arm, Patient Position: Sitting)   Pulse 82   Resp 12   Ht 5' 7" (1 702 m)   Wt 77 4 kg (170 lb 9 6 oz)   LMP 04/10/2022   SpO2 98%   BMI 26 72 kg/m²     XR chest pa & lateral    Result Date: 5/29/2021  Impression Right upper lobectomy with persistent trace right effusion  Workstation performed: [id number]      CT chest wo contrast    Result Date: 4/18/2022  Narrative CT CHEST WITHOUT IV CONTRAST INDICATION:   C34 11: Malignant neoplasm of upper lobe, right bronchus or lung  History of stage IB right upper lobectomy in April 2021 for adenocarcinoma  Additional history of clear cell renal cell carcinoma  COMPARISON:  CT, dated 10/17/2021  TECHNIQUE: CT examination of the chest was performed without intravenous contrast   Axial, sagittal, and coronal 2D reformatted images were created from the source data and submitted for interpretation  Radiation dose length product (DLP) for this visit:  207 mGy-cm   This examination, like all CT scans performed in the [facility], was performed utilizing techniques to minimize radiation dose exposure, including the use of iterative reconstruction and automated exposure control  FINDINGS: LUNGS:  Postsurgical changes related to right upper lobectomy are stable  Surgical staple line is normal  There are no concerning pulmonary nodules  Atelectasis is mild    Airways are normal   Apical predominant centrilobular emphysema is mild  PLEURA:  Unremarkable  HEART/GREAT VESSELS: Heart is unremarkable for patient's age  No thoracic aortic aneurysm  MEDIASTINUM AND SIMON:  Unremarkable  CHEST WALL AND LOWER NECK:  Unremarkable  VISUALIZED STRUCTURES IN THE UPPER ABDOMEN:  Unremarkable  OSSEOUS STRUCTURES:  No acute fracture or destructive osseous lesion  Impression Stable postsurgical changes related to right upper lobectomy for adenocarcinoma  There is no tumor recurrence or new concerning pulmonary nodules  Workstation performed: [id number]     CT chest wo contrast    Result Date: 10/19/2021  Narrative CT CHEST WITHOUT IV CONTRAST INDICATION:   C34 11: Malignant neoplasm of upper lobe, right bronchus or lung  Right upper lobectomy for adenocarcinoma on 4/14/2021  History of clear cell renal carcinoma status post right nephrectomy on 8/26/2019  COMPARISON:  Chest radiograph from 5/26/2021, PET CT from 4/9/2021 chest CT from 3/11/2021 and 10/26/2020  TECHNIQUE: CT examination of the chest was performed without intravenous contrast   Axial, sagittal, and coronal 2D reformatted images were created from the source data and submitted for interpretation  Radiation dose length product (DLP) for this visit:  333 mGy-cm   This examination, like all CT scans performed in the [facility], was performed utilizing techniques to minimize radiation dose exposure, including the use of iterative reconstruction and automated exposure control  FINDINGS: LUNGS:  Nothing to indicate recurrent tumor  Mild benign bilateral scar  Emphysema  PLEURA:  Resolution of right pleural effusion  HEART/GREAT VESSELS:  Unremarkable for patient's age  MEDIASTINUM AND SIMON:  Unremarkable  CHEST WALL AND LOWER NECK:   1 cm round lesion in the right breast (2/34) corresponds with a cyst on ultrasound from July 2020  UPPER ABDOMEN:  Unremarkable  OSSEOUS STRUCTURES:  Mild degenerative disease in the spine       Impression Nothing to indicate recurrent tumor  Resolution of right pleural effusion from most recent chest radiograph  Workstation performed: [id number]     No CT Chest,Abdomen,Pelvis results available for this patient  No NM PET CT results available for this patient  No Barium Swallow results available for this patient

## 2022-05-16 ENCOUNTER — TELEPHONE (OUTPATIENT)
Dept: OBGYN CLINIC | Facility: CLINIC | Age: 47
End: 2022-05-16

## 2022-05-18 PROBLEM — N87.1 HIGH GRADE SQUAMOUS INTRAEPITHELIAL LESION (HGSIL), GRADE 2 CIN, ON BIOPSY OF CERVIX: Status: ACTIVE | Noted: 2022-05-18

## 2022-05-31 ENCOUNTER — APPOINTMENT (OUTPATIENT)
Dept: LAB | Facility: CLINIC | Age: 47
End: 2022-05-31
Payer: COMMERCIAL

## 2022-05-31 DIAGNOSIS — C64.1 RENAL CELL CARCINOMA OF RIGHT KIDNEY (HCC): ICD-10-CM

## 2022-05-31 DIAGNOSIS — R31.29 MICROHEMATURIA: ICD-10-CM

## 2022-05-31 DIAGNOSIS — C34.11 PRIMARY ADENOCARCINOMA OF UPPER LOBE OF RIGHT LUNG (HCC): ICD-10-CM

## 2022-05-31 DIAGNOSIS — E78.2 MIXED HYPERLIPIDEMIA: ICD-10-CM

## 2022-05-31 DIAGNOSIS — K44.9 HIATAL HERNIA: ICD-10-CM

## 2022-05-31 DIAGNOSIS — Z23 ENCOUNTER FOR IMMUNIZATION: ICD-10-CM

## 2022-05-31 LAB
ALBUMIN SERPL BCP-MCNC: 3.8 G/DL (ref 3.5–5)
ALP SERPL-CCNC: 151 U/L (ref 46–116)
ALT SERPL W P-5'-P-CCNC: 26 U/L (ref 12–78)
ANION GAP SERPL CALCULATED.3IONS-SCNC: 5 MMOL/L (ref 4–13)
AST SERPL W P-5'-P-CCNC: 16 U/L (ref 5–45)
BILIRUB SERPL-MCNC: 0.39 MG/DL (ref 0.2–1)
BUN SERPL-MCNC: 13 MG/DL (ref 5–25)
CALCIUM SERPL-MCNC: 9.3 MG/DL (ref 8.3–10.1)
CHLORIDE SERPL-SCNC: 109 MMOL/L (ref 100–108)
CHOLEST SERPL-MCNC: 186 MG/DL
CO2 SERPL-SCNC: 26 MMOL/L (ref 21–32)
CREAT SERPL-MCNC: 0.8 MG/DL (ref 0.6–1.3)
GFR SERPL CREATININE-BSD FRML MDRD: 87 ML/MIN/1.73SQ M
GLUCOSE P FAST SERPL-MCNC: 91 MG/DL (ref 65–99)
HDLC SERPL-MCNC: 56 MG/DL
LDLC SERPL CALC-MCNC: 113 MG/DL (ref 0–100)
NONHDLC SERPL-MCNC: 130 MG/DL
POTASSIUM SERPL-SCNC: 4.1 MMOL/L (ref 3.5–5.3)
PROT SERPL-MCNC: 7.2 G/DL (ref 6.4–8.2)
SODIUM SERPL-SCNC: 140 MMOL/L (ref 136–145)
TRIGL SERPL-MCNC: 83 MG/DL
TSH SERPL DL<=0.05 MIU/L-ACNC: 0.72 UIU/ML (ref 0.45–4.5)

## 2022-05-31 PROCEDURE — 84443 ASSAY THYROID STIM HORMONE: CPT

## 2022-05-31 PROCEDURE — 80061 LIPID PANEL: CPT

## 2022-05-31 PROCEDURE — 80053 COMPREHEN METABOLIC PANEL: CPT

## 2022-05-31 PROCEDURE — 36415 COLL VENOUS BLD VENIPUNCTURE: CPT

## 2022-06-01 ENCOUNTER — TELEPHONE (OUTPATIENT)
Dept: OBGYN CLINIC | Facility: CLINIC | Age: 47
End: 2022-06-01

## 2022-06-06 ENCOUNTER — OFFICE VISIT (OUTPATIENT)
Dept: FAMILY MEDICINE CLINIC | Facility: CLINIC | Age: 47
End: 2022-06-06
Payer: COMMERCIAL

## 2022-06-06 VITALS
HEIGHT: 67 IN | HEART RATE: 69 BPM | RESPIRATION RATE: 18 BRPM | BODY MASS INDEX: 27 KG/M2 | WEIGHT: 172 LBS | SYSTOLIC BLOOD PRESSURE: 110 MMHG | OXYGEN SATURATION: 97 % | TEMPERATURE: 98.6 F | DIASTOLIC BLOOD PRESSURE: 70 MMHG

## 2022-06-06 DIAGNOSIS — G89.29 CHRONIC RIGHT SHOULDER PAIN: Primary | ICD-10-CM

## 2022-06-06 DIAGNOSIS — N87.1 HIGH GRADE SQUAMOUS INTRAEPITHELIAL LESION (HGSIL), GRADE 2 CIN, ON BIOPSY OF CERVIX: ICD-10-CM

## 2022-06-06 DIAGNOSIS — E78.2 MIXED HYPERLIPIDEMIA: ICD-10-CM

## 2022-06-06 DIAGNOSIS — C34.11 PRIMARY ADENOCARCINOMA OF UPPER LOBE OF RIGHT LUNG (HCC): ICD-10-CM

## 2022-06-06 DIAGNOSIS — M25.511 CHRONIC RIGHT SHOULDER PAIN: Primary | ICD-10-CM

## 2022-06-06 PROCEDURE — 99214 OFFICE O/P EST MOD 30 MIN: CPT | Performed by: FAMILY MEDICINE

## 2022-06-06 PROCEDURE — 3725F SCREEN DEPRESSION PERFORMED: CPT | Performed by: FAMILY MEDICINE

## 2022-06-06 NOTE — PROGRESS NOTES
Patient Name:  Kelley Connolly  MRN:  [id number]    [address] [place] [address]     1  Closed fracture of proximal end of right humerus with routine healing, unspecified fracture morphology, subsequent encounter  -     XR shoulder 2+ vw right; Future; Expected date: 06/07/2022    2  Adhesive capsulitis of right shoulder    3  Weakness of right upper extremity        49 y o  female approximately 1 year s/p Right shoulder proximal humerus fracture sustained on 06/15/2021  At this time, patients range of motion and strength are improving from previous appointments  Continued nonoperative treatment of Right shoulder including possible MRI of shoulder to evaluate for concomitant rotator cuff tear s/p injury  Patient declined MRI at this time  Additional nonoperative treatments discussed including corticosteroid injections, OTC oral and topical analgesics  Continue to perform the home exercises as prescribed by outpatient PT and passive range of motion of shoulder  Verbalized understanding of the above and will follow up in office in 3-4 months for reevaluation of Right shoulder  If patient continues to experience weakness, decreased range of motion can consider MRI at follow up appointment to evaluate for rotator cuff tear; can provide patient with anti-anxiety medications if needed for claustrophobia  Will also consider new x-rays at follow up appointment for evaluation of proximal humerus fracture  History of the Present Illness   Kelley Daily is a 49 y o  female approximately 1 year s/p Right shoulder proximal humerus fracture sustained on 06/15/2021  Today, patient reports she is feeling good and 80% improved from previous visits  Admits range of motion has improved from previous appointment  She notes pain over upper trapezius  She denies limitations throughout her day  Does not offer complaints or concerns at this time  Review of Systems     Review of Systems   Constitutional: Negative for chills and fever     HENT: Negative for ear pain and sore throat  Eyes: Negative for pain and visual disturbance  Respiratory: Negative for cough and shortness of breath  Cardiovascular: Negative for chest pain and palpitations  Gastrointestinal: Negative for abdominal pain and vomiting  Genitourinary: Negative for dysuria and hematuria  Musculoskeletal: Negative for arthralgias and back pain  Skin: Negative for color change and rash  Neurological: Negative for seizures and syncope  All other systems reviewed and are negative  Physical Exam     /71   Pulse 67   Resp 18     Right Shoulder: Active range of motion   120 degrees forward flexion  130 degrees abduction  60 degrees external rotation   2 level restriction internal rotation      Passive range of motion   130 degrees of forward flexion   There is no tenderness present over the shoulder  There is 4/5 strength with external rotation testing at the side  Empty can testing elicits weakness without associated pain  Belly press test is negative  Brunson test is negative   [place]'s test is negative negative    Speed's test is Negative  The patient is neurovascularly intact distally in the extremity  Data Review     I have personally reviewed pertinent films in PACS, and my interpretation follows  X-rays taken today 2022 of Right shoulder demonstrates healed proximal humerus fracture, well maintained joint space  No acute fracture or dislocation       Social History     Tobacco Use    Smoking status: Former Smoker     Packs/day: 0 00     Years: 25 00     Pack years: 0 00     Types: Cigarettes     Quit date: 2021     Years since quittin 4    Smokeless tobacco: Never Used    Tobacco comment: quit on 21   Vaping Use    Vaping Use: Former   Substance Use Topics    Alcohol use: Not Currently     Alcohol/week: 0 0 standard drinks    Drug use: Never           Procedures  None     Niurka Joseph DO

## 2022-06-06 NOTE — ASSESSMENT & PLAN NOTE
Patient is post 1 year fractured right shoulder after she fell over a gate at her home while trying to help her dog  She has been through immobilization and subsequent physical therapy and is now on home exercise program she developed a frozen shoulder but is now regaining range of motion

## 2022-06-06 NOTE — PROGRESS NOTES
BMI Counseling: Body mass index is 26 94 kg/m²  The BMI is above normal  Nutrition recommendations include reducing portion sizes, decreasing overall calorie intake, 3-5 servings of fruits/vegetables daily, reducing fast food intake, consuming healthier snacks, decreasing soda and/or juice intake, moderation in carbohydrate intake, increasing intake of lean protein, reducing intake of saturated fat and trans fat and reducing intake of cholesterol  Exercise recommendations include exercising 3-5 times per week

## 2022-06-06 NOTE — PATIENT INSTRUCTIONS
High Fiber Diet   WHAT YOU NEED TO KNOW:   A high-fiber diet includes foods that have a high amount of fiber  Fiber is the part of fruits, vegetables, and grains that is not broken down by your body  Fiber keeps your bowel movements regular  Fiber can also help lower your cholesterol level, control blood sugar in people with diabetes, and relieve constipation  Fiber can also help you control your weight because it helps you feel full faster  Most adults should eat 25 to 35 grams of fiber each day  Talk to your dietitian or healthcare provider about the amount of fiber you need  DISCHARGE INSTRUCTIONS:   Good sources of fiber:       Foods with at least 4 grams of fiber per serving:      ? to ½ cup of high-fiber cereal (check the nutrition label on the box)    ½ cup of blackberries or raspberries    4 dried prunes    1 cooked artichoke    ½ cup of cooked legumes, such as lentils, or red, kidney, and jade beans    Foods with 1 to 3 grams of fiber per servin slice of whole-wheat, pumpernickel, or rye bread    ½ cup of cooked brown rice    4 whole-wheat crackers    1 cup of oatmeal    ½ cup of cereal with 1 to 3 grams of fiber per serving (check the nutrition label on the box)    1 small piece of fruit, such as an apple, banana, pear, kiwi, or orange    3 dates    ½ cup of canned apricots, fruit cocktail, peaches, or pears    ½ cup of raw or cooked vegetables, such as carrots, cauliflower, cabbage, spinach, squash, or corn    Ways that you can increase fiber in your diet:   Choose brown or wild rice instead of white rice  Use whole wheat flour in recipes instead of white or all-purpose flour  Add beans and peas to casseroles or soups  Choose fresh fruit and vegetables with peels or skins on instead of juices  Other diet guidelines to follow: Add fiber to your diet slowly  You may have abdominal discomfort, bloating, and gas if you add fiber to your diet too quickly       Drink plenty of liquids as you add fiber to your diet  You may have nausea or develop constipation if you do not drink enough water  Ask how much liquid to drink each day and which liquids are best for you  © Copyright [employer] 2022 Information is for End User's use only and may not be sold, redistributed or otherwise used for commercial purposes  All illustrations and images included in CareNotes® are the copyrighted property of A D A M , Inc  or [facility] Ryan Muñoz   The above information is an  only  It is not intended as medical advice for individual conditions or treatments  Talk to your doctor, nurse or pharmacist before following any medical regimen to see if it is safe and effective for you

## 2022-06-06 NOTE — PROGRESS NOTES
Assessment/Plan:       Problem List Items Addressed This Visit        Respiratory    Primary adenocarcinoma of upper lobe of right lung ([address] )     Post treatment with Thoracic surgery CT reviewed and followed every 6 months           Relevant Orders    CBC and differential    Comprehensive metabolic panel    Lipid panel    TSH, 3rd generation with Free T4 reflex       Other    Mixed hyperlipidemia     Patient is working on diet and exercise at this time repeat laboratory work in 6 months           Relevant Orders    CBC and differential    Comprehensive metabolic panel    Lipid panel    TSH, 3rd generation with Free T4 reflex    High grade squamous intraepithelial lesion (HGSIL), grade 2 LUKAS, on biopsy of cervix     Followed by Gyn  Relevant Orders    CBC and differential    Comprehensive metabolic panel    Lipid panel    TSH, 3rd generation with Free T4 reflex    Chronic right shoulder pain - Primary     Patient is post 1 year fractured right shoulder after she fell over a gate at her home while trying to help her dog  She has been through immobilization and subsequent physical therapy and is now on home exercise program she developed a frozen shoulder but is now regaining range of motion  Relevant Orders    CBC and differential    Comprehensive metabolic panel    Lipid panel    TSH, 3rd generation with Free T4 reflex            Subjective:      Patient ID: April Cathleen is a 49 y o  female  General checkup and review of laboratory work patient is working on weight reduction after she quit smoking she is now starting to walk more and eating pistachios      The following portions of the patient's history were reviewed and updated as appropriate: allergies, current medications, past family history, past medical history, past social history, past surgical history and problem list     Review of Systems   Constitutional: Negative for chills, fatigue and fever     HENT: Negative for congestion, nosebleeds, rhinorrhea, sinus pressure and sore throat  Eyes: Negative for discharge and redness  Respiratory: Negative for cough and shortness of breath  Cardiovascular: Negative for chest pain, palpitations and leg swelling  Gastrointestinal: Negative for abdominal pain, blood in stool and nausea  Endocrine: Negative for cold intolerance, heat intolerance and polyuria  Genitourinary: Negative for dysuria and frequency  Musculoskeletal: Negative for arthralgias, back pain and myalgias  Right shoulder pain   Skin: Negative for rash  Neurological: Negative for dizziness, weakness and headaches  Hematological: Negative for adenopathy  Psychiatric/Behavioral: Negative for behavioral problems and sleep disturbance  The patient is not nervous/anxious  Objective:      /70 (BP Location: Left arm, Patient Position: Sitting)   Pulse 69   Temp 98 6 °F (37 °C)   Resp 18   Ht 5' 7" (1 702 m)   Wt 78 kg (172 lb)   SpO2 97%   BMI 26 94 kg/m²        Physical Exam  Vitals and nursing note reviewed  Constitutional:       General: She is not in acute distress  Appearance: Normal appearance  She is well-developed  HENT:      Head: Normocephalic and atraumatic  Right Ear: Tympanic membrane and external ear normal       Left Ear: Tympanic membrane and external ear normal       Nose: Nose normal       Mouth/Throat:      Mouth: Mucous membranes are moist       Pharynx: Oropharynx is clear  No oropharyngeal exudate  Eyes:      General: No scleral icterus  Right eye: No discharge  Left eye: No discharge  Extraocular Movements: Extraocular movements intact  Conjunctiva/sclera: Conjunctivae normal       Pupils: Pupils are equal, round, and reactive to light  Neck:      Thyroid: No thyromegaly  Vascular: No JVD  Cardiovascular:      Rate and Rhythm: Normal rate and regular rhythm  Heart sounds: Normal heart sounds   No murmur heard   Pulmonary:      Effort: Pulmonary effort is normal       Breath sounds: No wheezing or rales  Chest:      Chest wall: No tenderness  Abdominal:      General: Bowel sounds are normal  There is no distension  Palpations: Abdomen is soft  There is no mass  Tenderness: There is no abdominal tenderness  Musculoskeletal:         General: No tenderness or deformity  Normal range of motion  Cervical back: Normal range of motion  Lymphadenopathy:      Cervical: No cervical adenopathy  Skin:     General: Skin is warm and dry  Findings: No rash  Neurological:      General: No focal deficit present  Mental Status: She is alert and oriented to person, place, and time  Mental status is at baseline  Cranial Nerves: No cranial nerve deficit  Coordination: Coordination normal       Deep Tendon Reflexes: Reflexes are normal and symmetric  Reflexes normal    Psychiatric:         Mood and Affect: Mood normal          Behavior: Behavior normal          Thought Content: Thought content normal          Judgment: Judgment normal           Data:    Laboratory Results: I have personally reviewed the pertinent laboratory results/reports   Radiology/Other Diagnostic Testing Results: I have personally reviewed pertinent reports         Lab Results   Component Value Date    WBC 5 51 11/30/2021    HGB 14 0 11/30/2021    HCT 43 1 11/30/2021    MCV 91 11/30/2021     11/30/2021     Lab Results   Component Value Date    K 4 1 05/31/2022     (H) 05/31/2022    CO2 26 05/31/2022    BUN 13 05/31/2022    CREATININE 0 80 05/31/2022    GLUF 91 05/31/2022    CALCIUM 9 3 05/31/2022    AST 16 05/31/2022    ALT 26 05/31/2022    ALKPHOS 151 (H) 05/31/2022    EGFR 87 05/31/2022     Lab Results   Component Value Date    CHOLESTEROL 186 05/31/2022    CHOLESTEROL 209 (H) 11/30/2021    CHOLESTEROL 204 (H) 05/26/2021     Lab Results   Component Value Date    HDL 56 05/31/2022    HDL 55 11/30/2021 HDL 56 05/26/2021     Lab Results   Component Value Date    LDLCALC 113 (H) 05/31/2022    LDLCALC 138 (H) 11/30/2021    LDLCALC 134 (H) 05/26/2021     Lab Results   Component Value Date    TRIG 83 05/31/2022    TRIG 82 11/30/2021    TRIG 72 05/26/2021     No results found for: [place], [place]  Lab Results   Component Value Date    ACU2HQUWNEDO 0 719 05/31/2022     No results found for: HGBA1C  No results found for: PSA    Xiomara CHEEMA'Taylor, DO

## 2022-06-07 ENCOUNTER — APPOINTMENT (OUTPATIENT)
Dept: RADIOLOGY | Facility: CLINIC | Age: 47
End: 2022-06-07
Payer: COMMERCIAL

## 2022-06-07 ENCOUNTER — OFFICE VISIT (OUTPATIENT)
Dept: OBGYN CLINIC | Facility: CLINIC | Age: 47
End: 2022-06-07
Payer: COMMERCIAL

## 2022-06-07 VITALS — HEART RATE: 67 BPM | DIASTOLIC BLOOD PRESSURE: 71 MMHG | SYSTOLIC BLOOD PRESSURE: 102 MMHG | RESPIRATION RATE: 18 BRPM

## 2022-06-07 DIAGNOSIS — S42.201D CLOSED FRACTURE OF PROXIMAL END OF RIGHT HUMERUS WITH ROUTINE HEALING, UNSPECIFIED FRACTURE MORPHOLOGY, SUBSEQUENT ENCOUNTER: ICD-10-CM

## 2022-06-07 DIAGNOSIS — M75.01 ADHESIVE CAPSULITIS OF RIGHT SHOULDER: ICD-10-CM

## 2022-06-07 DIAGNOSIS — S42.201D CLOSED FRACTURE OF PROXIMAL END OF RIGHT HUMERUS WITH ROUTINE HEALING, UNSPECIFIED FRACTURE MORPHOLOGY, SUBSEQUENT ENCOUNTER: Primary | ICD-10-CM

## 2022-06-07 DIAGNOSIS — R29.898 WEAKNESS OF RIGHT UPPER EXTREMITY: ICD-10-CM

## 2022-06-07 PROCEDURE — 1036F TOBACCO NON-USER: CPT | Performed by: ORTHOPAEDIC SURGERY

## 2022-06-07 PROCEDURE — 99213 OFFICE O/P EST LOW 20 MIN: CPT | Performed by: ORTHOPAEDIC SURGERY

## 2022-06-07 PROCEDURE — 73030 X-RAY EXAM OF SHOULDER: CPT

## 2022-06-14 ENCOUNTER — PROCEDURE VISIT (OUTPATIENT)
Dept: OBGYN CLINIC | Facility: CLINIC | Age: 47
End: 2022-06-14
Payer: COMMERCIAL

## 2022-06-14 VITALS
SYSTOLIC BLOOD PRESSURE: 100 MMHG | DIASTOLIC BLOOD PRESSURE: 66 MMHG | HEIGHT: 67 IN | WEIGHT: 171.2 LBS | BODY MASS INDEX: 26.87 KG/M2

## 2022-06-14 DIAGNOSIS — Z32.02 NEGATIVE PREGNANCY TEST: Primary | ICD-10-CM

## 2022-06-14 DIAGNOSIS — N87.1 HIGH GRADE SQUAMOUS INTRAEPITHELIAL LESION (HGSIL), GRADE 2 CIN, ON BIOPSY OF CERVIX: ICD-10-CM

## 2022-06-14 PROCEDURE — 88307 TISSUE EXAM BY PATHOLOGIST: CPT | Performed by: PATHOLOGY

## 2022-06-14 PROCEDURE — 3008F BODY MASS INDEX DOCD: CPT | Performed by: ORTHOPAEDIC SURGERY

## 2022-06-14 PROCEDURE — 88344 IMHCHEM/IMCYTCHM EA MLT ANTB: CPT | Performed by: PATHOLOGY

## 2022-06-14 PROCEDURE — 81025 URINE PREGNANCY TEST: CPT | Performed by: OBSTETRICS & GYNECOLOGY

## 2022-06-14 PROCEDURE — 57460 BX OF CERVIX W/SCOPE LEEP: CPT | Performed by: OBSTETRICS & GYNECOLOGY

## 2022-06-15 LAB — SL AMB POCT URINE HCG: NORMAL

## 2022-06-27 ENCOUNTER — TELEPHONE (OUTPATIENT)
Dept: OBGYN CLINIC | Facility: CLINIC | Age: 47
End: 2022-06-27

## 2022-06-27 DIAGNOSIS — R30.0 DYSURIA: Primary | ICD-10-CM

## 2022-06-27 RX ORDER — CIPROFLOXACIN 500 MG/1
500 TABLET, FILM COATED ORAL EVERY 12 HOURS SCHEDULED
Qty: 6 TABLET | Refills: 0 | Status: SHIPPED | OUTPATIENT
Start: 2022-06-27 | End: 2022-06-30

## 2022-06-27 NOTE — TELEPHONE ENCOUNTER
Patient had LEEP procedure 2 weeks ago  Reports symptoms consistent with UTI  Urine cloudy with odor, burning and frequently  Hesitancy to initiate stream  Afebrile, denies back pain  Reviewed allergies and pharmacy  Advised will most likely need to do UA/CS  Patient asking if ok to order without cultures  Has a very hectic work week, does not know when she could get it done

## 2022-06-27 NOTE — TELEPHONE ENCOUNTER
Script for cipro sent to listed pharmacy  If no improvement with antibiotics, will need to order urine sample    Consider office visit

## 2022-07-01 PROBLEM — N87.0 DYSPLASIA OF CERVIX, LOW GRADE (CIN 1): Status: ACTIVE | Noted: 2022-05-18

## 2022-08-09 ENCOUNTER — HOSPITAL ENCOUNTER (OUTPATIENT)
Dept: CT IMAGING | Facility: HOSPITAL | Age: 47
Discharge: HOME | End: 2022-08-09
Attending: UROLOGY
Payer: COMMERCIAL

## 2022-08-09 DIAGNOSIS — C64.1 RENAL CELL CARCINOMA OF RIGHT KIDNEY (HCC): ICD-10-CM

## 2022-08-09 PROCEDURE — 71260 CT THORAX DX C+: CPT

## 2022-08-09 PROCEDURE — G1004 CDSM NDSC: HCPCS

## 2022-08-09 PROCEDURE — 74177 CT ABD & PELVIS W/CONTRAST: CPT

## 2022-08-09 RX ADMIN — IOHEXOL 65 ML: 350 INJECTION, SOLUTION INTRAVENOUS at 07:23

## 2022-08-10 NOTE — PROGRESS NOTES
8/12/2022      Chief Complaint   Patient presents with    Renal Cancer     Assessment and Plan    1  Microhematuria  - S/p negative work-up on 2/7/22   - Recommend annual urinalysis with PCP  - consider repeat work-up 3-5 years from prior work-up if microhematuria persists  2  History of T1a clear cell RCC  - S/p right partial laparoscopic nephrectomy 8/26/19  - CT C/A/P from 8/9/22 results pending  - As long as this is CHONG, surveillance imaging for RCC can be discontinued per NCCN guidelines given that she is 3 years out from surgery  She will require annual chest imaging as part of lung cancer surveillance    She will follow up as need and obtain yearly UA through PCP  She is to contact our office immediately if she is experience any gross hematuria  History of Present Illness  April Dal Asa is a 49 y o  female here for follow up evaluation of microhematuria and RCC  She is now s/p negative workup on 2/7/22 for microhematuria  Patient has significant history renal cell carcinoma status post right partial laparoscopic nephrectomy in August 2019  Her postoperative imaging has been negative for any evidence of disease recurrence  Her kidney function is within normal range  She has family history of renal cell carcinoma in her aunt  Denies any other family history of  malignancy  She is a former smoker and quit 9 months ago  She is doing well and denies any urinary complaints or gross hematuria  Review of Systems   Constitutional: Negative for chills and fever  Respiratory: Negative for shortness of breath  Cardiovascular: Negative for chest pain  Gastrointestinal: Negative for abdominal pain  Genitourinary: Negative for difficulty urinating, dysuria, flank pain, frequency, hematuria and urgency  Neurological: Negative for dizziness                    Past Medical History  Past Medical History:   Diagnosis Date    BRCA1 negative     BRCA2 negative     Cancer ([facility] [address] ) 6/16/19    Chronic kidney disease 2019    tumor and cyst    Colon polyp     Gastritis     Nodule of upper lobe of right lung 2021    PONV (postoperative nausea and vomiting)     Renal cell carcinoma ([facility] [address] ) 2019    Renal mass, right        Past Social History  Past Surgical History:   Procedure Laterality Date    BREAST BIOPSY Left 2020    benign    COLONOSCOPY      LUNG SEGMENTECTOMY Right 2021    Procedure: RESECTION WEDGE LUNG;  Surgeon: Rob Baker MD;  [facility];  Service: Thoracic    [address] N/A 2021    Procedure: BRONCHOSCOPY FLEXIBLE;  Surgeon: Rob Baker MD;  [facility];  Service: Thoracic    WA LAP,PARTIAL NEPHRECTOMY Right 2019    Procedure: NEPHRECTOMY PARTIAL LAPAROSCOPIC W ROBOTICS WITH INTRA-OP LAPAROSCOPIC ULTRASOUND;  Surgeon: Payam Parkre MD;  [facility];  Service: Urology    WA THORACOSCOPY SURG LOBECTOMY Right 2021    Procedure: LOBECTOMY LUNG; MEDIASTINAL LYMPH NODE DISSECTION;  Surgeon: Rob Bkaer MD;  [facility];  Service: Thoracic    [place] THORACOSCOPY W/THERA WEDGE RESEXN INITIAL UNILAT Right 2021    Procedure: THORACOSCOPY VIDEO ASSISTED SURGERY (VATS);   Surgeon: Rob Baker MD;  [facility];  Service: Thoracic    US GUIDED BREAST BIOPSY LEFT COMPLETE Left 1/15/2020    WISDOM TOOTH EXTRACTION       Social History     Tobacco Use   Smoking Status Former Smoker    Packs/day: 0 00    Years: 25 00    Pack years: 0 00    Types: Cigarettes    Quit date: 2021    Years since quittin 6   Smokeless Tobacco Never Used   Tobacco Comment    quit on 21       Past Family History  Family History   Problem Relation Age of Onset    Heart disease Father     Cancer Paternal Grandmother     Diabetes Paternal Grandmother     Breast cancer Paternal Grandmother 79    Other Maternal Aunt         Renal Cell Carcinoma    No Known Problems Mother    Brianna Lizarraga No Known Problems Sister     No Known Problems Maternal Grandmother     Breast cancer Paternal Grandfather 79    No Known Problems Maternal Aunt     No Known Problems Maternal Aunt        Past Social history  Social History     Socioeconomic History    Marital status: /Civil Union     Spouse name: Not on file    Number of children: Not on file    Years of education: Not on file    Highest education level: Not on file   Occupational History    Not on file   Tobacco Use    Smoking status: Former Smoker     Packs/day: 0 00     Years: 25 00     Pack years: 0 00     Types: Cigarettes     Quit date: 2021     Years since quittin 6    Smokeless tobacco: Never Used    Tobacco comment: quit on 21   Vaping Use    Vaping Use: Former   Substance and Sexual Activity    Alcohol use: Not Currently     Alcohol/week: 0 0 standard drinks    Drug use: Never    Sexual activity: Yes     Partners: Male     Birth control/protection: Condom Male   Other Topics Concern    Not on file   Social History Narrative    Not on file     Social Determinants of Health     Financial Resource Strain: Not on file   Food Insecurity: Not on file   Transportation Needs: Not on file   Physical Activity: Not on file   Stress: Not on file   Social Connections: Not on file   Intimate Partner Violence: Not on file   Housing Stability: Not on file       Current Medications  No current outpatient medications on file  No current facility-administered medications for this visit         Allergies  Allergies   Allergen Reactions    Strawberry (Diagnostic) - Food Allergy Hives    Tetracycline      breast swelling          The following portions of the patient's history were reviewed and updated as appropriate: allergies, current medications, past medical history, past social history, past surgical history and problem list       Vitals  Vitals:    22 0749   BP: 120/74   Pulse: 80   SpO2: 97%   Weight: 78 7 kg (173 lb 6 4 oz)   Height: 5' 7" (1 702 m)           Physical Exam  Physical Exam  Constitutional:       Appearance: Normal appearance  HENT:      Head: Normocephalic and atraumatic  Right Ear: External ear normal       Left Ear: External ear normal    Eyes:      General: No scleral icterus  Conjunctiva/sclera: Conjunctivae normal    Cardiovascular:      Pulses: Normal pulses  Pulmonary:      Effort: Pulmonary effort is normal    Musculoskeletal:         General: Normal range of motion  Cervical back: Normal range of motion  Neurological:      General: No focal deficit present  Mental Status: She is alert and oriented to person, place, and time  Psychiatric:         Mood and Affect: Mood normal          Behavior: Behavior normal          Thought Content: Thought content normal          Judgment: Judgment normal            Results  No results found for this or any previous visit (from the past 1 hour(s))  ]  No results found for: PSA  Lab Results   Component Value Date    CALCIUM 9 3 05/31/2022    K 4 1 05/31/2022    CO2 26 05/31/2022     (H) 05/31/2022    BUN 13 05/31/2022    CREATININE 0 80 05/31/2022     Lab Results   Component Value Date    WBC 5 51 11/30/2021    HGB 14 0 11/30/2021    HCT 43 1 11/30/2021    MCV 91 11/30/2021     11/30/2021           Orders  No orders of the defined types were placed in this encounter        Eliud Choi PA-C

## 2022-08-12 ENCOUNTER — OFFICE VISIT (OUTPATIENT)
Dept: UROLOGY | Facility: CLINIC | Age: 47
End: 2022-08-12
Payer: COMMERCIAL

## 2022-08-12 VITALS
SYSTOLIC BLOOD PRESSURE: 120 MMHG | BODY MASS INDEX: 27.21 KG/M2 | OXYGEN SATURATION: 97 % | DIASTOLIC BLOOD PRESSURE: 74 MMHG | WEIGHT: 173.4 LBS | HEART RATE: 80 BPM | HEIGHT: 67 IN

## 2022-08-12 DIAGNOSIS — C64.1 RENAL CELL CARCINOMA OF RIGHT KIDNEY (HCC): Primary | ICD-10-CM

## 2022-08-12 DIAGNOSIS — R31.29 MICROHEMATURIA: ICD-10-CM

## 2022-08-12 PROCEDURE — 99213 OFFICE O/P EST LOW 20 MIN: CPT | Performed by: PHYSICIAN ASSISTANT

## 2022-10-08 NOTE — PROGRESS NOTES
Patient Name:  Kelley Connolly  MRN:  [id number]    [address] [place], [place] [address]     1  Closed fracture of proximal end of right humerus with routine healing, unspecified fracture morphology, subsequent encounter    2  Adhesive capsulitis of right shoulder        49 y o  female approximately 1 5 years s/p mechanical fall resulting in Right shoulder proximal humerus fracture  Fracture subsequently healed and patient developed adhesive capsulitis which has continued to improve  She displays significantly improved range of motion and strength since her last visit  Overall she is happy with her results and function  I will plan to see the patient back as-needed basis if symptoms worsen  History of the Present Illness   Kelley Connolly is a 49 y o  female approximately 1 5 years s/p mechanical fall resulting in Right shoulder proximal humerus fracture with development of subsequent adhesive capsulitis  Today, patient reports she continues to put improved in regards to range of motion strength  She does report mild residual weakness but this is overall doing well  She is happy with her overall improvement  She states she has not had any restrictions due to her right shoulder and is performing all of her normal activities  She continues to perform home exercises  No other new complaints  Review of Systems     Review of Systems    Physical Exam     /74   Pulse 72   Ht 5' 7" (1 702 m)   Wt 78 7 kg (173 lb 6 4 oz)   BMI 27 16 kg/m²     Right Shoulder: Active range of motion   140 degrees forward flexion  140 degrees abduction  70 degrees external rotation   1 level restriction internal rotation    Passive range of motion   150 degrees of forward flexion     There is no tenderness present over the shoulder  There is 4+/5 strength with external rotation testing at the side      Empty can testing is negative   Belly press test is negative  Brunson test is negative   Caroline's test is negative    Speed's test is Negative  The patient is neurovascularly intact distally in the extremity  Data Review     I have personally reviewed pertinent films in PACS, and my interpretation follows  X-rays taken 22 of Right shoulder demonstrates healed proximal humerus fracture with glenohumeral joint space well maintained      Social History     Tobacco Use   • Smoking status: Former Smoker     Packs/day: 0 00     Years: 25 00     Pack years: 0 00     Types: Cigarettes     Quit date: 2021     Years since quittin 7   • Smokeless tobacco: Never Used   • Tobacco comment: quit on 21   Vaping Use   • Vaping Use: Former   Substance Use Topics   • Alcohol use: Not Currently     Alcohol/week: 0 0 standard drinks   • Drug use: Never           Procedures      Wesley Rawls,

## 2022-10-11 ENCOUNTER — OFFICE VISIT (OUTPATIENT)
Dept: OBGYN CLINIC | Facility: CLINIC | Age: 47
End: 2022-10-11
Payer: COMMERCIAL

## 2022-10-11 VITALS
SYSTOLIC BLOOD PRESSURE: 123 MMHG | BODY MASS INDEX: 27.21 KG/M2 | WEIGHT: 173.4 LBS | DIASTOLIC BLOOD PRESSURE: 74 MMHG | HEART RATE: 72 BPM | HEIGHT: 67 IN

## 2022-10-11 DIAGNOSIS — S42.201D CLOSED FRACTURE OF PROXIMAL END OF RIGHT HUMERUS WITH ROUTINE HEALING, UNSPECIFIED FRACTURE MORPHOLOGY, SUBSEQUENT ENCOUNTER: Primary | ICD-10-CM

## 2022-10-11 DIAGNOSIS — M75.01 ADHESIVE CAPSULITIS OF RIGHT SHOULDER: ICD-10-CM

## 2022-10-11 PROCEDURE — 99213 OFFICE O/P EST LOW 20 MIN: CPT | Performed by: ORTHOPAEDIC SURGERY

## 2022-11-04 ENCOUNTER — HOSPITAL ENCOUNTER (OUTPATIENT)
Dept: CT IMAGING | Facility: HOSPITAL | Age: 47
End: 2022-11-04
Attending: THORACIC SURGERY (CARDIOTHORACIC VASCULAR SURGERY)

## 2022-11-04 DIAGNOSIS — C34.11 PRIMARY ADENOCARCINOMA OF UPPER LOBE OF RIGHT LUNG (HCC): ICD-10-CM

## 2022-11-14 ENCOUNTER — OFFICE VISIT (OUTPATIENT)
Dept: CARDIAC SURGERY | Facility: CLINIC | Age: 47
End: 2022-11-14

## 2022-11-14 VITALS
HEIGHT: 67 IN | BODY MASS INDEX: 26.19 KG/M2 | RESPIRATION RATE: 17 BRPM | OXYGEN SATURATION: 100 % | HEART RATE: 66 BPM | TEMPERATURE: 98.7 F | WEIGHT: 166.89 LBS | SYSTOLIC BLOOD PRESSURE: 116 MMHG | DIASTOLIC BLOOD PRESSURE: 78 MMHG

## 2022-11-14 DIAGNOSIS — C34.11 PRIMARY ADENOCARCINOMA OF UPPER LOBE OF RIGHT LUNG (HCC): Primary | ICD-10-CM

## 2022-11-14 NOTE — PROGRESS NOTES
Assessment/Plan:    Primary adenocarcinoma of upper lobe of right lung ([facility] [address] )  Ms Connolly presents 1 5 years out from right upper lobectomy for Stage IB adenocarcinoma of the lung  She is doing well from a thoracic surgery standpoint  Her CT was reviewed in PACs and demonstrates no evidence of recurrent lung cancer  She also has a history of kidney cancer for which she completed surveillance imaging  She will return in 6 months with a CT chest for continued lung cancer surveillance with Dr Alannah Walker at [place]  We also discussed her sliding hiatal hernia which is not evident on the recent CT scan  She will discuss this further at her next appointment  Diagnoses and all orders for this visit:    Primary adenocarcinoma of upper lobe of right lung ([facility] [address] )  -     CT chest wo contrast; Future          Thoracic History   Cancer Staging  Primary adenocarcinoma of upper lobe of right lung [facility])  Staging form: Lung, AJCC 8th Edition  - Clinical stage from 5/4/2021: Stage IB (cT2a, cN0, cM0) - Signed by Jyoti Fischer MD on 5/6/2021  Histopathologic type:  Adenocarcinoma, NOS  Stage prefix: Initial diagnosis  Histologic grade (G): G2  Histologic grading system: 4 grade system  Laterality: Right  Tumor size (mm): 2 9  Lymph-vascular invasion (LVI): LVI not present (absent)/not identified  Specimen type: Excision  Type of lung cancer: Surgically resected non-small cell lung cancer  ECOG performance status: Grade 0  Pleural/elastic layer invasion: PL1  Pleural lavage cytology: Not assessed  Adequacy of mediastinal dissection: Adequate  Adjuvant radiation: No  Adjuvant chemotherapy: No  Stage used in treatment planning: Yes  National guidelines used in treatment planning: Yes  Type of national guideline used in treatment planning: NCCN    Oncology History   Primary adenocarcinoma of upper lobe of right lung ([facility] [address] )   5/4/2021 Initial Diagnosis    Primary adenocarcinoma of upper lobe of right lung ([facility] [address] )     5/4/2021 - Cancer Staged    Staging form: Lung, AJCC 8th Edition  - Clinical stage from 5/4/2021: Stage IB (cT2a, cN0, cM0) - Signed by Daryl Morris MD on 5/6/2021  Histopathologic type: Adenocarcinoma, NOS  Stage prefix: Initial diagnosis  Histologic grade (G): G2  Histologic grading system: 4 grade system  Laterality: Right  Tumor size (mm): 2 9  Lymph-vascular invasion (LVI): LVI not present (absent)/not identified  Specimen type: Excision  Type of lung cancer: Surgically resected non-small cell lung cancer  ECOG performance status: Grade 0  Pleural/elastic layer invasion: PL1  Pleural lavage cytology: Not assessed  Adequacy of mediastinal dissection: Adequate  Adjuvant radiation: No  Adjuvant chemotherapy: No  Stage used in treatment planning: Yes  National guidelines used in treatment planning: Yes  Type of national guideline used in treatment planning: NCCN                Subjective:    Patient ID: Kelley Connolly is a 49 y o  female  ecog 0  HPI   Ms Tate Alberto is a 49year old female with a history of Stage IB adenocarcinoma who underwent a right thoracoscopic upper lobectomy 4/14/2021  Recent chest CT 11/4/2022 demonstrates no new pulmonary nodules or enlarged mediastinal or hilar lymph nodes  She has stable scarring along the staple line  On discussion,     The following portions of the patient's history were reviewed and updated as appropriate: allergies, current medications, past family history, past medical history, past social history, past surgical history and problem list     Review of Systems   Constitutional: Negative  HENT: Negative for trouble swallowing  Respiratory: Negative  Cardiovascular: Negative  Gastrointestinal: Positive for abdominal pain (epigastric intermittently)  Musculoskeletal: Negative  Skin: Negative  Neurological: Negative  Hematological: Negative  Psychiatric/Behavioral: Negative            Objective:   Physical Exam  Constitutional:       General: She is not in acute distress  Appearance: Normal appearance  HENT:      Head: Normocephalic and atraumatic  Eyes:      Conjunctiva/sclera: Conjunctivae normal    Cardiovascular:      Rate and Rhythm: Normal rate and regular rhythm  Pulmonary:      Effort: Pulmonary effort is normal  No respiratory distress  Breath sounds: Normal breath sounds  Abdominal:      General: There is no distension  Palpations: Abdomen is soft  Musculoskeletal:      Cervical back: Neck supple  Right lower leg: No edema  Left lower leg: No edema  Lymphadenopathy:      Cervical: No cervical adenopathy  Skin:     General: Skin is warm and dry  Neurological:      General: No focal deficit present  Mental Status: She is alert and oriented to person, place, and time  Psychiatric:         Mood and Affect: Mood normal      /78 (BP Location: Left arm, Patient Position: Sitting, Cuff Size: Standard)   Pulse 66   Temp 98 7 °F (37 1 °C) (Temporal)   Resp 17   Ht 5' 7" (1 702 m)   Wt 75 7 kg (166 lb 14 2 oz)   SpO2 100%   BMI 26 14 kg/m²       CT chest wo contrast    Result Date: 11/4/2022  Narrative CT CHEST WITHOUT IV CONTRAST INDICATION:   C34 11: Malignant neoplasm of upper lobe, right bronchus or lung  COMPARISON:  CT chest exam 8/9/2022 TECHNIQUE: CT examination of the chest was performed without intravenous contrast  Axial, sagittal, and coronal 2D reformatted images were created from the source data and submitted for interpretation  Radiation dose length product (DLP) for this visit:  220 mGy-cm   This examination, like all CT scans performed in the [facility], was performed utilizing techniques to minimize radiation dose exposure, including the use of iterative reconstruction and automated exposure control  FINDINGS: LUNGS:  Status post right upper lobectomy with associated volume loss  Linear consolidation along the surgical staple line, stable   No new suspicious nodules or masses  Scattered linear atelectasis versus scarring in the lower lung fields  Scattered emphysematous changes and blebs  PLEURA:  Unremarkable  HEART/GREAT VESSELS: Heart is unremarkable for patient's age  No thoracic aortic aneurysm  MEDIASTINUM AND SIMON:  Surgical material in the right hilar region  No significant mediastinal lymphadenopathy  CHEST WALL AND LOWER NECK:  Unremarkable  VISUALIZED STRUCTURES IN THE UPPER ABDOMEN:  Partially visualized small fat-containing ventral wall hernia  OSSEOUS STRUCTURES:  No acute fracture or destructive osseous lesion  Impression 1  Stable exam   No new findings suspicious for disease recurrence  Workstation performed: [id number]     CT chest wo contrast    Result Date: 4/18/2022  Narrative CT CHEST WITHOUT IV CONTRAST INDICATION:   C34 11: Malignant neoplasm of upper lobe, right bronchus or lung  History of stage IB right upper lobectomy in April 2021 for adenocarcinoma  Additional history of clear cell renal cell carcinoma  COMPARISON:  CT, dated 10/17/2021  TECHNIQUE: CT examination of the chest was performed without intravenous contrast   Axial, sagittal, and coronal 2D reformatted images were created from the source data and submitted for interpretation  Radiation dose length product (DLP) for this visit:  207 mGy-cm   This examination, like all CT scans performed in the [facility], was performed utilizing techniques to minimize radiation dose exposure, including the use of iterative reconstruction and automated exposure control  FINDINGS: LUNGS:  Postsurgical changes related to right upper lobectomy are stable  Surgical staple line is normal  There are no concerning pulmonary nodules  Atelectasis is mild  Airways are normal   Apical predominant centrilobular emphysema is mild  PLEURA:  Unremarkable  HEART/GREAT VESSELS: Heart is unremarkable for patient's age  No thoracic aortic aneurysm  MEDIASTINUM AND SIMON:  Unremarkable   CHEST WALL AND LOWER NECK:  Unremarkable  VISUALIZED STRUCTURES IN THE UPPER ABDOMEN:  Unremarkable  OSSEOUS STRUCTURES:  No acute fracture or destructive osseous lesion  Impression Stable postsurgical changes related to right upper lobectomy for adenocarcinoma  There is no tumor recurrence or new concerning pulmonary nodules  Workstation performed: [id number]     CT chest abdomen pelvis w contrast    Result Date: 8/11/2022  Narrative CT CHEST, ABDOMEN AND PELVIS WITH IV CONTRAST INDICATION:   C64 1: Malignant neoplasm of right kidney, except renal pelvis  History of right partial nephrectomy for clear cell renal cell carcinoma  Microscopic hematuria  History of right upper lobectomy for stage IB pulmonary adenocarcinoma  COMPARISON:  April 13, 2022 TECHNIQUE: CT examination of the chest, abdomen and pelvis was performed  Axial, sagittal, and coronal 2D reformatted images were created from the source data and submitted for interpretation  Radiation dose length product (DLP) for this visit:  640 mGy-cm   This examination, like all CT scans performed in the [facility], was performed utilizing techniques to minimize radiation dose exposure, including the use of iterative reconstruction and automated exposure control  IV Contrast:  65 mL of iohexol (OMNIPAQUE) Enteric Contrast: Enteric contrast was not administered  FINDINGS: CHEST LUNGS:  Scarring and surgical material associated with right upper lobectomy reidentified  Mild background pulmonary emphysema noted  No new, enlarging, or otherwise suspicious pulmonary nodule/mass  No tracheal or endobronchial lesion  PLEURA:  Unremarkable  HEART/GREAT VESSELS: Heart is unremarkable for patient's age  No thoracic aortic aneurysm  MEDIASTINUM AND SIMON:  Small sliding-type hiatal hernia  No mediastinal or hilar lymphadenopathy  CHEST WALL AND LOWER NECK:  Unremarkable  ABDOMEN LIVER/BILIARY TREE:  Unremarkable   GALLBLADDER:  No calcified gallstones  No pericholecystic inflammatory change  SPLEEN:  Unremarkable  PANCREAS:  Unremarkable  ADRENAL GLANDS:  Unremarkable  KIDNEYS/URETERS:  Surgical change at the posterior and medial margins of the right renal lower pole consistent with the sequela of prior partial nephrectomy  No residual or recurrent tumor mass  No solid renal mass, urinary tract calculus, or hydronephrosis  Within the limitations of this single phase examination no evidence for ureteral lesion is noted  Incidentally noted circumaortic left renal vein  STOMACH AND BOWEL:  Unremarkable  APPENDIX:  A normal appendix was visualized  ABDOMINOPELVIC CAVITY:  There is a trace free pelvic fluid in the posterior cul-de-sac, likely physiologic given the patient's age and gender  No pneumoperitoneum  No lymphadenopathy  VESSELS:  Unremarkable for patient's age  PELVIS REPRODUCTIVE ORGANS:  Unremarkable for patient's age  URINARY BLADDER:  Unremarkable  ABDOMINAL WALL/INGUINAL REGIONS:  Unremarkable  OSSEOUS STRUCTURES:  No acute fracture or destructive osseous lesion  Impression No residual, recurrent, or metastatic tumor in the chest, abdomen or pelvis  Partial nephrectomy at the lower pole the right kidney reidentified without suspicious change from prior examinations  Surgical changes and scarring associated with right upper lobectomy reidentified  Small sliding-type hiatal hernia  Mild background pulmonary emphysema   Workstation performed: [id number]

## 2022-11-14 NOTE — ASSESSMENT & PLAN NOTE
Ms Leila Li presents 1 5 years out from right upper lobectomy for Stage IB adenocarcinoma of the lung  She is doing well from a thoracic surgery standpoint  Her CT was reviewed in PACs and demonstrates no evidence of recurrent lung cancer  She also has a history of kidney cancer for which she completed surveillance imaging  She will return in 6 months with a CT chest for continued lung cancer surveillance with Dr Joselyn Weaver at [place]  We also discussed her sliding hiatal hernia which is not evident on the recent CT scan  She will discuss this further at her next appointment

## 2022-11-30 ENCOUNTER — APPOINTMENT (OUTPATIENT)
Dept: LAB | Facility: CLINIC | Age: 47
End: 2022-11-30

## 2022-11-30 DIAGNOSIS — G89.29 CHRONIC RIGHT SHOULDER PAIN: ICD-10-CM

## 2022-11-30 DIAGNOSIS — E78.2 MIXED HYPERLIPIDEMIA: ICD-10-CM

## 2022-11-30 DIAGNOSIS — C34.11 PRIMARY ADENOCARCINOMA OF UPPER LOBE OF RIGHT LUNG (HCC): ICD-10-CM

## 2022-11-30 DIAGNOSIS — N87.1 HIGH GRADE SQUAMOUS INTRAEPITHELIAL LESION (HGSIL), GRADE 2 CIN, ON BIOPSY OF CERVIX: ICD-10-CM

## 2022-11-30 DIAGNOSIS — M25.511 CHRONIC RIGHT SHOULDER PAIN: ICD-10-CM

## 2022-11-30 LAB
ALBUMIN SERPL BCP-MCNC: 3.9 G/DL (ref 3.5–5)
ALP SERPL-CCNC: 135 U/L (ref 46–116)
ALT SERPL W P-5'-P-CCNC: 16 U/L (ref 12–78)
ANION GAP SERPL CALCULATED.3IONS-SCNC: 3 MMOL/L (ref 4–13)
AST SERPL W P-5'-P-CCNC: 10 U/L (ref 5–45)
BASOPHILS # BLD AUTO: 0.05 THOUSANDS/ÂΜL (ref 0–0.1)
BASOPHILS NFR BLD AUTO: 1 % (ref 0–1)
BILIRUB SERPL-MCNC: 0.45 MG/DL (ref 0.2–1)
BUN SERPL-MCNC: 9 MG/DL (ref 5–25)
CALCIUM SERPL-MCNC: 8.8 MG/DL (ref 8.3–10.1)
CHLORIDE SERPL-SCNC: 108 MMOL/L (ref 96–108)
CHOLEST SERPL-MCNC: 182 MG/DL
CO2 SERPL-SCNC: 27 MMOL/L (ref 21–32)
CREAT SERPL-MCNC: 0.82 MG/DL (ref 0.6–1.3)
EOSINOPHIL # BLD AUTO: 0.04 THOUSAND/ÂΜL (ref 0–0.61)
EOSINOPHIL NFR BLD AUTO: 1 % (ref 0–6)
ERYTHROCYTE [DISTWIDTH] IN BLOOD BY AUTOMATED COUNT: 12.8 % (ref 11.6–15.1)
GFR SERPL CREATININE-BSD FRML MDRD: 84 ML/MIN/1.73SQ M
GLUCOSE P FAST SERPL-MCNC: 77 MG/DL (ref 65–99)
HCT VFR BLD AUTO: 40.7 % (ref 34.8–46.1)
HDLC SERPL-MCNC: 53 MG/DL
HGB BLD-MCNC: 13.3 G/DL (ref 11.5–15.4)
IMM GRANULOCYTES # BLD AUTO: 0.01 THOUSAND/UL (ref 0–0.2)
IMM GRANULOCYTES NFR BLD AUTO: 0 % (ref 0–2)
LDLC SERPL CALC-MCNC: 114 MG/DL (ref 0–100)
LYMPHOCYTES # BLD AUTO: 1.71 THOUSANDS/ÂΜL (ref 0.6–4.47)
LYMPHOCYTES NFR BLD AUTO: 36 % (ref 14–44)
MCH RBC QN AUTO: 29.2 PG (ref 26.8–34.3)
MCHC RBC AUTO-ENTMCNC: 32.7 G/DL (ref 31.4–37.4)
MCV RBC AUTO: 90 FL (ref 82–98)
MONOCYTES # BLD AUTO: 0.37 THOUSAND/ÂΜL (ref 0.17–1.22)
MONOCYTES NFR BLD AUTO: 8 % (ref 4–12)
NEUTROPHILS # BLD AUTO: 2.62 THOUSANDS/ÂΜL (ref 1.85–7.62)
NEUTS SEG NFR BLD AUTO: 54 % (ref 43–75)
NONHDLC SERPL-MCNC: 129 MG/DL
NRBC BLD AUTO-RTO: 0 /100 WBCS
PLATELET # BLD AUTO: 225 THOUSANDS/UL (ref 149–390)
PMV BLD AUTO: 10.1 FL (ref 8.9–12.7)
POTASSIUM SERPL-SCNC: 4.2 MMOL/L (ref 3.5–5.3)
PROT SERPL-MCNC: 6.9 G/DL (ref 6.4–8.4)
RBC # BLD AUTO: 4.55 MILLION/UL (ref 3.81–5.12)
SODIUM SERPL-SCNC: 138 MMOL/L (ref 135–147)
TRIGL SERPL-MCNC: 76 MG/DL
TSH SERPL DL<=0.05 MIU/L-ACNC: 0.59 UIU/ML (ref 0.45–4.5)
WBC # BLD AUTO: 4.8 THOUSAND/UL (ref 4.31–10.16)

## 2022-12-07 ENCOUNTER — OFFICE VISIT (OUTPATIENT)
Dept: FAMILY MEDICINE CLINIC | Facility: CLINIC | Age: 47
End: 2022-12-07

## 2022-12-07 VITALS
TEMPERATURE: 98.5 F | HEART RATE: 64 BPM | RESPIRATION RATE: 18 BRPM | DIASTOLIC BLOOD PRESSURE: 70 MMHG | SYSTOLIC BLOOD PRESSURE: 122 MMHG | HEIGHT: 67 IN | WEIGHT: 165.4 LBS | BODY MASS INDEX: 25.96 KG/M2 | OXYGEN SATURATION: 96 %

## 2022-12-07 DIAGNOSIS — K43.9 VENTRAL HERNIA WITHOUT OBSTRUCTION OR GANGRENE: ICD-10-CM

## 2022-12-07 DIAGNOSIS — K44.9 HIATAL HERNIA: ICD-10-CM

## 2022-12-07 DIAGNOSIS — E78.2 MIXED HYPERLIPIDEMIA: ICD-10-CM

## 2022-12-07 DIAGNOSIS — C34.11 PRIMARY ADENOCARCINOMA OF UPPER LOBE OF RIGHT LUNG (HCC): ICD-10-CM

## 2022-12-07 DIAGNOSIS — C64.1 RENAL CELL CARCINOMA OF RIGHT KIDNEY (HCC): Primary | ICD-10-CM

## 2022-12-07 DIAGNOSIS — Z23 ENCOUNTER FOR IMMUNIZATION: ICD-10-CM

## 2022-12-07 NOTE — PROGRESS NOTES
BMI Counseling: Body mass index is 25 91 kg/m²  The BMI is above normal  Nutrition recommendations include reducing portion sizes, decreasing overall calorie intake, reducing fast food intake, decreasing soda and/or juice intake, moderation in carbohydrate intake, increasing intake of lean protein and reducing intake of saturated fat and trans fat  Exercise recommendations include exercising 3-5 times per week  Assessment/Plan:       Problem List Items Addressed This Visit        Respiratory    Primary adenocarcinoma of upper lobe of right lung ([facility] [address] )     Post resection doing well no SOB  She will follow up with Thoracic surgery q year  Genitourinary    Renal cell carcinoma of right kidney ([facility] [address] ) - Primary     Stable followed by  Urology now in remission and cleared to dc survailence  Other    Hiatal hernia     Occasional heart burn but stable off medication and followed by Gastroenterology- recommend follow up EGD         Relevant Orders    Ambulatory referral to Gastroenterology    Mixed hyperlipidemia     Stable reviewed labs and follow up yearly now  Ventral hernia     Very small midline between umbilicus and sternum  Discussed and explained CT findings  Subjective:      Patient ID: April Cathleen is a 49 y o  female  6-month follow-up evaluation patient doing well overall off all medications she has clearance from her renal cancer from urology perspective and is followed annually by thoracic surgery for lung cancer surveillance      The following portions of the patient's history were reviewed and updated as appropriate: allergies, current medications, past family history, past medical history, past social history, past surgical history and problem list     Review of Systems   Constitutional: Negative for chills, fatigue and fever  HENT: Negative for congestion, nosebleeds, rhinorrhea, sinus pressure and sore throat  Eyes: Negative for discharge and redness  Respiratory: Negative for cough and shortness of breath  Cardiovascular: Negative for chest pain, palpitations and leg swelling  Gastrointestinal: Negative for abdominal pain, blood in stool and nausea  Endocrine: Negative for cold intolerance, heat intolerance and polyuria  Genitourinary: Negative for dysuria and frequency  Musculoskeletal: Negative for arthralgias, back pain and myalgias  Skin: Negative for rash  Neurological: Negative for dizziness, weakness and headaches  Hematological: Negative for adenopathy  Psychiatric/Behavioral: Negative for behavioral problems and sleep disturbance  The patient is not nervous/anxious  Objective:      /70 (BP Location: Left arm, Patient Position: Sitting, Cuff Size: Standard)   Pulse 64   Temp 98 5 °F (36 9 °C) (Tympanic)   Resp 18   Ht 5' 7" (1 702 m)   Wt 75 kg (165 lb 6 4 oz)   SpO2 96%   BMI 25 91 kg/m²        Physical Exam  Vitals and nursing note reviewed  Constitutional:       General: She is not in acute distress  Appearance: Normal appearance  She is well-developed and normal weight  HENT:      Head: Normocephalic and atraumatic  Right Ear: Tympanic membrane, ear canal and external ear normal       Left Ear: Tympanic membrane, ear canal and external ear normal       Nose: Nose normal       Mouth/Throat:      Mouth: Mucous membranes are moist       Pharynx: No oropharyngeal exudate  Eyes:      General: No scleral icterus  Right eye: No discharge  Left eye: No discharge  Conjunctiva/sclera: Conjunctivae normal       Pupils: Pupils are equal, round, and reactive to light  Neck:      Thyroid: No thyromegaly  Vascular: No JVD  Cardiovascular:      Rate and Rhythm: Normal rate and regular rhythm  Pulses: Normal pulses  Heart sounds: Normal heart sounds  No murmur heard  Pulmonary:      Effort: Pulmonary effort is normal       Breath sounds: No wheezing or rales     Chest: Chest wall: No tenderness  Abdominal:      General: Bowel sounds are normal  There is no distension  Palpations: Abdomen is soft  There is no mass  Tenderness: There is no abdominal tenderness  Musculoskeletal:         General: No tenderness or deformity  Normal range of motion  Cervical back: Normal range of motion  Lymphadenopathy:      Cervical: No cervical adenopathy  Skin:     General: Skin is warm and dry  Capillary Refill: Capillary refill takes less than 2 seconds  Findings: No rash  Neurological:      General: No focal deficit present  Mental Status: She is alert and oriented to person, place, and time  Mental status is at baseline  Cranial Nerves: No cranial nerve deficit  Coordination: Coordination normal       Deep Tendon Reflexes: Reflexes are normal and symmetric  Reflexes normal    Psychiatric:         Mood and Affect: Mood normal          Behavior: Behavior normal          Thought Content: Thought content normal          Judgment: Judgment normal           Data:    Laboratory Results: I have personally reviewed the pertinent laboratory results/reports   Radiology/Other Diagnostic Testing Results: I have personally reviewed pertinent reports         Lab Results   Component Value Date    WBC 4 80 11/30/2022    HGB 13 3 11/30/2022    HCT 40 7 11/30/2022    MCV 90 11/30/2022     11/30/2022     Lab Results   Component Value Date    K 4 2 11/30/2022     11/30/2022    CO2 27 11/30/2022    BUN 9 11/30/2022    CREATININE 0 82 11/30/2022    GLUF 77 11/30/2022    CALCIUM 8 8 11/30/2022    AST 10 11/30/2022    ALT 16 11/30/2022    ALKPHOS 135 (H) 11/30/2022    EGFR 84 11/30/2022     Lab Results   Component Value Date    CHOLESTEROL 182 11/30/2022    CHOLESTEROL 186 05/31/2022    CHOLESTEROL 209 (H) 11/30/2021     Lab Results   Component Value Date    HDL 53 11/30/2022    HDL 56 05/31/2022    HDL 55 11/30/2021     Lab Results   Component Value Date LDLCALC 114 (H) 11/30/2022    LDLCALC 113 (H) 05/31/2022    LDLCALC 138 (H) 11/30/2021     Lab Results   Component Value Date    TRIG 76 11/30/2022    TRIG 83 05/31/2022    TRIG 82 11/30/2021     No results found for: [place], [place]  Lab Results   Component Value Date    JED2GNQNKHCC 0 594 11/30/2022     No results found for: HGBA1C  No results found for: PSA    Viviana Pleitez DO

## 2022-12-07 NOTE — ASSESSMENT & PLAN NOTE
Occasional heart burn but stable off medication and followed by Gastroenterology- recommend follow up EGD

## 2023-01-26 ENCOUNTER — HOSPITAL ENCOUNTER (OUTPATIENT)
Dept: MAMMOGRAPHY | Facility: CLINIC | Age: 48
End: 2023-01-26

## 2023-01-26 VITALS — HEIGHT: 67 IN | WEIGHT: 165 LBS | BODY MASS INDEX: 25.9 KG/M2

## 2023-01-26 DIAGNOSIS — Z12.31 OTHER SCREENING MAMMOGRAM: ICD-10-CM

## 2023-02-17 ENCOUNTER — TELEPHONE (OUTPATIENT)
Dept: CARDIAC SURGERY | Facility: CLINIC | Age: 48
End: 2023-02-17

## 2023-02-17 NOTE — TELEPHONE ENCOUNTER
Left message for patient to call the office to reschedule follow up appointment with Dr Felicita Guallpa at [facility] on 5/16 to later in the day with Dr Felicita Guallpa or to Physician Assistant Hannah Kwan's schedule at 8:30AM

## 2023-03-24 ENCOUNTER — TELEPHONE (OUTPATIENT)
Dept: GASTROENTEROLOGY | Facility: CLINIC | Age: 48
End: 2023-03-24

## 2023-04-24 ENCOUNTER — ANNUAL EXAM (OUTPATIENT)
Dept: OBGYN CLINIC | Facility: CLINIC | Age: 48
End: 2023-04-24

## 2023-04-24 VITALS
WEIGHT: 168 LBS | BODY MASS INDEX: 27 KG/M2 | SYSTOLIC BLOOD PRESSURE: 108 MMHG | DIASTOLIC BLOOD PRESSURE: 80 MMHG | HEIGHT: 66 IN

## 2023-04-24 DIAGNOSIS — Z01.419 ENCOUNTER FOR ANNUAL ROUTINE GYNECOLOGICAL EXAMINATION: ICD-10-CM

## 2023-04-24 DIAGNOSIS — N87.0 DYSPLASIA OF CERVIX, LOW GRADE (CIN 1): ICD-10-CM

## 2023-04-24 DIAGNOSIS — Z11.51 SCREENING FOR HPV (HUMAN PAPILLOMAVIRUS): ICD-10-CM

## 2023-04-24 DIAGNOSIS — Z01.419 ENCOUNTER FOR GYNECOLOGICAL EXAMINATION (GENERAL) (ROUTINE) WITHOUT ABNORMAL FINDINGS: Primary | ICD-10-CM

## 2023-04-24 DIAGNOSIS — Z12.31 ENCOUNTER FOR SCREENING MAMMOGRAM FOR MALIGNANT NEOPLASM OF BREAST: ICD-10-CM

## 2023-04-24 NOTE — PROGRESS NOTES
Assessment/Plan:    Dysplasia of cervix, low grade (LUKAS 1)  Pap/HPV collected    Encounter for annual routine gynecological examination  Pap/HPV collected  Mammogram ordered  Colonoscopy current    Encourage healthy diet, exercise, Calcium 1200mg per day and at least 800 iu Vitamin D daily  Diagnoses and all orders for this visit:    Encounter for gynecological examination (general) (routine) without abnormal findings  -     Liquid-based pap, screening    Screening for HPV (human papillomavirus)  -     Liquid-based pap, screening    Encounter for screening mammogram for malignant neoplasm of breast    Encounter for annual routine gynecological examination    Dysplasia of cervix, low grade (LUKAS 1)          Subjective:      Patient ID: eKlley Connolly is a 51 y o  female  Patient presents for a routine annual visit  LMP: 4/10/23  Birth control: condoms    Last Pap Smear: 22-neg/+HPV 25, colposcopy done 22-LUKAS 2-3, leep done 22, repeat pap today  Mammogram: 23-normal, next one already ordered  Former smoker  Non drinker  Currently sexually active  Grandmother and grandfather with breast cancer  No family history of colon, cervical, ovarian, or uterine cancer  No concerns/questions for today's visit    Menses q21 d has missed some menses  Some hot flashes  Has gained weight ( stopped smoking 2 years ago as well)  Exercises regularly - eliptical    Gynecologic Exam  She reports no genital itching, genital lesions, genital odor, genital rash, pelvic pain, vaginal bleeding or vaginal discharge  The patient is experiencing no pain  Pertinent negatives include no chills, constipation, diarrhea, fever, nausea, sore throat or vomiting         The following portions of the patient's history were reviewed and updated as appropriate:   She  has a past medical history of BRCA1 negative, BRCA2 negative, Cancer ([facility] [address] ) (19), Chronic kidney disease (2019), Colon polyp, Gastritis, Nodule of upper lobe of right lung (04/2021), PONV (postoperative nausea and vomiting), Renal cell carcinoma ([facility] [address] ) (06/2019), and Renal mass, right  She   Patient Active Problem List    Diagnosis Date Noted   • Ventral hernia 12/07/2022   • Chronic right shoulder pain 06/06/2022   • Dysplasia of cervix, low grade (LUKAS 1) 05/18/2022   • High risk HPV infection 04/29/2022   • Encounter for annual routine gynecological examination 04/18/2022   • Microscopic hematuria 02/07/2022   • Primary adenocarcinoma of upper lobe of right lung ([facility] [address] ) 05/04/2021   • Lung nodule 12/15/2020   • Abnormal CT of the chest    • Mixed hyperlipidemia 08/07/2020   • Anxiety 08/07/2020   • Hypocalcemia 11/05/2019   • [facility] 09/11/2019   • Gastritis 05/07/2019   • Hiatal hernia 05/07/2019   • Plantar wart, left foot 05/07/2019     She  has a past surgical history that includes Colonoscopy; [place] tooth extraction; pr laparoscopy surg partial nephrectomy (Right, 08/26/2019); US guided breast biopsy left complete (Left, 01/15/2020); Breast biopsy (Left, 01/2020); pr thoracoscopy w/thera wedge resexn initial unilat (Right, 04/14/2021); pr thoracoscopy w/lobectomy single lobe (Right, 04/14/2021); [facility] [place]cell washg spx (N/A, 04/14/2021); Lung segmentectomy (Right, 04/14/2021); and Cervical biopsy w/ loop electrode excision  Her family history includes Breast cancer in her paternal grandfather and paternal grandmother; Cancer in her paternal grandmother; Diabetes in her paternal grandmother; Heart disease in her father; No Known Problems in her maternal aunt, maternal aunt, maternal grandmother, mother, and sister; Other in her maternal aunt  She  reports that she quit smoking about 2 years ago  Her smoking use included cigarettes  She has never used smokeless tobacco  She reports that she does not currently use alcohol  She reports that she does not use drugs    No current outpatient medications "on file  No current facility-administered medications for this visit  No current outpatient medications on file prior to visit  No current facility-administered medications on file prior to visit  She is allergic to strawberry (diagnostic) - food allergy and tetracycline       Review of Systems   Constitutional: Negative for activity change, appetite change, chills, fatigue and fever  HENT: Negative for rhinorrhea, sneezing and sore throat  Eyes: Negative for visual disturbance  Respiratory: Negative for cough, shortness of breath and wheezing  Cardiovascular: Negative for chest pain, palpitations and leg swelling  Gastrointestinal: Negative for abdominal distention, constipation, diarrhea, nausea and vomiting  Genitourinary: Negative for difficulty urinating, pelvic pain and vaginal discharge  Neurological: Negative for syncope and light-headedness  Objective:      /80 (BP Location: Left arm, Patient Position: Sitting, Cuff Size: Adult)   Ht 5' 5 5\" (1 664 m)   Wt 76 2 kg (168 lb)   LMP 04/10/2023 (Exact Date)   BMI 27 53 kg/m²          Physical Exam  Chest:   Breasts:     Breasts are symmetrical       Right: No inverted nipple, mass, nipple discharge, skin change or tenderness  Left: No inverted nipple, mass, nipple discharge, skin change or tenderness  Genitourinary:     Labia:         Right: No rash, tenderness, lesion or injury  Left: No rash, tenderness, lesion or injury  Vagina: Normal  No vaginal discharge, tenderness or bleeding  Cervix: No cervical motion tenderness, discharge or friability  Uterus: Not deviated, not enlarged, not fixed and not tender  Adnexa:         Right: No mass, tenderness or fullness  Left: No mass, tenderness or fullness  Neurological:      Mental Status: She is alert and oriented to person, place, and time           "

## 2023-04-26 LAB
HPV HR 12 DNA CVX QL NAA+PROBE: NEGATIVE
HPV16 DNA CVX QL NAA+PROBE: NEGATIVE
HPV18 DNA CVX QL NAA+PROBE: NEGATIVE

## 2023-04-28 LAB
LAB AP GYN PRIMARY INTERPRETATION: NORMAL
Lab: NORMAL

## 2023-05-08 ENCOUNTER — HOSPITAL ENCOUNTER (OUTPATIENT)
Dept: CT IMAGING | Facility: HOSPITAL | Age: 48
Discharge: HOME | End: 2023-05-08

## 2023-05-08 DIAGNOSIS — C34.11 PRIMARY ADENOCARCINOMA OF UPPER LOBE OF RIGHT LUNG (HCC): ICD-10-CM

## 2023-05-12 ENCOUNTER — OFFICE VISIT (OUTPATIENT)
Dept: GASTROENTEROLOGY | Facility: CLINIC | Age: 48
End: 2023-05-12

## 2023-05-12 VITALS
BODY MASS INDEX: 27.99 KG/M2 | WEIGHT: 168 LBS | SYSTOLIC BLOOD PRESSURE: 118 MMHG | HEIGHT: 65 IN | DIASTOLIC BLOOD PRESSURE: 70 MMHG | HEART RATE: 87 BPM

## 2023-05-12 DIAGNOSIS — R10.13 EPIGASTRIC PAIN: ICD-10-CM

## 2023-05-12 DIAGNOSIS — R19.8 ALTERNATING CONSTIPATION AND DIARRHEA: ICD-10-CM

## 2023-05-12 DIAGNOSIS — K43.9 VENTRAL HERNIA WITHOUT OBSTRUCTION OR GANGRENE: ICD-10-CM

## 2023-05-12 DIAGNOSIS — K29.00 ACUTE SUPERFICIAL GASTRITIS WITHOUT HEMORRHAGE: Primary | ICD-10-CM

## 2023-05-12 DIAGNOSIS — K21.9 GASTROESOPHAGEAL REFLUX DISEASE, UNSPECIFIED WHETHER ESOPHAGITIS PRESENT: ICD-10-CM

## 2023-05-12 DIAGNOSIS — K44.9 HIATAL HERNIA: ICD-10-CM

## 2023-05-12 DIAGNOSIS — K29.80 DUODENITIS: ICD-10-CM

## 2023-05-12 RX ORDER — DICYCLOMINE HCL 20 MG
20 TABLET ORAL 4 TIMES DAILY PRN
Qty: 60 TABLET | Refills: 5 | Status: SHIPPED | OUTPATIENT
Start: 2023-05-12

## 2023-05-12 RX ORDER — PANTOPRAZOLE SODIUM 40 MG/1
40 TABLET, DELAYED RELEASE ORAL
Qty: 30 TABLET | Refills: 5 | Status: SHIPPED | OUTPATIENT
Start: 2023-05-12

## 2023-05-12 NOTE — PROGRESS NOTES
[facility]      Chief Complaint: Abdominal discomfort    HPI:  Kelley Blackwood is a 51 y o   female who presents with several GI issues  First is a history of gastritis and duodenitis seen on EGD in 2019  At that time biopsy showed peptic duodenitis with no H  pylori  During that work-up she was found to have kidney cancer and had this taken care of definitively  Subsequent to that she developed a lung carcinoma and this was also taken care of definitively  Colonoscopy in 2019 showed adenomatous polyps and she was given a 5-year follow-up  The patient has not taken any medication recently for her abdominal discomfort  She has epigastric discomfort which is nonradiating  It is made worse with acid  foods and fatty foods which she tends to avoid     She has no nausea or vomiting  No melena  No weight loss  No dysphagia  No alarm symptomatology  She tried Dexilant for a while sometime ago but felt it did not do anything  This was prior to her kidney surgery  She has no other GI complaints  She has no lower abdominal discomfort  She does however have alternating constipation and diarrhea  She will have constipation until she starts to get abdominal cramping and then have explosive diarrhea  No rectal bleeding  No hematochezia  No fever or chills  No other problems  She has long since quit smoking  CT scans in the past as well as other scans following her cancer have shown intermittently a hiatal hernia and a ventral hernia  She has no pain in these areas  Review of Systems:   Constitutional: No fever or chills, feels well, no tiredness, no recent weight gain or weight loss  HENT: No complaints of earache, no hearing loss, no nosebleeds, no nasal discharge, no sore throat, no hoarseness  Eyes: No complaints of eye pain, no red eyes, no discharge from eyes, no itchy eyes    Cardiovascular: No complaints of slow heart rate, no fast heart rate, no chest pain, no palpitations, no leg claudication, no lower extremity edema  Respiratory: No complaints of shortness of breath, no wheezing, no cough, no SOB on exertion, no orthopnea  Gastrointestinal: As noted in HPI  Genitourinary: No complaints of dysuria, no incontinence, no hesitancy, no nocturia  Musculoskeletal: No complaints of arthralgia, no myalgias, no joint swelling or stiffness, no limb pain or swelling  Neurological: No complaints of headache, no confusion, no convulsions, no numbness or tingling, no dizziness or fainting, no limb weakness, no difficulty walking  Skin: No complaints of skin rash or skin lesions, no itching, no skin wound, no dry skin  Hematological/Lymphatic: No complaints of swollen glands, does not bleed easy  Allergic/Immunologic: No immunocompromised state  Endocrine:  No complaints of polyuria, no polydipsia  Psychiatric/Behavioral: is not suicidal, no sleep disturbances, no anxiety or depression, no change in personality, no emotional problems         Historical Information   Past Medical History:   Diagnosis Date   • BRCA1 negative    • BRCA2 negative    • [facility]   • Chronic kidney disease 08/2019    tumor and cyst   • Colon polyp    • Gastritis    • Nodule of upper lobe of right lung 04/2021   • PONV (postoperative nausea and vomiting)    • Renal cell carcinoma ([facility] [address] ) 06/2019   • Renal mass, right      Past Surgical History:   Procedure Laterality Date   • BREAST BIOPSY Left 01/2020    benign   • CERVICAL BIOPSY  W/ LOOP ELECTRODE EXCISION     • COLONOSCOPY     • LUNG SEGMENTECTOMY Right 04/14/2021    Procedure: RESECTION WEDGE LUNG;  Surgeon: Rossana Larose MD;  Location: [facility];  Service: Thoracic   • ND [address] INCL FLUOR GDNCE DX W/CELL WASHG [facility] N/A 04/14/2021    Procedure: BRONCHOSCOPY FLEXIBLE;  Surgeon: Rossana Larose MD;  Location: [facility];  Service: Thoracic   • ND LAPAROSCOPY SURG PARTIAL NEPHRECTOMY Right 08/26/2019    Procedure: "NEPHRECTOMY PARTIAL LAPAROSCOPIC W ROBOTICS WITH INTRA-OP LAPAROSCOPIC ULTRASOUND;  Surgeon: Meliza Osborne MD;  Location: [facility];  Service: Urology   • MD THORACOSCOPY W/LOBECTOMY SINGLE LOBE Right 2021    Procedure: LOBECTOMY LUNG; MEDIASTINAL LYMPH NODE DISSECTION;  Surgeon: Joelle Antonio MD;  [facility];  Service: Thoracic   • MD THORACOSCOPY W/THERA WEDGE RESEXN INITIAL UNILAT Right 2021    Procedure: THORACOSCOPY VIDEO ASSISTED SURGERY (VATS); Surgeon: Joelle Antonio MD;  [facility];  Service: Thoracic   • US GUIDED BREAST BIOPSY LEFT COMPLETE Left 01/15/2020   • WISDOM TOOTH EXTRACTION       Social History   Social History     Substance and Sexual Activity   Alcohol Use Not Currently     Social History     Substance and Sexual Activity   Drug Use Never     Social History     Tobacco Use   Smoking Status Former   • Packs/day: 0 00   • Years: 25 00   • Pack years: 0 00   • Types: Cigarettes   • Quit date: 2021   • Years since quittin 3   Smokeless Tobacco Never   Tobacco Comments    quit on 21     Family History   Problem Relation Age of Onset   • Heart disease Father    • Cancer Paternal Grandmother    • Diabetes Paternal Grandmother    • Breast cancer Paternal Grandmother    • Other Maternal Aunt         Renal Cell Carcinoma   • No Known Problems Mother    • No Known Problems Sister    • No Known Problems Maternal Grandmother    • Breast cancer Paternal Grandfather    • No Known Problems Maternal Aunt    • No Known Problems Maternal Aunt          Current Medications: has a current medication list which includes the following prescription(s): dicyclomine and pantoprazole          Vital Signs: /70   Pulse 87   Ht 5' 5\" (1 651 m)   Wt 76 2 kg (168 lb)   BMI 27 96 kg/m²       Physical Exam:   Constitutional  General Appearance: No acute distress, well appearing and well nourished  Head  Normocephalic  Eyes  Conjunctivae and lids: No swelling, " erythema, or discharge  Pupils and irises: Equal, round and reactive to light  Ears, Nose, Mouth, and Throat  External inspection of ears and nose: Normal  Nasal mucosa, septum and turbinates: Normal without edema or erythema/   Oropharynx: Normal with no erythema, edema, exudate or lesions  Neck  Normal range of motion  Neck supple  Cardiovascular  Auscultation of the heart: Normal rate and rhythm, normal S1 and S2 without murmurs  Examination of the extremities for edema and/or varicosities: Normal  Pulmonary/Chest  Respiratory effort: No increased work of breathing or signs of respiratory distress  Auscultation of lungs: Clear to auscultation, equal breath sounds bilaterally, no wheezes, rales, no rhonchi  Abdomen  Abdomen: Non-tender, no masses  Liver and spleen: No hepatomegaly or splenomegaly  Musculoskeletal  Gait and station: normal   Digits and Nails: normal without clubbing or cyanosis  Inspection/palpation of joints, bones, and muscles: Normal  Neurological  No nystagmus or asterixis  Skin  Skin and subcutaneous tissue: Normal without rashes or lesions  Lymphatic  Palpation of the lymph nodes in neck: No lymphadenopathy  Psychiatric  Orientation to person, place and time: Normal   Mood and affect: Normal          Labs:  Lab Results   Component Value Date    ALT 16 11/30/2022    AST 10 11/30/2022    BUN 9 11/30/2022    CALCIUM 8 8 11/30/2022     11/30/2022    CO2 27 11/30/2022    CREATININE 0 82 11/30/2022    HDL 53 11/30/2022    HCT 40 7 11/30/2022    HGB 13 3 11/30/2022    MG 2 2 04/15/2021     11/30/2022    K 4 2 11/30/2022    TRIG 76 11/30/2022    WBC 4 80 11/30/2022         X-Rays & Procedures:   No orders to display           ______________________________________________________________________      Assessment & Plan:     Diagnoses and all orders for this visit:    Acute superficial gastritis without hemorrhage  -     pantoprazole (PROTONIX) 40 mg tablet;  Take 1 tablet (40 mg total) by mouth daily before breakfast    Hiatal hernia  -     pantoprazole (PROTONIX) 40 mg tablet; Take 1 tablet (40 mg total) by mouth daily before breakfast    Duodenitis  -     pantoprazole (PROTONIX) 40 mg tablet; Take 1 tablet (40 mg total) by mouth daily before breakfast    Epigastric pain  -     pantoprazole (PROTONIX) 40 mg tablet; Take 1 tablet (40 mg total) by mouth daily before breakfast  -     dicyclomine (BENTYL) 20 mg tablet; Take 1 tablet (20 mg total) by mouth 4 (four) times a day as needed (Abdominal pain)    Gastroesophageal reflux disease, unspecified whether esophagitis present    Ventral hernia without obstruction or gangrene    Alternating constipation and diarrhea  -     dicyclomine (BENTYL) 20 mg tablet; Take 1 tablet (20 mg total) by mouth 4 (four) times a day as needed (Abdominal pain)      There may be an element of irritable bowel  However the patient clearly had significant upper GI inflammatory change  We will place her on Protonix 40 mg p o  every morning half hour AC breakfast, and dicyclomine 20 mg p o  4 times daily as needed  She will call me with her progress  Repeat colonoscopy is due next year and she is on our callback for July 2024  We had a discussion about the most logical approach to strengthening her abdominal muscles without causing undue stress on her already present hernias

## 2023-05-12 NOTE — LETTER
May 12, 2023     Lalo Smith, [address] [address]    Patient: Kelley Connolly   YOB: 1975   Date of Visit: 5/12/2023       Dear Dr Lucio Rooney: Thank you for referring Kelley Connolly to me for evaluation  Below are my notes for this consultation  If you have questions, please do not hesitate to call me  I look forward to following your patient along with you  Sincerely,        Anjum Kaye MD        CC: No Recipients  Anjum Kaye MD  5/12/2023  7:44 AM  Incomplete  [facility]      Chief Complaint: Abdominal discomfort    HPI:  Kelley Connolly is a 51 y o   female who presents with several GI issues  First is a history of gastritis and duodenitis seen on EGD in 2019  At that time biopsy showed peptic duodenitis with no H  pylori  During that work-up she was found to have kidney cancer and had this taken care of definitively  Subsequent to that she developed a lung carcinoma and this was also taken care of definitively  Colonoscopy in 2019 showed adenomatous polyps and she was given a 5-year follow-up  The patient has not taken any medication recently for her abdominal discomfort  She has epigastric discomfort which is nonradiating  It is made worse with acid  foods and fatty foods which she tends to avoid     She has no nausea or vomiting  No melena  No weight loss  No dysphagia  No alarm symptomatology  She tried Dexilant for a while sometime ago but felt it did not do anything  This was prior to her kidney surgery  She has no other GI complaints  She has no lower abdominal discomfort  She does however have alternating constipation and diarrhea  She will have constipation until she starts to get abdominal cramping and then have explosive diarrhea  No rectal bleeding  No hematochezia  No fever or chills  No other problems  She has long since quit smoking    CT scans in the past as well as other scans following her cancer have shown intermittently a hiatal hernia and a ventral hernia  She has no pain in these areas  Review of Systems:   Constitutional: No fever or chills, feels well, no tiredness, no recent weight gain or weight loss  HENT: No complaints of earache, no hearing loss, no nosebleeds, no nasal discharge, no sore throat, no hoarseness  Eyes: No complaints of eye pain, no red eyes, no discharge from eyes, no itchy eyes  Cardiovascular: No complaints of slow heart rate, no fast heart rate, no chest pain, no palpitations, no leg claudication, no lower extremity edema  Respiratory: No complaints of shortness of breath, no wheezing, no cough, no SOB on exertion, no orthopnea  Gastrointestinal: As noted in HPI  Genitourinary: No complaints of dysuria, no incontinence, no hesitancy, no nocturia  Musculoskeletal: No complaints of arthralgia, no myalgias, no joint swelling or stiffness, no limb pain or swelling  Neurological: No complaints of headache, no confusion, no convulsions, no numbness or tingling, no dizziness or fainting, no limb weakness, no difficulty walking  Skin: No complaints of skin rash or skin lesions, no itching, no skin wound, no dry skin  Hematological/Lymphatic: No complaints of swollen glands, does not bleed easy  Allergic/Immunologic: No immunocompromised state  Endocrine:  No complaints of polyuria, no polydipsia  Psychiatric/Behavioral: is not suicidal, no sleep disturbances, no anxiety or depression, no change in personality, no emotional problems         Historical Information   Past Medical History:   Diagnosis Date   • BRCA1 negative    • BRCA2 negative    • Cancer ([facility] ) 6/16/19   • Chronic kidney disease 08/2019    tumor and cyst   • Colon polyp    • Gastritis    • Nodule of upper lobe of right lung 04/2021   • PONV (postoperative nausea and vomiting)    • Renal cell carcinoma ([facility] ) 06/2019   • Renal mass, right      Past Surgical History: Procedure Laterality Date   • BREAST BIOPSY Left 2020    benign   • CERVICAL BIOPSY  W/ LOOP ELECTRODE EXCISION     • COLONOSCOPY     • LUNG SEGMENTECTOMY Right 2021    Procedure: RESECTION WEDGE LUNG;  Surgeon: Anabel Corbin MD;  Location: [facility];  Service: Thoracic   • [place] [address] INCL FLUOR GDNCE DX [address] [facility] N/A 2021    Procedure: BRONCHOSCOPY FLEXIBLE;  Surgeon: Anabel Corbin MD;  Location: [facility];  Service: Thoracic   • [place] LAPAROSCOPY SURG PARTIAL NEPHRECTOMY Right 2019    Procedure: NEPHRECTOMY PARTIAL LAPAROSCOPIC W ROBOTICS WITH INTRA-OP LAPAROSCOPIC ULTRASOUND;  Surgeon: Michael Rogers MD;  Location: [facility];  Service: Urology   • [place] THORACOSCOPY W/LOBECTOMY SINGLE LOBE Right 2021    Procedure: LOBECTOMY LUNG; MEDIASTINAL LYMPH NODE DISSECTION;  Surgeon: Anabel Corbin MD;  [facility];  Service: Thoracic   • [place] THORACOSCOPY W/THERA WEDGE RESEXN INITIAL UNILAT Right 2021    Procedure: THORACOSCOPY VIDEO ASSISTED SURGERY (VATS);   Surgeon: Anabel Corbin MD;  [facility];  Service: Thoracic   • [place] GUIDED BREAST BIOPSY LEFT COMPLETE Left 01/15/2020   • WISDOM TOOTH EXTRACTION       Social History   Social History     Substance and Sexual Activity   Alcohol Use Not Currently     Social History     Substance and Sexual Activity   Drug Use Never     Social History     Tobacco Use   Smoking Status Former   • Packs/day: 0 00   • Years: 25 00   • Pack years: 0 00   • Types: Cigarettes   • Quit date: 2021   • Years since quittin 3   Smokeless Tobacco Never   Tobacco Comments    quit on 21     Family History   Problem Relation Age of Onset   • Heart disease Father    • Cancer Paternal Grandmother    • Diabetes Paternal Grandmother    • Breast cancer Paternal Grandmother    • Other Maternal Aunt         Renal Cell Carcinoma   • No Known Problems Mother    • No Known Problems Sister    • No Known Problems "Maternal Grandmother    • Breast cancer Paternal Grandfather    • No Known Problems Maternal Aunt    • No Known Problems Maternal Aunt          Current Medications: has a current medication list which includes the following prescription(s): dicyclomine and pantoprazole  Vital Signs: /70   Pulse 87   Ht 5' 5\" (1 651 m)   Wt 76 2 kg (168 lb)   BMI 27 96 kg/m²       Physical Exam:   Constitutional  General Appearance: No acute distress, well appearing and well nourished  Head  Normocephalic  Eyes  Conjunctivae and lids: No swelling, erythema, or discharge  Pupils and irises: Equal, round and reactive to light  Ears, Nose, Mouth, and Throat  External inspection of ears and nose: Normal  Nasal mucosa, septum and turbinates: Normal without edema or erythema/   Oropharynx: Normal with no erythema, edema, exudate or lesions  Neck  Normal range of motion  Neck supple  Cardiovascular  Auscultation of the heart: Normal rate and rhythm, normal S1 and S2 without murmurs  Examination of the extremities for edema and/or varicosities: Normal  Pulmonary/Chest  Respiratory effort: No increased work of breathing or signs of respiratory distress  Auscultation of lungs: Clear to auscultation, equal breath sounds bilaterally, no wheezes, rales, no rhonchi  Abdomen  Abdomen: Non-tender, no masses  Liver and spleen: No hepatomegaly or splenomegaly  Musculoskeletal  Gait and station: normal   Digits and Nails: normal without clubbing or cyanosis  Inspection/palpation of joints, bones, and muscles: Normal  Neurological  No nystagmus or asterixis  Skin  Skin and subcutaneous tissue: Normal without rashes or lesions  Lymphatic  Palpation of the lymph nodes in neck: No lymphadenopathy     Psychiatric  Orientation to person, place and time: Normal   Mood and affect: Normal          Labs:  Lab Results   Component Value Date    ALT 16 11/30/2022    AST 10 11/30/2022    BUN 9 11/30/2022    CALCIUM 8 8 " 11/30/2022     11/30/2022    CO2 27 11/30/2022    CREATININE 0 82 11/30/2022    HDL 53 11/30/2022    HCT 40 7 11/30/2022    HGB 13 3 11/30/2022    MG 2 2 04/15/2021     11/30/2022    K 4 2 11/30/2022    TRIG 76 11/30/2022    WBC 4 80 11/30/2022         X-Rays & Procedures:   No orders to display           ______________________________________________________________________      Assessment & Plan:     Diagnoses and all orders for this visit:    Acute superficial gastritis without hemorrhage  -     pantoprazole (PROTONIX) 40 mg tablet; Take 1 tablet (40 mg total) by mouth daily before breakfast    Hiatal hernia  -     pantoprazole (PROTONIX) 40 mg tablet; Take 1 tablet (40 mg total) by mouth daily before breakfast    Duodenitis  -     pantoprazole (PROTONIX) 40 mg tablet; Take 1 tablet (40 mg total) by mouth daily before breakfast    Epigastric pain  -     pantoprazole (PROTONIX) 40 mg tablet; Take 1 tablet (40 mg total) by mouth daily before breakfast  -     dicyclomine (BENTYL) 20 mg tablet; Take 1 tablet (20 mg total) by mouth 4 (four) times a day as needed (Abdominal pain)    Gastroesophageal reflux disease, unspecified whether esophagitis present    Ventral hernia without obstruction or gangrene    Alternating constipation and diarrhea  -     dicyclomine (BENTYL) 20 mg tablet; Take 1 tablet (20 mg total) by mouth 4 (four) times a day as needed (Abdominal pain)      There may be an element of irritable bowel  However the patient clearly had significant upper GI inflammatory change  We will place her on Protonix 40 mg p o  every morning half hour AC breakfast, and dicyclomine 20 mg p o  4 times daily as needed  She will call me with her progress  Repeat colonoscopy is due next year and she is on our callback for July 2024  We had a discussion about the most logical approach to strengthening her abdominal muscles without causing undue stress on her already present hernias

## 2023-05-16 ENCOUNTER — OFFICE VISIT (OUTPATIENT)
Dept: CARDIAC SURGERY | Facility: CLINIC | Age: 48
End: 2023-05-16

## 2023-05-16 VITALS
TEMPERATURE: 97.9 F | RESPIRATION RATE: 16 BRPM | SYSTOLIC BLOOD PRESSURE: 112 MMHG | BODY MASS INDEX: 28.32 KG/M2 | HEART RATE: 80 BPM | DIASTOLIC BLOOD PRESSURE: 72 MMHG | OXYGEN SATURATION: 98 % | HEIGHT: 65 IN | WEIGHT: 170 LBS

## 2023-05-16 DIAGNOSIS — K44.9 HIATAL HERNIA: ICD-10-CM

## 2023-05-16 DIAGNOSIS — C34.11 PRIMARY ADENOCARCINOMA OF UPPER LOBE OF RIGHT LUNG (HCC): Primary | ICD-10-CM

## 2023-05-16 NOTE — ASSESSMENT & PLAN NOTE
We discussed her hiatal hernia  Her symptoms are relatively well controlled at this time  I did explain to her that if she finds that her reflux worsens or she is considering repair of her ventral hernia, that we could potentially fix her hiatal hernia at the same time

## 2023-05-16 NOTE — ASSESSMENT & PLAN NOTE
April is a 27-year-old female who is well-known to me  She underwent a VATS right upper lobectomy in May 2021  She is now 2 years out and remains cancer free  I have personally reviewed her CT scan in PACS and this demonstrates stability and no signs of any recurrence of her cancer  Today in the office, we discussed her lung cancer  She has demonstrated stability and no signs of recurrence at this time  She will be maintained on 6-month surveillance schedule for another year  Once her CT scans demonstrate stability for 3 years, we will space her out to once a year annual surveillance    I have ordered a CT scan for 6 months from now and she will follow-up with me at that time

## 2023-05-16 NOTE — PROGRESS NOTES
Assessment/Plan:    Primary adenocarcinoma of upper lobe of right lung [facility])  April is a 34-year-old female who is well-known to me  She underwent a VATS right upper lobectomy in May 2021  She is now 2 years out and remains cancer free  I have personally reviewed her CT scan in PACS and this demonstrates stability and no signs of any recurrence of her cancer  Today in the office, we discussed her lung cancer  She has demonstrated stability and no signs of recurrence at this time  She will be maintained on 6-month surveillance schedule for another year  Once her CT scans demonstrate stability for 3 years, we will space her out to once a year annual surveillance  I have ordered a CT scan for 6 months from now and she will follow-up with me at that time    Hiatal hernia  We discussed her hiatal hernia  Her symptoms are relatively well controlled at this time  I did explain to her that if she finds that her reflux worsens or she is considering repair of her ventral hernia, that we could potentially fix her hiatal hernia at the same time  Diagnoses and all orders for this visit:    Primary adenocarcinoma of upper lobe of right lung ([address] )  -     CT chest wo contrast; Future    Hiatal hernia          Thoracic History   Cancer Staging   Primary adenocarcinoma of upper lobe of right lung [facility])  Staging form: Lung, AJCC 8th Edition  - Clinical stage from 5/4/2021: Stage IB (cT2a, cN0, cM0) - Signed by Keith Camarena MD on 5/6/2021  Histopathologic type:  Adenocarcinoma, NOS  Stage prefix: Initial diagnosis  Histologic grade (G): G2  Histologic grading system: 4 grade system  Laterality: Right  Tumor size (mm): 2 9  Lymph-vascular invasion (LVI): LVI not present (absent)/not identified  Specimen type: Excision  Type of lung cancer: Surgically resected non-small cell lung cancer  ECOG performance status: Grade 0  Pleural/elastic layer invasion: PL1  Pleural lavage cytology: Not assessed  Adequacy of mediastinal dissection: Adequate  Adjuvant radiation: No  Adjuvant chemotherapy: No  Stage used in treatment planning: Yes  National guidelines used in treatment planning: Yes  Type of national guideline used in treatment planning: NCCN    Oncology History   Primary adenocarcinoma of upper lobe of right lung ([facility] )   5/4/2021 Initial Diagnosis    Primary adenocarcinoma of upper lobe of right lung ([facility] )     5/4/2021 -  Cancer Staged    Staging form: Lung, AJCC 8th Edition  - Clinical stage from 5/4/2021: Stage IB (cT2a, cN0, cM0) - Signed by Leonardo Crow MD on 5/6/2021  Histopathologic type: Adenocarcinoma, NOS  Stage prefix: Initial diagnosis  Histologic grade (G): G2  Histologic grading system: 4 grade system  Laterality: Right  Tumor size (mm): 2 9  Lymph-vascular invasion (LVI): LVI not present (absent)/not identified  Specimen type: Excision  Type of lung cancer: Surgically resected non-small cell lung cancer  ECOG performance status: Grade 0  Pleural/elastic layer invasion: PL1  Pleural lavage cytology: Not assessed  Adequacy of mediastinal dissection: Adequate  Adjuvant radiation: No  Adjuvant chemotherapy: No  Stage used in treatment planning: Yes  National guidelines used in treatment planning: Yes  Type of national guideline used in treatment planning: NCCN                Subjective:    Patient ID: Kelley Connolly is a 51 y o  female  HPI  April is a 43-year-old female who is well-known to me  She underwent a VATS right upper lobectomy in May 2021  She is now 2 years out and remains cancer free  I have personally reviewed her CT scan in PACS and this demonstrates stability and no signs of any recurrence of her cancer  Today in the office, she reports that she is doing pretty well  She does report some incidental weight gain  She does report some abdominal cramping and bloating that she is working with her gastroenterologist for  She denies any significant shortness of breath    She is exercising on her elliptical without difficulty  She denies chest pain  She denies any recent upper respiratory infection or pneumonia  I have reviewed all the most recent notes in her chart including from her gastroenterologist as well as her urologist    The following portions of the patient's history were reviewed and updated as appropriate: allergies, current medications, past family history, past medical history, past social history, past surgical history and problem list     Review of Systems   Constitutional: Positive for unexpected weight change  Negative for chills, fatigue and fever  HENT: Negative  Negative for trouble swallowing  Eyes: Negative  Negative for visual disturbance  Respiratory: Negative for cough, shortness of breath and stridor  Cardiovascular: Negative for chest pain  Gastrointestinal: Negative  Endocrine: Negative  Genitourinary: Negative  Musculoskeletal: Negative  Skin: Negative  Neurological: Negative for dizziness, light-headedness and headaches  Hematological: Negative for adenopathy  Psychiatric/Behavioral: Negative  Objective:   Physical Exam  Vitals and nursing note reviewed  Constitutional:       General: She is not in acute distress  Appearance: Normal appearance  She is well-developed and normal weight  She is not diaphoretic  HENT:      Head: Normocephalic and atraumatic  Nose: Nose normal  No congestion or rhinorrhea  Mouth/Throat:      Mouth: Mucous membranes are moist       Pharynx: Oropharynx is clear  No oropharyngeal exudate  Eyes:      General: No scleral icterus  Pupils: Pupils are equal, round, and reactive to light  Neck:      Trachea: No tracheal deviation  Cardiovascular:      Rate and Rhythm: Normal rate and regular rhythm  Pulses: Normal pulses  Heart sounds: Normal heart sounds  No murmur heard  Pulmonary:      Effort: Pulmonary effort is normal  No respiratory distress  "Breath sounds: Normal breath sounds  No stridor  No wheezing or rales  Chest:      Chest wall: No tenderness  Abdominal:      General: Bowel sounds are normal  There is no distension  Palpations: Abdomen is soft  Tenderness: There is no abdominal tenderness  There is no rebound  Musculoskeletal:         General: Normal range of motion  Cervical back: Normal range of motion and neck supple  No muscular tenderness  Lymphadenopathy:      Cervical: No cervical adenopathy  Skin:     General: Skin is warm and dry  Coloration: Skin is not jaundiced or pale  Findings: No erythema or rash  Neurological:      General: No focal deficit present  Mental Status: She is alert and oriented to person, place, and time  Psychiatric:         Mood and Affect: Mood normal          Behavior: Behavior normal          Thought Content: Thought content normal          Judgment: Judgment normal      /72 (BP Location: Right arm, Patient Position: Sitting, Cuff Size: Standard)   Pulse 80   Temp 97 9 °F (36 6 °C) (Temporal)   Resp 16   Ht 5' 5\" (1 651 m)   Wt 77 1 kg (170 lb)   SpO2 98%   BMI 28 29 kg/m²     No Chest XR results available for this patient  CT chest wo contrast    Result Date: 5/12/2023  Narrative CT CHEST WITHOUT IV CONTRAST INDICATION:   C34 11: Malignant neoplasm of upper lobe, right bronchus or lung  Right upper lobectomy for adenocarcinoma 4/14/2021  History of renal cancer  COMPARISON: Chest CT  11/04/2022, 10/17/2021 and 7/21/2020  TECHNIQUE: Chest CT without intravenous contrast   Axial, sagittal, coronal 2D reformats and coronal MIPS from source data  Radiation dose length product (DLP):  226 mGy-cm   Radiation dose exposure minimized using iterative reconstruction and automated exposure control  FINDINGS: LUNGS: Nothing to indicate recurrent tumor  Right upper lobectomy with stable benign scar along the staple line, unchanged since at least October 2021   Mild " emphysema  Stable benign bilateral linear scar  AIRWAYS: No significant filling defects  PLEURA:  Unremarkable  HEART/GREAT VESSELS:  Normal for age  MEDIASTINUM AND SIMON:  Unremarkable  CHEST WALL AND LOWER NECK: Unremarkable  UPPER ABDOMEN:  Unremarkable  OSSEOUS STRUCTURES: Normal for age  Impression Nothing to indicate recurrent tumor  Workstation performed: [id number]     CT chest wo contrast    Result Date: 11/4/2022  Narrative CT CHEST WITHOUT IV CONTRAST INDICATION:   C34 11: Malignant neoplasm of upper lobe, right bronchus or lung  COMPARISON:  CT chest exam 8/9/2022 TECHNIQUE: CT examination of the chest was performed without intravenous contrast  Axial, sagittal, and coronal 2D reformatted images were created from the source data and submitted for interpretation  Radiation dose length product (DLP) for this visit:  220 mGy-cm   This examination, like all CT scans performed in the [facility], was performed utilizing techniques to minimize radiation dose exposure, including the use of iterative reconstruction and automated exposure control  FINDINGS: LUNGS:  Status post right upper lobectomy with associated volume loss  Linear consolidation along the surgical staple line, stable  No new suspicious nodules or masses  Scattered linear atelectasis versus scarring in the lower lung fields  Scattered emphysematous changes and blebs  PLEURA:  Unremarkable  HEART/GREAT VESSELS: Heart is unremarkable for patient's age  No thoracic aortic aneurysm  MEDIASTINUM AND SIMON:  Surgical material in the right hilar region  No significant mediastinal lymphadenopathy  CHEST WALL AND LOWER NECK:  Unremarkable  VISUALIZED STRUCTURES IN THE UPPER ABDOMEN:  Partially visualized small fat-containing ventral wall hernia  OSSEOUS STRUCTURES:  No acute fracture or destructive osseous lesion  Impression 1  Stable exam   No new findings suspicious for disease recurrence   Workstation performed: [id number]     CT chest abdomen pelvis w contrast    Result Date: 8/11/2022  Narrative CT CHEST, ABDOMEN AND PELVIS WITH IV CONTRAST INDICATION:   C64 1: Malignant neoplasm of right kidney, except renal pelvis  History of right partial nephrectomy for clear cell renal cell carcinoma  Microscopic hematuria  History of right upper lobectomy for stage IB pulmonary adenocarcinoma  COMPARISON:  April 13, 2022 TECHNIQUE: CT examination of the chest, abdomen and pelvis was performed  Axial, sagittal, and coronal 2D reformatted images were created from the source data and submitted for interpretation  Radiation dose length product (DLP) for this visit:  640 mGy-cm   This examination, like all CT scans performed in the [facility], was performed utilizing techniques to minimize radiation dose exposure, including the use of iterative reconstruction and automated exposure control  IV Contrast:  65 mL of iohexol (OMNIPAQUE) Enteric Contrast: Enteric contrast was not administered  FINDINGS: CHEST LUNGS:  Scarring and surgical material associated with right upper lobectomy reidentified  Mild background pulmonary emphysema noted  No new, enlarging, or otherwise suspicious pulmonary nodule/mass  No tracheal or endobronchial lesion  PLEURA:  Unremarkable  HEART/GREAT VESSELS: Heart is unremarkable for patient's age  No thoracic aortic aneurysm  MEDIASTINUM AND SIMON:  Small sliding-type hiatal hernia  No mediastinal or hilar lymphadenopathy  CHEST WALL AND LOWER NECK:  Unremarkable  ABDOMEN LIVER/BILIARY TREE:  Unremarkable  GALLBLADDER:  No calcified gallstones  No pericholecystic inflammatory change  SPLEEN:  Unremarkable  PANCREAS:  Unremarkable  ADRENAL GLANDS:  Unremarkable  KIDNEYS/URETERS:  Surgical change at the posterior and medial margins of the right renal lower pole consistent with the sequela of prior partial nephrectomy  No residual or recurrent tumor mass    No solid renal mass, urinary tract calculus, or hydronephrosis  Within the limitations of this single phase examination no evidence for ureteral lesion is noted  Incidentally noted circumaortic left renal vein  STOMACH AND BOWEL:  Unremarkable  APPENDIX:  A normal appendix was visualized  ABDOMINOPELVIC CAVITY:  There is a trace free pelvic fluid in the posterior cul-de-sac, likely physiologic given the patient's age and gender  No pneumoperitoneum  No lymphadenopathy  VESSELS:  Unremarkable for patient's age  PELVIS REPRODUCTIVE ORGANS:  Unremarkable for patient's age  URINARY BLADDER:  Unremarkable  ABDOMINAL WALL/INGUINAL REGIONS:  Unremarkable  OSSEOUS STRUCTURES:  No acute fracture or destructive osseous lesion  Impression No residual, recurrent, or metastatic tumor in the chest, abdomen or pelvis  Partial nephrectomy at the lower pole the right kidney reidentified without suspicious change from prior examinations  Surgical changes and scarring associated with right upper lobectomy reidentified  Small sliding-type hiatal hernia  Mild background pulmonary emphysema  Workstation performed: [id number]      No NM PET CT results available for this patient  No Barium Swallow results available for this patient

## 2023-06-05 ENCOUNTER — APPOINTMENT (OUTPATIENT)
Dept: LAB | Facility: CLINIC | Age: 48
End: 2023-06-05
Payer: COMMERCIAL

## 2023-06-05 DIAGNOSIS — C34.11 PRIMARY ADENOCARCINOMA OF UPPER LOBE OF RIGHT LUNG (HCC): ICD-10-CM

## 2023-06-05 DIAGNOSIS — K44.9 HIATAL HERNIA: ICD-10-CM

## 2023-06-05 DIAGNOSIS — E78.2 MIXED HYPERLIPIDEMIA: ICD-10-CM

## 2023-06-05 DIAGNOSIS — C64.1 RENAL CELL CARCINOMA OF RIGHT KIDNEY (HCC): ICD-10-CM

## 2023-06-05 LAB
ALBUMIN SERPL BCP-MCNC: 4.1 G/DL (ref 3.5–5)
ALP SERPL-CCNC: 129 U/L (ref 46–116)
ALT SERPL W P-5'-P-CCNC: 17 U/L (ref 12–78)
ANION GAP SERPL CALCULATED.3IONS-SCNC: 2 MMOL/L (ref 4–13)
AST SERPL W P-5'-P-CCNC: 11 U/L (ref 5–45)
BASOPHILS # BLD AUTO: 0.04 THOUSANDS/ÂΜL (ref 0–0.1)
BASOPHILS NFR BLD AUTO: 1 % (ref 0–1)
BILIRUB SERPL-MCNC: 0.44 MG/DL (ref 0.2–1)
BUN SERPL-MCNC: 11 MG/DL (ref 5–25)
CALCIUM SERPL-MCNC: 9.1 MG/DL (ref 8.3–10.1)
CHLORIDE SERPL-SCNC: 108 MMOL/L (ref 96–108)
CHOLEST SERPL-MCNC: 207 MG/DL
CO2 SERPL-SCNC: 28 MMOL/L (ref 21–32)
CREAT SERPL-MCNC: 0.92 MG/DL (ref 0.6–1.3)
EOSINOPHIL # BLD AUTO: 0.06 THOUSAND/ÂΜL (ref 0–0.61)
EOSINOPHIL NFR BLD AUTO: 1 % (ref 0–6)
ERYTHROCYTE [DISTWIDTH] IN BLOOD BY AUTOMATED COUNT: 13 % (ref 11.6–15.1)
GFR SERPL CREATININE-BSD FRML MDRD: 73 ML/MIN/1.73SQ M
GLUCOSE P FAST SERPL-MCNC: 85 MG/DL (ref 65–99)
HCT VFR BLD AUTO: 41 % (ref 34.8–46.1)
HDLC SERPL-MCNC: 62 MG/DL
HGB BLD-MCNC: 13.7 G/DL (ref 11.5–15.4)
IMM GRANULOCYTES # BLD AUTO: 0.01 THOUSAND/UL (ref 0–0.2)
IMM GRANULOCYTES NFR BLD AUTO: 0 % (ref 0–2)
LDLC SERPL CALC-MCNC: 130 MG/DL (ref 0–100)
LYMPHOCYTES # BLD AUTO: 1.92 THOUSANDS/ÂΜL (ref 0.6–4.47)
LYMPHOCYTES NFR BLD AUTO: 36 % (ref 14–44)
MCH RBC QN AUTO: 29.6 PG (ref 26.8–34.3)
MCHC RBC AUTO-ENTMCNC: 33.4 G/DL (ref 31.4–37.4)
MCV RBC AUTO: 89 FL (ref 82–98)
MONOCYTES # BLD AUTO: 0.39 THOUSAND/ÂΜL (ref 0.17–1.22)
MONOCYTES NFR BLD AUTO: 7 % (ref 4–12)
NEUTROPHILS # BLD AUTO: 2.99 THOUSANDS/ÂΜL (ref 1.85–7.62)
NEUTS SEG NFR BLD AUTO: 55 % (ref 43–75)
NONHDLC SERPL-MCNC: 145 MG/DL
NRBC BLD AUTO-RTO: 0 /100 WBCS
PLATELET # BLD AUTO: 203 THOUSANDS/UL (ref 149–390)
PMV BLD AUTO: 10.4 FL (ref 8.9–12.7)
POTASSIUM SERPL-SCNC: 4.5 MMOL/L (ref 3.5–5.3)
PROT SERPL-MCNC: 7.1 G/DL (ref 6.4–8.4)
RBC # BLD AUTO: 4.63 MILLION/UL (ref 3.81–5.12)
SODIUM SERPL-SCNC: 138 MMOL/L (ref 135–147)
TRIGL SERPL-MCNC: 75 MG/DL
TSH SERPL DL<=0.05 MIU/L-ACNC: 0.65 UIU/ML (ref 0.45–4.5)
WBC # BLD AUTO: 5.41 THOUSAND/UL (ref 4.31–10.16)

## 2023-06-05 PROCEDURE — 80061 LIPID PANEL: CPT

## 2023-06-05 PROCEDURE — 84443 ASSAY THYROID STIM HORMONE: CPT

## 2023-06-05 PROCEDURE — 80053 COMPREHEN METABOLIC PANEL: CPT

## 2023-06-05 PROCEDURE — 85025 COMPLETE CBC W/AUTO DIFF WBC: CPT

## 2023-06-05 PROCEDURE — 36415 COLL VENOUS BLD VENIPUNCTURE: CPT

## 2023-06-08 ENCOUNTER — OFFICE VISIT (OUTPATIENT)
Dept: FAMILY MEDICINE CLINIC | Facility: CLINIC | Age: 48
End: 2023-06-08
Payer: COMMERCIAL

## 2023-06-08 VITALS
DIASTOLIC BLOOD PRESSURE: 76 MMHG | SYSTOLIC BLOOD PRESSURE: 114 MMHG | HEART RATE: 63 BPM | HEIGHT: 65 IN | TEMPERATURE: 98.4 F | WEIGHT: 165 LBS | OXYGEN SATURATION: 97 % | BODY MASS INDEX: 27.49 KG/M2

## 2023-06-08 DIAGNOSIS — K44.9 HIATAL HERNIA: Primary | ICD-10-CM

## 2023-06-08 DIAGNOSIS — C34.11 PRIMARY ADENOCARCINOMA OF UPPER LOBE OF RIGHT LUNG (HCC): ICD-10-CM

## 2023-06-08 DIAGNOSIS — C64.1 RENAL CELL CARCINOMA OF RIGHT KIDNEY (HCC): ICD-10-CM

## 2023-06-08 DIAGNOSIS — E78.2 MIXED HYPERLIPIDEMIA: ICD-10-CM

## 2023-06-08 DIAGNOSIS — K43.9 VENTRAL HERNIA WITHOUT OBSTRUCTION OR GANGRENE: ICD-10-CM

## 2023-06-08 PROCEDURE — 99214 OFFICE O/P EST MOD 30 MIN: CPT | Performed by: FAMILY MEDICINE

## 2023-06-08 NOTE — ASSESSMENT & PLAN NOTE
Mixed hyperlipidemia showing elevated cholesterol at 207 this time she will continue to watch diet now no additional medication for now HDL number is good repeat lab work in 6 months

## 2023-06-08 NOTE — ASSESSMENT & PLAN NOTE
Stable no worsening indigestion continuing on pantoprazole followed by Dr Meg Pena and gastroenterology notes reviewed and discussed with patient today

## 2023-06-08 NOTE — ASSESSMENT & PLAN NOTE
Stable no pain continue with stable bowels medications dicyclomine and pantoprazole and if symptoms change or worsen follow-up

## 2023-06-08 NOTE — PROGRESS NOTES
Assessment/Plan:       Problem List Items Addressed This Visit        Digestive    Hiatal hernia - Primary     Stable no worsening indigestion continuing on pantoprazole followed by Dr Josseline Jackson and gastroenterology notes reviewed and discussed with patient today            Respiratory    Primary adenocarcinoma of upper lobe of right lung [facility])     Postresection patient followed up by thoracic surgery annually CT scan reviewed no sign of recurrent disease            Genitourinary    Renal cell carcinoma of right kidney ([address] )     Resolved no sign of recurrent disease follow-up CT scan annually            Other    Mixed hyperlipidemia     Mixed hyperlipidemia showing elevated cholesterol at 207 this time she will continue to watch diet now no additional medication for now HDL number is good repeat lab work in 6 months         Relevant Orders    CBC and differential    Comprehensive metabolic panel    Lipid panel    TSH, 3rd generation with Free T4 reflex    Ventral hernia     Stable no pain continue with stable bowels medications dicyclomine and pantoprazole and if symptoms change or worsen follow-up              Subjective:      Patient ID: April Cathleen is a 51 y o  female  Patient is here for her 6-month follow-up evaluation and review of lab work discussion regarding recent specialist visits      The following portions of the patient's history were reviewed and updated as appropriate: allergies, current medications, past family history, past medical history, past social history, past surgical history and problem list     Review of Systems   Constitutional: Negative for chills, fatigue and fever  HENT: Negative for congestion, nosebleeds, rhinorrhea, sinus pressure and sore throat  Eyes: Negative for discharge and redness  Respiratory: Negative for cough and shortness of breath  Cardiovascular: Negative for chest pain, palpitations and leg swelling     Gastrointestinal: Negative for abdominal pain, "blood in stool and nausea  Endocrine: Negative for cold intolerance, heat intolerance and polyuria  Genitourinary: Negative for dysuria and frequency  Musculoskeletal: Negative for arthralgias, back pain and myalgias  Skin: Negative for rash  Neurological: Negative for dizziness, weakness and headaches  Hematological: Negative for adenopathy  Psychiatric/Behavioral: Negative for behavioral problems and sleep disturbance  The patient is not nervous/anxious  Objective:      /76   Pulse 63   Temp 98 4 °F (36 9 °C)   Ht 5' 5\" (1 651 m)   Wt 74 8 kg (165 lb)   SpO2 97%   BMI 27 46 kg/m²        Physical Exam  Vitals and nursing note reviewed  Constitutional:       General: She is not in acute distress  Appearance: She is well-developed  HENT:      Head: Normocephalic and atraumatic  Right Ear: External ear normal       Left Ear: External ear normal       Nose: Nose normal       Mouth/Throat:      Pharynx: No oropharyngeal exudate  Eyes:      General: No scleral icterus  Right eye: No discharge  Left eye: No discharge  Conjunctiva/sclera: Conjunctivae normal       Pupils: Pupils are equal, round, and reactive to light  Neck:      Thyroid: No thyromegaly  Vascular: No JVD  Cardiovascular:      Rate and Rhythm: Normal rate and regular rhythm  Heart sounds: Normal heart sounds  No murmur heard  Pulmonary:      Effort: Pulmonary effort is normal       Breath sounds: No wheezing or rales  Chest:      Chest wall: No tenderness  Abdominal:      General: Bowel sounds are normal  There is no distension  Palpations: Abdomen is soft  There is no mass  Tenderness: There is no abdominal tenderness  Musculoskeletal:         General: No tenderness or deformity  Normal range of motion  Cervical back: Normal range of motion  Lymphadenopathy:      Cervical: No cervical adenopathy  Skin:     General: Skin is warm and dry        " "Findings: No rash  Neurological:      Mental Status: She is alert and oriented to person, place, and time  Cranial Nerves: No cranial nerve deficit  Coordination: Coordination normal       Deep Tendon Reflexes: Reflexes are normal and symmetric  Reflexes normal    Psychiatric:         Behavior: Behavior normal          Thought Content: Thought content normal          Judgment: Judgment normal           Data:    Laboratory Results: I have personally reviewed the pertinent laboratory results/reports   Radiology/Other Diagnostic Testing Results: I have personally reviewed pertinent reports         Lab Results   Component Value Date    HCT 41 0 06/05/2023    HGB 13 7 06/05/2023    MCV 89 06/05/2023     06/05/2023    WBC 5 41 06/05/2023     Lab Results   Component Value Date    ALKPHOS 129 (H) 06/05/2023    ALT 17 06/05/2023    AST 11 06/05/2023    BUN 11 06/05/2023    CALCIUM 9 1 06/05/2023     06/05/2023    CO2 28 06/05/2023    CREATININE 0 92 06/05/2023    EGFR 73 06/05/2023    GLUF 85 06/05/2023    K 4 5 06/05/2023     Lab Results   Component Value Date    CHOLESTEROL 207 (H) 06/05/2023    CHOLESTEROL 182 11/30/2022    CHOLESTEROL 186 05/31/2022     Lab Results   Component Value Date    HDL 62 06/05/2023    HDL 53 11/30/2022    HDL 56 05/31/2022     Lab Results   Component Value Date    LDLCALC 130 (H) 06/05/2023    LDLCALC 114 (H) 11/30/2022    LDLCALC 113 (H) 05/31/2022     Lab Results   Component Value Date    TRIG 75 06/05/2023    TRIG 76 11/30/2022    TRIG 83 05/31/2022     No results found for: \"CHOLHDL\"  Lab Results   Component Value Date    LLF1YRBXNPFJ 0 648 06/05/2023     No results found for: \"HGBA1C\"  No results found for: \"PSA\"    [place], DO      "

## 2023-06-08 NOTE — ASSESSMENT & PLAN NOTE
Postresection patient followed up by thoracic surgery annually CT scan reviewed no sign of recurrent disease

## 2023-11-06 ENCOUNTER — TELEPHONE (OUTPATIENT)
Dept: OBGYN CLINIC | Facility: CLINIC | Age: 48
End: 2023-11-06

## 2023-11-06 DIAGNOSIS — N92.1 MENORRHAGIA WITH IRREGULAR CYCLE: Primary | ICD-10-CM

## 2023-11-06 RX ORDER — MEDROXYPROGESTERONE ACETATE 10 MG/1
10 TABLET ORAL DAILY
Qty: 10 TABLET | Refills: 0 | Status: SHIPPED | OUTPATIENT
Start: 2023-11-06 | End: 2023-11-16

## 2023-11-06 NOTE — TELEPHONE ENCOUNTER
Pt had normal periods till after yly (4/23). Periods then became very irreg. On Oct 16, had 4 days spotting, then 5 days very heavy period, - stopped few days - from 29th till now, using about 6 pads/day. Large clots - bright red. Also had sore breasts.   RX?  R/A alonso

## 2023-11-06 NOTE — TELEPHONE ENCOUNTER
Pt will go for cbc, tsh and pelvic u/s. Slips entered. Pt would like to try provera .  I pended order for you

## 2023-11-07 ENCOUNTER — APPOINTMENT (OUTPATIENT)
Dept: LAB | Facility: CLINIC | Age: 48
End: 2023-11-07
Payer: COMMERCIAL

## 2023-11-07 DIAGNOSIS — N92.1 MENORRHAGIA WITH IRREGULAR CYCLE: ICD-10-CM

## 2023-11-07 DIAGNOSIS — E78.2 MIXED HYPERLIPIDEMIA: ICD-10-CM

## 2023-11-07 LAB
BASOPHILS # BLD AUTO: 0.06 THOUSANDS/ÂΜL (ref 0–0.1)
BASOPHILS NFR BLD AUTO: 1 % (ref 0–1)
EOSINOPHIL # BLD AUTO: 0.07 THOUSAND/ÂΜL (ref 0–0.61)
EOSINOPHIL NFR BLD AUTO: 1 % (ref 0–6)
ERYTHROCYTE [DISTWIDTH] IN BLOOD BY AUTOMATED COUNT: 12.9 % (ref 11.6–15.1)
HCT VFR BLD AUTO: 37.4 % (ref 34.8–46.1)
HGB BLD-MCNC: 12.6 G/DL (ref 11.5–15.4)
IMM GRANULOCYTES # BLD AUTO: 0.02 THOUSAND/UL (ref 0–0.2)
IMM GRANULOCYTES NFR BLD AUTO: 0 % (ref 0–2)
LYMPHOCYTES # BLD AUTO: 1.83 THOUSANDS/ÂΜL (ref 0.6–4.47)
LYMPHOCYTES NFR BLD AUTO: 28 % (ref 14–44)
MCH RBC QN AUTO: 30.1 PG (ref 26.8–34.3)
MCHC RBC AUTO-ENTMCNC: 33.7 G/DL (ref 31.4–37.4)
MCV RBC AUTO: 90 FL (ref 82–98)
MONOCYTES # BLD AUTO: 0.45 THOUSAND/ÂΜL (ref 0.17–1.22)
MONOCYTES NFR BLD AUTO: 7 % (ref 4–12)
NEUTROPHILS # BLD AUTO: 4.03 THOUSANDS/ÂΜL (ref 1.85–7.62)
NEUTS SEG NFR BLD AUTO: 63 % (ref 43–75)
NRBC BLD AUTO-RTO: 0 /100 WBCS
PLATELET # BLD AUTO: 222 THOUSANDS/UL (ref 149–390)
PMV BLD AUTO: 10.5 FL (ref 8.9–12.7)
RBC # BLD AUTO: 4.18 MILLION/UL (ref 3.81–5.12)
TSH SERPL DL<=0.05 MIU/L-ACNC: 0.72 UIU/ML (ref 0.45–4.5)
WBC # BLD AUTO: 6.46 THOUSAND/UL (ref 4.31–10.16)

## 2023-11-07 PROCEDURE — 84443 ASSAY THYROID STIM HORMONE: CPT

## 2023-11-07 PROCEDURE — 36415 COLL VENOUS BLD VENIPUNCTURE: CPT

## 2023-11-07 PROCEDURE — 85025 COMPLETE CBC W/AUTO DIFF WBC: CPT

## 2023-11-10 ENCOUNTER — HOSPITAL ENCOUNTER (OUTPATIENT)
Dept: CT IMAGING | Facility: HOSPITAL | Age: 48
End: 2023-11-10
Attending: THORACIC SURGERY (CARDIOTHORACIC VASCULAR SURGERY)
Payer: COMMERCIAL

## 2023-11-10 DIAGNOSIS — C34.11 PRIMARY ADENOCARCINOMA OF UPPER LOBE OF RIGHT LUNG (HCC): ICD-10-CM

## 2023-11-10 PROCEDURE — G1004 CDSM NDSC: HCPCS

## 2023-11-10 PROCEDURE — 71250 CT THORAX DX C-: CPT

## 2023-11-14 ENCOUNTER — HOSPITAL ENCOUNTER (OUTPATIENT)
Dept: ULTRASOUND IMAGING | Facility: CLINIC | Age: 48
Discharge: HOME | End: 2023-11-14
Payer: COMMERCIAL

## 2023-11-14 DIAGNOSIS — N92.1 MENORRHAGIA WITH IRREGULAR CYCLE: ICD-10-CM

## 2023-11-14 PROCEDURE — 76856 US EXAM PELVIC COMPLETE: CPT

## 2023-11-14 PROCEDURE — 76830 TRANSVAGINAL US NON-OB: CPT

## 2023-11-21 ENCOUNTER — OFFICE VISIT (OUTPATIENT)
Dept: CARDIAC SURGERY | Facility: CLINIC | Age: 48
End: 2023-11-21
Payer: COMMERCIAL

## 2023-11-21 VITALS
HEART RATE: 67 BPM | HEIGHT: 65 IN | WEIGHT: 177 LBS | SYSTOLIC BLOOD PRESSURE: 102 MMHG | OXYGEN SATURATION: 98 % | DIASTOLIC BLOOD PRESSURE: 70 MMHG | BODY MASS INDEX: 29.49 KG/M2

## 2023-11-21 DIAGNOSIS — C34.11 PRIMARY ADENOCARCINOMA OF UPPER LOBE OF RIGHT LUNG (HCC): Primary | ICD-10-CM

## 2023-11-21 PROCEDURE — 99213 OFFICE O/P EST LOW 20 MIN: CPT | Performed by: PHYSICIAN ASSISTANT

## 2023-11-21 NOTE — ASSESSMENT & PLAN NOTE
April is stable from a thoracic surgery and lung cancer standpoint. Her most recent CT scan did not reveal any evidence of metastatic or recurrent disease. She is 2.5 years out and therefore will follow up in 6 months with a new CT scan for her 3 year anniversary. At that point, if her CT scan remains stable, we will extend out to yearly. She is in agreement with the plan.

## 2023-11-21 NOTE — PROGRESS NOTES
Assessment/Plan:    Primary adenocarcinoma of upper lobe of right lung [facility])  April is stable from a thoracic surgery and lung cancer standpoint. Her most recent CT scan did not reveal any evidence of metastatic or recurrent disease. She is 2.5 years out and therefore will follow up in 6 months with a new CT scan for her 3 year anniversary. At that point, if her CT scan remains stable, we will extend out to yearly. She is in agreement with the plan. Diagnoses and all orders for this visit:    Primary adenocarcinoma of upper lobe of right lung ([address])  -     CT chest wo contrast; Future          Thoracic History   Cancer Staging   Primary adenocarcinoma of upper lobe of right lung [facility])  Staging form: Lung, AJCC 8th Edition  - Clinical stage from 5/4/2021: Stage IB (cT2a, cN0, cM0) - Signed by Shaun Teixeira MD on 5/6/2021  Histopathologic type:  Adenocarcinoma, NOS  Stage prefix: Initial diagnosis  Histologic grade (G): G2  Histologic grading system: 4 grade system  Laterality: Right  Tumor size (mm): 2.9  Lymph-vascular invasion (LVI): LVI not present (absent)/not identified  Specimen type: Excision  Type of lung cancer: Surgically resected non-small cell lung cancer  ECOG performance status: Grade 0  Pleural/elastic layer invasion: PL1  Pleural lavage cytology: Not assessed  Adequacy of mediastinal dissection: Adequate  Adjuvant radiation: No  Adjuvant chemotherapy: No  Stage used in treatment planning: Yes  National guidelines used in treatment planning: Yes  Type of national guideline used in treatment planning: NCCN    Oncology History   Primary adenocarcinoma of upper lobe of right lung ([address])   4/14/2021 Surgery    Right thoracoscopic upper lobe wedge resection with completion upper lobectomy      5/4/2021 Initial Diagnosis    Primary adenocarcinoma of upper lobe of right lung ([address])     5/4/2021 -  Cancer Staged    Staging form: Lung, AJCC 8th Edition  - Clinical stage from 5/4/2021: Stage IB (cT2a, cN0, cM0) - Signed by Adriane Burger MD on 5/6/2021  Histopathologic type: Adenocarcinoma, NOS  Stage prefix: Initial diagnosis  Histologic grade (G): G2  Histologic grading system: 4 grade system  Laterality: Right  Tumor size (mm): 2.9  Lymph-vascular invasion (LVI): LVI not present (absent)/not identified  Specimen type: Excision  Type of lung cancer: Surgically resected non-small cell lung cancer  ECOG performance status: Grade 0  Pleural/elastic layer invasion: PL1  Pleural lavage cytology: Not assessed  Adequacy of mediastinal dissection: Adequate  Adjuvant radiation: No  Adjuvant chemotherapy: No  Stage used in treatment planning: Yes  National guidelines used in treatment planning: Yes  Type of national guideline used in treatment planning: NCCN             Patient ID: April Cathleen is a 51 y.o. female. ECOG 0     HPI    April is a 52 yo female who underwent a right thoracoscopic upper lobectomy April 2021. She was last seen on 5/16/23 at which point her CT scan did not reveal any new nodules or lymphadenopathy. She returns today, with a CT from 11/10/23 which revealed stable scarring along suture line, no recurrent tumor, mild background emphysema. On discussion, she is 2.5 years out from surgery. On discussion, she denies fever, chills, new bone pains, cough, or shortness of breath, or headaches. The following portions of the patient's history were reviewed and updated as appropriate: allergies, current medications, past family history, past medical history, past social history, past surgical history and problem list.    Review of Systems      Objective:   Physical Exam  Vitals reviewed. Constitutional:       Appearance: Normal appearance. HENT:      Head: Normocephalic and atraumatic. Eyes:      General: No scleral icterus. Extraocular Movements: Extraocular movements intact. Cardiovascular:      Rate and Rhythm: Normal rate and regular rhythm.       Heart sounds: Normal heart sounds. No murmur heard. Pulmonary:      Effort: Pulmonary effort is normal. No respiratory distress. Breath sounds: Normal breath sounds. No wheezing. Musculoskeletal:         General: No swelling. Cervical back: Normal range of motion and neck supple. Lymphadenopathy:      Cervical: No cervical adenopathy. Skin:     General: Skin is warm and dry. Neurological:      Mental Status: She is alert and oriented to person, place, and time. Psychiatric:         Mood and Affect: Mood normal.         Behavior: Behavior normal.     /70 (BP Location: Right arm, Patient Position: Sitting)   Pulse 67   Ht 5' 5" (1.651 m)   Wt 80.3 kg (177 lb)   SpO2 98%   BMI 29.45 kg/m²        CT chest wo contrast    Result Date: 11/15/2023  Narrative CT CHEST WITHOUT IV CONTRAST INDICATION:   C34.11: Malignant neoplasm of upper lobe, right bronchus or lung. COMPARISON: CT scan from 5/8/2023. TECHNIQUE: CT examination of the chest was performed without intravenous contrast. Multiplanar 2D reformatted images were created from the source data. This examination, like all CT scans performed in the [facility], was performed utilizing techniques to minimize radiation dose exposure, including the use of iterative reconstruction and automated exposure control. Radiation dose length product (DLP) for this visit:  217 mGy-cm FINDINGS: LUNGS: Stable mild background emphysema. Status post right upper lobectomy. Stable scarring along the suture line. No recurrent tumor. Scattered areas of linear atelectasis in both lungs are stable. No endotracheal or endobronchial lesion observed. PLEURA:  Unremarkable. HEART/GREAT VESSELS: Heart is unremarkable for patient's age. No thoracic aortic aneurysm. MEDIASTINUM AND SIMON:  Unremarkable. CHEST WALL AND LOWER NECK:  Unremarkable. VISUALIZED STRUCTURES IN THE UPPER ABDOMEN:  Unremarkable. OSSEOUS STRUCTURES:  No acute fracture or destructive osseous lesion. Impression Status post right upper lobectomy. No recurrent tumor. Stable mild background emphysema.  Workstation performed: [id number]

## 2023-12-05 ENCOUNTER — APPOINTMENT (OUTPATIENT)
Dept: LAB | Facility: CLINIC | Age: 48
End: 2023-12-05
Payer: COMMERCIAL

## 2023-12-05 LAB
ALBUMIN SERPL BCP-MCNC: 4.3 G/DL (ref 3.5–5)
ALP SERPL-CCNC: 104 U/L (ref 34–104)
ALT SERPL W P-5'-P-CCNC: 17 U/L (ref 7–52)
ANION GAP SERPL CALCULATED.3IONS-SCNC: 9 MMOL/L
AST SERPL W P-5'-P-CCNC: 17 U/L (ref 13–39)
BASOPHILS # BLD AUTO: 0.05 THOUSANDS/ÂΜL (ref 0–0.1)
BASOPHILS NFR BLD AUTO: 1 % (ref 0–1)
BILIRUB SERPL-MCNC: 0.38 MG/DL (ref 0.2–1)
BUN SERPL-MCNC: 13 MG/DL (ref 5–25)
CALCIUM SERPL-MCNC: 8.8 MG/DL (ref 8.4–10.2)
CHLORIDE SERPL-SCNC: 105 MMOL/L (ref 96–108)
CHOLEST SERPL-MCNC: 168 MG/DL
CO2 SERPL-SCNC: 27 MMOL/L (ref 21–32)
CREAT SERPL-MCNC: 0.82 MG/DL (ref 0.6–1.3)
EOSINOPHIL # BLD AUTO: 0.1 THOUSAND/ÂΜL (ref 0–0.61)
EOSINOPHIL NFR BLD AUTO: 2 % (ref 0–6)
ERYTHROCYTE [DISTWIDTH] IN BLOOD BY AUTOMATED COUNT: 12.3 % (ref 11.6–15.1)
GFR SERPL CREATININE-BSD FRML MDRD: 84 ML/MIN/1.73SQ M
GLUCOSE P FAST SERPL-MCNC: 83 MG/DL (ref 65–99)
HCT VFR BLD AUTO: 40 % (ref 34.8–46.1)
HDLC SERPL-MCNC: 51 MG/DL
HGB BLD-MCNC: 12.9 G/DL (ref 11.5–15.4)
IMM GRANULOCYTES # BLD AUTO: 0.02 THOUSAND/UL (ref 0–0.2)
IMM GRANULOCYTES NFR BLD AUTO: 0 % (ref 0–2)
LDLC SERPL CALC-MCNC: 100 MG/DL (ref 0–100)
LYMPHOCYTES # BLD AUTO: 2.09 THOUSANDS/ÂΜL (ref 0.6–4.47)
LYMPHOCYTES NFR BLD AUTO: 38 % (ref 14–44)
MCH RBC QN AUTO: 28.9 PG (ref 26.8–34.3)
MCHC RBC AUTO-ENTMCNC: 32.3 G/DL (ref 31.4–37.4)
MCV RBC AUTO: 90 FL (ref 82–98)
MONOCYTES # BLD AUTO: 0.39 THOUSAND/ÂΜL (ref 0.17–1.22)
MONOCYTES NFR BLD AUTO: 7 % (ref 4–12)
NEUTROPHILS # BLD AUTO: 2.85 THOUSANDS/ÂΜL (ref 1.85–7.62)
NEUTS SEG NFR BLD AUTO: 52 % (ref 43–75)
NONHDLC SERPL-MCNC: 117 MG/DL
NRBC BLD AUTO-RTO: 0 /100 WBCS
PLATELET # BLD AUTO: 208 THOUSANDS/UL (ref 149–390)
PMV BLD AUTO: 10.4 FL (ref 8.9–12.7)
POTASSIUM SERPL-SCNC: 4.4 MMOL/L (ref 3.5–5.3)
PROT SERPL-MCNC: 6.8 G/DL (ref 6.4–8.4)
RBC # BLD AUTO: 4.47 MILLION/UL (ref 3.81–5.12)
SODIUM SERPL-SCNC: 141 MMOL/L (ref 135–147)
TRIGL SERPL-MCNC: 83 MG/DL
TSH SERPL DL<=0.05 MIU/L-ACNC: 0.67 UIU/ML (ref 0.45–4.5)
WBC # BLD AUTO: 5.5 THOUSAND/UL (ref 4.31–10.16)

## 2023-12-05 PROCEDURE — 85025 COMPLETE CBC W/AUTO DIFF WBC: CPT

## 2023-12-05 PROCEDURE — 80053 COMPREHEN METABOLIC PANEL: CPT

## 2023-12-05 PROCEDURE — 84443 ASSAY THYROID STIM HORMONE: CPT

## 2023-12-05 PROCEDURE — 80061 LIPID PANEL: CPT

## 2023-12-08 ENCOUNTER — OFFICE VISIT (OUTPATIENT)
Dept: FAMILY MEDICINE CLINIC | Facility: CLINIC | Age: 48
End: 2023-12-08
Payer: COMMERCIAL

## 2023-12-08 VITALS
OXYGEN SATURATION: 98 % | TEMPERATURE: 97 F | HEART RATE: 78 BPM | DIASTOLIC BLOOD PRESSURE: 70 MMHG | HEIGHT: 65 IN | BODY MASS INDEX: 34.19 KG/M2 | RESPIRATION RATE: 18 BRPM | SYSTOLIC BLOOD PRESSURE: 110 MMHG | WEIGHT: 205.2 LBS

## 2023-12-08 DIAGNOSIS — E78.2 MIXED HYPERLIPIDEMIA: ICD-10-CM

## 2023-12-08 DIAGNOSIS — K29.00 ACUTE SUPERFICIAL GASTRITIS WITHOUT HEMORRHAGE: ICD-10-CM

## 2023-12-08 DIAGNOSIS — C34.11 PRIMARY ADENOCARCINOMA OF UPPER LOBE OF RIGHT LUNG (HCC): ICD-10-CM

## 2023-12-08 DIAGNOSIS — C64.1 RENAL CELL CARCINOMA OF RIGHT KIDNEY (HCC): ICD-10-CM

## 2023-12-08 DIAGNOSIS — L98.9 SKIN LESIONS: Primary | ICD-10-CM

## 2023-12-08 DIAGNOSIS — L98.9 SKIN LESIONS: ICD-10-CM

## 2023-12-08 DIAGNOSIS — Z23 ENCOUNTER FOR IMMUNIZATION: Primary | ICD-10-CM

## 2023-12-08 PROCEDURE — 99214 OFFICE O/P EST MOD 30 MIN: CPT | Performed by: FAMILY MEDICINE

## 2023-12-08 PROCEDURE — 90471 IMMUNIZATION ADMIN: CPT | Performed by: FAMILY MEDICINE

## 2023-12-08 PROCEDURE — 90686 IIV4 VACC NO PRSV 0.5 ML IM: CPT | Performed by: FAMILY MEDICINE

## 2023-12-08 NOTE — PROGRESS NOTES
BMI Counseling: Body mass index is 34.15 kg/m². The BMI is above normal. Exercise recommendations include exercising 3-5 times per week.

## 2023-12-08 NOTE — ASSESSMENT & PLAN NOTE
Post thoracotomy with most recent CT negative for recurrence patient follows up with thoracic surgery every 6 months repeat CT in the spring

## 2023-12-08 NOTE — PROGRESS NOTES
Assessment/Plan:       Problem List Items Addressed This Visit          Digestive    Gastritis     Stomach resolved off pantoprazole stay off this for now continue with good diet plan avoiding late night eating and acid rich foods            Respiratory    Primary adenocarcinoma of upper lobe of right lung ([facility])     Post thoracotomy with most recent CT negative for recurrence patient follows up with thoracic surgery every 6 months repeat CT in the spring            Musculoskeletal and Integument    Skin lesions     Patient requesting dermatology evaluation for general skin cancer testing            Genitourinary    Renal cell carcinoma of right kidney [facility])     Post treatment nephrectomy and I will check abdominal pelvic CT to follow-up at next scheduled CT for thoracic         Relevant Orders    CT abdomen pelvis wo contrast       Other    Mixed hyperlipidemia     Mixed hyperlipidemia is stable monitor laboratory work follow heart healthy diet          Other Visit Diagnoses       Encounter for immunization    -  Primary    Relevant Orders    influenza vaccine, quadrivalent, 0.5 mL, preservative-free, for adult and pediatric patients 6 mos+ (AFLURIA, FLUARIX, FLULAVAL, FLUZONE) (Completed)              Subjective:      Patient ID: April Cathleen is a 51 y.o. female. Patient here for her follow-up evaluation 6-month checkup review of all lab work doing well overall cholesterol levels are down        The following portions of the patient's history were reviewed and updated as appropriate: allergies, current medications, past family history, past medical history, past social history, past surgical history and problem list.    Review of Systems   Constitutional:  Negative for chills and fever. HENT:  Negative for ear pain and sore throat. Eyes:  Negative for pain and visual disturbance. Respiratory:  Negative for cough and shortness of breath. Cardiovascular:  Negative for chest pain and palpitations. Gastrointestinal:  Negative for abdominal pain and vomiting. Genitourinary:  Negative for dysuria and hematuria. Musculoskeletal:  Negative for arthralgias and back pain. Skin:  Negative for color change and rash. Neurological:  Negative for seizures and syncope. All other systems reviewed and are negative. Objective:      /70 (BP Location: Left arm, Patient Position: Sitting, Cuff Size: Standard)   Pulse 78   Temp (!) 97 °F (36.1 °C) (Tympanic)   Resp 18   Ht 5' 5" (1.651 m)   Wt 93.1 kg (205 lb 3.2 oz)   SpO2 98%   BMI 34.15 kg/m²        Physical Exam  Vitals and nursing note reviewed. Constitutional:       General: She is not in acute distress. Appearance: Normal appearance. She is well-developed. HENT:      Head: Normocephalic and atraumatic. Right Ear: Tympanic membrane and external ear normal.      Left Ear: Tympanic membrane and external ear normal.      Nose: Nose normal.      Mouth/Throat:      Mouth: Mucous membranes are moist.      Pharynx: Oropharynx is clear. No oropharyngeal exudate. Eyes:      General: No scleral icterus. Right eye: No discharge. Left eye: No discharge. Conjunctiva/sclera: Conjunctivae normal.      Pupils: Pupils are equal, round, and reactive to light. Neck:      Thyroid: No thyromegaly. Vascular: No JVD. Cardiovascular:      Rate and Rhythm: Normal rate and regular rhythm. Heart sounds: Normal heart sounds. No murmur heard. Pulmonary:      Effort: Pulmonary effort is normal.      Breath sounds: No wheezing or rales. Chest:      Chest wall: No tenderness. Abdominal:      General: Bowel sounds are normal. There is no distension. Palpations: Abdomen is soft. There is no mass. Tenderness: There is no abdominal tenderness. Musculoskeletal:         General: No tenderness or deformity. Normal range of motion. Cervical back: Normal range of motion.    Lymphadenopathy:      Cervical: No cervical adenopathy. Skin:     General: Skin is warm and dry. Findings: No rash. Neurological:      General: No focal deficit present. Mental Status: She is alert and oriented to person, place, and time. Cranial Nerves: No cranial nerve deficit. Coordination: Coordination normal.      Deep Tendon Reflexes: Reflexes are normal and symmetric. Reflexes normal.   Psychiatric:         Mood and Affect: Mood normal.         Behavior: Behavior normal.         Thought Content: Thought content normal.         Judgment: Judgment normal.          Data:    Laboratory Results: I have personally reviewed the pertinent laboratory results/reports   Radiology/Other Diagnostic Testing Results: I have personally reviewed pertinent reports.        Lab Results   Component Value Date    WBC 5.50 12/05/2023    HGB 12.9 12/05/2023    HCT 40.0 12/05/2023    MCV 90 12/05/2023     12/05/2023     Lab Results   Component Value Date    K 4.4 12/05/2023     12/05/2023    CO2 27 12/05/2023    BUN 13 12/05/2023    CREATININE 0.82 12/05/2023    GLUF 83 12/05/2023    CALCIUM 8.8 12/05/2023    AST 17 12/05/2023    ALT 17 12/05/2023    ALKPHOS 104 12/05/2023    EGFR 84 12/05/2023     Lab Results   Component Value Date    CHOLESTEROL 168 12/05/2023    CHOLESTEROL 207 (H) 06/05/2023    CHOLESTEROL 182 11/30/2022     Lab Results   Component Value Date    HDL 51 12/05/2023    HDL 62 06/05/2023    HDL 53 11/30/2022     Lab Results   Component Value Date    LDLCALC 100 12/05/2023    LDLCALC 130 (H) 06/05/2023    LDLCALC 114 (H) 11/30/2022     Lab Results   Component Value Date    TRIG 83 12/05/2023    TRIG 75 06/05/2023    TRIG 76 11/30/2022     No results found for: "CHOLHDL"  Lab Results   Component Value Date    [id number] 0.672 12/05/2023     No results found for: "HGBA1C"  No results found for: "PSA"    [place], DO

## 2023-12-08 NOTE — ASSESSMENT & PLAN NOTE
Post treatment nephrectomy and I will check abdominal pelvic CT to follow-up at next scheduled CT for thoracic

## 2023-12-22 ENCOUNTER — OFFICE VISIT (OUTPATIENT)
Dept: OBGYN CLINIC | Facility: CLINIC | Age: 48
End: 2023-12-22
Payer: COMMERCIAL

## 2023-12-22 VITALS
BODY MASS INDEX: 29.52 KG/M2 | DIASTOLIC BLOOD PRESSURE: 80 MMHG | HEIGHT: 65 IN | SYSTOLIC BLOOD PRESSURE: 114 MMHG | WEIGHT: 177.2 LBS

## 2023-12-22 DIAGNOSIS — N84.1 ABNORMAL UTERINE BLEEDING DUE TO ENDOCERVICAL POLYP: Primary | ICD-10-CM

## 2023-12-22 DIAGNOSIS — N93.9 ABNORMAL UTERINE BLEEDING DUE TO ENDOCERVICAL POLYP: Primary | ICD-10-CM

## 2023-12-22 PROBLEM — N87.0 DYSPLASIA OF CERVIX, LOW GRADE (CIN 1): Status: RESOLVED | Noted: 2022-05-18 | Resolved: 2023-12-22

## 2023-12-22 PROBLEM — B97.7 HIGH RISK HPV INFECTION: Status: RESOLVED | Noted: 2022-04-29 | Resolved: 2023-12-22

## 2023-12-22 PROCEDURE — 99214 OFFICE O/P EST MOD 30 MIN: CPT | Performed by: OBSTETRICS & GYNECOLOGY

## 2023-12-22 NOTE — PROGRESS NOTES
Assessment/Plan:    Abnormal uterine bleeding due to endocervical polyp  Episode of heavy prolonged bleeding  Imaging reviewed by me, fundal polyp noted.     Plan for D&C Hysteroscopy with polypectomy    We reviewed the risks of the surgery including but not limited to infection, bleeding, injury to nearby organs (bowel. bladder, ureter, blood vessel, nerve) and possible long term consequences of such injury, as well as possibility of need for blood transfusion and other resuscitative measures.        Diagnoses and all orders for this visit:    Abnormal uterine bleeding due to endocervical polyp          Subjective:      Patient ID: April Cathleen is a 48 y.o. female.    Patient here to discuss possible D&C. Pelvic US 23. 3 fibroids and polyp noted.   Patient said she never took the Provera.  Patient didn't have any more bleeding after that episode in November.  Patient is sexually active    49 yo  with month long episode of bleeding. Very heavy, clots.  Was prescribed provera but did not take it and bleeding did slowly resolve.  No exacerbating or relieving factors.  NO radiation. Acute episode.  Normal menstrual pattern until 9 months ago then began with irregular spotting and then had month long bleed prompting call.   Imaging: fundal endometrial polyp noted 8mm.  Additional fibroids noted within the uterus- small.     Here to discuss plan of care.   Recommend D&C and removal of polyp     Gynecologic Exam  She complains of vaginal bleeding. She reports no genital itching, genital lesions, genital odor, genital rash, pelvic pain or vaginal discharge. This is a new problem. The current episode started more than 1 month ago. The problem occurs constantly. The problem has been resolved since onset. The patient is experiencing no pain. Pertinent negatives include no abdominal pain, anorexia, back pain, chills, constipation, diarrhea, dysuria, fever, flank pain, frequency, headaches, hematuria, nausea, painful  intercourse, rash, sore throat, urgency or vomiting. The vaginal bleeding is heavier than menses. Patient has been passing clots. Patient has not been passing tissue. Nothing aggravates the symptoms. Past treatments include acetaminophen, heating pads and NSAIDs. The treatment provided no relief. She is sexually active. The patient's menstrual history has been irregular.       The following portions of the patient's history were reviewed and updated as appropriate: She  has a past medical history of Allergic, BRCA1 negative, BRCA2 negative, Cancer (HCC) (06/16/2019), Chronic kidney disease (08/2019), Colon polyp, Gastritis, Nodule of upper lobe of right lung (04/2021), PONV (postoperative nausea and vomiting), Renal cell carcinoma (HCC) (06/2019), and Renal mass, right.  She   Patient Active Problem List    Diagnosis Date Noted    Abnormal uterine bleeding due to endocervical polyp 12/22/2023    Skin lesions 12/08/2023    Ventral hernia 12/07/2022    Chronic right shoulder pain 06/06/2022    Encounter for annual routine gynecological examination 04/18/2022    Microscopic hematuria 02/07/2022    Primary adenocarcinoma of upper lobe of right lung (HCC) 05/04/2021    Lung nodule 12/15/2020    Abnormal CT of the chest     Mixed hyperlipidemia 08/07/2020    Anxiety 08/07/2020    Hypocalcemia 11/05/2019    Renal cell carcinoma of right kidney (HCC) 09/11/2019    Gastritis 05/07/2019    Hiatal hernia 05/07/2019    Plantar wart, left foot 05/07/2019     She  has a past surgical history that includes Colonoscopy; [place] tooth extraction; pr laparoscopy surg partial nephrectomy (Right, 08/26/2019); US guided breast biopsy left complete (Left, 01/15/2020); Breast biopsy (Left, 01/2020); pr thoracoscopy w/thera wedge resexn initial unilat (Right, 04/14/2021); pr thoracoscopy w/lobectomy single lobe (Right, 04/14/2021); pr [facility] incl fluor gdnce dx w/cell washg spx (N/A, 04/14/2021); Lung segmentectomy (Right, 04/14/2021); and  Cervical biopsy w/ loop electrode excision.  Her family history includes Arthritis in her paternal grandmother; Breast cancer (age of onset: 70) in her paternal grandfather and paternal grandmother; Cancer in her paternal grandmother; Coronary artery disease in her father; Diabetes in her paternal grandmother; Heart disease in her father; No Known Problems in her maternal aunt, maternal aunt, maternal grandmother, mother, and sister; Other in her maternal aunt.  She  reports that she quit smoking about 27 years ago. Her smoking use included cigarettes. She has never used smokeless tobacco. She reports that she does not currently use alcohol. She reports that she does not use drugs.  Current Outpatient Medications   Medication Sig Dispense Refill    dicyclomine (BENTYL) 20 mg tablet Take 1 tablet (20 mg total) by mouth 4 (four) times a day as needed (Abdominal pain) (Patient not taking: Reported on 11/21/2023) 60 tablet 5    medroxyPROGESTERone (PROVERA) 10 mg tablet Take 1 tablet (10 mg total) by mouth daily for 10 days 10 tablet 0    pantoprazole (PROTONIX) 40 mg tablet Take 1 tablet (40 mg total) by mouth daily before breakfast (Patient not taking: Reported on 11/21/2023) 30 tablet 5     No current facility-administered medications for this visit.     Current Outpatient Medications on File Prior to Visit   Medication Sig    dicyclomine (BENTYL) 20 mg tablet Take 1 tablet (20 mg total) by mouth 4 (four) times a day as needed (Abdominal pain) (Patient not taking: Reported on 11/21/2023)    medroxyPROGESTERone (PROVERA) 10 mg tablet Take 1 tablet (10 mg total) by mouth daily for 10 days    pantoprazole (PROTONIX) 40 mg tablet Take 1 tablet (40 mg total) by mouth daily before breakfast (Patient not taking: Reported on 11/21/2023)     No current facility-administered medications on file prior to visit.     She is allergic to strawberry (diagnostic) - food allergy and tetracycline..    Review of Systems  "  Constitutional:  Negative for activity change, appetite change, chills, fatigue and fever.   HENT:  Negative for rhinorrhea, sneezing and sore throat.    Eyes:  Negative for visual disturbance.   Respiratory:  Negative for cough, shortness of breath and wheezing.    Cardiovascular:  Negative for chest pain, palpitations and leg swelling.   Gastrointestinal:  Negative for abdominal distention, abdominal pain, anorexia, constipation, diarrhea, nausea and vomiting.   Genitourinary:  Negative for difficulty urinating, dysuria, flank pain, frequency, hematuria, pelvic pain, urgency and vaginal discharge.   Musculoskeletal:  Negative for back pain.   Skin:  Negative for rash.   Neurological:  Negative for syncope, light-headedness and headaches.         Objective:      /80 (BP Location: Left arm, Patient Position: Sitting)   Ht 5' 5\" (1.651 m)   Wt 80.4 kg (177 lb 3.2 oz)   BMI 29.49 kg/m²          Physical Exam  Constitutional:       General: She is not in acute distress.     Appearance: She is well-developed. She is not diaphoretic.   Neck:      Vascular: No JVD.   Cardiovascular:      Rate and Rhythm: Normal rate and regular rhythm.      Heart sounds: Normal heart sounds. No murmur heard.     No friction rub. No gallop.   Pulmonary:      Effort: Pulmonary effort is normal. No respiratory distress.      Breath sounds: Normal breath sounds.   Abdominal:      General: Bowel sounds are normal. There is no distension.      Palpations: Abdomen is soft.      Tenderness: There is no abdominal tenderness. There is no guarding or rebound.   Genitourinary:     Labia:         Right: No rash, tenderness or lesion.         Left: No rash, tenderness or lesion.       Vagina: Normal. No vaginal discharge, erythema, tenderness or bleeding.      Cervix: No cervical motion tenderness, discharge, friability or erythema.      Uterus: Not deviated, not enlarged, not fixed and not tender.       Adnexa:         Right: No mass, " tenderness or fullness.          Left: No mass, tenderness or fullness.     Musculoskeletal:      Cervical back: Neck supple.      Right lower leg: No edema.      Left lower leg: No edema.   Lymphadenopathy:      Cervical: No cervical adenopathy.   Neurological:      Mental Status: She is alert and oriented to person, place, and time.      Deep Tendon Reflexes: Reflexes are normal and symmetric.

## 2023-12-22 NOTE — ASSESSMENT & PLAN NOTE
Episode of heavy prolonged bleeding  Imaging reviewed by me, fundal polyp noted.     Plan for D&C Hysteroscopy with polypectomy    We reviewed the risks of the surgery including but not limited to infection, bleeding, injury to nearby organs (bowel. bladder, ureter, blood vessel, nerve) and possible long term consequences of such injury, as well as possibility of need for blood transfusion and other resuscitative measures.

## 2023-12-29 ENCOUNTER — TELEPHONE (OUTPATIENT)
Dept: OBGYN CLINIC | Facility: CLINIC | Age: 48
End: 2023-12-29

## 2023-12-29 NOTE — TELEPHONE ENCOUNTER
Talked to patient she is scheduled for her surgical procedure on 1/26/2024 with Dr. Booker in the [facility], Surgical packet mailed out today.

## 2024-01-09 ENCOUNTER — APPOINTMENT (OUTPATIENT)
Dept: LAB | Facility: CLINIC | Age: 49
End: 2024-01-09
Payer: COMMERCIAL

## 2024-01-09 DIAGNOSIS — Z01.818 PRE-OP TESTING: ICD-10-CM

## 2024-01-09 LAB
ANION GAP SERPL CALCULATED.3IONS-SCNC: 9 MMOL/L
BUN SERPL-MCNC: 16 MG/DL (ref 5–25)
CALCIUM SERPL-MCNC: 8.8 MG/DL (ref 8.4–10.2)
CHLORIDE SERPL-SCNC: 108 MMOL/L (ref 96–108)
CO2 SERPL-SCNC: 22 MMOL/L (ref 21–32)
CREAT SERPL-MCNC: 0.85 MG/DL (ref 0.6–1.3)
ERYTHROCYTE [DISTWIDTH] IN BLOOD BY AUTOMATED COUNT: 12.6 % (ref 11.6–15.1)
GFR SERPL CREATININE-BSD FRML MDRD: 80 ML/MIN/1.73SQ M
GLUCOSE P FAST SERPL-MCNC: 91 MG/DL (ref 65–99)
HCT VFR BLD AUTO: 38.9 % (ref 34.8–46.1)
HGB BLD-MCNC: 12.7 G/DL (ref 11.5–15.4)
MCH RBC QN AUTO: 29.1 PG (ref 26.8–34.3)
MCHC RBC AUTO-ENTMCNC: 32.6 G/DL (ref 31.4–37.4)
MCV RBC AUTO: 89 FL (ref 82–98)
PLATELET # BLD AUTO: 233 THOUSANDS/UL (ref 149–390)
PMV BLD AUTO: 10.1 FL (ref 8.9–12.7)
POTASSIUM SERPL-SCNC: 4.3 MMOL/L (ref 3.5–5.3)
RBC # BLD AUTO: 4.36 MILLION/UL (ref 3.81–5.12)
SODIUM SERPL-SCNC: 139 MMOL/L (ref 135–147)
WBC # BLD AUTO: 5.47 THOUSAND/UL (ref 4.31–10.16)

## 2024-01-09 PROCEDURE — 85027 COMPLETE CBC AUTOMATED: CPT

## 2024-01-09 PROCEDURE — 36415 COLL VENOUS BLD VENIPUNCTURE: CPT

## 2024-01-09 PROCEDURE — 80048 BASIC METABOLIC PNL TOTAL CA: CPT

## 2024-01-10 ENCOUNTER — OFFICE VISIT (OUTPATIENT)
Dept: LAB | Facility: HOSPITAL | Age: 49
End: 2024-01-10
Payer: COMMERCIAL

## 2024-01-10 DIAGNOSIS — Z01.818 PRE-OP TESTING: ICD-10-CM

## 2024-01-10 LAB
ATRIAL RATE: 71 BPM
P AXIS: 51 DEGREES
PR INTERVAL: 152 MS
QRS AXIS: 9 DEGREES
QRSD INTERVAL: 76 MS
QT INTERVAL: 376 MS
QTC INTERVAL: 408 MS
T WAVE AXIS: 41 DEGREES
VENTRICULAR RATE: 71 BPM

## 2024-01-10 PROCEDURE — 93005 ELECTROCARDIOGRAM TRACING: CPT

## 2024-01-18 RX ORDER — IBUPROFEN 200 MG
TABLET ORAL EVERY 6 HOURS PRN
COMMUNITY
End: 2024-01-26

## 2024-01-18 NOTE — PRE-PROCEDURE INSTRUCTIONS
Pre-Surgery Instructions:   Medication Instructions    ibuprofen (MOTRIN) 200 mg tablet Stop taking 3 days prior to surgery.    Medication instructions for day surgery reviewed. Please use only a sip of water to take your instructed medications. Avoid aspirin and all over the counter vitamins, supplements and NSAIDS for one week prior to surgery per anesthesia guidelines. Tylenol is ok to take as needed.     You will receive a call one business day prior to surgery with an arrival time and hospital directions. If your surgery is scheduled on a Monday, the hospital will be calling you on the Friday prior to your surgery. If you have not heard from anyone by 8pm, please call the hospital supervisor through the hospital  at [phone].     Do not eat or drink anything after midnight the night before your surgery, including candy, mints, lifesavers, or chewing gum. Do not drink alcohol 24hrs before your surgery. Try not to smoke at least 24hrs before your surgery.       Follow the pre surgery showering instructions as listed in the “My Surgical Experience Booklet” or otherwise provided by your surgeon's office. Do not use a blade to shave the surgical area 1 week before surgery. It is okay to use a clean electric clippers up to 24 hours before surgery. Do not apply any lotions, creams, including makeup, cologne, deodorant, or perfumes after showering on the day of your surgery. Do not use dry shampoo, hair spray, hair gel, or any type of hair products.     No contact lenses, eye make-up, or artificial eyelashes. Remove nail polish, including gel polish, and any artificial, gel, or acrylic nails if possible. Remove all jewelry including rings and body piercing jewelry.     Wear causal clothing that is easy to take on and off. Consider your type of surgery.    Keep any valuables, jewelry, piercings at home. Please bring any specially ordered equipment (sling, braces) if indicated.    Arrange for a responsible  person to drive you to and from the hospital on the day of your surgery. Visitor Guidelines discussed.     Call the surgeon's office with any new illnesses, exposures, or additional questions prior to surgery.    Please reference your “My Surgical Experience Booklet” for additional information to prepare for your upcoming surgery.

## 2024-01-24 PROCEDURE — NC001 PR NO CHARGE: Performed by: OBSTETRICS & GYNECOLOGY

## 2024-01-25 NOTE — H&P
Assessment/Plan:    Abnormal uterine bleeding due to endocervical polyp  Episode of heavy prolonged bleeding  Imaging reviewed by me, fundal polyp noted.     Plan for D&C Hysteroscopy with polypectomy    We reviewed the risks of the surgery including but not limited to infection, bleeding, injury to nearby organs (bowel. bladder, ureter, blood vessel, nerve) and possible long term consequences of such injury, as well as possibility of need for blood transfusion and other resuscitative measures.      Diagnoses and all orders for this visit:    Abnormal uterine bleeding due to endocervical polyp          Subjective:      Patient ID: April Cathleen is a 48 y.o. female.    Patient here to discuss possible D&C. Pelvic US 23. 3 fibroids and polyp noted.   Patient said she never took the Provera.  Patient didn't have any more bleeding after that episode in November.  Patient is sexually active    49 yo  with month long episode of bleeding. Very heavy, clots.  Was prescribed provera but did not take it and bleeding did slowly resolve.  No exacerbating or relieving factors.  NO radiation. Acute episode.  Normal menstrual pattern until 9 months ago then began with irregular spotting and then had month long bleed prompting call.   Imaging: fundal endometrial polyp noted 8mm.  Additional fibroids noted within the uterus- small.     Here to discuss plan of care.   Recommend D&C and removal of polyp     Gynecologic Exam  She complains of vaginal bleeding. She reports no genital itching, genital lesions, genital odor, genital rash, pelvic pain or vaginal discharge. This is a new problem. The current episode started more than 1 month ago. The problem occurs constantly. The problem has been resolved since onset. The patient is experiencing no pain. Pertinent negatives include no abdominal pain, anorexia, back pain, chills, constipation, diarrhea, dysuria, fever, flank pain, frequency, headaches, hematuria, nausea, painful  intercourse, rash, sore throat, urgency or vomiting. The vaginal bleeding is heavier than menses. Patient has been passing clots. Patient has not been passing tissue. Nothing aggravates the symptoms. Past treatments include acetaminophen, heating pads and NSAIDs. The treatment provided no relief. She is sexually active. The patient's menstrual history has been irregular.       The following portions of the patient's history were reviewed and updated as appropriate: She  has a past medical history of Allergic, Anxiety, BRCA1 negative, BRCA2 negative, Cancer (HCC) (06/16/2019), Chronic kidney disease (08/2019), Colon polyp, Gastritis, Hiatal hernia, Nodule of upper lobe of right lung (04/2021), Personal history of COVID-19, PONV (postoperative nausea and vomiting), Renal cell carcinoma (HCC) (06/2019), and Renal mass, right.  She   Patient Active Problem List    Diagnosis Date Noted    Abnormal uterine bleeding due to endocervical polyp 12/22/2023    Skin lesions 12/08/2023    Ventral hernia 12/07/2022    Chronic right shoulder pain 06/06/2022    Encounter for annual routine gynecological examination 04/18/2022    Microscopic hematuria 02/07/2022    Primary adenocarcinoma of upper lobe of right lung (HCC) 05/04/2021    Lung nodule 12/15/2020    Abnormal CT of the chest     Mixed hyperlipidemia 08/07/2020    Anxiety 08/07/2020    Hypocalcemia 11/05/2019    Renal cell carcinoma of right kidney (HCC) 09/11/2019    Gastritis 05/07/2019    Hiatal hernia 05/07/2019    Plantar wart, left foot 05/07/2019     She  has a past surgical history that includes Colonoscopy; [place] tooth extraction; pr laparoscopy surg partial nephrectomy (Right, 08/26/2019); US guided breast biopsy left complete (Left, 01/15/2020); Breast biopsy (Left, 01/2020); pr thoracoscopy w/thera wedge resexn initial unilat (Right, 04/14/2021); pr thoracoscopy w/lobectomy single lobe (Right, 04/14/2021); pr [facility] incl fluor gdnce dx w/cell washg spx (N/A,  04/14/2021); Lung segmentectomy (Right, 04/14/2021); Cervical biopsy w/ loop electrode excision; and EGD.  Her family history includes Arthritis in her paternal grandmother; Breast cancer (age of onset: 70) in her paternal grandfather and paternal grandmother; Cancer in her paternal grandmother; Coronary artery disease in her father; Diabetes in her paternal grandmother; Heart disease in her father; No Known Problems in her maternal aunt, maternal aunt, maternal grandmother, mother, and sister; Other in her maternal aunt.  She  reports that she quit smoking about 28 years ago. Her smoking use included cigarettes. She has never used smokeless tobacco. She reports that she does not currently use alcohol. She reports that she does not use drugs.  No current facility-administered medications for this encounter.     Current Outpatient Medications   Medication Sig Dispense Refill    ibuprofen (MOTRIN) 200 mg tablet Take by mouth every 6 (six) hours as needed for mild pain      dicyclomine (BENTYL) 20 mg tablet Take 1 tablet (20 mg total) by mouth 4 (four) times a day as needed (Abdominal pain) (Patient not taking: Reported on 11/21/2023) 60 tablet 5    medroxyPROGESTERone (PROVERA) 10 mg tablet Take 1 tablet (10 mg total) by mouth daily for 10 days 10 tablet 0    pantoprazole (PROTONIX) 40 mg tablet Take 1 tablet (40 mg total) by mouth daily before breakfast (Patient not taking: Reported on 11/21/2023) 30 tablet 5     No current facility-administered medications on file prior to encounter.     Current Outpatient Medications on File Prior to Encounter   Medication Sig    ibuprofen (MOTRIN) 200 mg tablet Take by mouth every 6 (six) hours as needed for mild pain    dicyclomine (BENTYL) 20 mg tablet Take 1 tablet (20 mg total) by mouth 4 (four) times a day as needed (Abdominal pain) (Patient not taking: Reported on 11/21/2023)    medroxyPROGESTERone (PROVERA) 10 mg tablet Take 1 tablet (10 mg total) by mouth daily for 10 days  "   pantoprazole (PROTONIX) 40 mg tablet Take 1 tablet (40 mg total) by mouth daily before breakfast (Patient not taking: Reported on 11/21/2023)     She is allergic to strawberry (diagnostic) - food allergy and tetracycline..    Review of Systems   Constitutional:  Negative for activity change, appetite change, chills, fatigue and fever.   HENT:  Negative for rhinorrhea, sneezing and sore throat.    Eyes:  Negative for visual disturbance.   Respiratory:  Negative for cough, shortness of breath and wheezing.    Cardiovascular:  Negative for chest pain, palpitations and leg swelling.   Gastrointestinal:  Negative for abdominal distention, abdominal pain, anorexia, constipation, diarrhea, nausea and vomiting.   Genitourinary:  Negative for difficulty urinating, dysuria, flank pain, frequency, hematuria, pelvic pain, urgency and vaginal discharge.   Musculoskeletal:  Negative for back pain.   Skin:  Negative for rash.   Neurological:  Negative for syncope, light-headedness and headaches.         Objective:      Ht 5' 5\" (1.651 m)   Wt 80.3 kg (177 lb)   BMI 29.45 kg/m²          Physical Exam  Constitutional:       General: She is not in acute distress.     Appearance: She is well-developed. She is not diaphoretic.   Neck:      Vascular: No JVD.   Cardiovascular:      Rate and Rhythm: Normal rate and regular rhythm.      Heart sounds: Normal heart sounds. No murmur heard.     No friction rub. No gallop.   Pulmonary:      Effort: Pulmonary effort is normal. No respiratory distress.      Breath sounds: Normal breath sounds.   Abdominal:      General: Bowel sounds are normal. There is no distension.      Palpations: Abdomen is soft.      Tenderness: There is no abdominal tenderness. There is no guarding or rebound.   Genitourinary:     Labia:         Right: No rash, tenderness or lesion.         Left: No rash, tenderness or lesion.       Vagina: Normal. No vaginal discharge, erythema, tenderness or bleeding.      Cervix: " No cervical motion tenderness, discharge, friability or erythema.      Uterus: Not deviated, not enlarged, not fixed and not tender.       Adnexa:         Right: No mass, tenderness or fullness.          Left: No mass, tenderness or fullness.     Musculoskeletal:      Cervical back: Neck supple.      Right lower leg: No edema.      Left lower leg: No edema.   Lymphadenopathy:      Cervical: No cervical adenopathy.   Neurological:      Mental Status: She is alert and oriented to person, place, and time.      Deep Tendon Reflexes: Reflexes are normal and symmetric.

## 2024-01-26 ENCOUNTER — ANESTHESIA EVENT (OUTPATIENT)
Dept: PERIOP | Facility: HOSPITAL | Age: 49
End: 2024-01-26
Payer: COMMERCIAL

## 2024-01-26 ENCOUNTER — HOSPITAL ENCOUNTER (OUTPATIENT)
Facility: HOSPITAL | Age: 49
Setting detail: OUTPATIENT SURGERY
Discharge: HOME | End: 2024-01-26
Attending: OBSTETRICS & GYNECOLOGY | Admitting: OBSTETRICS & GYNECOLOGY
Payer: COMMERCIAL

## 2024-01-26 ENCOUNTER — ANESTHESIA (OUTPATIENT)
Dept: PERIOP | Facility: HOSPITAL | Age: 49
End: 2024-01-26
Payer: COMMERCIAL

## 2024-01-26 VITALS
OXYGEN SATURATION: 96 % | WEIGHT: 178.57 LBS | TEMPERATURE: 98.8 F | RESPIRATION RATE: 19 BRPM | HEIGHT: 66 IN | DIASTOLIC BLOOD PRESSURE: 74 MMHG | SYSTOLIC BLOOD PRESSURE: 116 MMHG | HEART RATE: 75 BPM | BODY MASS INDEX: 28.7 KG/M2

## 2024-01-26 DIAGNOSIS — N93.9 ABNORMAL UTERINE BLEEDING (AUB): ICD-10-CM

## 2024-01-26 DIAGNOSIS — N84.1 ENDOCERVICAL POLYP: ICD-10-CM

## 2024-01-26 DIAGNOSIS — G89.18 POSTOPERATIVE PAIN: Primary | ICD-10-CM

## 2024-01-26 PROBLEM — D25.0 FIBROIDS, SUBMUCOSAL: Status: ACTIVE | Noted: 2024-01-26

## 2024-01-26 LAB
EXT PREGNANCY TEST URINE: NEGATIVE
EXT. CONTROL: NORMAL

## 2024-01-26 PROCEDURE — 81025 URINE PREGNANCY TEST: CPT | Performed by: OBSTETRICS & GYNECOLOGY

## 2024-01-26 PROCEDURE — 58558 HYSTEROSCOPY BIOPSY: CPT | Performed by: OBSTETRICS & GYNECOLOGY

## 2024-01-26 PROCEDURE — 88305 TISSUE EXAM BY PATHOLOGIST: CPT | Performed by: STUDENT IN AN ORGANIZED HEALTH CARE EDUCATION/TRAINING PROGRAM

## 2024-01-26 RX ORDER — FENTANYL CITRATE/PF 50 MCG/ML
50 SYRINGE (ML) INJECTION
Status: DISCONTINUED | OUTPATIENT
Start: 2024-01-26 | End: 2024-01-26 | Stop reason: HOSPADM

## 2024-01-26 RX ORDER — DEXAMETHASONE SODIUM PHOSPHATE 10 MG/ML
INJECTION, SOLUTION INTRAMUSCULAR; INTRAVENOUS AS NEEDED
Status: DISCONTINUED | OUTPATIENT
Start: 2024-01-26 | End: 2024-01-26

## 2024-01-26 RX ORDER — ONDANSETRON 2 MG/ML
4 INJECTION INTRAMUSCULAR; INTRAVENOUS ONCE AS NEEDED
Status: DISCONTINUED | OUTPATIENT
Start: 2024-01-26 | End: 2024-01-26 | Stop reason: HOSPADM

## 2024-01-26 RX ORDER — OXYCODONE HYDROCHLORIDE 5 MG/1
5 TABLET ORAL EVERY 4 HOURS PRN
Status: DISCONTINUED | OUTPATIENT
Start: 2024-01-26 | End: 2024-01-26 | Stop reason: HOSPADM

## 2024-01-26 RX ORDER — ACETAMINOPHEN 325 MG/1
650 TABLET ORAL EVERY 6 HOURS PRN
Start: 2024-01-26

## 2024-01-26 RX ORDER — HYDROMORPHONE HCL/PF 1 MG/ML
0.5 SYRINGE (ML) INJECTION
Status: DISCONTINUED | OUTPATIENT
Start: 2024-01-26 | End: 2024-01-26 | Stop reason: HOSPADM

## 2024-01-26 RX ORDER — MIDAZOLAM HYDROCHLORIDE 2 MG/2ML
INJECTION, SOLUTION INTRAMUSCULAR; INTRAVENOUS AS NEEDED
Status: DISCONTINUED | OUTPATIENT
Start: 2024-01-26 | End: 2024-01-26

## 2024-01-26 RX ORDER — PROPOFOL 10 MG/ML
INJECTION, EMULSION INTRAVENOUS AS NEEDED
Status: DISCONTINUED | OUTPATIENT
Start: 2024-01-26 | End: 2024-01-26

## 2024-01-26 RX ORDER — ONDANSETRON 2 MG/ML
INJECTION INTRAMUSCULAR; INTRAVENOUS AS NEEDED
Status: DISCONTINUED | OUTPATIENT
Start: 2024-01-26 | End: 2024-01-26

## 2024-01-26 RX ORDER — SODIUM CHLORIDE, SODIUM LACTATE, POTASSIUM CHLORIDE, CALCIUM CHLORIDE 600; 310; 30; 20 MG/100ML; MG/100ML; MG/100ML; MG/100ML
INJECTION, SOLUTION INTRAVENOUS CONTINUOUS PRN
Status: DISCONTINUED | OUTPATIENT
Start: 2024-01-26 | End: 2024-01-26

## 2024-01-26 RX ORDER — IBUPROFEN 600 MG/1
600 TABLET ORAL EVERY 6 HOURS PRN
Start: 2024-01-26

## 2024-01-26 RX ORDER — ACETAMINOPHEN 325 MG/1
650 TABLET ORAL EVERY 6 HOURS PRN
Status: DISCONTINUED | OUTPATIENT
Start: 2024-01-26 | End: 2024-01-26 | Stop reason: SDUPTHER

## 2024-01-26 RX ORDER — PROPOFOL 10 MG/ML
INJECTION, EMULSION INTRAVENOUS CONTINUOUS PRN
Status: DISCONTINUED | OUTPATIENT
Start: 2024-01-26 | End: 2024-01-26

## 2024-01-26 RX ORDER — MAGNESIUM HYDROXIDE 1200 MG/15ML
LIQUID ORAL AS NEEDED
Status: DISCONTINUED | OUTPATIENT
Start: 2024-01-26 | End: 2024-01-26 | Stop reason: HOSPADM

## 2024-01-26 RX ORDER — FENTANYL CITRATE 50 UG/ML
INJECTION, SOLUTION INTRAMUSCULAR; INTRAVENOUS AS NEEDED
Status: DISCONTINUED | OUTPATIENT
Start: 2024-01-26 | End: 2024-01-26

## 2024-01-26 RX ORDER — LIDOCAINE HYDROCHLORIDE AND EPINEPHRINE 10; 10 MG/ML; UG/ML
INJECTION, SOLUTION INFILTRATION; PERINEURAL AS NEEDED
Status: DISCONTINUED | OUTPATIENT
Start: 2024-01-26 | End: 2024-01-26 | Stop reason: HOSPADM

## 2024-01-26 RX ORDER — ACETAMINOPHEN 325 MG/1
650 TABLET ORAL EVERY 6 HOURS PRN
Status: DISCONTINUED | OUTPATIENT
Start: 2024-01-26 | End: 2024-01-26 | Stop reason: HOSPADM

## 2024-01-26 RX ORDER — KETOROLAC TROMETHAMINE 30 MG/ML
INJECTION, SOLUTION INTRAMUSCULAR; INTRAVENOUS AS NEEDED
Status: DISCONTINUED | OUTPATIENT
Start: 2024-01-26 | End: 2024-01-26

## 2024-01-26 RX ADMIN — FENTANYL CITRATE 25 MCG: 50 INJECTION, SOLUTION INTRAMUSCULAR; INTRAVENOUS at 10:30

## 2024-01-26 RX ADMIN — ONDANSETRON 4 MG: 2 INJECTION INTRAMUSCULAR; INTRAVENOUS at 10:42

## 2024-01-26 RX ADMIN — DEXAMETHASONE SODIUM PHOSPHATE 10 MG: 10 INJECTION INTRAMUSCULAR; INTRAVENOUS at 10:42

## 2024-01-26 RX ADMIN — ACETAMINOPHEN 650 MG: 325 TABLET, FILM COATED ORAL at 11:24

## 2024-01-26 RX ADMIN — FENTANYL CITRATE 25 MCG: 50 INJECTION, SOLUTION INTRAMUSCULAR; INTRAVENOUS at 10:26

## 2024-01-26 RX ADMIN — PROPOFOL 150 MCG/KG/MIN: 10 INJECTION, EMULSION INTRAVENOUS at 10:25

## 2024-01-26 RX ADMIN — MIDAZOLAM HYDROCHLORIDE 2 MG: 1 INJECTION, SOLUTION INTRAMUSCULAR; INTRAVENOUS at 10:21

## 2024-01-26 RX ADMIN — KETOROLAC TROMETHAMINE 30 MG: 30 INJECTION, SOLUTION INTRAMUSCULAR; INTRAVENOUS at 10:48

## 2024-01-26 RX ADMIN — PROPOFOL 50 MG: 10 INJECTION, EMULSION INTRAVENOUS at 10:25

## 2024-01-26 RX ADMIN — FENTANYL CITRATE 50 MCG: 50 INJECTION, SOLUTION INTRAMUSCULAR; INTRAVENOUS at 10:55

## 2024-01-26 RX ADMIN — SODIUM CHLORIDE, SODIUM LACTATE, POTASSIUM CHLORIDE, AND CALCIUM CHLORIDE: .6; .31; .03; .02 INJECTION, SOLUTION INTRAVENOUS at 10:22

## 2024-01-26 NOTE — INTERVAL H&P NOTE
H&P reviewed. After examining the patient I find no changes in the patients condition since the H&P had been written.    Vitals:    01/26/24 0908   BP: 133/61   Pulse: 77   Resp: 16   Temp: (!) 97.4 °F (36.3 °C)   SpO2: 97%

## 2024-01-26 NOTE — ANESTHESIA POSTPROCEDURE EVALUATION
Post-Op Assessment Note    CV Status:  Stable  Pain Score: 5    Pain management: adequate       Mental Status:  Awake and sleepy   Hydration Status:  Euvolemic   PONV Controlled:  Controlled   Airway Patency:  Patent     Post Op Vitals Reviewed: Yes    No anethesia notable event occurred.    Staff: CRNA               BP   110/69   Temp   97   Pulse  81   Resp   14   SpO2   99

## 2024-01-26 NOTE — OP NOTE
OPERATIVE REPORT  PATIENT NAME: Kelley Connolly    :  1975  MRN: [id number]  Pt Location: [facility] OR ROOM 02    SURGERY DATE: 2024    Surgeons and Role:     * Yennifer Booker MD - Primary    Preop Diagnosis:  Endocervical polyp [N84.1]  Abnormal uterine bleeding (AUB) [N93.9]    Post-Op Diagnosis Codes:     * Endocervical polyp [N84.1]     * Abnormal uterine bleeding (AUB) [N93.9]    Procedure(s):  D&C HYSTEROCOPY  myomectomy    Specimen(s):  ID Type Source Tests Collected by Time Destination   1 : endometrial curettings Tissue Endometrium TISSUE EXAM Yennifer Booker MD 2024 1006    2 : endometrial fibroid Tissue Endometrium TISSUE EXAM Yennifer Booker MD 2024 1047        Estimated Blood Loss:   Minimal    Drains:  * No LDAs found *    Anesthesia Type:   IV Sedation with Anesthesia    Operative Indications:  Endocervical polyp [N84.1]  Abnormal uterine bleeding (AUB) [N93.9]      Operative Findings:  Cervix normal in appearance. Small polypoid lesion noted at os  Endometrium: submucosal fibroid noted mid body on right. Smooth cavity otherwise.  Bilateral ostia visualized.  Endocervical canal no polyp visualized.                 Complications:   None    Procedure and Technique:  Patient was identified in the holding area and brought back to the operating room where general anesthesia was initiated. Bilateral lower extremity compression boots were placed prior to initiation of anesthesia. Patient was placed in the dorsal lithotomy position and prepped and draped in a sterile fashion.  Timeout procedure was completed.   Exam under anesthesia was completed and above findings noted.    A weighted speculum and kimberly were placed into the vagina for visualization of the cervix.  A single toothed tenaculum was used to grasp the anterior lip of the cervix.  Paracervical block was placed.   The uterus was sounded and the cervix was dilated to accommodate the insertion of the  hysteroscope.    Using the [employer] fluid management system according to product instructions, the device was primed prior to use.    The hysteroscope was inserted and using normal saline as the distension fluid the endometrium was visualized. The above findings were noted.    The resection device was inserted through the hysteroscope and used to remove the tissue under direct visualization.      Good hemostasis was noted.      The hysteroscope was removed and the specimen was sent to pathology for evaluation.    Endometrial currettage completed, including the cervical canal and specimen sent to pathology     All instruments were removed from the vagina and good hemostasis was noted.  The patient was extubated and brought the the recovery room in stable condition. All sponge, lap and needle counts were correct.      I was present for the entire procedure., I was present for all critical portions of the procedure., and A qualified resident physician was not available.    Patient Disposition:  PACU         SIGNATURE: Yennifer Booker MD  DATE: January 26, 2024  TIME: 11:01 AM

## 2024-01-29 ENCOUNTER — HOSPITAL ENCOUNTER (OUTPATIENT)
Dept: MAMMOGRAPHY | Facility: CLINIC | Age: 49
Discharge: HOME | End: 2024-01-29
Payer: COMMERCIAL

## 2024-01-29 VITALS — BODY MASS INDEX: 28.61 KG/M2 | HEIGHT: 66 IN | WEIGHT: 178 LBS

## 2024-01-29 DIAGNOSIS — Z12.31 VISIT FOR SCREENING MAMMOGRAM: ICD-10-CM

## 2024-01-29 PROCEDURE — 77067 SCR MAMMO BI INCL CAD: CPT

## 2024-01-29 PROCEDURE — 77063 BREAST TOMOSYNTHESIS BI: CPT

## 2024-01-30 PROCEDURE — 88305 TISSUE EXAM BY PATHOLOGIST: CPT | Performed by: STUDENT IN AN ORGANIZED HEALTH CARE EDUCATION/TRAINING PROGRAM

## 2024-02-21 PROBLEM — Z01.419 ENCOUNTER FOR ANNUAL ROUTINE GYNECOLOGICAL EXAMINATION: Status: RESOLVED | Noted: 2022-04-18 | Resolved: 2024-02-21

## 2024-05-06 ENCOUNTER — ANNUAL EXAM (OUTPATIENT)
Dept: OBGYN CLINIC | Facility: CLINIC | Age: 49
End: 2024-05-06
Payer: COMMERCIAL

## 2024-05-06 VITALS
DIASTOLIC BLOOD PRESSURE: 84 MMHG | WEIGHT: 185.2 LBS | SYSTOLIC BLOOD PRESSURE: 116 MMHG | HEIGHT: 66 IN | BODY MASS INDEX: 29.77 KG/M2

## 2024-05-06 DIAGNOSIS — Z01.419 ENCOUNTER FOR ANNUAL ROUTINE GYNECOLOGICAL EXAMINATION: Primary | ICD-10-CM

## 2024-05-06 DIAGNOSIS — Z12.11 COLON CANCER SCREENING: ICD-10-CM

## 2024-05-06 DIAGNOSIS — Z12.4 CERVICAL CANCER SCREENING: ICD-10-CM

## 2024-05-06 DIAGNOSIS — Z12.31 ENCOUNTER FOR SCREENING MAMMOGRAM FOR BREAST CANCER: ICD-10-CM

## 2024-05-06 PROBLEM — N84.1 ABNORMAL UTERINE BLEEDING DUE TO ENDOCERVICAL POLYP: Status: RESOLVED | Noted: 2023-12-22 | Resolved: 2024-05-06

## 2024-05-06 PROBLEM — D25.0 FIBROIDS, SUBMUCOSAL: Status: RESOLVED | Noted: 2024-01-26 | Resolved: 2024-05-06

## 2024-05-06 PROBLEM — N93.9 ABNORMAL UTERINE BLEEDING DUE TO ENDOCERVICAL POLYP: Status: RESOLVED | Noted: 2023-12-22 | Resolved: 2024-05-06

## 2024-05-06 PROCEDURE — G0145 SCR C/V CYTO,THINLAYER,RESCR: HCPCS | Performed by: OBSTETRICS & GYNECOLOGY

## 2024-05-06 PROCEDURE — 99396 PREV VISIT EST AGE 40-64: CPT | Performed by: OBSTETRICS & GYNECOLOGY

## 2024-05-06 PROCEDURE — G0476 HPV COMBO ASSAY CA SCREEN: HCPCS | Performed by: OBSTETRICS & GYNECOLOGY

## 2024-05-06 NOTE — PROGRESS NOTES
Assessment/Plan:    Encounter for annual routine gynecological examination  Pap/HPV collected patient request  Mammogram ordered  Colonoscopy referral placed    Encourage healthy diet, exercise, Calcium 1200mg per day and at least 800 iu Vitamin D daily.         Diagnoses and all orders for this visit:    Encounter for annual routine gynecological examination    Encounter for screening mammogram for breast cancer  -     Mammo screening bilateral w 3d & cad; Future    Colon cancer screening  -     Ambulatory Referral to Gastroenterology; Future          Subjective:      Patient ID: Kelley Connolly is a 49 y.o. female.    Patient presents for a routine annual visit  Menarche- Y/O  Last Pap Smear- 23 neg/neg  LMP-3/30/24  Birth control-none  Mammogram- 23  Colonoscopy-referral placed in , . New referral pended.     Former smoker  Social drinker  Currently sexually active  PGM and PGF with breast cancer    S/p D&C Hysteroscopy 24. Doing well.     Pap today? Per pt request due to having hx of LEEP.    Would like to discuss weight gain of about 30lb in the last 3 years.   Menses: returned after D&C  - no menses this month yet.  Some hot flashes    Gynecologic Exam  She complains of missed menses. She reports no genital itching, genital lesions, genital odor, genital rash, pelvic pain, vaginal bleeding or vaginal discharge. The patient is experiencing no pain. Pertinent negatives include no chills, constipation, diarrhea, fever, nausea, sore throat or vomiting.       The following portions of the patient's history were reviewed and updated as appropriate: She  has a past medical history of Allergic, Anxiety, BRCA1 negative, BRCA2 negative, Cancer (HCC) (2019), Chronic kidney disease (2019), Colon polyp, Fibroids, submucosal (2024), Gastritis, Hiatal hernia, Nodule of upper lobe of right lung (2021), Personal history of COVID-19, PONV (postoperative nausea and vomiting), Renal  cell carcinoma (HCC) (06/2019), and Renal mass, right.  She   Patient Active Problem List    Diagnosis Date Noted    Encounter for annual routine gynecological examination 05/06/2024    Skin lesions 12/08/2023    Ventral hernia 12/07/2022    Chronic right shoulder pain 06/06/2022    Microscopic hematuria 02/07/2022    Primary adenocarcinoma of upper lobe of right lung (HCC) 05/04/2021    Lung nodule 12/15/2020    Abnormal CT of the chest     Mixed hyperlipidemia 08/07/2020    Anxiety 08/07/2020    Hypocalcemia 11/05/2019    Renal cell carcinoma of right kidney (HCC) 09/11/2019    Gastritis 05/07/2019    Hiatal hernia 05/07/2019    Plantar wart, left foot 05/07/2019     She  has a past surgical history that includes Colonoscopy; [place] tooth extraction; pr laparoscopy surg partial nephrectomy (Right, 08/26/2019); US guided breast biopsy left complete (Left, 01/15/2020); Breast biopsy (Left, 01/2020); pr thoracoscopy w/thera wedge resexn initial unilat (Right, 04/14/2021); pr thoracoscopy w/lobectomy single lobe (Right, 04/14/2021); pr [facility] incl fluor gdnce dx w/cell washg spx (N/A, 04/14/2021); Lung segmentectomy (Right, 04/14/2021); Cervical biopsy w/ loop electrode excision; EGD; pr hysteroscopy bx endometrium&/polypc w/wo d&c (N/A, 1/26/2024); and pr hysteroscopy bx endometrium&/polypc w/wo d&c (N/A, 1/26/2024).  Her family history includes Arthritis in her paternal grandmother; Breast cancer (age of onset: 70) in her paternal grandfather and paternal grandmother; Cancer in her paternal grandmother; Coronary artery disease in her father; Diabetes in her paternal grandmother; Heart disease in her father; No Known Problems in her maternal aunt, maternal aunt, maternal grandmother, mother, and sister; Other in her maternal aunt.  She  reports that she quit smoking about 28 years ago. Her smoking use included cigarettes. She has never used smokeless tobacco. She reports that she does not currently use alcohol. She  "reports that she does not use drugs.  Current Outpatient Medications   Medication Sig Dispense Refill    acetaminophen (TYLENOL) 325 mg tablet Take 2 tablets (650 mg total) by mouth every 6 (six) hours as needed for mild pain (Patient not taking: Reported on 5/6/2024)      ibuprofen (MOTRIN) 600 mg tablet Take 1 tablet (600 mg total) by mouth every 6 (six) hours as needed for moderate pain (Patient not taking: Reported on 5/6/2024)       No current facility-administered medications for this visit.     Current Outpatient Medications on File Prior to Visit   Medication Sig    acetaminophen (TYLENOL) 325 mg tablet Take 2 tablets (650 mg total) by mouth every 6 (six) hours as needed for mild pain (Patient not taking: Reported on 5/6/2024)    ibuprofen (MOTRIN) 600 mg tablet Take 1 tablet (600 mg total) by mouth every 6 (six) hours as needed for moderate pain (Patient not taking: Reported on 5/6/2024)     No current facility-administered medications on file prior to visit.     She is allergic to strawberry (diagnostic) - food allergy and tetracycline..    Review of Systems   Constitutional:  Negative for activity change, appetite change, chills, fatigue and fever.   HENT:  Negative for rhinorrhea, sneezing and sore throat.    Eyes:  Negative for visual disturbance.   Respiratory:  Negative for cough, shortness of breath and wheezing.    Cardiovascular:  Negative for chest pain, palpitations and leg swelling.   Gastrointestinal:  Negative for abdominal distention, constipation, diarrhea, nausea and vomiting.   Genitourinary:  Positive for missed menses. Negative for difficulty urinating, pelvic pain and vaginal discharge.   Neurological:  Negative for syncope and light-headedness.         Objective:      /84 (BP Location: Left arm, Patient Position: Sitting, Cuff Size: Standard)   Ht 5' 5.75\" (1.67 m)   Wt 84 kg (185 lb 3.2 oz)   LMP 03/30/2024   BMI 30.12 kg/m²          Physical Exam  Chest:   Breasts:     " Breasts are symmetrical.      Right: No inverted nipple, mass, nipple discharge, skin change or tenderness.      Left: No inverted nipple, mass, nipple discharge, skin change or tenderness.   Genitourinary:     Labia:         Right: No rash, tenderness, lesion or injury.         Left: No rash, tenderness, lesion or injury.       Vagina: Normal. No vaginal discharge, erythema, tenderness or bleeding.      Cervix: No cervical motion tenderness, discharge, friability, erythema or cervical bleeding.      Uterus: Not deviated, not enlarged, not fixed and not tender.       Adnexa:         Right: No mass, tenderness or fullness.          Left: No mass, tenderness or fullness.     Neurological:      Mental Status: She is alert and oriented to person, place, and time.

## 2024-05-06 NOTE — ASSESSMENT & PLAN NOTE
Pap/HPV collected patient request  Mammogram ordered  Colonoscopy referral placed    Encourage healthy diet, exercise, Calcium 1200mg per day and at least 800 iu Vitamin D daily.

## 2024-05-11 LAB
LAB AP GYN PRIMARY INTERPRETATION: NORMAL
Lab: NORMAL

## 2024-05-21 ENCOUNTER — HOSPITAL ENCOUNTER (OUTPATIENT)
Dept: CT IMAGING | Facility: HOSPITAL | Age: 49
Discharge: HOME | End: 2024-05-21
Payer: COMMERCIAL

## 2024-05-21 DIAGNOSIS — C64.1 RENAL CELL CARCINOMA OF RIGHT KIDNEY (HCC): ICD-10-CM

## 2024-05-21 PROCEDURE — 71250 CT THORAX DX C-: CPT

## 2024-05-21 PROCEDURE — 74176 CT ABD & PELVIS W/O CONTRAST: CPT

## 2024-05-21 PROCEDURE — G1004 CDSM NDSC: HCPCS

## 2024-05-31 ENCOUNTER — TELEPHONE (OUTPATIENT)
Dept: HEMATOLOGY ONCOLOGY | Facility: CLINIC | Age: 49
End: 2024-05-31

## 2024-05-31 NOTE — TELEPHONE ENCOUNTER
Patient Call    Who are you speaking with? Patient    If it is not the patient, are they listed on an active communication consent form? N/A   What is the reason for this call? Pt is calling in regards to a CT scan pt had done on 5/21/24. Pt has an appt with Dr Shafer on 6/4/24 and the results are still not ready. Pt is asking if it is possible to get the test read. Please call pt back regarding the results.    Does this require a call back? Yes   If a call back is required, please list best call back number [phone]   If a call back is required, advise that a message will be forwarded to their care team and someone will return their call as soon as possible.   Did you relay this information to the patient? Yes

## 2024-05-31 NOTE — TELEPHONE ENCOUNTER
If you haven't called can you please call for these results. Patient see Lucy on Tuesday.  Thanks,  Dariana  I spoke with patient and told her we would have CTC read for visit on 6/4/24.

## 2024-06-04 ENCOUNTER — APPOINTMENT (OUTPATIENT)
Dept: LAB | Facility: CLINIC | Age: 49
End: 2024-06-04
Payer: COMMERCIAL

## 2024-06-04 ENCOUNTER — OFFICE VISIT (OUTPATIENT)
Dept: CARDIAC SURGERY | Facility: CLINIC | Age: 49
End: 2024-06-04
Payer: COMMERCIAL

## 2024-06-04 VITALS
HEIGHT: 66 IN | BODY MASS INDEX: 30.02 KG/M2 | HEART RATE: 78 BPM | WEIGHT: 186.8 LBS | SYSTOLIC BLOOD PRESSURE: 118 MMHG | DIASTOLIC BLOOD PRESSURE: 78 MMHG | RESPIRATION RATE: 18 BRPM | OXYGEN SATURATION: 99 % | TEMPERATURE: 97.9 F

## 2024-06-04 DIAGNOSIS — K44.9 HIATAL HERNIA: ICD-10-CM

## 2024-06-04 DIAGNOSIS — E83.51 HYPOCALCEMIA: ICD-10-CM

## 2024-06-04 DIAGNOSIS — C34.11 PRIMARY ADENOCARCINOMA OF UPPER LOBE OF RIGHT LUNG (HCC): Primary | ICD-10-CM

## 2024-06-04 DIAGNOSIS — Z13.29 SCREENING FOR THYROID DISORDER: ICD-10-CM

## 2024-06-04 DIAGNOSIS — E78.2 MIXED HYPERLIPIDEMIA: ICD-10-CM

## 2024-06-04 DIAGNOSIS — K29.00 ACUTE SUPERFICIAL GASTRITIS WITHOUT HEMORRHAGE: ICD-10-CM

## 2024-06-04 DIAGNOSIS — E78.2 MIXED HYPERLIPIDEMIA: Primary | ICD-10-CM

## 2024-06-04 LAB
ALBUMIN SERPL BCP-MCNC: 4.7 G/DL (ref 3.5–5)
ALP SERPL-CCNC: 97 U/L (ref 34–104)
ALT SERPL W P-5'-P-CCNC: 12 U/L (ref 7–52)
ANION GAP SERPL CALCULATED.3IONS-SCNC: 8 MMOL/L (ref 4–13)
AST SERPL W P-5'-P-CCNC: 14 U/L (ref 13–39)
BASOPHILS # BLD AUTO: 0.05 THOUSANDS/ÂΜL (ref 0–0.1)
BASOPHILS NFR BLD AUTO: 1 % (ref 0–1)
BILIRUB SERPL-MCNC: 0.41 MG/DL (ref 0.2–1)
BUN SERPL-MCNC: 15 MG/DL (ref 5–25)
CALCIUM SERPL-MCNC: 8.8 MG/DL (ref 8.4–10.2)
CHLORIDE SERPL-SCNC: 106 MMOL/L (ref 96–108)
CHOLEST SERPL-MCNC: 175 MG/DL
CO2 SERPL-SCNC: 26 MMOL/L (ref 21–32)
CREAT SERPL-MCNC: 0.86 MG/DL (ref 0.6–1.3)
EOSINOPHIL # BLD AUTO: 0.07 THOUSAND/ÂΜL (ref 0–0.61)
EOSINOPHIL NFR BLD AUTO: 1 % (ref 0–6)
ERYTHROCYTE [DISTWIDTH] IN BLOOD BY AUTOMATED COUNT: 12.8 % (ref 11.6–15.1)
GFR SERPL CREATININE-BSD FRML MDRD: 79 ML/MIN/1.73SQ M
GLUCOSE P FAST SERPL-MCNC: 86 MG/DL (ref 65–99)
HCT VFR BLD AUTO: 41.5 % (ref 34.8–46.1)
HDLC SERPL-MCNC: 61 MG/DL
HGB BLD-MCNC: 13.6 G/DL (ref 11.5–15.4)
IMM GRANULOCYTES # BLD AUTO: 0.02 THOUSAND/UL (ref 0–0.2)
IMM GRANULOCYTES NFR BLD AUTO: 0 % (ref 0–2)
LDLC SERPL CALC-MCNC: 97 MG/DL (ref 0–100)
LYMPHOCYTES # BLD AUTO: 1.89 THOUSANDS/ÂΜL (ref 0.6–4.47)
LYMPHOCYTES NFR BLD AUTO: 31 % (ref 14–44)
MCH RBC QN AUTO: 29.2 PG (ref 26.8–34.3)
MCHC RBC AUTO-ENTMCNC: 32.8 G/DL (ref 31.4–37.4)
MCV RBC AUTO: 89 FL (ref 82–98)
MONOCYTES # BLD AUTO: 0.45 THOUSAND/ÂΜL (ref 0.17–1.22)
MONOCYTES NFR BLD AUTO: 7 % (ref 4–12)
NEUTROPHILS # BLD AUTO: 3.61 THOUSANDS/ÂΜL (ref 1.85–7.62)
NEUTS SEG NFR BLD AUTO: 60 % (ref 43–75)
NRBC BLD AUTO-RTO: 0 /100 WBCS
PLATELET # BLD AUTO: 207 THOUSANDS/UL (ref 149–390)
PMV BLD AUTO: 11.2 FL (ref 8.9–12.7)
POTASSIUM SERPL-SCNC: 4.1 MMOL/L (ref 3.5–5.3)
PROT SERPL-MCNC: 7.2 G/DL (ref 6.4–8.4)
RBC # BLD AUTO: 4.66 MILLION/UL (ref 3.81–5.12)
SODIUM SERPL-SCNC: 140 MMOL/L (ref 135–147)
TRIGL SERPL-MCNC: 85 MG/DL
TSH SERPL DL<=0.05 MIU/L-ACNC: 0.8 UIU/ML (ref 0.45–4.5)
WBC # BLD AUTO: 6.09 THOUSAND/UL (ref 4.31–10.16)

## 2024-06-04 PROCEDURE — 99214 OFFICE O/P EST MOD 30 MIN: CPT | Performed by: THORACIC SURGERY (CARDIOTHORACIC VASCULAR SURGERY)

## 2024-06-04 PROCEDURE — 80061 LIPID PANEL: CPT

## 2024-06-04 PROCEDURE — 36415 COLL VENOUS BLD VENIPUNCTURE: CPT

## 2024-06-04 PROCEDURE — 84443 ASSAY THYROID STIM HORMONE: CPT

## 2024-06-04 PROCEDURE — 80053 COMPREHEN METABOLIC PANEL: CPT

## 2024-06-04 PROCEDURE — 85025 COMPLETE CBC W/AUTO DIFF WBC: CPT

## 2024-06-04 NOTE — ASSESSMENT & PLAN NOTE
No asked me today about her hiatal hernia.  I did explain to her that it is small in size and since she is not particularly symptomatic with reflux, repairing this is not necessary at this time.  However, she does have cholelithiasis as well as a port site hernia.  I did explain to her that if she is thinking of having those 2 things repaired, it may be beneficial to have the hiatal hernia and an antireflux operation at the same time.  I did explain to her that she would need to undergo a full workup for her reflux.  After explaining to her about the manometry test and 24-hour pH testing, she politely declined.  She is planning to follow-up with Dr. East in the near future to discuss her reflux disease with him.  I did explain to her that she can always call me in the future if she changes her mind or does want to proceed with a workup for her reflux

## 2024-06-04 NOTE — ASSESSMENT & PLAN NOTE
She remains CHONG at this time.  Her CT scan demonstrates stability and no signs of any recurrence.  At this time, she is 3 years out from her lung cancer resection, which is a huge accomplishment!  Also, according to the NCCN guidelines, we can space out her surveillance to annual.  I have ordered a CT scan of the chest for 1 year from now and she will follow-up with me at that time.    Lucy Shafer MD  Thoracic Surgery  (Available on [employer])  Office: [phone]

## 2024-06-04 NOTE — PROGRESS NOTES
Pts called stating that she and her  were at the [facility] in [place] to get blood work done but no order were placed from her last visit   Patient PCP is out of office lab order were placed for routine blood work

## 2024-06-04 NOTE — PROGRESS NOTES
Assessment/Plan:    Hiatal hernia  No asked me today about her hiatal hernia.  I did explain to her that it is small in size and since she is not particularly symptomatic with reflux, repairing this is not necessary at this time.  However, she does have cholelithiasis as well as a port site hernia.  I did explain to her that if she is thinking of having those 2 things repaired, it may be beneficial to have the hiatal hernia and an antireflux operation at the same time.  I did explain to her that she would need to undergo a full workup for her reflux.  After explaining to her about the manometry test and 24-hour pH testing, she politely declined.  She is planning to follow-up with Dr. East in the near future to discuss her reflux disease with him.  I did explain to her that she can always call me in the future if she changes her mind or does want to proceed with a workup for her reflux    Primary adenocarcinoma of upper lobe of right lung (HCC)  She remains CHONG at this time.  Her CT scan demonstrates stability and no signs of any recurrence.  At this time, she is 3 years out from her lung cancer resection, which is a huge accomplishment!  Also, according to the NCCN guidelines, we can space out her surveillance to annual.  I have ordered a CT scan of the chest for 1 year from now and she will follow-up with me at that time.    Lucy Shafer MD  Thoracic Surgery  (Available on [employer])  Office: [phone]       Diagnoses and all orders for this visit:    Primary adenocarcinoma of upper lobe of right lung (HCC)  -     CT chest wo contrast; Future    Hiatal hernia          Thoracic History   Cancer Staging   Primary adenocarcinoma of upper lobe of right lung (HCC)  Staging form: Lung, AJCC 8th Edition  - Clinical stage from 5/4/2021: Stage IB (cT2a, cN0, cM0) - Signed by Lucy Shafer MD on 5/6/2021  Histopathologic type: Adenocarcinoma, NOS  Stage prefix: Initial diagnosis  Histologic grade (G):  G2  Histologic grading system: 4 grade system  Laterality: Right  Tumor size (mm): 2.9  Lymph-vascular invasion (LVI): LVI not present (absent)/not identified  Specimen type: Excision  Type of lung cancer: Surgically resected non-small cell lung cancer  ECOG performance status: Grade 0  Pleural/elastic layer invasion: PL1  Pleural lavage cytology: Not assessed  Adequacy of mediastinal dissection: Adequate  Adjuvant radiation: No  Adjuvant chemotherapy: No  Stage used in treatment planning: Yes  National guidelines used in treatment planning: Yes  Type of national guideline used in treatment planning: NCCN    Oncology History   Primary adenocarcinoma of upper lobe of right lung (HCC)   4/14/2021 Surgery    Right thoracoscopic upper lobe wedge resection with completion upper lobectomy      5/4/2021 Initial Diagnosis    Primary adenocarcinoma of upper lobe of right lung (HCC)     5/4/2021 -  Cancer Staged    Staging form: Lung, AJCC 8th Edition  - Clinical stage from 5/4/2021: Stage IB (cT2a, cN0, cM0) - Signed by Lucy Shafer MD on 5/6/2021  Histopathologic type: Adenocarcinoma, NOS  Stage prefix: Initial diagnosis  Histologic grade (G): G2  Histologic grading system: 4 grade system  Laterality: Right  Tumor size (mm): 2.9  Lymph-vascular invasion (LVI): LVI not present (absent)/not identified  Specimen type: Excision  Type of lung cancer: Surgically resected non-small cell lung cancer  ECOG performance status: Grade 0  Pleural/elastic layer invasion: PL1  Pleural lavage cytology: Not assessed  Adequacy of mediastinal dissection: Adequate  Adjuvant radiation: No  Adjuvant chemotherapy: No  Stage used in treatment planning: Yes  National guidelines used in treatment planning: Yes  Type of national guideline used in treatment planning: NCCN                Subjective:    Patient ID: Kelley Connolly is a 49 y.o. female.    HPI  April is a 49-year-old female who is known to me.  She underwent a VATS right upper  "lobectomy approximately 3 years ago.  She presents today for routine surveillance.  I have personally reviewed her CT scan in PACS.  This demonstrates stability and no signs of any recurrence of her cancer.  We also did discuss her hiatal hernia, as she asked about it.  We also discussed her port site hernia on her anterior abdominal wall.    Today in the office, she reports that she is feeling pretty good.  She denies any shortness of breath or chest pain.  She is up to an hour on the elliptical without difficulty.  She has experienced a weight gain over the past few years and attributes this to menopause.  She denies any recent upper respiratory infection or pneumonia.  She denies a chronic cough.    I have reviewed all the most recent notes in the chart including from her PCP    The following portions of the patient's history were reviewed and updated as appropriate: allergies, current medications, past family history, past medical history, past social history, past surgical history and problem list.    Review of Systems      Objective:   Physical ExamBP 118/78   Pulse 78   Temp 97.9 °F (36.6 °C) (Tympanic)   Resp 18   Ht 5' 6\" (1.676 m)   Wt 84.7 kg (186 lb 12.8 oz)   LMP 05/20/2024 (Approximate)   SpO2 99%   BMI 30.15 kg/m²     No Chest XR results available for this patient.   CT chest wo contrast    Result Date: 11/15/2023  Narrative CT CHEST WITHOUT IV CONTRAST INDICATION:   C34.11: Malignant neoplasm of upper lobe, right bronchus or lung. COMPARISON: CT scan from 5/8/2023. TECHNIQUE: CT examination of the chest was performed without intravenous contrast. Multiplanar 2D reformatted images were created from the source data. This examination, like all CT scans performed in the [facility], was performed utilizing techniques to minimize radiation dose exposure, including the use of iterative reconstruction and automated exposure control. Radiation dose length product (DLP) for this visit: "  217 mGy-cm FINDINGS: LUNGS: Stable mild background emphysema. Status post right upper lobectomy. Stable scarring along the suture line. No recurrent tumor. Scattered areas of linear atelectasis in both lungs are stable. No endotracheal or endobronchial lesion observed. PLEURA:  Unremarkable. HEART/GREAT VESSELS: Heart is unremarkable for patient's age.  No thoracic aortic aneurysm. MEDIASTINUM AND SIMON:  Unremarkable. CHEST WALL AND LOWER NECK:  Unremarkable. VISUALIZED STRUCTURES IN THE UPPER ABDOMEN:  Unremarkable. OSSEOUS STRUCTURES:  No acute fracture or destructive osseous lesion.     Impression Status post right upper lobectomy. No recurrent tumor. Stable mild background emphysema. Workstation performed: [id number]     CT chest abdomen pelvis wo contrast    Result Date: 5/31/2024  Narrative CT CHEST, ABDOMEN AND PELVIS WITHOUT IV CONTRAST INDICATION: C64.1: Malignant neoplasm of right kidney, except renal pelvis. COMPARISON: CT chest 11/10/2023, 5/8/2023. CT chest abdomen pelvis 8/9/2022. TECHNIQUE: CT examination of the chest, abdomen and pelvis was performed without intravenous contrast. Multiplanar 2D reformatted images were created from the source data. This examination, like all CT scans performed in the [facility], was performed utilizing techniques to minimize radiation dose exposure, including the use of iterative reconstruction and automated exposure control. Radiation dose length product (DLP) for this visit: 730 mGy-cm Enteric Contrast: Not administered. FINDINGS: CHEST LUNGS: Status post right upper lobectomy with unchanged scarring along the staple line. No evidence of recurrent tumor. Scattered linear atelectasis or scarring bilaterally. Mild emphysema. No tracheal or endobronchial lesion. PLEURA: Unremarkable. HEART/GREAT VESSELS: Heart is unremarkable for patient's age. No thoracic aortic aneurysm. MEDIASTINUM AND SIMON: Unremarkable. CHEST WALL AND LOWER NECK: Unremarkable.  ABDOMEN Absence of intravenous contrast enhancement limits evaluation of the abdominal viscera. LIVER/BILIARY TREE: Unremarkable. GALLBLADDER: Cholelithiasis without findings of acute cholecystitis. SPLEEN: Unremarkable. PANCREAS: Unremarkable. ADRENAL GLANDS: Unremarkable. KIDNEYS/URETERS: Stable changes of partial nephrectomy at the right lower pole with no evidence of recurrent mass. Bilateral kidneys otherwise unremarkable. STOMACH AND BOWEL: Unremarkable. APPENDIX: No findings to suggest appendicitis. ABDOMINOPELVIC CAVITY: No ascites. No pneumoperitoneum. No lymphadenopathy. VESSELS: Unremarkable for patient's age. PELVIS REPRODUCTIVE ORGANS: Unremarkable for patient's age. URINARY BLADDER: Unremarkable. ABDOMINAL WALL/INGUINAL REGIONS: Unremarkable. BONES: No acute fracture or suspicious osseous lesion.     Impression Stable exam. No evidence of recurrent or metastatic disease status post right upper lobectomy and partial right nephrectomy. Cholelithiasis. Workstation performed: [id number]      No NM PET CT results available for this patient.   No Barium Swallow results available for this patient.

## 2024-06-05 PROBLEM — Z01.419 ENCOUNTER FOR ANNUAL ROUTINE GYNECOLOGICAL EXAMINATION: Status: RESOLVED | Noted: 2024-05-06 | Resolved: 2024-06-05

## 2024-06-07 ENCOUNTER — TELEPHONE (OUTPATIENT)
Age: 49
End: 2024-06-07

## 2024-06-13 ENCOUNTER — OFFICE VISIT (OUTPATIENT)
Dept: FAMILY MEDICINE CLINIC | Facility: CLINIC | Age: 49
End: 2024-06-13
Payer: COMMERCIAL

## 2024-06-13 VITALS
RESPIRATION RATE: 18 BRPM | HEIGHT: 66 IN | TEMPERATURE: 98 F | HEART RATE: 68 BPM | BODY MASS INDEX: 29.83 KG/M2 | SYSTOLIC BLOOD PRESSURE: 122 MMHG | DIASTOLIC BLOOD PRESSURE: 78 MMHG | OXYGEN SATURATION: 97 % | WEIGHT: 185.6 LBS

## 2024-06-13 DIAGNOSIS — K29.00 ACUTE SUPERFICIAL GASTRITIS WITHOUT HEMORRHAGE: ICD-10-CM

## 2024-06-13 DIAGNOSIS — C64.1 RENAL CELL CARCINOMA OF RIGHT KIDNEY (HCC): ICD-10-CM

## 2024-06-13 DIAGNOSIS — C34.11 PRIMARY ADENOCARCINOMA OF UPPER LOBE OF RIGHT LUNG (HCC): ICD-10-CM

## 2024-06-13 DIAGNOSIS — E78.2 MIXED HYPERLIPIDEMIA: ICD-10-CM

## 2024-06-13 DIAGNOSIS — F41.9 ANXIETY: ICD-10-CM

## 2024-06-13 DIAGNOSIS — Z12.11 SCREENING FOR COLON CANCER: ICD-10-CM

## 2024-06-13 DIAGNOSIS — E83.51 HYPOCALCEMIA: ICD-10-CM

## 2024-06-13 DIAGNOSIS — Z23 ENCOUNTER FOR IMMUNIZATION: Primary | ICD-10-CM

## 2024-06-13 PROCEDURE — 99214 OFFICE O/P EST MOD 30 MIN: CPT | Performed by: FAMILY MEDICINE

## 2024-06-13 NOTE — ASSESSMENT & PLAN NOTE
Lysed anxiety stable off medication doing well currently follow-up in 6 months no additional treatment at this time

## 2024-06-13 NOTE — ASSESSMENT & PLAN NOTE
Post resection with no residual cancer.  Patient had follow-up through hematology oncology she is doing well at this time.  Repeat urinalysis in 6 months

## 2024-06-13 NOTE — PROGRESS NOTES
Assessment/Plan:       Problem List Items Addressed This Visit          Respiratory    Primary adenocarcinoma of upper lobe of right lung (HCC)     Post resection doing well monitored and followed by thoracic surgery.  Follow-up here in 6 months            Digestive    Gastritis     Stable gastritis limit ibuprofen use Pepcid complete as needed            Genitourinary    Renal cell carcinoma of right kidney (HCC)     Post resection with no residual cancer.  Patient had follow-up through hematology oncology she is doing well at this time.  Repeat urinalysis in 6 months         Relevant Orders    CBC and differential    Comprehensive metabolic panel    Lipid panel    TSH, 3rd generation with Free T4 reflex    UA (URINE) with reflex to Scope       Behavioral Health    Anxiety     Lysed anxiety stable off medication doing well currently follow-up in 6 months no additional treatment at this time            Other    Hypocalcemia     Reviewed all lab work stable follow-up with levels in 6 months recommend calcium and vitamin D         Mixed hyperlipidemia     Mixed hyperlipidemia stable monitor laboratory work follow heart healthy diet recheck in 6 months         Relevant Orders    CBC and differential    Comprehensive metabolic panel    Lipid panel    TSH, 3rd generation with Free T4 reflex    UA (URINE) with reflex to Scope     Other Visit Diagnoses       Encounter for immunization    -  Primary    Screening for colon cancer        Relevant Orders    Ambulatory referral to Gastroenterology              Subjective:      Patient ID: April Cathleen is a 49 y.o. female.    6-month follow-up evaluation        The following portions of the patient's history were reviewed and updated as appropriate: allergies, current medications, past family history, past medical history, past social history, past surgical history and problem list.    Review of Systems   Constitutional:  Negative for chills, fatigue and fever.   HENT:  Negative  "for congestion, nosebleeds, rhinorrhea, sinus pressure and sore throat.    Eyes:  Negative for discharge and redness.   Respiratory:  Negative for cough and shortness of breath.    Cardiovascular:  Negative for chest pain, palpitations and leg swelling.   Gastrointestinal:  Negative for abdominal pain, blood in stool and nausea.   Endocrine: Negative for cold intolerance, heat intolerance and polyuria.   Genitourinary:  Negative for dysuria and frequency.   Musculoskeletal:  Negative for arthralgias, back pain and myalgias.   Skin:  Negative for rash.   Neurological:  Negative for dizziness, weakness and headaches.   Hematological:  Negative for adenopathy.   Psychiatric/Behavioral:  Negative for behavioral problems and sleep disturbance. The patient is not nervous/anxious.          Objective:      /78 (BP Location: Left arm, Patient Position: Sitting, Cuff Size: Standard)   Pulse 68   Temp 98 °F (36.7 °C) (Tympanic)   Resp 18   Ht 5' 6\" (1.676 m)   Wt 84.2 kg (185 lb 9.6 oz)   LMP 05/20/2024 (Approximate)   SpO2 97%   BMI 29.96 kg/m²        Physical Exam  Vitals and nursing note reviewed.   Constitutional:       General: She is not in acute distress.     Appearance: Normal appearance. She is well-developed.   HENT:      Head: Normocephalic and atraumatic.      Right Ear: Tympanic membrane and external ear normal.      Left Ear: Tympanic membrane and external ear normal.      Nose: Nose normal.      Mouth/Throat:      Mouth: Mucous membranes are moist.      Pharynx: Oropharynx is clear. No oropharyngeal exudate.   Eyes:      General: No scleral icterus.        Right eye: No discharge.         Left eye: No discharge.      Conjunctiva/sclera: Conjunctivae normal.      Pupils: Pupils are equal, round, and reactive to light.   Neck:      Thyroid: No thyromegaly.      Vascular: No JVD.   Cardiovascular:      Rate and Rhythm: Normal rate and regular rhythm.      Heart sounds: Normal heart sounds. No murmur " heard.  Pulmonary:      Effort: Pulmonary effort is normal.      Breath sounds: No wheezing or rales.   Chest:      Chest wall: No tenderness.   Abdominal:      General: Bowel sounds are normal. There is no distension.      Palpations: Abdomen is soft. There is no mass.      Tenderness: There is no abdominal tenderness.   Musculoskeletal:         General: No tenderness or deformity. Normal range of motion.      Cervical back: Normal range of motion.   Lymphadenopathy:      Cervical: No cervical adenopathy.   Skin:     General: Skin is warm and dry.      Findings: No rash.   Neurological:      General: No focal deficit present.      Mental Status: She is alert and oriented to person, place, and time.      Cranial Nerves: No cranial nerve deficit.      Coordination: Coordination normal.      Deep Tendon Reflexes: Reflexes are normal and symmetric. Reflexes normal.   Psychiatric:         Mood and Affect: Mood normal.         Behavior: Behavior normal.         Thought Content: Thought content normal.         Judgment: Judgment normal.          Data:    Laboratory Results: I have personally reviewed the pertinent laboratory results/reports   Radiology/Other Diagnostic Testing Results: I have personally reviewed pertinent reports.       Lab Results   Component Value Date    WBC 6.09 06/04/2024    HGB 13.6 06/04/2024    HCT 41.5 06/04/2024    MCV 89 06/04/2024     06/04/2024     Lab Results   Component Value Date    K 4.1 06/04/2024     06/04/2024    CO2 26 06/04/2024    BUN 15 06/04/2024    CREATININE 0.86 06/04/2024    GLUF 86 06/04/2024    CALCIUM 8.8 06/04/2024    AST 14 06/04/2024    ALT 12 06/04/2024    ALKPHOS 97 06/04/2024    EGFR 79 06/04/2024     Lab Results   Component Value Date    CHOLESTEROL 175 06/04/2024    CHOLESTEROL 168 12/05/2023    CHOLESTEROL 207 (H) 06/05/2023     Lab Results   Component Value Date    HDL 61 06/04/2024    HDL 51 12/05/2023    HDL 62 06/05/2023     Lab Results   Component  "Value Date    LDLCALC 97 06/04/2024    LDLCALC 100 12/05/2023    LDLCALC 130 (H) 06/05/2023     Lab Results   Component Value Date    TRIG 85 06/04/2024    TRIG 83 12/05/2023    TRIG 75 06/05/2023     No results found for: \"CHOLHDL\"  Lab Results   Component Value Date    [id number] 0.803 06/04/2024     No results found for: \"HGBA1C\"  No results found for: \"PSA\"    Jonathan Damian, DO      "

## 2024-12-13 ENCOUNTER — APPOINTMENT (OUTPATIENT)
Dept: LAB | Facility: CLINIC | Age: 49
End: 2024-12-13
Payer: COMMERCIAL

## 2024-12-13 ENCOUNTER — OFFICE VISIT (OUTPATIENT)
Dept: GASTROENTEROLOGY | Facility: CLINIC | Age: 49
End: 2024-12-13
Payer: COMMERCIAL

## 2024-12-13 VITALS
HEIGHT: 66 IN | DIASTOLIC BLOOD PRESSURE: 86 MMHG | RESPIRATION RATE: 16 BRPM | WEIGHT: 185 LBS | SYSTOLIC BLOOD PRESSURE: 110 MMHG | BODY MASS INDEX: 29.73 KG/M2

## 2024-12-13 DIAGNOSIS — C64.1 RENAL CELL CARCINOMA OF RIGHT KIDNEY (HCC): ICD-10-CM

## 2024-12-13 DIAGNOSIS — R10.13 EPIGASTRIC PAIN: ICD-10-CM

## 2024-12-13 DIAGNOSIS — K29.00 ACUTE SUPERFICIAL GASTRITIS WITHOUT HEMORRHAGE: Primary | ICD-10-CM

## 2024-12-13 DIAGNOSIS — K29.80 DUODENITIS: ICD-10-CM

## 2024-12-13 DIAGNOSIS — E78.2 MIXED HYPERLIPIDEMIA: ICD-10-CM

## 2024-12-13 DIAGNOSIS — Z86.0100 HISTORY OF COLON POLYPS: ICD-10-CM

## 2024-12-13 LAB
ALBUMIN SERPL BCG-MCNC: 4.4 G/DL (ref 3.5–5)
ALP SERPL-CCNC: 113 U/L (ref 34–104)
ALT SERPL W P-5'-P-CCNC: 13 U/L (ref 7–52)
ANION GAP SERPL CALCULATED.3IONS-SCNC: 7 MMOL/L (ref 4–13)
AST SERPL W P-5'-P-CCNC: 15 U/L (ref 13–39)
BACTERIA UR QL AUTO: NORMAL /HPF
BASOPHILS # BLD AUTO: 0.05 THOUSANDS/ÂΜL (ref 0–0.1)
BASOPHILS NFR BLD AUTO: 1 % (ref 0–1)
BILIRUB SERPL-MCNC: 0.41 MG/DL (ref 0.2–1)
BILIRUB UR QL STRIP: NEGATIVE
BUN SERPL-MCNC: 15 MG/DL (ref 5–25)
CALCIUM SERPL-MCNC: 9.2 MG/DL (ref 8.4–10.2)
CHLORIDE SERPL-SCNC: 103 MMOL/L (ref 96–108)
CHOLEST SERPL-MCNC: 168 MG/DL (ref ?–200)
CLARITY UR: CLEAR
CO2 SERPL-SCNC: 27 MMOL/L (ref 21–32)
COLOR UR: COLORLESS
CREAT SERPL-MCNC: 0.87 MG/DL (ref 0.6–1.3)
EOSINOPHIL # BLD AUTO: 0.06 THOUSAND/ÂΜL (ref 0–0.61)
EOSINOPHIL NFR BLD AUTO: 1 % (ref 0–6)
ERYTHROCYTE [DISTWIDTH] IN BLOOD BY AUTOMATED COUNT: 12.6 % (ref 11.6–15.1)
GFR SERPL CREATININE-BSD FRML MDRD: 77 ML/MIN/1.73SQ M
GLUCOSE P FAST SERPL-MCNC: 77 MG/DL (ref 65–99)
GLUCOSE UR STRIP-MCNC: NEGATIVE MG/DL
HCT VFR BLD AUTO: 41.1 % (ref 34.8–46.1)
HDLC SERPL-MCNC: 60 MG/DL
HGB BLD-MCNC: 13.8 G/DL (ref 11.5–15.4)
HGB UR QL STRIP.AUTO: ABNORMAL
IMM GRANULOCYTES # BLD AUTO: 0.01 THOUSAND/UL (ref 0–0.2)
IMM GRANULOCYTES NFR BLD AUTO: 0 % (ref 0–2)
KETONES UR STRIP-MCNC: NEGATIVE MG/DL
LDLC SERPL CALC-MCNC: 91 MG/DL (ref 0–100)
LEUKOCYTE ESTERASE UR QL STRIP: NEGATIVE
LYMPHOCYTES # BLD AUTO: 1.84 THOUSANDS/ÂΜL (ref 0.6–4.47)
LYMPHOCYTES NFR BLD AUTO: 32 % (ref 14–44)
MCH RBC QN AUTO: 29.9 PG (ref 26.8–34.3)
MCHC RBC AUTO-ENTMCNC: 33.6 G/DL (ref 31.4–37.4)
MCV RBC AUTO: 89 FL (ref 82–98)
MONOCYTES # BLD AUTO: 0.4 THOUSAND/ÂΜL (ref 0.17–1.22)
MONOCYTES NFR BLD AUTO: 7 % (ref 4–12)
NEUTROPHILS # BLD AUTO: 3.4 THOUSANDS/ÂΜL (ref 1.85–7.62)
NEUTS SEG NFR BLD AUTO: 59 % (ref 43–75)
NITRITE UR QL STRIP: NEGATIVE
NON-SQ EPI CELLS URNS QL MICRO: NORMAL /HPF
NONHDLC SERPL-MCNC: 108 MG/DL
NRBC BLD AUTO-RTO: 0 /100 WBCS
PH UR STRIP.AUTO: 6.5 [PH]
PLATELET # BLD AUTO: 213 THOUSANDS/UL (ref 149–390)
PMV BLD AUTO: 11.1 FL (ref 8.9–12.7)
POTASSIUM SERPL-SCNC: 4.1 MMOL/L (ref 3.5–5.3)
PROT SERPL-MCNC: 7.2 G/DL (ref 6.4–8.4)
PROT UR STRIP-MCNC: NEGATIVE MG/DL
RBC # BLD AUTO: 4.62 MILLION/UL (ref 3.81–5.12)
RBC #/AREA URNS AUTO: NORMAL /HPF
SODIUM SERPL-SCNC: 137 MMOL/L (ref 135–147)
SP GR UR STRIP.AUTO: 1.01 (ref 1–1.03)
TRIGL SERPL-MCNC: 85 MG/DL (ref ?–150)
TSH SERPL DL<=0.05 MIU/L-ACNC: 0.77 UIU/ML (ref 0.45–4.5)
UROBILINOGEN UR STRIP-ACNC: <2 MG/DL
WBC # BLD AUTO: 5.76 THOUSAND/UL (ref 4.31–10.16)
WBC #/AREA URNS AUTO: NORMAL /HPF

## 2024-12-13 PROCEDURE — 81001 URINALYSIS AUTO W/SCOPE: CPT

## 2024-12-13 PROCEDURE — 80061 LIPID PANEL: CPT

## 2024-12-13 PROCEDURE — 36415 COLL VENOUS BLD VENIPUNCTURE: CPT

## 2024-12-13 PROCEDURE — 80053 COMPREHEN METABOLIC PANEL: CPT

## 2024-12-13 PROCEDURE — 84443 ASSAY THYROID STIM HORMONE: CPT

## 2024-12-13 PROCEDURE — 99204 OFFICE O/P NEW MOD 45 MIN: CPT | Performed by: INTERNAL MEDICINE

## 2024-12-13 PROCEDURE — 85025 COMPLETE CBC W/AUTO DIFF WBC: CPT

## 2024-12-13 RX ORDER — SODIUM CHLORIDE, SODIUM LACTATE, POTASSIUM CHLORIDE, CALCIUM CHLORIDE 600; 310; 30; 20 MG/100ML; MG/100ML; MG/100ML; MG/100ML
125 INJECTION, SOLUTION INTRAVENOUS CONTINUOUS
OUTPATIENT
Start: 2024-12-13

## 2024-12-13 NOTE — H&P (VIEW-ONLY)
"Name: Kelley Connolly      : 1975      MRN: [id number]  Encounter Provider: Erik East MD  Encounter Date: 2024   Encounter department: [facility] [place]  :  Assessment & Plan  Acute superficial gastritis without hemorrhage    Orders:  •  EGD; Future    Duodenitis    Orders:  •  EGD; Future    Epigastric pain    Orders:  •  EGD; Future    History of colon polyps    Orders:  •  Colonoscopy; Future        History of Present Illness   HPI  Kelley Connolly is a 49 y.o. female who presents history of chronic intermittent epigastric pain especially after eating acidic foods.  Patient has a history of nodular duodenitis and gastritis.  She is not taking any medication on a regular basis.  She occasionally takes Pepcid with some relief.  She has no alarm symptomatology.  No nausea or vomiting.  No dysphagia.  No melena.  No weight loss.  Last EGD 5 years ago.  Patient also had a colonoscopy in 2019.  She has a history of colon polyps.  She has no lower abdominal pain.  No change in bowel habits or stool caliber.  No melena hematochezia or rectal bleeding.  No tenesmus.  Patient is concerned because of the past history of both kidney cancer and lung cancer.  She has no other complaints at the present time and is doing very well      Review of Systems   All other systems reviewed and are negative.       Objective   /86   Resp 16   Ht 5' 6\" (1.676 m)   Wt 83.9 kg (185 lb)   BMI 29.86 kg/m²      Physical Exam  Constitutional:       Appearance: Normal appearance. She is normal weight.   HENT:      Head: Normocephalic.   Eyes:      Pupils: Pupils are equal, round, and reactive to light.   Cardiovascular:      Rate and Rhythm: Normal rate and regular rhythm.      Pulses: Normal pulses.      Heart sounds: Normal heart sounds.   Pulmonary:      Effort: Pulmonary effort is normal.      Breath sounds: Normal breath sounds.   Abdominal:      General: Abdomen is flat. " Bowel sounds are normal.      Palpations: Abdomen is soft.   Musculoskeletal:         General: Normal range of motion.      Cervical back: Neck supple.   Skin:     General: Skin is warm and dry.   Neurological:      General: No focal deficit present.      Mental Status: She is alert and oriented to person, place, and time.   Psychiatric:         Mood and Affect: Mood normal.         Behavior: Behavior normal.

## 2024-12-13 NOTE — PROGRESS NOTES
"Name: Kelley Connolly      : 1975      MRN: [id number]  Encounter Provider: Erik East MD  Encounter Date: 2024   Encounter department: [facility] [place]  :  Assessment & Plan  Acute superficial gastritis without hemorrhage    Orders:  •  EGD; Future    Duodenitis    Orders:  •  EGD; Future    Epigastric pain    Orders:  •  EGD; Future    History of colon polyps    Orders:  •  Colonoscopy; Future        History of Present Illness   HPI  Kelley Connolly is a 49 y.o. female who presents history of chronic intermittent epigastric pain especially after eating acidic foods.  Patient has a history of nodular duodenitis and gastritis.  She is not taking any medication on a regular basis.  She occasionally takes Pepcid with some relief.  She has no alarm symptomatology.  No nausea or vomiting.  No dysphagia.  No melena.  No weight loss.  Last EGD 5 years ago.  Patient also had a colonoscopy in 2019.  She has a history of colon polyps.  She has no lower abdominal pain.  No change in bowel habits or stool caliber.  No melena hematochezia or rectal bleeding.  No tenesmus.  Patient is concerned because of the past history of both kidney cancer and lung cancer.  She has no other complaints at the present time and is doing very well      Review of Systems   All other systems reviewed and are negative.       Objective   /86   Resp 16   Ht 5' 6\" (1.676 m)   Wt 83.9 kg (185 lb)   BMI 29.86 kg/m²      Physical Exam  Constitutional:       Appearance: Normal appearance. She is normal weight.   HENT:      Head: Normocephalic.   Eyes:      Pupils: Pupils are equal, round, and reactive to light.   Cardiovascular:      Rate and Rhythm: Normal rate and regular rhythm.      Pulses: Normal pulses.      Heart sounds: Normal heart sounds.   Pulmonary:      Effort: Pulmonary effort is normal.      Breath sounds: Normal breath sounds.   Abdominal:      General: Abdomen is flat. " Bowel sounds are normal.      Palpations: Abdomen is soft.   Musculoskeletal:         General: Normal range of motion.      Cervical back: Neck supple.   Skin:     General: Skin is warm and dry.   Neurological:      General: No focal deficit present.      Mental Status: She is alert and oriented to person, place, and time.   Psychiatric:         Mood and Affect: Mood normal.         Behavior: Behavior normal.

## 2024-12-13 NOTE — PATIENT INSTRUCTIONS
Scheduled date of EGD/colonoscopy (as of today): 1/6/25  Physician performing EGD/colonoscopy: Cruzito  Location of EGD/colonoscopy: [place]  Desired bowel prep reviewed with patient: Miralax  Instructions reviewed with patient by: Kaur BETHEA  Clearances:

## 2024-12-18 ENCOUNTER — OFFICE VISIT (OUTPATIENT)
Dept: FAMILY MEDICINE CLINIC | Facility: CLINIC | Age: 49
End: 2024-12-18
Payer: COMMERCIAL

## 2024-12-18 VITALS
SYSTOLIC BLOOD PRESSURE: 104 MMHG | TEMPERATURE: 97.7 F | HEART RATE: 83 BPM | DIASTOLIC BLOOD PRESSURE: 70 MMHG | BODY MASS INDEX: 30.31 KG/M2 | HEIGHT: 66 IN | OXYGEN SATURATION: 97 % | WEIGHT: 188.6 LBS | RESPIRATION RATE: 15 BRPM

## 2024-12-18 DIAGNOSIS — Z85.828 ENCOUNTER FOR FOLLOW-UP SURVEILLANCE OF SKIN CANCER: ICD-10-CM

## 2024-12-18 DIAGNOSIS — C64.1 RENAL CELL CARCINOMA OF RIGHT KIDNEY (HCC): ICD-10-CM

## 2024-12-18 DIAGNOSIS — Z00.00 ANNUAL PHYSICAL EXAM: ICD-10-CM

## 2024-12-18 DIAGNOSIS — C34.11 PRIMARY ADENOCARCINOMA OF UPPER LOBE OF RIGHT LUNG (HCC): Primary | ICD-10-CM

## 2024-12-18 DIAGNOSIS — Z08 ENCOUNTER FOR FOLLOW-UP SURVEILLANCE OF SKIN CANCER: ICD-10-CM

## 2024-12-18 DIAGNOSIS — E78.2 MIXED HYPERLIPIDEMIA: ICD-10-CM

## 2024-12-18 PROCEDURE — 99396 PREV VISIT EST AGE 40-64: CPT | Performed by: FAMILY MEDICINE

## 2024-12-18 NOTE — PROGRESS NOTES
Adult Annual Physical  Name: Kelley Connolly      : 1975      MRN: [id number]  Encounter Provider: Jonathan Damian DO  Encounter Date: 2024   Encounter department: [facility]    Assessment & Plan  Primary adenocarcinoma of upper lobe of right lung (HCC)  Resolved postsurgical intervention patient is followed by thoracic surgery yearly now    Orders:    CBC and differential; Future    Comprehensive metabolic panel; Future    Lipid panel; Future    TSH, 3rd generation with Free T4 reflex; Future    Renal cell carcinoma of right kidney (HCC)  Resolved post surgical intervention patient is followed by nephrology annually no additional treatment plan    Orders:    CBC and differential; Future    Comprehensive metabolic panel; Future    Lipid panel; Future    TSH, 3rd generation with Free T4 reflex; Future    Mixed hyperlipidemia  Reviewed all health concerns lipid profile discussed follow heart healthy diet avoid saturated fats recheck lab work annually    Orders:    CBC and differential; Future    Comprehensive metabolic panel; Future    Lipid panel; Future    TSH, 3rd generation with Free T4 reflex; Future    Annual physical exam    Orders:    CBC and differential; Future    Comprehensive metabolic panel; Future    Lipid panel; Future    TSH, 3rd generation with Free T4 reflex; Future    Immunizations and preventive care screenings were discussed with patient today. Appropriate education was printed on patient's after visit summary.    Counseling:  Alcohol/drug use: discussed moderation in alcohol intake, the recommendations for healthy alcohol use, and avoidance of illicit drug use.  Dental Health: discussed importance of regular tooth brushing, flossing, and dental visits.  Injury prevention: discussed safety/seat belts, safety helmets, smoke detectors, carbon monoxide detectors, and smoking near bedding or upholstery.  Sexual health: discussed sexually transmitted  "diseases, partner selection, use of condoms, avoidance of unintended pregnancy, and contraceptive alternatives.  Exercise: the importance of regular exercise/physical activity was discussed. Recommend exercise 3-5 times per week for at least 30 minutes.          History of Present Illness     Adult Annual Physical:  Patient presents for annual physical.     Diet and Physical Activity:  - Diet/Nutrition: well balanced diet.  - Exercise: no formal exercise.    Depression Screening:  - PHQ-2 Score: 0    General Health:  - Sleep: sleeps well.  - Hearing: normal hearing bilateral ears.  - Vision: no vision problems.    /GYN Health:    - Menopause: perimenopausal.     Review of Systems   Constitutional:  Negative for chills, fatigue and fever.   HENT:  Negative for congestion, nosebleeds, rhinorrhea, sinus pressure and sore throat.    Eyes:  Negative for discharge and redness.   Respiratory:  Negative for cough and shortness of breath.    Cardiovascular:  Negative for chest pain, palpitations and leg swelling.   Gastrointestinal:  Negative for abdominal pain, blood in stool and nausea.   Endocrine: Negative for cold intolerance, heat intolerance and polyuria.   Genitourinary:  Negative for dysuria and frequency.   Musculoskeletal:  Negative for arthralgias, back pain and myalgias.   Skin:  Negative for rash.   Neurological:  Negative for dizziness, weakness and headaches.   Hematological:  Negative for adenopathy.   Psychiatric/Behavioral:  Negative for behavioral problems and sleep disturbance. The patient is not nervous/anxious.          Objective   /70 (BP Location: Left arm, Patient Position: Sitting, Cuff Size: Large)   Pulse 83   Temp 97.7 °F (36.5 °C) (Tympanic)   Resp 15   Ht 5' 6\" (1.676 m)   Wt 85.5 kg (188 lb 9.6 oz)   SpO2 97%   BMI 30.44 kg/m²     Physical Exam  Vitals and nursing note reviewed.   Constitutional:       General: She is not in acute distress.     Appearance: Normal appearance. " She is well-developed.   HENT:      Head: Normocephalic and atraumatic.      Right Ear: Tympanic membrane and external ear normal.      Left Ear: Tympanic membrane and external ear normal.      Nose: Nose normal.      Mouth/Throat:      Mouth: Mucous membranes are moist.      Pharynx: Oropharynx is clear. No oropharyngeal exudate.   Eyes:      General: No scleral icterus.        Right eye: No discharge.         Left eye: No discharge.      Conjunctiva/sclera: Conjunctivae normal.      Pupils: Pupils are equal, round, and reactive to light.   Neck:      Thyroid: No thyromegaly.      Vascular: No JVD.   Cardiovascular:      Rate and Rhythm: Normal rate and regular rhythm.      Pulses: Normal pulses.      Heart sounds: Normal heart sounds. No murmur heard.  Pulmonary:      Effort: Pulmonary effort is normal.      Breath sounds: No wheezing or rales.   Chest:      Chest wall: No tenderness.   Abdominal:      General: Bowel sounds are normal. There is no distension.      Palpations: Abdomen is soft. There is no mass.      Tenderness: There is no abdominal tenderness.   Musculoskeletal:         General: No tenderness or deformity. Normal range of motion.      Cervical back: Normal range of motion.   Lymphadenopathy:      Cervical: No cervical adenopathy.   Skin:     General: Skin is warm and dry.      Capillary Refill: Capillary refill takes less than 2 seconds.      Findings: No rash.   Neurological:      General: No focal deficit present.      Mental Status: She is alert and oriented to person, place, and time. Mental status is at baseline.      Cranial Nerves: No cranial nerve deficit.      Coordination: Coordination normal.      Deep Tendon Reflexes: Reflexes are normal and symmetric. Reflexes normal.   Psychiatric:         Mood and Affect: Mood normal.         Behavior: Behavior normal.         Thought Content: Thought content normal.         Judgment: Judgment normal.

## 2024-12-18 NOTE — ASSESSMENT & PLAN NOTE
Resolved postsurgical intervention patient is followed by thoracic surgery yearly now    Orders:    CBC and differential; Future    Comprehensive metabolic panel; Future    Lipid panel; Future    TSH, 3rd generation with Free T4 reflex; Future

## 2024-12-18 NOTE — ASSESSMENT & PLAN NOTE
Resolved post surgical intervention patient is followed by nephrology annually no additional treatment plan    Orders:    CBC and differential; Future    Comprehensive metabolic panel; Future    Lipid panel; Future    TSH, 3rd generation with Free T4 reflex; Future

## 2024-12-18 NOTE — ASSESSMENT & PLAN NOTE
Reviewed all health concerns lipid profile discussed follow heart healthy diet avoid saturated fats recheck lab work annually    Orders:    CBC and differential; Future    Comprehensive metabolic panel; Future    Lipid panel; Future    TSH, 3rd generation with Free T4 reflex; Future

## 2025-01-06 ENCOUNTER — ANESTHESIA EVENT (OUTPATIENT)
Dept: GASTROENTEROLOGY | Facility: HOSPITAL | Age: 50
End: 2025-01-06
Payer: COMMERCIAL

## 2025-01-06 ENCOUNTER — ANESTHESIA (OUTPATIENT)
Dept: GASTROENTEROLOGY | Facility: HOSPITAL | Age: 50
End: 2025-01-06
Payer: COMMERCIAL

## 2025-01-06 ENCOUNTER — HOSPITAL ENCOUNTER (OUTPATIENT)
Dept: GASTROENTEROLOGY | Facility: HOSPITAL | Age: 50
Setting detail: OUTPATIENT SURGERY
Discharge: HOME | End: 2025-01-06
Attending: INTERNAL MEDICINE
Payer: COMMERCIAL

## 2025-01-06 VITALS
TEMPERATURE: 97.5 F | SYSTOLIC BLOOD PRESSURE: 127 MMHG | DIASTOLIC BLOOD PRESSURE: 86 MMHG | WEIGHT: 183.2 LBS | BODY MASS INDEX: 29.44 KG/M2 | RESPIRATION RATE: 16 BRPM | HEIGHT: 66 IN | OXYGEN SATURATION: 98 % | HEART RATE: 80 BPM

## 2025-01-06 DIAGNOSIS — K29.00 ACUTE SUPERFICIAL GASTRITIS WITHOUT HEMORRHAGE: ICD-10-CM

## 2025-01-06 DIAGNOSIS — R10.13 EPIGASTRIC PAIN: ICD-10-CM

## 2025-01-06 DIAGNOSIS — K29.80 DUODENITIS: ICD-10-CM

## 2025-01-06 DIAGNOSIS — Z86.0100 HISTORY OF COLON POLYPS: ICD-10-CM

## 2025-01-06 LAB
EXT PREGNANCY TEST URINE: NEGATIVE
EXT. CONTROL: NORMAL

## 2025-01-06 PROCEDURE — 43239 EGD BIOPSY SINGLE/MULTIPLE: CPT | Performed by: INTERNAL MEDICINE

## 2025-01-06 PROCEDURE — 81025 URINE PREGNANCY TEST: CPT | Performed by: INTERNAL MEDICINE

## 2025-01-06 PROCEDURE — 45380 COLONOSCOPY AND BIOPSY: CPT | Performed by: INTERNAL MEDICINE

## 2025-01-06 PROCEDURE — 88305 TISSUE EXAM BY PATHOLOGIST: CPT | Performed by: SPECIALIST

## 2025-01-06 RX ORDER — SODIUM CHLORIDE, SODIUM LACTATE, POTASSIUM CHLORIDE, CALCIUM CHLORIDE 600; 310; 30; 20 MG/100ML; MG/100ML; MG/100ML; MG/100ML
125 INJECTION, SOLUTION INTRAVENOUS CONTINUOUS
Status: DISCONTINUED | OUTPATIENT
Start: 2025-01-06 | End: 2025-01-10 | Stop reason: HOSPADM

## 2025-01-06 RX ORDER — PROPOFOL 10 MG/ML
INJECTION, EMULSION INTRAVENOUS AS NEEDED
Status: DISCONTINUED | OUTPATIENT
Start: 2025-01-06 | End: 2025-01-07

## 2025-01-06 RX ORDER — LIDOCAINE HYDROCHLORIDE 20 MG/ML
INJECTION, SOLUTION EPIDURAL; INFILTRATION; INTRACAUDAL; PERINEURAL AS NEEDED
Status: DISCONTINUED | OUTPATIENT
Start: 2025-01-06 | End: 2025-01-07

## 2025-01-06 RX ADMIN — LIDOCAINE HYDROCHLORIDE 4 ML: 20 INJECTION, SOLUTION EPIDURAL; INFILTRATION; INTRACAUDAL; PERINEURAL at 07:57

## 2025-01-06 RX ADMIN — PROPOFOL 40 MG: 10 INJECTION, EMULSION INTRAVENOUS at 08:05

## 2025-01-06 RX ADMIN — PROPOFOL 40 MG: 10 INJECTION, EMULSION INTRAVENOUS at 08:09

## 2025-01-06 RX ADMIN — PROPOFOL 70 MG: 10 INJECTION, EMULSION INTRAVENOUS at 08:00

## 2025-01-06 RX ADMIN — SODIUM CHLORIDE, SODIUM LACTATE, POTASSIUM CHLORIDE, AND CALCIUM CHLORIDE: .6; .31; .03; .02 INJECTION, SOLUTION INTRAVENOUS at 07:53

## 2025-01-06 RX ADMIN — PROPOFOL 130 MG: 10 INJECTION, EMULSION INTRAVENOUS at 07:57

## 2025-01-06 NOTE — INTERVAL H&P NOTE
H&P reviewed. After examining the patient I find no changes in the patients condition since the H&P had been written.    Vitals:    01/06/25 0657   BP: 113/83   Pulse: 78   Resp: 19   Temp: 97.6 °F (36.4 °C)   SpO2: 97%

## 2025-01-06 NOTE — ANESTHESIA POSTPROCEDURE EVALUATION
Post-Op Assessment Note    CV Status:  Stable  Pain Score: 0    Pain management: adequate       Mental Status:  Alert and awake   Hydration Status:  Euvolemic   PONV Controlled:  Controlled   Airway Patency:  Patent     Post Op Vitals Reviewed: Yes    No anethesia notable event occurred.    Staff: CRNA       Last Filed PACU Vitals:  Vitals Value Taken Time   Temp 97.5 °F (36.4 °C) 01/06/25 0817   Pulse 70 01/06/25 0817   BP 99/60 01/06/25 0817   Resp 16 01/06/25 0817   SpO2 100 % 01/06/25 0817       Modified [place]:     Vitals Value Taken Time   Activity 2 01/06/25 0817   Respiration 2 01/06/25 0817   Circulation 2 01/06/25 0817   Consciousness 1 01/06/25 0817   Oxygen Saturation 2 01/06/25 0817     Modified Tex Score: 9

## 2025-01-06 NOTE — ANESTHESIA PREPROCEDURE EVALUATION
Noted.   Procedure:  COLONOSCOPY  EGD    Relevant Problems   ANESTHESIA (within normal limits)      CARDIO   (+) Mixed hyperlipidemia   (-) MI (myocardial infarction) (HCC)      ENDO (within normal limits)      GI/HEPATIC  NPO confirmed  BMI 29.6   (+) Hiatal hernia      /RENAL   (+) Renal cell carcinoma of right kidney (HCC)      HEMATOLOGY (within normal limits)      MUSCULOSKELETAL   (+) Hiatal hernia      NEURO/PSYCH   (+) Anxiety   (+) Chronic right shoulder pain   (-) CVA (cerebral vascular accident) (HCC)      PULMONARY (within normal limits)   (-) Asthma   (-) Sleep apnea   (-) URI (upper respiratory infection)      Allergies   Allergen Reactions    Strawberry (Diagnostic) - Food Allergy Hives    Tetracycline      breast swelling      Social History     Tobacco Use    Smoking status: Former     Current packs/day: 0.00     Types: Cigarettes     Quit date: 1996     Years since quittin.0    Smokeless tobacco: Never    Tobacco comments:     quit on 20   Vaping Use    Vaping status: Never Used   Substance Use Topics    Alcohol use: Not Currently    Drug use: Never     Current Outpatient Medications   Medication Instructions    acetaminophen (TYLENOL) 650 mg, Oral, Every 6 hours PRN    ibuprofen (MOTRIN) 600 mg, Oral, Every 6 hours PRN     Lab Results   Component Value Date    WBC 5.76 2024    HGB 13.8 2024    HCT 41.1 2024     2024    SODIUM 137 2024    K 4.1 2024     2024    CO2 27 2024    BUN 15 2024    CREATININE 0.87 2024    GLUC 96 2021    AST 15 2024    ALT 13 2024    ALKPHOS 113 (H) 2024    TBILI 0.41 2024    ALB 4.4 2024    PROTIME 13.3 2021    PTT 29 2021    INR 1.01 2021     Vitals:    25 0657   BP: 113/83   Pulse: 78   Resp: 19   Temp: 97.6 °F (36.4 °C)   SpO2: 97%     EKG 1/10/24  Normal sinus rhythm  Normal ECG  When compared with ECG of 2021 07:27,  No  significant change was found  Confirmed by Zenon Nicole ([id number]) on 1/10/2024 4:28:03 PM    Physical Exam    Airway    Mallampati score: I  TM Distance: >3 FB  Neck ROM: full     Dental   No notable dental hx     Cardiovascular  Rhythm: regular, Rate: normal, Cardiovascular exam normal    Pulmonary  Pulmonary exam normal Breath sounds clear to auscultation    Other Findings  post-pubertal.      Anesthesia Plan  ASA Score- 2     Anesthesia Type- IV sedation with anesthesia with ASA Monitors.         Additional Monitors:     Airway Plan:     Comment: O2 mask, natural airway, EtCO2 monitor. Risks discussed including awareness, aspiration, drug reactions and conversion to GA..       Plan Factors-Exercise tolerance (METS): >4 METS.    Chart reviewed. EKG reviewed.  Existing labs reviewed. Patient summary reviewed.    Patient is not a current smoker.              Induction- intravenous.    Postoperative Plan-         Informed Consent- Anesthetic plan and risks discussed with patient and spouse.  I personally reviewed this patient with the CRNA. Discussed and agreed on the Anesthesia Plan with the CRNA..

## 2025-01-07 PROCEDURE — 88305 TISSUE EXAM BY PATHOLOGIST: CPT | Performed by: SPECIALIST

## 2025-01-30 ENCOUNTER — HOSPITAL ENCOUNTER (OUTPATIENT)
Dept: MAMMOGRAPHY | Facility: CLINIC | Age: 50
End: 2025-01-30
Payer: COMMERCIAL

## 2025-01-30 VITALS — WEIGHT: 183 LBS | BODY MASS INDEX: 29.41 KG/M2 | HEIGHT: 66 IN

## 2025-01-30 DIAGNOSIS — Z12.31 ENCOUNTER FOR SCREENING MAMMOGRAM FOR BREAST CANCER: ICD-10-CM

## 2025-01-30 PROCEDURE — 77063 BREAST TOMOSYNTHESIS BI: CPT

## 2025-01-30 PROCEDURE — 77067 SCR MAMMO BI INCL CAD: CPT

## 2025-05-13 NOTE — PROGRESS NOTES
Name: Kelley Connolly      : 1975      MRN: [id number]  Encounter Provider: Yennifer Booker MD  Encounter Date: 2025   Encounter department: [facility]  :  Assessment & Plan  Encounter for annual routine gynecological examination  Pap/HPV collected  Mammogram ordered  Colonoscopy current    Encourage healthy diet, exercise, Calcium 1200mg per day and at least 800 iu Vitamin D daily.         Cervical cancer screening    Orders:    Liquid-based pap, screening        History of Present Illness   HPI  Kelley Connolly is a 50 y.o. female who presents for a routine annual visit    Last Pap Smear- 24 neg/neg  LMP-24  Birth control-condom  Mammogram-25. Scheduled 26  Colonoscopy-25 recall 5 years   Former smoker  Social drinker    Currently sexually active  Cancer-related family history includes Breast cancer (age of onset: 70) in her paternal grandfather and paternal grandmother; Cancer in her paternal grandmother.    Requests pap again today    Menses spacing out - 3-4 months   Hot flashes, weight gain, water retention      Review of Systems   Constitutional:  Negative for activity change, appetite change, chills, fatigue and fever.   HENT:  Negative for rhinorrhea, sneezing and sore throat.    Eyes:  Negative for visual disturbance.   Respiratory:  Negative for cough, shortness of breath and wheezing.    Cardiovascular:  Negative for chest pain, palpitations and leg swelling.   Gastrointestinal:  Negative for abdominal distention, constipation, diarrhea, nausea and vomiting.   Genitourinary:  Negative for difficulty urinating.   Neurological:  Negative for syncope and light-headedness.          Objective   /68 (BP Location: Left arm, Patient Position: Sitting, Cuff Size: Standard)   Wt 86.7 kg (191 lb 3.2 oz)   BMI 30.86 kg/m²      Physical Exam  Chest:   Breasts:     Breasts are symmetrical.      Right: No inverted nipple,  mass, nipple discharge, skin change or tenderness.      Left: No inverted nipple, mass, nipple discharge, skin change or tenderness.   Genitourinary:     Labia:         Right: No rash, tenderness, lesion or injury.         Left: No rash, tenderness, lesion or injury.       Vagina: Normal. No vaginal discharge, erythema, tenderness or bleeding.      Cervix: No cervical motion tenderness, discharge, friability, erythema or cervical bleeding.      Uterus: Not deviated, not enlarged, not fixed and not tender.       Adnexa:         Right: No mass, tenderness or fullness.          Left: No mass, tenderness or fullness.       Neurological:      Mental Status: She is alert and oriented to person, place, and time.

## 2025-05-14 ENCOUNTER — ANNUAL EXAM (OUTPATIENT)
Dept: OBGYN CLINIC | Facility: CLINIC | Age: 50
End: 2025-05-14
Payer: COMMERCIAL

## 2025-05-14 VITALS — DIASTOLIC BLOOD PRESSURE: 68 MMHG | BODY MASS INDEX: 30.86 KG/M2 | SYSTOLIC BLOOD PRESSURE: 104 MMHG | WEIGHT: 191.2 LBS

## 2025-05-14 DIAGNOSIS — Z12.4 CERVICAL CANCER SCREENING: ICD-10-CM

## 2025-05-14 DIAGNOSIS — Z01.419 ENCOUNTER FOR ANNUAL ROUTINE GYNECOLOGICAL EXAMINATION: Primary | ICD-10-CM

## 2025-05-14 PROCEDURE — 99396 PREV VISIT EST AGE 40-64: CPT | Performed by: OBSTETRICS & GYNECOLOGY

## 2025-05-14 PROCEDURE — G0145 SCR C/V CYTO,THINLAYER,RESCR: HCPCS | Performed by: OBSTETRICS & GYNECOLOGY

## 2025-05-14 PROCEDURE — G0476 HPV COMBO ASSAY CA SCREEN: HCPCS | Performed by: OBSTETRICS & GYNECOLOGY

## 2025-05-19 ENCOUNTER — RESULTS FOLLOW-UP (OUTPATIENT)
Dept: OBGYN CLINIC | Facility: CLINIC | Age: 50
End: 2025-05-19

## 2025-05-19 LAB
LAB AP GYN PRIMARY INTERPRETATION: NORMAL
Lab: NORMAL

## 2025-05-27 ENCOUNTER — HOSPITAL ENCOUNTER (OUTPATIENT)
Dept: CT IMAGING | Facility: HOSPITAL | Age: 50
Discharge: HOME | End: 2025-05-27
Attending: THORACIC SURGERY (CARDIOTHORACIC VASCULAR SURGERY)
Payer: COMMERCIAL

## 2025-05-27 DIAGNOSIS — C34.11 PRIMARY ADENOCARCINOMA OF UPPER LOBE OF RIGHT LUNG (HCC): ICD-10-CM

## 2025-05-27 PROCEDURE — 71250 CT THORAX DX C-: CPT

## 2025-06-06 ENCOUNTER — APPOINTMENT (OUTPATIENT)
Dept: LAB | Facility: CLINIC | Age: 50
End: 2025-06-06
Payer: COMMERCIAL

## 2025-06-06 DIAGNOSIS — E78.2 MIXED HYPERLIPIDEMIA: ICD-10-CM

## 2025-06-06 DIAGNOSIS — C34.11 PRIMARY ADENOCARCINOMA OF UPPER LOBE OF RIGHT LUNG (HCC): ICD-10-CM

## 2025-06-06 DIAGNOSIS — Z00.00 ANNUAL PHYSICAL EXAM: ICD-10-CM

## 2025-06-06 DIAGNOSIS — C64.1 RENAL CELL CARCINOMA OF RIGHT KIDNEY (HCC): ICD-10-CM

## 2025-06-06 LAB
ALBUMIN SERPL BCG-MCNC: 4.4 G/DL (ref 3.5–5)
ALP SERPL-CCNC: 126 U/L (ref 34–104)
ALT SERPL W P-5'-P-CCNC: 24 U/L (ref 7–52)
ANION GAP SERPL CALCULATED.3IONS-SCNC: 9 MMOL/L (ref 4–13)
AST SERPL W P-5'-P-CCNC: 18 U/L (ref 13–39)
BASOPHILS # BLD AUTO: 0.05 THOUSANDS/ÂΜL (ref 0–0.1)
BASOPHILS NFR BLD AUTO: 1 % (ref 0–1)
BILIRUB SERPL-MCNC: 0.41 MG/DL (ref 0.2–1)
BUN SERPL-MCNC: 15 MG/DL (ref 5–25)
CALCIUM SERPL-MCNC: 8.8 MG/DL (ref 8.4–10.2)
CHLORIDE SERPL-SCNC: 107 MMOL/L (ref 96–108)
CHOLEST SERPL-MCNC: 171 MG/DL (ref ?–200)
CO2 SERPL-SCNC: 26 MMOL/L (ref 21–32)
CREAT SERPL-MCNC: 0.84 MG/DL (ref 0.6–1.3)
EOSINOPHIL # BLD AUTO: 0.07 THOUSAND/ÂΜL (ref 0–0.61)
EOSINOPHIL NFR BLD AUTO: 1 % (ref 0–6)
ERYTHROCYTE [DISTWIDTH] IN BLOOD BY AUTOMATED COUNT: 13 % (ref 11.6–15.1)
GFR SERPL CREATININE-BSD FRML MDRD: 80 ML/MIN/1.73SQ M
GLUCOSE P FAST SERPL-MCNC: 95 MG/DL (ref 65–99)
HCT VFR BLD AUTO: 40.7 % (ref 34.8–46.1)
HDLC SERPL-MCNC: 63 MG/DL
HGB BLD-MCNC: 13.6 G/DL (ref 11.5–15.4)
IMM GRANULOCYTES # BLD AUTO: 0.02 THOUSAND/UL (ref 0–0.2)
IMM GRANULOCYTES NFR BLD AUTO: 0 % (ref 0–2)
LDLC SERPL CALC-MCNC: 94 MG/DL (ref 0–100)
LYMPHOCYTES # BLD AUTO: 1.68 THOUSANDS/ÂΜL (ref 0.6–4.47)
LYMPHOCYTES NFR BLD AUTO: 31 % (ref 14–44)
MCH RBC QN AUTO: 29.4 PG (ref 26.8–34.3)
MCHC RBC AUTO-ENTMCNC: 33.4 G/DL (ref 31.4–37.4)
MCV RBC AUTO: 88 FL (ref 82–98)
MONOCYTES # BLD AUTO: 0.45 THOUSAND/ÂΜL (ref 0.17–1.22)
MONOCYTES NFR BLD AUTO: 8 % (ref 4–12)
NEUTROPHILS # BLD AUTO: 3.21 THOUSANDS/ÂΜL (ref 1.85–7.62)
NEUTS SEG NFR BLD AUTO: 59 % (ref 43–75)
NONHDLC SERPL-MCNC: 108 MG/DL
NRBC BLD AUTO-RTO: 0 /100 WBCS
PLATELET # BLD AUTO: 210 THOUSANDS/UL (ref 149–390)
PMV BLD AUTO: 10.9 FL (ref 8.9–12.7)
POTASSIUM SERPL-SCNC: 4.2 MMOL/L (ref 3.5–5.3)
PROT SERPL-MCNC: 6.9 G/DL (ref 6.4–8.4)
RBC # BLD AUTO: 4.62 MILLION/UL (ref 3.81–5.12)
SODIUM SERPL-SCNC: 142 MMOL/L (ref 135–147)
TRIGL SERPL-MCNC: 70 MG/DL (ref ?–150)
TSH SERPL DL<=0.05 MIU/L-ACNC: 0.63 UIU/ML (ref 0.45–4.5)
WBC # BLD AUTO: 5.48 THOUSAND/UL (ref 4.31–10.16)

## 2025-06-06 PROCEDURE — 84443 ASSAY THYROID STIM HORMONE: CPT

## 2025-06-06 PROCEDURE — 80061 LIPID PANEL: CPT

## 2025-06-06 PROCEDURE — 80053 COMPREHEN METABOLIC PANEL: CPT

## 2025-06-06 PROCEDURE — 85025 COMPLETE CBC W/AUTO DIFF WBC: CPT

## 2025-06-06 PROCEDURE — 36415 COLL VENOUS BLD VENIPUNCTURE: CPT

## 2025-06-17 ENCOUNTER — OFFICE VISIT (OUTPATIENT)
Dept: CARDIAC SURGERY | Facility: CLINIC | Age: 50
End: 2025-06-17
Payer: COMMERCIAL

## 2025-06-17 ENCOUNTER — TELEPHONE (OUTPATIENT)
Age: 50
End: 2025-06-17

## 2025-06-17 VITALS
SYSTOLIC BLOOD PRESSURE: 104 MMHG | DIASTOLIC BLOOD PRESSURE: 66 MMHG | WEIGHT: 193 LBS | HEART RATE: 68 BPM | TEMPERATURE: 98.7 F | HEIGHT: 66 IN | OXYGEN SATURATION: 98 % | BODY MASS INDEX: 31.02 KG/M2

## 2025-06-17 DIAGNOSIS — C64.1 RENAL CELL CARCINOMA OF RIGHT KIDNEY (HCC): ICD-10-CM

## 2025-06-17 DIAGNOSIS — C34.11 PRIMARY ADENOCARCINOMA OF UPPER LOBE OF RIGHT LUNG (HCC): Primary | ICD-10-CM

## 2025-06-17 DIAGNOSIS — K44.9 HIATAL HERNIA: ICD-10-CM

## 2025-06-17 PROCEDURE — 99213 OFFICE O/P EST LOW 20 MIN: CPT | Performed by: THORACIC SURGERY (CARDIOTHORACIC VASCULAR SURGERY)

## 2025-06-17 NOTE — TELEPHONE ENCOUNTER
[employer] called today to request info on how to obtain the patient's medical records from 2018 onward. Caller was given the tel number to Medical Records Dept and was transferred.

## 2025-06-17 NOTE — ASSESSMENT & PLAN NOTE
This remains stable at this time.  She has no evidence of disease.  Surveillance continues with urology

## 2025-06-17 NOTE — ASSESSMENT & PLAN NOTE
At this time, she continues to have no evidence of disease recurrence.  She is now 4 years out.  She will be maintained on annual surveillance schedule.    Orders:    CT chest wo contrast; Future

## 2025-06-17 NOTE — PROGRESS NOTES
Name: Kelley Connolly      : 1975      MRN: [id number]  Encounter Provider: Lucy Shafer MD  Encounter Date: 2025   Encounter department: [facility]  :  Assessment & Plan  Primary adenocarcinoma of upper lobe of right lung (HCC)  At this time, she continues to have no evidence of disease recurrence.  She is now 4 years out.  She will be maintained on annual surveillance schedule.    Orders:    CT chest wo contrast; Future    Renal cell carcinoma of right kidney (HCC)  This remains stable at this time.  She has no evidence of disease.  Surveillance continues with urology         Hiatal hernia  This remains stable and her symptoms remain stable at this time.  Would recommend continuing medical management             Thoracic History     Oncology History   Primary adenocarcinoma of upper lobe of right lung (HCC)   2021 Surgery    Right thoracoscopic upper lobe wedge resection with completion upper lobectomy      2021 Initial Diagnosis    Primary adenocarcinoma of upper lobe of right lung (HCC)     2021 -  Cancer Staged    Staging form: Lung, AJCC 8th Edition  - Clinical stage from 2021: Stage IB (cT2a, cN0, cM0) - Signed by Lucy Shafer MD on 2021  Histopathologic type: Adenocarcinoma, NOS  Stage prefix: Initial diagnosis  Histologic grade (G): G2  Histologic grading system: 4 grade system  Laterality: Right  Tumor size (mm): 2.9  Lymph-vascular invasion (LVI): LVI not present (absent)/not identified  Specimen type: Excision  Type of lung cancer: Surgically resected non-small cell lung cancer  ECOG performance status: Grade 0  Pleural/elastic layer invasion: PL1  Pleural lavage cytology: Not assessed  Adequacy of mediastinal dissection: Adequate  Adjuvant radiation: No  Adjuvant chemotherapy: No  Stage used in treatment planning: Yes  National guidelines used in treatment planning: Yes  Type of national guideline used in treatment  planning: NCCN            History of Present Illness   HPI  April Cathleen is a 50 y.o. female who is known to me.  She underwent a VATS right upper lobectomy in April 2021.  She presents today for routine surveillance.  I have personally reviewed her most recent CT scan in PACS.  This demonstrates stability and no signs of any recurrence of her cancer.    Today in the office, she reports that she is doing well.  She denies any changes in her health since her last visit.  She does report that she is gaining weight and this has been frustrating to her.  Otherwise, she has no changes.    I have personally reviewed the most relevant notes in the chart including from her PCP and gastroenterology.    Review of Systems   Constitutional:  Negative for chills, fatigue, fever and unexpected weight change.   HENT: Negative.     Eyes: Negative.  Negative for visual disturbance.   Respiratory:  Negative for cough, shortness of breath and stridor.    Cardiovascular:  Negative for chest pain.   Gastrointestinal: Negative.    Endocrine: Negative.    Genitourinary: Negative.    Musculoskeletal: Negative.    Skin: Negative.    Neurological:  Negative for dizziness, light-headedness and headaches.   Hematological:  Negative for adenopathy.   Psychiatric/Behavioral: Negative.        Medical History Reviewed by provider this encounter:     .  Past Medical History   Past Medical History[1]  Past Surgical History[2]  Family History[3]   reports that she quit smoking about 29 years ago. Her smoking use included cigarettes. She has never used smokeless tobacco. She reports that she does not currently use alcohol. She reports that she does not use drugs.  Current Outpatient Medications   Medication Instructions    acetaminophen (TYLENOL) 650 mg, Oral, Every 6 hours PRN    ibuprofen (MOTRIN) 600 mg, Oral, Every 6 hours PRN   Allergies[4]   Medications Ordered Prior to Encounter[5]   Social History[6]     Objective   /66   Pulse 68    "Temp 98.7 °F (37.1 °C)   Ht 5' 6\" (1.676 m)   Wt 87.5 kg (193 lb)   SpO2 98%   BMI 31.15 kg/m²     Pain Screening:  Pain Score: 0-No pain  ECOG    Physical Exam  Vitals and nursing note reviewed.   Constitutional:       General: She is not in acute distress.     Appearance: Normal appearance. She is well-developed. She is not diaphoretic.   HENT:      Head: Normocephalic and atraumatic.      Nose: Nose normal. No congestion or rhinorrhea.      Mouth/Throat:      Mouth: Mucous membranes are moist.      Pharynx: Oropharynx is clear. No oropharyngeal exudate.     Eyes:      General: No scleral icterus.     Pupils: Pupils are equal, round, and reactive to light.     Neck:      Trachea: No tracheal deviation.     Cardiovascular:      Rate and Rhythm: Normal rate and regular rhythm.      Pulses: Normal pulses.      Heart sounds: Normal heart sounds. No murmur heard.  Pulmonary:      Effort: Pulmonary effort is normal. No respiratory distress.      Breath sounds: Normal breath sounds. No stridor. No wheezing or rales.   Chest:      Chest wall: No tenderness.   Abdominal:      General: Bowel sounds are normal. There is no distension.      Palpations: Abdomen is soft.      Tenderness: There is no abdominal tenderness. There is no rebound.     Musculoskeletal:         General: Normal range of motion.      Cervical back: Normal range of motion and neck supple. No muscular tenderness.   Lymphadenopathy:      Cervical: No cervical adenopathy.     Skin:     General: Skin is warm and dry.      Coloration: Skin is not jaundiced or pale.      Findings: No erythema or rash.     Neurological:      General: No focal deficit present.      Mental Status: She is alert and oriented to person, place, and time.     Psychiatric:         Mood and Affect: Mood normal.         Behavior: Behavior normal.         Thought Content: Thought content normal.         Judgment: Judgment normal.         Labs:   Results for orders placed or performed " in visit on 06/06/25   CBC and differential   Result Value Ref Range    WBC 5.48 4.31 - 10.16 Thousand/uL    RBC 4.62 3.81 - 5.12 Million/uL    Hemoglobin 13.6 11.5 - 15.4 g/dL    Hematocrit 40.7 34.8 - 46.1 %    MCV 88 82 - 98 fL    MCH 29.4 26.8 - 34.3 pg    MCHC 33.4 31.4 - 37.4 g/dL    RDW 13.0 11.6 - 15.1 %    MPV 10.9 8.9 - 12.7 fL    Platelets 210 149 - 390 Thousands/uL    nRBC 0 /100 WBCs    Segmented % 59 43 - 75 %    Immature Grans % 0 0 - 2 %    Lymphocytes % 31 14 - 44 %    Monocytes % 8 4 - 12 %    Eosinophils Relative 1 0 - 6 %    Basophils Relative 1 0 - 1 %    Absolute Neutrophils 3.21 1.85 - 7.62 Thousands/µL    Absolute Immature Grans 0.02 0.00 - 0.20 Thousand/uL    Absolute Lymphocytes 1.68 0.60 - 4.47 Thousands/µL    Absolute Monocytes 0.45 0.17 - 1.22 Thousand/µL    Eosinophils Absolute 0.07 0.00 - 0.61 Thousand/µL    Basophils Absolute 0.05 0.00 - 0.10 Thousands/µL   Comprehensive metabolic panel   Result Value Ref Range    Sodium 142 135 - 147 mmol/L    Potassium 4.2 3.5 - 5.3 mmol/L    Chloride 107 96 - 108 mmol/L    CO2 26 21 - 32 mmol/L    ANION GAP 9 4 - 13 mmol/L    BUN 15 5 - 25 mg/dL    Creatinine 0.84 0.60 - 1.30 mg/dL    Glucose, Fasting 95 65 - 99 mg/dL    Calcium 8.8 8.4 - 10.2 mg/dL    AST 18 13 - 39 U/L    ALT 24 7 - 52 U/L    Alkaline Phosphatase 126 (H) 34 - 104 U/L    Total Protein 6.9 6.4 - 8.4 g/dL    Albumin 4.4 3.5 - 5.0 g/dL    Total Bilirubin 0.41 0.20 - 1.00 mg/dL    eGFR 80 ml/min/1.73sq m   Lipid panel   Result Value Ref Range    Cholesterol 171 See Comment mg/dL    Triglycerides 70 See Comment mg/dL    HDL, Direct 63 >=50 mg/dL    LDL Calculated 94 0 - 100 mg/dL    Non-HDL-Chol (CHOL-HDL) 108 mg/dl   TSH, 3rd generation with Free T4 reflex   Result Value Ref Range    TSH 3RD GENERATION 0.633 0.450 - 4.500 uIU/mL        Radiology Results Review : I have reviewed images/report studies in PACS as described above (in the HPI).  Pathology: I have reviewed pathology reports  described above.           [1]   Past Medical History:  Diagnosis Date    Abnormal Pap smear of cervix     Allergic     Anxiety     BRCA1 negative     BRCA2 negative     Breast cyst     Cancer (HCC) 06/16/2019    renal cell , lung  - 2 separate cancers - pt states unrelated    Chronic kidney disease 08/2019    tumor and cyst    Colon polyp     Fibroids, submucosal 01/26/2024    Gastritis     Hiatal hernia     Nodule of upper lobe of right lung 04/2021    Personal history of COVID-19 December 2022    PONV (postoperative nausea and vomiting)     Renal cell carcinoma (HCC) 06/2019    Renal mass, right    [2]   Past Surgical History:  Procedure Laterality Date    BREAST BIOPSY Left 01/2020    benign    BREAST CYST EXCISION      BREAST LUMPECTOMY      CERVICAL BIOPSY  W/ LOOP ELECTRODE EXCISION      COLONOSCOPY      EGD      KIDNEY SURGERY  Aug 2019    LUNG SEGMENTECTOMY Right 04/14/2021    Procedure: RESECTION WEDGE LUNG;  Surgeon: Lucy Shafer MD;  [facility]: [facility];  Service: Thoracic    NEPHRECTOMY  8/26/19    [place] [facility] [place] [facility] [place]/CELL WASHG SPX N/A 04/14/2021    Procedure: BRONCHOSCOPY FLEXIBLE;  Surgeon: Lucy Shafer MD;  [facility];  Service: Thoracic    [place] HYSTEROSCOPY BX ENDOMETRIUM&/POLYPC W/WO D&C N/A 01/26/2024    Procedure: D&C HYSTEROCOPY;  Surgeon: Yennifer Booker MD;  [facility];  Service: Gynecology    [place] HYSTEROSCOPY BX ENDOMETRIUM&/POLYPC W/WO D&C N/A 01/26/2024    Procedure: myomectomy;  Surgeon: Yennifer Booker MD;  [facility];  Service: Gynecology    [place] LAPAROSCOPY SURG PARTIAL NEPHRECTOMY Right 08/26/2019    Procedure: NEPHRECTOMY PARTIAL LAPAROSCOPIC W ROBOTICS WITH INTRA-OP LAPAROSCOPIC ULTRASOUND;  Surgeon: Mendel Preciado MD;  [facility];  Service: Urology    [place] THORACOSCOPY W/LOBECTOMY SINGLE LOBE Right 04/14/2021    Procedure: LOBECTOMY LUNG; MEDIASTINAL LYMPH NODE DISSECTION;   Surgeon: Lucy Shafer MD;  [facility];  Service: Thoracic    [place] THORACOSCOPY W/THERA WEDGE RESEXN INITIAL UNILAT Right 2021    Procedure: THORACOSCOPY VIDEO ASSISTED SURGERY (VATS);  Surgeon: Lucy Shafer MD;  [facility];  Service: Thoracic    US GUIDED BREAST BIOPSY LEFT COMPLETE Left 01/15/2020    WISDOM TOOTH EXTRACTION     [3]   Family History  Problem Relation Name Age of Onset    No Known Problems Mother      Heart disease Father Sha Simons     Coronary artery disease Father Sha Simons     Early death Father Sha Simons         Heart Disease  at age 55    No Known Problems Sister      No Known Problems Maternal Grandmother      Cancer Paternal Grandmother Dads mom         Breast cancer    Diabetes Paternal Grandmother Dads mom     Breast cancer Paternal Grandmother Dads mom 70    Arthritis Paternal Grandmother Dads mom     Breast cancer Paternal Grandfather Dads father 70    Cancer Paternal Grandfather Dads father         Breast cancer    Kidney cancer Maternal Aunt Jaye     No Known Problems Maternal Aunt      No Known Problems Maternal Aunt     [4]   Allergies  Allergen Reactions    Strawberry (Diagnostic) - Food Allergy Hives    Tetracycline      breast swelling    [5]   Current Outpatient Medications on File Prior to Visit   Medication Sig Dispense Refill    acetaminophen (TYLENOL) 325 mg tablet Take 2 tablets (650 mg total) by mouth every 6 (six) hours as needed for mild pain (Patient not taking: Reported on 2024)      ibuprofen (MOTRIN) 600 mg tablet Take 1 tablet (600 mg total) by mouth every 6 (six) hours as needed for moderate pain (Patient not taking: Reported on 2024)       No current facility-administered medications on file prior to visit.   [6]   Social History  Tobacco Use    Smoking status: Former     Current packs/day: 0.00     Types: Cigarettes     Quit date: 1996     Years since quittin.4    Smokeless tobacco: Never    Tobacco  comments:     quit on 1/1/21   Vaping Use    Vaping status: Never Used   Substance and Sexual Activity    Alcohol use: Not Currently    Drug use: Never    Sexual activity: Yes     Partners: Male     Birth control/protection: Condom Male

## 2025-06-17 NOTE — ASSESSMENT & PLAN NOTE
This remains stable and her symptoms remain stable at this time.  Would recommend continuing medical management

## 2025-06-18 ENCOUNTER — OFFICE VISIT (OUTPATIENT)
Dept: FAMILY MEDICINE CLINIC | Facility: CLINIC | Age: 50
End: 2025-06-18
Payer: COMMERCIAL

## 2025-06-18 VITALS
HEART RATE: 70 BPM | TEMPERATURE: 97.9 F | HEIGHT: 66 IN | BODY MASS INDEX: 31.08 KG/M2 | WEIGHT: 193.4 LBS | DIASTOLIC BLOOD PRESSURE: 78 MMHG | SYSTOLIC BLOOD PRESSURE: 120 MMHG | RESPIRATION RATE: 18 BRPM | OXYGEN SATURATION: 97 %

## 2025-06-18 DIAGNOSIS — K44.9 HIATAL HERNIA: Primary | ICD-10-CM

## 2025-06-18 DIAGNOSIS — C64.1 RENAL CELL CARCINOMA OF RIGHT KIDNEY (HCC): ICD-10-CM

## 2025-06-18 DIAGNOSIS — E78.2 MIXED HYPERLIPIDEMIA: ICD-10-CM

## 2025-06-18 DIAGNOSIS — L98.9 SKIN LESIONS: ICD-10-CM

## 2025-06-18 DIAGNOSIS — C34.11 PRIMARY ADENOCARCINOMA OF UPPER LOBE OF RIGHT LUNG (HCC): ICD-10-CM

## 2025-06-18 DIAGNOSIS — E66.3 OVERWEIGHT: ICD-10-CM

## 2025-06-18 PROCEDURE — 99214 OFFICE O/P EST MOD 30 MIN: CPT | Performed by: FAMILY MEDICINE

## 2025-06-18 NOTE — ASSESSMENT & PLAN NOTE
Hiatal hernia stable no acid reflux or indigestion recently patient can add Pepcid complete as needed basis follow-up with me in 6 months

## 2025-06-18 NOTE — ASSESSMENT & PLAN NOTE
Stable urinalysis will be followed up every 6 months patient followed by urology no new recurrence at this time reevaluate at next visit

## 2025-06-18 NOTE — ASSESSMENT & PLAN NOTE
Discussed the diet plan with patient recommend possibly using [employer] allegra on her phone needs to follow caloric intake and exercise.  Menopause, 2 cancers all at once now she will try diet and exercise

## 2025-06-18 NOTE — ASSESSMENT & PLAN NOTE
Stable seen yesterday by thoracic surgery for annual surveillance no new changes.  Follow-up with thoracic as scheduled and reevaluate with me in 6 months

## 2025-06-18 NOTE — PROGRESS NOTES
"Name: Kelley Connolly      : 1975      MRN: [id number]  Encounter Provider: Jonathan Damian DO  Encounter Date: 2025   Encounter department: [facility]  :  Assessment & Plan  Hiatal hernia  Hiatal hernia stable no acid reflux or indigestion recently patient can add Pepcid complete as needed basis follow-up with me in 6 months         Primary adenocarcinoma of upper lobe of right lung (HCC)  Stable seen yesterday by thoracic surgery for annual surveillance no new changes.  Follow-up with thoracic as scheduled and reevaluate with me in 6 months         Renal cell carcinoma of right kidney (HCC)  Stable urinalysis will be followed up every 6 months patient followed by urology no new recurrence at this time reevaluate at next visit         Mixed hyperlipidemia  Stable monitor diet avoid saturated fats reevaluate in 6 months    Orders:    Comprehensive metabolic panel; Future    Lipid panel; Future    TSH, 3rd generation with Free T4 reflex; Future    Overweight    Discussed the diet plan with patient recommend possibly using [employer] allegra on her phone needs to follow caloric intake and exercise.  Menopause, 2 cancers all at once now she will try diet and exercise         Skin lesions    Orders:    Ambulatory Referral to Dermatology; Future           History of Present Illness   HPI  Review of Systems    Objective   /78 (BP Location: Left arm, Patient Position: Sitting, Cuff Size: Standard)   Pulse 70   Temp 97.9 °F (36.6 °C) (Tympanic)   Resp 18   Ht 5' 6\" (1.676 m)   Wt 87.7 kg (193 lb 6.4 oz)   SpO2 97%   BMI 31.22 kg/m²      Physical Exam    " No

## (undated) DEVICE — FLOSEAL HEMOSTATIC MATRIX, 10 ML: Brand: FLOSEAL

## (undated) DEVICE — DRAPE EQUIPMENT RF WAND

## (undated) DEVICE — TIP COVER ACCESSORY

## (undated) DEVICE — DRAPE SHEET X-LG

## (undated) DEVICE — ENDOPATH 5MM ENDOSCOPIC BLUNT TIP DISSECTORS (12 POUCHES CONTAINING 3 DISSECTORS EACH): Brand: ENDOPATH

## (undated) DEVICE — TROCAR PORT ACCESS 12 X120MM W/BLDLS OPTICAL TIP AIRSEAL

## (undated) DEVICE — SUT VICRYL 0 UR-6 27 IN J603H

## (undated) DEVICE — 3M™ IOBAN™ 2 ANTIMICROBIAL INCISE DRAPE 6650EZ: Brand: IOBAN™ 2

## (undated) DEVICE — 3000CC GUARDIAN II: Brand: GUARDIAN

## (undated) DEVICE — JP PERF DRN SIL FLT 10MM FULL: Brand: CARDINAL HEALTH

## (undated) DEVICE — ELECTRODE LAP L WIRE E-Z CLEAN 33CM -0100

## (undated) DEVICE — THE ECHELON FLEX POWERED PLUS ARTICULATING ENDOSCOPIC LINEAR CUTTERS ARE STERILE, SINGLE PATIENT USE INSTRUMENTS THAT SIMULTANEOUSLYCUT AND STAPLE TISSUE. THERE ARE SIX STAGGERED ROWS OF STAPLES, THREE ON EITHER SIDE OF THE CUT LINE. THE ECHELON FLEX 45 POWERED PLUSINSTRUMENTS HAVE A STAPLE LINE THAT IS APPROXIMATELY 45 MM LONG AND A CUT LINE THAT IS APPROXIMATELY 42 MM LONG. THE SHAFT CAN ROTATE FREELYIN BOTH DIRECTIONS AND AN ARTICULATION MECHANISM ENABLES THE DISTAL PORTION OF THE SHAFT TO PIVOT TO FACILITATE LATERAL ACCESS TO THE OPERATIVESITE.THE INSTRUMENTS ARE PACKAGED WITH A PRIMARY LITHIUM BATTERY PACK THAT MUST BE INSTALLED PRIOR TO USE. THERE ARE SPECIFIC REQUIREMENTS FORDISPOSING OF THE BATTERY PACK. REFER TO THE BATTERY PACK DISPOSAL SECTION.THE INSTRUMENTS ARE PACKAGED WITHOUT A RELOAD AND MUST BE LOADED PRIOR TO USE. A STAPLE RETAINING CAP ON THE RELOAD PROTECTS THE STAPLE LEGPOINTS DURING SHIPPING AND TRANSPORTATION. THE INSTRUMENTS’ LOCK-OUT FEATURE IS DESIGNED TO PREVENT A USED OR IMPROPERLY INSTALLED RELOADFROM BEING REFIRED OR AN INSTRUMENT FROM BEING FIRED WITHOUT A RELOAD.: Brand: ECHELON FLEX

## (undated) DEVICE — THE ECHELON, ECHELON ENDOPATH™ AND ECHELON FLEX™ FAMILIES OF ENDOSCOPIC LINEAR CUTTERS AND RELOADS ARE STERILE, SINGLE PATIENT USE INSTRUMENTS THAT SIMULTANEOUSLY CUT AND STAPLE TISSUE. THERE ARE SIX STAGGERED ROWS OF STAPLES, THREE ON EITHER SIDE OF THE CUT LINE. THE 45 MM INSTRUMENTS HAVE A STAPLE LINE THATIS APPROXIMATELY 45 MM LONG AND A CUT LINE THAT IS APPROXIMATELY 42 MM LONG. THE SHAFT CAN ROTATE FREELY IN BOTH DIRECTIONS AND AN ARTICULATION MECHANISM ON ARTICULATING INSTRUMENTS ENABLES BENDING THE DISTAL PORTIONOF THE SHAFT TO FACILITATE LATERAL ACCESS OF THE OPERATIVE SITE.THE INSTRUMENTS ARE SHIPPED WITHOUT A RELOAD AND MUST BE LOADED PRIOR TO USE. A STAPLE RETAINING CAP ON THE RELOAD PROTECTS THE STAPLE LEG POINTS DURING SHIPPING AND TRANSPORTATION. THE INSTRUMENTS’ LOCK-OUT FEATURE IS DESIGNED TO PREVENT A USED RELOAD FROM BEING REFIRED.: Brand: ECHELON ENDOPATH

## (undated) DEVICE — INTENDED FOR TISSUE SEPARATION, AND OTHER PROCEDURES THAT REQUIRE A SHARP SURGICAL BLADE TO PUNCTURE OR CUT.: Brand: BARD-PARKER SAFETY BLADES SIZE 10, STERILE

## (undated) DEVICE — LUBRICANT INST ELECTROLUBE ANTISTK WO PAD

## (undated) DEVICE — SUT STRATAFIX SPIRAL 2-0 CT-1 30 CM SXPD1B401

## (undated) DEVICE — ARM DRAPE

## (undated) DEVICE — TISSUE REMOVAL SYSTEM FLUID MANAGEMENT ACCESSORIES: Brand: SYMPHION

## (undated) DEVICE — GLOVE INDICATOR PI UNDERGLOVE SZ 7.5 BLUE

## (undated) DEVICE — ENDOPATH ECHELON VASCULAR  RELOADS, WHITE, 35MM: Brand: ECHELON ENDOPATH

## (undated) DEVICE — UTILITY MARKER,BLACK WITH LABELS: Brand: DEVON

## (undated) DEVICE — DRAIN SPONGES,6 PLY: Brand: EXCILON

## (undated) DEVICE — NEEDLE SPINAL18G X 3.5 IN QUINCKE

## (undated) DEVICE — ADHESIVE SKIN HIGH VISCOSITY EXOFIN 1ML

## (undated) DEVICE — STERILE THORACIC PACK: Brand: CARDINAL HEALTH

## (undated) DEVICE — GAUZE SPONGES,16 PLY: Brand: CURITY

## (undated) DEVICE — SUT VICRYL 3-0 SH 27 IN J416H

## (undated) DEVICE — SUT VLOC 90 3-0 V-20 9IN VLOCM0644

## (undated) DEVICE — PAD GROUNDING ADULT

## (undated) DEVICE — GLOVE INDICATOR PI UNDERGLOVE SZ 6.5 BLUE

## (undated) DEVICE — FENESTRATED BIPOLAR FORCEPS: Brand: ENDOWRIST

## (undated) DEVICE — HEAVY DUTY TABLE COVER: Brand: CONVERTORS

## (undated) DEVICE — GLOVE SRG BIOGEL ECLIPSE 6

## (undated) DEVICE — SUT ETHILON 3-0 PS-1 18 IN 1663G

## (undated) DEVICE — WOUND RETRACTOR AND PROTECTOR: Brand: ALEXIS WOUND PROTECTOR-RETRACTOR

## (undated) DEVICE — VISUALIZATION SYSTEM: Brand: CLEARIFY

## (undated) DEVICE — SINGLE TUBING WITH LARGE CONNECTOR FOR THORACIC SUCTION SYSTEM PUMP: Brand: THOPAZ TUBING SINGLE

## (undated) DEVICE — AIRSEAL TUBE SMOKE EVAC LUMENX3 FILTERED

## (undated) DEVICE — PROGRASP FORCEPS: Brand: ENDOWRIST

## (undated) DEVICE — PVC URETHRAL CATHETER: Brand: DOVER

## (undated) DEVICE — SUT SILK 0 30 IN A306H

## (undated) DEVICE — TISSUE RETRIEVAL SYSTEM: Brand: INZII RETRIEVAL SYSTEM

## (undated) DEVICE — ECHELON FLEX  POWERED VASCULAR STAPLER WITH ADVANCED PLACEMENT TIP, 35MM: Brand: ECHELON FLEX

## (undated) DEVICE — IRRIG ENDO FLO TUBING

## (undated) DEVICE — SURGICEL 4 X 8

## (undated) DEVICE — INTENDED FOR TISSUE SEPARATION, AND OTHER PROCEDURES THAT REQUIRE A SHARP SURGICAL BLADE TO PUNCTURE OR CUT.: Brand: BARD-PARKER SAFETY BLADES SIZE 15, STERILE

## (undated) DEVICE — HEM-O-LOK CLIP CARTRIDGE LARGE DA VINCI SI/XI

## (undated) DEVICE — JACKSON-PRATT 100CC BULB RESERVOIR: Brand: CARDINAL HEALTH

## (undated) DEVICE — Device: Brand: TISSUE RETRIEVAL SYSTEM

## (undated) DEVICE — SINGLE USE SUCTION VALVE MAJ-209: Brand: SINGLE USE SUCTION VALVE (STERILE)

## (undated) DEVICE — LIGHT HANDLE COVER SLEEVE DISP BLUE STELLAR

## (undated) DEVICE — SCD SEQUENTIAL COMPRESSION COMFORT SLEEVE MEDIUM KNEE LENGTH: Brand: KENDALL SCD

## (undated) DEVICE — COLUMN DRAPE

## (undated) DEVICE — SUT PDS II 0 CT-1 27 IN Z340H

## (undated) DEVICE — MONOPOLAR CURVED SCISSORS: Brand: ENDOWRIST

## (undated) DEVICE — FIRST STEP BEDSIDE KIT - STAND-UP POUCH, ENDOSCOPIC CLEANING PAD - 1 POUCH: Brand: FIRST STEP BEDSIDE KIT - STAND-UP POUCH, ENDOSCOPIC CLEANING PAD

## (undated) DEVICE — SUT VICRYL 0 54 IN J207G

## (undated) DEVICE — CHLORHEXIDINE 4PCT 4 OZ

## (undated) DEVICE — SPONGE 4 X 4 XRAY 16 PLY STRL LF RFD

## (undated) DEVICE — CYSTO TUBING SINGLE IRRIGATION

## (undated) DEVICE — NEEDLE SPINAL 20G X 3.5 LF

## (undated) DEVICE — LARGE NEEDLE DRIVER: Brand: ENDOWRIST

## (undated) DEVICE — ELECTRODE BLADE MOD E-Z CLEAN 2.5IN 6.4CM -0012M

## (undated) DEVICE — 2000CC GUARDIAN II: Brand: GUARDIAN

## (undated) DEVICE — CHLORAPREP HI-LITE 26ML ORANGE

## (undated) DEVICE — 28 FR STRAIGHT – SOFT PVC CATHETER: Brand: PVC THORACIC CATHETERS

## (undated) DEVICE — SUT MONOCRYL 4-0 PS-2 18 IN Y496G

## (undated) DEVICE — GLOVE SRG BIOGEL ECLIPSE 7.5

## (undated) DEVICE — TRAY FOLEY 16FR URIMETER SURESTEP

## (undated) DEVICE — BETHLEHEM MAJOR GENERAL PACK: Brand: CARDINAL HEALTH

## (undated) DEVICE — CANISTER FOR THORACIC SUCTION SYSTEM: Brand: THOPAZ DISPOSABLE CANISTER 0.8L

## (undated) DEVICE — CLAMP TOWEL TUBING DISPOSABLE

## (undated) DEVICE — SINGLE USE BIOPSY VALVE MAJ-210: Brand: SINGLE USE BIOPSY VALVE (STERILE)

## (undated) DEVICE — STRL ALLENTOWN HYSTEROSCOPY PK: Brand: CARDINAL HEALTH

## (undated) DEVICE — SYRINGE 10ML LL

## (undated) DEVICE — 4-PORT MANIFOLD: Brand: NEPTUNE 2

## (undated) DEVICE — SUT VICRYL 2-0 SH 27 IN UNDYED J417H

## (undated) DEVICE — ADHESIVE SKN CLSR HISTOACRYL FLEX 0.5ML LF

## (undated) DEVICE — ENDOPATH PNEUMONEEDLE INSUFFLATION NEEDLES WITH LUER LOCK CONNECTORS 120MM: Brand: ENDOPATH

## (undated) DEVICE — SUT VICRYL 0 CT-1 27 IN J260H

## (undated) DEVICE — TISSUE REMOVAL SYSTEM RESECTING DEVICE: Brand: SYMPHION

## (undated) DEVICE — VESSEL LOOPS X-RAY DETECTABLE: Brand: DEROYAL

## (undated) DEVICE — TUBING SUCTION 5MM X 12 FT

## (undated) DEVICE — CANNULA SEAL

## (undated) DEVICE — PENCIL ELECTROSURG E-Z CLEAN -0035H

## (undated) DEVICE — TRAY FOLEY 18FR URIMETER SURESTEP

## (undated) DEVICE — SUT PROLENE 0 CT-1 30 IN 8424H

## (undated) DEVICE — MAYO STAND COVER: Brand: CONVERTORS